# Patient Record
Sex: FEMALE | Race: WHITE | NOT HISPANIC OR LATINO | Employment: OTHER | URBAN - METROPOLITAN AREA
[De-identification: names, ages, dates, MRNs, and addresses within clinical notes are randomized per-mention and may not be internally consistent; named-entity substitution may affect disease eponyms.]

---

## 2017-02-06 DIAGNOSIS — I10 ESSENTIAL HYPERTENSION: Primary | ICD-10-CM

## 2017-02-06 RX ORDER — LISINOPRIL 5 MG/1
5 TABLET ORAL DAILY
Qty: 90 TABLET | Refills: 0 | Status: SHIPPED | OUTPATIENT
Start: 2017-02-06 | End: 2017-05-08

## 2017-02-06 NOTE — TELEPHONE ENCOUNTER
Medication is being filled for 1 time refill only due to:  Patient needs labs BMP. Future labs ordered Yes.

## 2017-02-07 ENCOUNTER — TELEPHONE (OUTPATIENT)
Dept: FAMILY MEDICINE | Facility: CLINIC | Age: 77
End: 2017-02-07

## 2017-02-07 ENCOUNTER — HOSPITAL ENCOUNTER (EMERGENCY)
Facility: CLINIC | Age: 77
Discharge: HOME | End: 2017-02-07
Attending: EMERGENCY MEDICINE | Admitting: EMERGENCY MEDICINE
Payer: MEDICARE

## 2017-02-07 ENCOUNTER — APPOINTMENT (OUTPATIENT)
Dept: ULTRASOUND IMAGING | Facility: CLINIC | Age: 77
End: 2017-02-07
Attending: EMERGENCY MEDICINE
Payer: MEDICARE

## 2017-02-07 ENCOUNTER — APPOINTMENT (OUTPATIENT)
Dept: GENERAL RADIOLOGY | Facility: CLINIC | Age: 77
End: 2017-02-07
Attending: EMERGENCY MEDICINE
Payer: MEDICARE

## 2017-02-07 VITALS
OXYGEN SATURATION: 100 % | RESPIRATION RATE: 14 BRPM | TEMPERATURE: 97.7 F | SYSTOLIC BLOOD PRESSURE: 142 MMHG | HEART RATE: 101 BPM | HEIGHT: 66 IN | WEIGHT: 175 LBS | DIASTOLIC BLOOD PRESSURE: 82 MMHG | BODY MASS INDEX: 28.12 KG/M2

## 2017-02-07 DIAGNOSIS — R60.0 LEG EDEMA, LEFT: ICD-10-CM

## 2017-02-07 PROCEDURE — 93971 EXTREMITY STUDY: CPT | Mod: LT

## 2017-02-07 PROCEDURE — 99284 EMERGENCY DEPT VISIT MOD MDM: CPT | Mod: 25 | Performed by: EMERGENCY MEDICINE

## 2017-02-07 PROCEDURE — 99284 EMERGENCY DEPT VISIT MOD MDM: CPT | Mod: Z6 | Performed by: EMERGENCY MEDICINE

## 2017-02-07 PROCEDURE — 73560 X-RAY EXAM OF KNEE 1 OR 2: CPT | Mod: LT

## 2017-02-07 ASSESSMENT — ENCOUNTER SYMPTOMS
VOMITING: 0
DIFFICULTY URINATING: 0
NAUSEA: 0
CHILLS: 0
COLOR CHANGE: 0
WEAKNESS: 0
SHORTNESS OF BREATH: 0
POLYDIPSIA: 0
NUMBNESS: 0
BRUISES/BLEEDS EASILY: 0
AGITATION: 0
NECK PAIN: 0
ADENOPATHY: 0
ABDOMINAL PAIN: 0
FEVER: 0
BACK PAIN: 0
LIGHT-HEADEDNESS: 0
NECK STIFFNESS: 0

## 2017-02-07 NOTE — ED AVS SNAPSHOT
[facility], [place], Emergency Department    [address] [address]    Phone:  [phone]                                       Shyann Samuel   MRN: [id number]    Department:  [facility], Emergency Department   Date of Visit:  2/7/2017           After Visit Summary Signature Page     I have received my discharge instructions, and my questions have been answered. I have discussed any challenges I see with this plan with the nurse or doctor.    ..........................................................................................................................................  Patient/Patient Representative Signature      ..........................................................................................................................................  Patient Representative Print Name and Relationship to Patient    ..................................................               ................................................  Date                                            Time    ..........................................................................................................................................  Reviewed by Signature/Title    ...................................................              ..............................................  Date                                                            Time

## 2017-02-07 NOTE — TELEPHONE ENCOUNTER
Discussed MyChart message from pt dated evening of 2/6 with Dr Starks - concern about swelling of pt's left calf, as well as joint swelling and pain of knee and ankle following a fall about 10 days ago. Per Dr Starks - pt needs to go to ER for possible blood clot - needs assessment of leg and joint swelling. Advised pt of this - she agrees with plan - she will go to [facility] ER this AM. She will call clinic prn .

## 2017-02-07 NOTE — ED AVS SNAPSHOT
[facility], Emergency Department    [address] [address]    Phone:  [phone]                                       Shyann Samuel   MRN: [id number]    Department:  [facility], Emergency Department   Date of Visit:  2/7/2017           Patient Information     Date Of Birth          1940        Your diagnoses for this visit were:     Leg edema, left        You were seen by Umair Stewart MD.        Discharge Instructions       Please make an appointment to follow up with Your Primary Care Provider in 7 days unless symptoms completely resolve. If your swelling worsens you should obtain a repeat ultrasound in one week since small blood clots in the veins of the calf cannot be seen adequately early in the process of clot formation.    Leg Swelling (1 Leg Only)  Swelling of the arms, feet, ankles, and legs is called edema. It is caused by extra fluid collecting in the tissues. Because of gravity, extra fluid in the body settles to the lowest part. That is why the legs and feet are most affected. You have swelling in only 1 leg.  Some of the causes for swelling in only one leg include:    Infection in the foot or leg    Long-term problem with a vein not working well (venous insufficiency)    Swollen, twisted vein in the leg (varicose veins)    Insect bite or sting on the foot or leg    Injury or recent surgery on the foot or leg    Blood clot in a deep vein of the leg (deep vein thrombosis or DVT)  Medical treatment will depend on what is causing your swelling.  Home care  Follow these guidelines when caring for yourself at home:    Don t wear tight clothing.    Keep your legs up while lying or sitting.    Take any medicines as directed.    If infection, injury, or recent surgery is the cause of your swelling, stay off your legs as much as possible until your symptoms get better.    If you have venous insufficiency or varicose veins, don t sit or  one place for long periods of time.  Take breaks and walk around every few hours. Talk with your healthcare provider about wearing support stockings to help lessen swelling during the day.  Follow-up care  Follow up with your health care provider as advised.  Call 911  Call 911 if any of these occur:    Shortness of breath or trouble breathing    Chest pain    Coughing up blood    Fainting or loss of consciousness   When to seek medical advice  Call your healthcare provider right away if any of these occur:    Increased pain, swelling, warmth, or redness of the leg, ankle, or foot    Fever of 100.4 F (38 C) or higher, or as directed by your healthcare provider    Weakness or dizziness    9626-7502 [employer]. [address] [place] [address], [place] [address]. All rights reserved. This information is not intended as a substitute for professional medical care. Always follow your healthcare professional's instructions.            24 Hour Appointment Hotline       To make an appointment at any [facility], call [phone] ([phone]). If you don't have a family doctor or clinic, we will help you find one. [facility] clinics are conveniently located to serve the needs of you and your family.             Review of your medicines      Our records show that you are taking the medicines listed below. If these are incorrect, please call your family doctor or clinic.        Dose / Directions Last dose taken    CALCIUM + D PO        Take  by mouth. 600mg/400 IU bid   Refills:  0        ciprofloxacin 500 MG tablet   Commonly known as:  CIPRO   Dose:  500 mg   Quantity:  10 tablet        Take 1 tablet (500 mg) by mouth 2 times daily   Refills:  1        conjugated estrogens cream   Commonly known as:  PREMARIN   Dose:  1 g   Quantity:  30 g        Place 1 g vaginally nightly as needed   Refills:  3        cyanocobalamin 1000 MCG tablet   Commonly known as:  vitamin  B-12   Dose:  1 tablet        Take 1 tablet by mouth three times a week.   Refills:   0        lisinopril 5 MG tablet   Commonly known as:  PRINIVIL/ZESTRIL   Dose:  5 mg   Quantity:  90 tablet        Take 1 tablet (5 mg) by mouth daily Needs labs for further refills   Refills:  0        NAPROXEN PO   Dose:  220 mg        Take 220 mg by mouth 2 times daily as needed for moderate pain   Refills:  0        OMEGA-3 FISH OIL PO        Take  by mouth.   Refills:  0        simvastatin 20 MG tablet   Commonly known as:  ZOCOR   Dose:  20 mg   Quantity:  90 tablet        Take 1 tablet (20 mg) by mouth At Bedtime   Refills:  3        * UNABLE TO FIND        MEDICATION NAME: Six-Flavor Tea pills, 8 pills (Chinese herbal medicain) Strengthen kidney merdian   Refills:  0        * UNABLE TO FIND        MEDICATION NAME: Barry Ash Raya pills (Chinese herbal medicine) for sleep   Refills:  0        * Notice:  This list has 2 medication(s) that are the same as other medications prescribed for you. Read the directions carefully, and ask your doctor or other care provider to review them with you.            Procedures and tests performed during your visit     US Lower Extremity Venous Duplex Left    XR Knee Left 1/2 Views      Orders Needing Specimen Collection     None      Pending Results     Date and Time Order Name Status Description    2/7/2017 1242 XR Knee Left 1/2 Views Preliminary             Pending Culture Results     No orders found from 2/6/2017 to 2/8/2017.            Thank you for choosing [facility]       Thank you for choosing [facility] for your care. Our goal is always to provide you with excellent care. Hearing back from our patients is one way we can continue to improve our services. Please take a few minutes to complete the written survey that you may receive in the mail after you visit with us. Thank you!        [employer] Information     [employer] gives you secure access to your electronic health record. If you see a primary care provider, you can also send messages to your care team and make  appointments. If you have questions, please call your primary care clinic.  If you do not have a primary care provider, please call [phone] and they will assist you.        Care EveryWhere ID     This is your Care EveryWhere ID. This could be used by other organizations to access your [facility] medical records  [id number]        After Visit Summary       This is your record. Keep this with you and show to your community pharmacist(s) and doctor(s) at your next visit.

## 2017-02-07 NOTE — ED PROVIDER NOTES
"    [facility] ([facility])  February 7, 2017  ED 11   History     Chief Complaint   Patient presents with     Leg Swelling     The history is provided by the patient and medical records.     Shyann Samuel is a 76 year old female with a medical history significant for hyperlipidemia, hypertension, osteoarthritis, presents to the emergency department for evaluation following a slip and fall on January 26. Patient reports that she slipped and fell getting out of her car and fell onto both knees. She reports that later in that evening she noted the left knee appeared swollen, but she did not think much of it as she considered the fall a \"gentle fall.\" She reports that she did not have any pain at that time. However, she reports that that a few days after the fall she developed left lower leg swelling. She reports that the swelling improves upon waking up at night and gradually worsens throughout the day. She states that she is able to move the knee without difficulty and denies any hip pain. She does note left anterior thigh soreness that she attributes to longstanding sciatica. She states that she is taking naproxen for her sciatica and has not had to take anything for her left knee  She denies fever. No reported history of DVT or PE. She is status post total right hip arthroplasty, but denies any previous interventions on either knee. She notes a visit with a Urologist in 2015 for frequent nocturnal urination in which she reports her symptoms were attributed to fluid retention in her lower extremities. She is not on diuretic medications.     I have reviewed the Medications, Allergies, Past Medical and Surgical History, and Social History in the Epic system.    PAST MEDICAL HISTORY:   Past Medical History   Diagnosis Date     Back pain      Hyperlipidemia      Anterior corneal dystrophy      Cataract      Esotropia      Hyperlipidemia      Hypertension      Insomnia      Osteoarthritis      " Posterior vitreous detachment      Urinary tract infection      PAST SURGICAL HISTORY:   Past Surgical History   Procedure Laterality Date     Eye surgery  after 2003,pt unsure     blepharoplasty     C total hip arthroplasty Right 10/2009     Replacement of right hip     Cataract iol, rt/lt Bilateral ~2005     Hc external levator resection Bilateral 12/2014     FAMILY HISTORY:   Family History   Problem Relation Age of Onset     CEREBROVASCULAR DISEASE Mother      Crohn Disease Mother      OSTEOPOROSIS Mother      CANCER Other      Father (kidney meningioma), Brother (neuroblastoma)     Glaucoma No family hx of      Macular Degeneration No family hx of      SOCIAL HISTORY:   Social History   Substance Use Topics     Smoking status: Never Smoker      Smokeless tobacco: Never Used     Alcohol Use: No     Discharge Medication List as of 2/7/2017  3:14 PM      CONTINUE these medications which have NOT CHANGED    Details   lisinopril (PRINIVIL/ZESTRIL) 5 MG tablet Take 1 tablet (5 mg) by mouth daily Needs labs for further refills, Disp-90 tablet, R-0, E-Prescribe      simvastatin (ZOCOR) 20 MG tablet Take 1 tablet (20 mg) by mouth At Bedtime, Disp-90 tablet, R-3, E-Prescribe      NAPROXEN PO Take 220 mg by mouth 2 times daily as needed for moderate pain, Historical      !! UNABLE TO FIND MEDICATION NAME: Six-Flavor Tea pills, 8 pills (Chinese herbal medicain) Strengthen kidney merdian, Historical      !! UNABLE TO FIND MEDICATION NAME: Barry Ash Raya pills (Chinese herbal medicine) for sleep, Historical      cyanocolbalamin (VITAMIN  B-12) 1000 MCG tablet Take 1 tablet by mouth three times a week., 1 tablet, Oral, THREE TIMES WEEKLY, Until Discontinued, Historical      Omega-3 Fatty Acids (OMEGA-3 FISH OIL PO) Take  by mouth., Oral, Until Discontinued, Historical      Calcium-Vitamin D (CALCIUM + D PO) Take  by mouth. 600mg/400 IU bid, Oral, Until Discontinued, Historical      ciprofloxacin (CIPRO) 500 MG tablet Take  "1 tablet (500 mg) by mouth 2 times daily, Disp-10 tablet, R-1, Local Print      conjugated estrogens (PREMARIN) vaginal cream Place 1 g vaginally nightly as needed, Disp-30 g, R-3, Local Print       !! - Potential duplicate medications found. Please discuss with provider.        Allergies   Allergen Reactions     Benzalkonium Chloride      Eye irritation     Nitrofurantoin Other (See Comments)     Causes fast heart rate.     Review of Systems   Constitutional: Negative for fever and chills.   HENT: Negative for congestion.    Eyes: Negative for visual disturbance.   Respiratory: Negative for shortness of breath.    Cardiovascular: Negative for chest pain.   Gastrointestinal: Negative for nausea, vomiting and abdominal pain.   Endocrine: Negative for polydipsia and polyuria.   Genitourinary: Negative for difficulty urinating.   Musculoskeletal: Negative for back pain, neck pain and neck stiffness.        Left leg swelling.   Skin: Negative for color change, pallor and rash.   Neurological: Negative for weakness, light-headedness and numbness.   Hematological: Negative for adenopathy. Does not bruise/bleed easily.   Psychiatric/Behavioral: Negative for behavioral problems and agitation.       Physical Exam   BP: 158/89 mmHg  Pulse: 86  Temp: 97.7  F (36.5  C)  Resp: 16  Height: 167.6 cm (5' 6\")  Weight: 79.379 kg (175 lb)  SpO2: 96 %  Physical Exam   Constitutional: She is oriented to person, place, and time. She appears well-developed and well-nourished. No distress.   HENT:   Head: Normocephalic and atraumatic.   Mouth/Throat: Oropharynx is clear and moist. No oropharyngeal exudate.   Eyes: Conjunctivae and EOM are normal. No scleral icterus.   Neck: Normal range of motion. Neck supple.   Cardiovascular: Normal rate, normal heart sounds and intact distal pulses.    Pulses:       Dorsalis pedis pulses are 2+ on the right side, and 2+ on the left side.        Posterior tibial pulses are 2+ on the right side, and 2+ on " the left side.   Pulmonary/Chest: Effort normal and breath sounds normal. No respiratory distress. She has no wheezes. She has no rales.   Abdominal: Soft. Bowel sounds are normal. There is no tenderness.   Musculoskeletal: Normal range of motion. She exhibits edema. She exhibits no tenderness.   Left leg minimally pitting edema. No tenderness to palpation of left leg. No skin color changes in left lower extremity. Compartments are soft in LLE. Full ROM, active and passive, in left hip, knee, and ankle withtout pain. No tenderness over hips.   Neurological: She is alert and oriented to person, place, and time. She has normal reflexes. She displays normal reflexes. No cranial nerve deficit. She exhibits normal muscle tone. Coordination normal.   Skin: Skin is warm. No rash noted. She is not diaphoretic. No erythema.   Psychiatric: She has a normal mood and affect. Her behavior is normal. Judgment and thought content normal.   Nursing note and vitals reviewed.      ED Course     Procedures       12:43 PM  The patient was seen and examined by Umair Stewart MD in Room 11.          Critical Care time:  none               Labs Ordered and Resulted from Time of ED Arrival Up to the Time of Departure from the ED - No data to display    Assessments & Plan (with Medical Decision Making)   76 year old woman presenting with left lower extremity swelling for several days. Differential diagnosis: DVT, dependent edema, lymphedema, unlikely knee fracture.    After thorough history and physical exam, the patient appears to be in no acute distress. She declined analgesics and stated that she had no pain. I will obtain a left lower extremity ultrasound and an x-ray of the left knee for further diagnostic evaluation. Patient agrees with the plan.     I reviewed the patient's x-ray of the knee and I read the Radiology report; there is no evidence of fracture or joint effusion. I also reviewed her left lower extremity ultrasound and I  read the Radiology report; there is no evidence of DVT. At this point I do not have clear etiology for patient s left leg edema and I recommend she follow up with a primary-care physician in 1 week for reassessment. She agrees with the plan. She also agrees to return if her symptoms worsen.     This part of the document was transcribed by Mango Lopez and Arely Moralez, Medical [employer].     I have reviewed the nursing notes.    I have reviewed the findings, diagnosis, plan and need for follow up with the patient.    Discharge Medication List as of 2/7/2017  3:14 PM          Final diagnoses:   Leg edema, left     I, Mango Lopez, am serving as a trained medical scribe to document services personally performed by Umair Stewart MD, based on the provider's statements to me.   I, Umair Stewart MD, was physically present and have reviewed and verified the accuracy of this note documented by Mango Lopez.  2/7/2017   [facility], [place], EMERGENCY DEPARTMENT      Umair Stewart MD  02/07/17 1911

## 2017-02-07 NOTE — ED NOTES
Pt is here with + 2 pitting edema in entire left leg which pt reports noticing last Friday. Pt fell on 1/26 landing on knees and believes this my be related. No pain currently. VSS

## 2017-02-07 NOTE — DISCHARGE INSTRUCTIONS
Please make an appointment to follow up with Your Primary Care Provider in 7 days unless symptoms completely resolve. If your swelling worsens you should obtain a repeat ultrasound in one week since small blood clots in the veins of the calf cannot be seen adequately early in the process of clot formation.    Leg Swelling (1 Leg Only)  Swelling of the arms, feet, ankles, and legs is called edema. It is caused by extra fluid collecting in the tissues. Because of gravity, extra fluid in the body settles to the lowest part. That is why the legs and feet are most affected. You have swelling in only 1 leg.  Some of the causes for swelling in only one leg include:    Infection in the foot or leg    Long-term problem with a vein not working well (venous insufficiency)    Swollen, twisted vein in the leg (varicose veins)    Insect bite or sting on the foot or leg    Injury or recent surgery on the foot or leg    Blood clot in a deep vein of the leg (deep vein thrombosis or DVT)  Medical treatment will depend on what is causing your swelling.  Home care  Follow these guidelines when caring for yourself at home:    Don t wear tight clothing.    Keep your legs up while lying or sitting.    Take any medicines as directed.    If infection, injury, or recent surgery is the cause of your swelling, stay off your legs as much as possible until your symptoms get better.    If you have venous insufficiency or varicose veins, don t sit or  one place for long periods of time. Take breaks and walk around every few hours. Talk with your healthcare provider about wearing support stockings to help lessen swelling during the day.  Follow-up care  Follow up with your health care provider as advised.  Call 911  Call 911 if any of these occur:    Shortness of breath or trouble breathing    Chest pain    Coughing up blood    Fainting or loss of consciousness   When to seek medical advice  Call your healthcare provider right away if any  of these occur:    Increased pain, swelling, warmth, or redness of the leg, ankle, or foot    Fever of 100.4 F (38 C) or higher, or as directed by your healthcare provider    Weakness or dizziness    [id number] [employer]. [address] [place] [address]. All rights reserved. This information is not intended as a substitute for professional medical care. Always follow your healthcare professional's instructions.

## 2017-02-13 DIAGNOSIS — R60.0 EDEMA OF LEFT LOWER EXTREMITY: Primary | ICD-10-CM

## 2017-02-14 ENCOUNTER — OFFICE VISIT (OUTPATIENT)
Dept: FAMILY MEDICINE | Facility: CLINIC | Age: 77
End: 2017-02-14

## 2017-02-14 VITALS
OXYGEN SATURATION: 98 % | TEMPERATURE: 97.3 F | WEIGHT: 183.5 LBS | HEART RATE: 88 BPM | SYSTOLIC BLOOD PRESSURE: 123 MMHG | BODY MASS INDEX: 29.62 KG/M2 | DIASTOLIC BLOOD PRESSURE: 82 MMHG

## 2017-02-14 DIAGNOSIS — R60.0 LEG EDEMA, LEFT: Primary | ICD-10-CM

## 2017-02-14 NOTE — PROGRESS NOTES
SUBJECTIVE:  Shyann Samuel is a 76 year old female who presents to clinic today for the following health issues:    Left Leg Swelling x 11 days  On 1/26 she slipped and fell on ice while turning to get into the front seat of her car. She landed on her left knee but not think it was a severe fall. On 2/3/17 She noticed that her left leg was swelling up. On 2/6/17 she was advised to go to the ER for evaulation. She states a knee Xray and leg Ultrasound were both negative. The ER physician said she should repeat the ultrasound if it didn't resolve in a week. Two days ago she noticed it was even more swollen when she went to bed and her knee was swollen as well as her thigh. She called to make an appointment for ultrasound with vascular clinic and was given March 17 appointment    She states the swelling was better in the morning when she first wakes up. This morning the calf swelling has gone down but her knee is swollen. She also notes that last night she had pain in her left ankle for the first time.    She has no personal or family history of blood clots. She has been ambulating since her fall. She has no fever, cough, shortness of breath, weakness, nausea. She denies locking, catching or giving way of her knee.      Patient Active Problem List   Diagnosis     Low back pain     Status post hip replacement     Hyperlipidemia     Anterior corneal dystrophy     Hypertension     Insomnia     Osteoarthritis     Cataract     Dermatitis medicamentosa     Esotropia     Macular puckering     Nontoxic multinodular goiter     Posterior vitreous detachment     Recurrent UTI     Other symptoms involving nervous and musculoskeletal systems(181.99)     Atrophic vaginitis     Overactive bladder     Health Care Home       Current Outpatient Prescriptions   Medication Sig Dispense Refill     lisinopril (PRINIVIL/ZESTRIL) 5 MG tablet Take 1 tablet (5 mg) by mouth daily Needs labs for further refills 90 tablet 0     conjugated  estrogens (PREMARIN) vaginal cream Place 1 g vaginally nightly as needed 30 g 3     simvastatin (ZOCOR) 20 MG tablet Take 1 tablet (20 mg) by mouth At Bedtime 90 tablet 3     NAPROXEN PO Take 220 mg by mouth 2 times daily as needed for moderate pain       UNABLE TO FIND MEDICATION NAME: Six-Flavor Tea pills, 8 pills (Chinese herbal medicain) Strengthen kidney merdian       UNABLE TO FIND MEDICATION NAME: Barry Ash Raya pills (Chinese herbal medicine) for sleep       cyanocolbalamin (VITAMIN  B-12) 1000 MCG tablet Take 1 tablet by mouth three times a week.       Omega-3 Fatty Acids (OMEGA-3 FISH OIL PO) Take  by mouth.       Calcium-Vitamin D (CALCIUM + D PO) Take  by mouth. 600mg/400 IU bid       Problem list, Medication list, Allergies, and Medical/Social/Surgical histories reviewed in EPIC and updated as appropriate.    ROS:  The pertinent ROS is as per HPI, otherwise completely unremarkable.    OBJECTIVE:  /82 (BP Location: Right arm, Patient Position: Chair, Cuff Size: Adult Large)  Pulse 88  Temp 97.3  F (36.3  C) (Axillary)  Wt 183 lb 8 oz (83.2 kg)  SpO2 98%  BMI 29.62 kg/m2  Body mass index is 29.62 kg/(m^2).  GENERAL: healthy, alert and no distress  MS: Left leg is obviously swollen. Calf diameter is 39 cm vs 33 cm in her right leg. Distal circulation and sensation are intact. No warmth or erythema. Marlys sign is negative. Left Knee ROM is wnl. No edema. Mild tenderness to palpation at inferomedial aspect of knee. No laxity with varus or valgus stress Anterior drawer is positive. Zulma is negative.      ASSESSMENT/PLAN:  1. Leg edema, left  Given the discrepancy in leg diameter and worsening edema a repeat ultrasound would be prudent. If she develops shortness of breath or chest pain in the interim she agrees to go to the ER.   - US Lower Extremity Venous Duplex Left; Future      Rosa Segundo, MS, PA-C  [facility]

## 2017-02-14 NOTE — NURSING NOTE
76 year old  Chief Complaint   Patient presents with     Swelling     in left leg        Blood pressure 123/82, pulse 88, temperature 97.3  F (36.3  C), temperature source Axillary, weight 183 lb 8 oz (83.2 kg), SpO2 98 %. Body mass index is 29.62 kg/(m^2).  Patient Active Problem List   Diagnosis     Low back pain     Status post hip replacement     Hyperlipidemia     Anterior corneal dystrophy     Hypertension     Insomnia     Osteoarthritis     Cataract     Dermatitis medicamentosa     Esotropia     Macular puckering     Nontoxic multinodular goiter     Posterior vitreous detachment     Recurrent UTI     Other symptoms involving nervous and musculoskeletal systems(781.99)     Atrophic vaginitis     Overactive bladder     Health Care Home       Wt Readings from Last 2 Encounters:   02/14/17 183 lb 8 oz (83.2 kg)   02/07/17 175 lb (79.4 kg)     BP Readings from Last 3 Encounters:   02/14/17 123/82   02/07/17 142/82   09/30/16 138/84         Current Outpatient Prescriptions   Medication     lisinopril (PRINIVIL/ZESTRIL) 5 MG tablet     conjugated estrogens (PREMARIN) vaginal cream     simvastatin (ZOCOR) 20 MG tablet     NAPROXEN PO     UNABLE TO FIND     UNABLE TO FIND     cyanocolbalamin (VITAMIN  B-12) 1000 MCG tablet     Omega-3 Fatty Acids (OMEGA-3 FISH OIL PO)     Calcium-Vitamin D (CALCIUM + D PO)     No current facility-administered medications for this visit.        Social History   Substance Use Topics     Smoking status: Never Smoker     Smokeless tobacco: Never Used     Alcohol use No       Health Maintenance Due   Topic Date Due     ADVANCE DIRECTIVE PLANNING Q5 YRS (NO INBASKET)  04/08/1959     COLON CANCER SCREEN (SYSTEM ASSIGNED)  04/08/1991     FALL RISK ASSESSMENT  04/08/2006     DEXA SCAN SCREENING (SYSTEM ASSIGNED)  04/08/2006     PNEUMOCOCCAL (1 of 2 - PCV13) 04/08/2006     TETANUS IMMUNIZATION (SYSTEM ASSIGNED)  06/24/2010     INFLUENZA VACCINE (SYSTEM ASSIGNED)  09/01/2016       No results  found for: PAP      February 14, 2017 11:09 AM

## 2017-02-14 NOTE — MR AVS SNAPSHOT
After Visit Summary   2/14/2017    Shyann Samuel    MRN: [id number]           Patient Information     Date Of Birth          1940        Visit Information        Provider Department      2/14/2017 11:00 AM Rosa Segundo PA-C [facility]        Today's Diagnoses     Leg edema, left    -  1       Follow-ups after your visit        Your next 10 appointments already scheduled     Feb 15, 2017 12:00 PM CST   US LOWER EXTREMITY VENOUS DUPLEX LEFT with UCUSV1   [facility]  ([facility])    [address] [address] [phone]           Please bring a list of your medicines (including vitamins, minerals and over-the-counter drugs). Also, tell your doctor about any allergies you may have. Wear comfortable clothes and leave your valuables at home.  You do not need to do anything special to prepare for your exam.  Please call the Imaging Department at your exam site with any questions.              Future tests that were ordered for you today     Open Future Orders        Priority Expected Expires Ordered    US Lower Extremity Venous Duplex Left Routine  2/14/2018 2/14/2017            Who to contact     Please call your clinic at [phone] to:    Ask questions about your health    Make or cancel appointments    Discuss your medicines    Learn about your test results    Speak to your doctor   If you have compliments or concerns about an experience at your clinic, or if you wish to file a complaint, please contact [facility] Patient Relations at [phone] or email us at [email or web address]         Additional Information About Your Visit        MyChart Information     [employer] gives you secure access to your electronic health record. If you see a primary care provider, you can also send messages to your care team and make appointments. If you have questions, please call your primary  [facility] clinic.  If you do not have a primary care provider, please call [phone] and they will assist you.      [employer] is an electronic gateway that provides easy, online access to your medical records. With [employer], you can request a clinic appointment, read your test results, renew a prescription or communicate with your care team.     To access your existing account, please contact your [facility] or call [phone] for assistance.        Care EveryWhere ID     This is your Care EveryWhere ID. This could be used by other organizations to access your [facility] medical records  [id number]        Your Vitals Were     Pulse Temperature Pulse Oximetry BMI (Body Mass Index)          88 97.3  F (36.3  C) (Axillary) 98% 29.62 kg/m2         Blood Pressure from Last 3 Encounters:   02/14/17 123/82   02/07/17 142/82   09/30/16 138/84    Weight from Last 3 Encounters:   02/14/17 183 lb 8 oz (83.2 kg)   02/07/17 175 lb (79.4 kg)   09/19/16 181 lb (82.1 kg)               Primary Care Provider Office Phone # Fax #    Aleksandra Starks [phone]       [address] [address]        Goals        General    Psychosocial (pt-stated)     Notes - Note edited  4/19/2016 11:10 AM by Zulma Kim BSW    As of today's date 4/19/2016 goal is met at 76 - 100%.   Goal Status:  Complete   Pt has resource ([employer]) that can evaluate pt's home.  Pt has decided to complete two other tasks (hiring yard work and cleaning out of her house assistance) prior to having [employer] come out.  I want a resource that can evaluate my home for DME/potential remodeling so I can age in place  As of today's date 3/17/2016 goal is met at 0 - 25%.   Goal Status:  Active    LUKE Coffey          Thank you!     Thank you for choosing [facility]  for your care. Our goal is always to provide you with excellent care. Hearing back from our patients  is one way we can continue to improve our services. Please take a few minutes to complete the written survey that you may receive in the mail after your visit with us. Thank you!             Your Updated Medication List - Protect others around you: Learn how to safely use, store and throw away your medicines at www.disposemymeds.org.          This list is accurate as of: 2/14/17 12:44 PM.  Always use your most recent med list.                   Brand Name Dispense Instructions for use    CALCIUM + D PO      Take  by mouth. 600mg/400 IU bid       conjugated estrogens cream    PREMARIN    30 g    Place 1 g vaginally nightly as needed       cyanocobalamin 1000 MCG tablet    vitamin  B-12     Take 1 tablet by mouth three times a week.       lisinopril 5 MG tablet    PRINIVIL/ZESTRIL    90 tablet    Take 1 tablet (5 mg) by mouth daily Needs labs for further refills       NAPROXEN PO      Take 220 mg by mouth 2 times daily as needed for moderate pain       OMEGA-3 FISH OIL PO      Take  by mouth.       simvastatin 20 MG tablet    ZOCOR    90 tablet    Take 1 tablet (20 mg) by mouth At Bedtime       * UNABLE TO FIND      MEDICATION NAME: Six-Flavor Tea pills, 8 pills (Chinese herbal medicain) Strengthen kidney merdian       * UNABLE TO FIND      MEDICATION NAME: Barry Bird Ten Raya pills (Chinese herbal medicine) for sleep       * Notice:  This list has 2 medication(s) that are the same as other medications prescribed for you. Read the directions carefully, and ask your doctor or other care provider to review them with you.

## 2017-02-23 ENCOUNTER — OFFICE VISIT (OUTPATIENT)
Dept: OTHER | Facility: CLINIC | Age: 77
End: 2017-02-23
Attending: INTERNAL MEDICINE
Payer: MEDICARE

## 2017-02-23 VITALS
DIASTOLIC BLOOD PRESSURE: 84 MMHG | OXYGEN SATURATION: 97 % | HEART RATE: 95 BPM | HEIGHT: 66 IN | SYSTOLIC BLOOD PRESSURE: 145 MMHG | WEIGHT: 184 LBS | BODY MASS INDEX: 29.57 KG/M2

## 2017-02-23 DIAGNOSIS — I87.2 VENOUS (PERIPHERAL) INSUFFICIENCY: Primary | ICD-10-CM

## 2017-02-23 PROCEDURE — 99211 OFF/OP EST MAY X REQ PHY/QHP: CPT

## 2017-02-23 PROCEDURE — 99214 OFFICE O/P EST MOD 30 MIN: CPT | Mod: ZP | Performed by: INTERNAL MEDICINE

## 2017-02-23 NOTE — NURSING NOTE
"Chief Complaint   Patient presents with     Consult     left leg swelling referred by Dr. Starks, had DVT U/S done       Initial /84 (BP Location: Left arm, Patient Position: Chair, Cuff Size: Adult Large)  Pulse 95  Ht 5' 6\" (1.676 m)  Wt 184 lb (83.5 kg)  SpO2 97%  BMI 29.7 kg/m2 Estimated body mass index is 29.7 kg/(m^2) as calculated from the following:    Height as of this encounter: 5' 6\" (1.676 m).    Weight as of this encounter: 184 lb (83.5 kg).  Medication Reconciliation: complete    Face to face time: 5 minutes    Alisha Vidales CMA    "

## 2017-02-23 NOTE — MR AVS SNAPSHOT
After Visit Summary   2/23/2017    Shyann Samuel    MRN: [id number]           Patient Information     Date Of Birth          1940        Visit Information        Provider Department      2/23/2017 2:30 PM Chao Valenzuela MD [facility]      Today's Diagnoses     Venous (peripheral) insufficiency    -  1       Follow-ups after your visit        Your next 10 appointments already scheduled     Apr 26, 2017  2:00 PM CDT   (Arrive by 1:45 PM)   New Patient Visit with Fredy Ambriz MD   [facility] ([facility])    [address] [place] [address]  3rd Floor  [facility] [address] [phone]              Who to contact     If you have questions or need follow up information about today's clinic visit or your schedule please contact [facility] directly at [phone].  Normal or non-critical lab and imaging results will be communicated to you by MyChart, letter or phone within 4 business days after the clinic has received the results. If you do not hear from us within 7 days, please contact the clinic through [employer] or phone. If you have a critical or abnormal lab result, we will notify you by phone as soon as possible.  Submit refill requests through [employer] or call your pharmacy and they will forward the refill request to us. Please allow 3 business days for your refill to be completed.          Additional Information About Your Visit        MyChart Information     [employer] gives you secure access to your electronic health record. If you see a primary care provider, you can also send messages to your care team and make appointments. If you have questions, please call your primary care clinic.  If you do not have a primary care provider, please call [phone] and they will assist you.        Care EveryWhere ID     This is your Care EveryWhere ID. This could be used by  "other organizations to access your [facility] medical records  [id number]        Your Vitals Were     Pulse Height Pulse Oximetry BMI (Body Mass Index)          95 5' 6\" (1.676 m) 97% 29.7 kg/m2         Blood Pressure from Last 3 Encounters:   02/23/17 145/84   02/14/17 123/82   02/07/17 142/82    Weight from Last 3 Encounters:   02/23/17 184 lb (83.5 kg)   02/14/17 183 lb 8 oz (83.2 kg)   02/07/17 175 lb (79.4 kg)              Today, you had the following     No orders found for display       Primary Care Provider Office Phone # Fax #    Aleksandra Starks [phone]       [address] [address]        Goals        General    Psychosocial (pt-stated)     Notes - Note edited  4/19/2016 11:10 AM by Zulma Kim BSW    As of today's date 4/19/2016 goal is met at 76 - 100%.   Goal Status:  Complete   Pt has resource ([employer]) that can evaluate pt's home.  Pt has decided to complete two other tasks (hiring yard work and cleaning out of her house assistance) prior to having [employer] come out.  I want a resource that can evaluate my home for DME/potential remodeling so I can age in place  As of today's date 3/17/2016 goal is met at 0 - 25%.   Goal Status:  Active    LUKE Coffey          Thank you!     Thank you for choosing [facility]  for your care. Our goal is always to provide you with excellent care. Hearing back from our patients is one way we can continue to improve our services. Please take a few minutes to complete the written survey that you may receive in the mail after your visit with us. Thank you!             Your Updated Medication List - Protect others around you: Learn how to safely use, store and throw away your medicines at www.disposemymeds.org.          This list is accurate as of: 2/23/17  2:48 PM.  Always use your most recent med list.                   Brand Name Dispense Instructions for use    " CALCIUM + D PO      Take  by mouth. 600mg/400 IU bid       conjugated estrogens cream    PREMARIN    30 g    Place 1 g vaginally nightly as needed       cyanocobalamin 1000 MCG tablet    vitamin  B-12     Take 1 tablet by mouth three times a week.       lisinopril 5 MG tablet    PRINIVIL/ZESTRIL    90 tablet    Take 1 tablet (5 mg) by mouth daily Needs labs for further refills       NAPROXEN PO      Take 220 mg by mouth 2 times daily as needed for moderate pain       OMEGA-3 FISH OIL PO      Take  by mouth.       simvastatin 20 MG tablet    ZOCOR    90 tablet    Take 1 tablet (20 mg) by mouth At Bedtime       * UNABLE TO FIND      MEDICATION NAME: Six-Flavor Tea pills, 8 pills (Chinese herbal medicain) Strengthen kidney merdian       * UNABLE TO FIND      MEDICATION NAME: Barry Ash Raya pills (Chinese herbal medicine) for sleep       * Notice:  This list has 2 medication(s) that are the same as other medications prescribed for you. Read the directions carefully, and ask your doctor or other care provider to review them with you.

## 2017-02-24 NOTE — PROGRESS NOTES
February 23, 2017            Aleksandra Starks MD   [facility]   [address], [address]   [place], [place]  [address]      Dear Aleksandra:      I saw your patient, Ms. Shyann Samuel in vascular medical assessment at [facility] on today's date to go over her recent complaints of unilateral left lower extremity edema.  Approximately 1 month ago, the patient fell and contacted her bilateral knees with the trauma being greater on the left knee.  She noted that the left leg would swell during the day subsequent to this.  She noted that the swelling would improve as the night went on.  On 02/07/2017, she was seen in the emergency room.  She was noted to have no evidence of fracture of the leg.  She was noted to have patellofemoral degenerative changes.  Lower extremity duplex obtained at that time revealed no evidence of DVT.  She continued to have symptoms of unilateral left lower extremity edema and for that reason contacted your office.  Roughly 1 week later, on 02/15/2017, a venous duplex of left lower extremity was repeated and thankfully noted the absence of left lower extremity DVT.  Nonetheless, the patient is having continuing ongoing symptoms of left lower extremity edema.  She notes again that this has become much more prominent since the time of her fall.  However, she does note that she has had swelling in her legs that has been of minimal concern for many years and she states that that swelling was similarly worse throughout the day and better in the morning when she would wake up after her legs had been elevated in bed.  She denies chest pain, shortness of breath, nausea, vomiting or diarrhea.  The remainder of her 14-point review of systems is entirely within normal limits.      MEDICATIONS:   1.  Lisinopril 5 mg daily.   2.  Premarin vaginal cream nightly p.r.n.   3.  Simvastatin 20 mg daily.   4.  Naprosyn 220 mg p.o. b.i.d. p.r.n.   5.  Vitamin B12 1 tab  p.o. 3 times weekly.   6.  Omega 3 fatty acids over-the-counter 1 gram daily.   7.  Calcium with vitamin D b.i.d.      ALLERGIES:  Macrodantin causes palpitations.      PAST MEDICAL HISTORY:    1.  Urinary tract infection.   2.  Posterior vitreous detachment.   3.  Osteoarthritis.   4.  Insomnia.   5.  Hypertension.   6.  Hyperlipidemia.   7.  Cataracts.   8.  Back pain.   9.  Corneal dystrophy.      PREVIOUS SURGICAL HISTORY:     1.  Bilateral external levator resections in  and .   2.  Cataract repair bilaterally .   3.  Right total hip arthroplasty 10/2009.      FAMILY HISTORY:  Notable for stroke and Crohn's disease as well as osteoporosis in her  mother.  Her father had a renal meningioma.  A brother had a neuroblastoma.      SOCIAL HISTORY:  Notable for the fact that she is retired.  She is a .  She is a lifelong nonsmoker.      PHYSICAL EXAMINATION:   VITAL SIGNS:  The patient has a blood pressure of 147/88 in her right arm and  145/84 in her left arm.  Heart rate is 93, respirations are 12, pulse ox 97% on room air.   HEENT:  Oropharynx within normal limits.   NECK:  Reveals no JVD, thyromegaly, lymphadenopathy.   LUNGS:  Clear to auscultation bilaterally without rales, wheezes or rhonchi.   HEART:  Regular rate and rhythm, normal S1, S2, no S3, S4, murmur, gallop or rub.   GASTROINTESTINAL:  Normoactive bowel sounds, soft, nondistended, nontender.   EXTREMITIES:  Without cyanosis, clubbing or edema on the right.  She has an enlarged left calf with a mild degree of calf tautness.  She states that this is not appreciably changed since the time of her 02/15/2017 venous duplex.   NEUROLOGIC:  Nonfocal.  She has CEAP C1 varicosities.      IMPRESSION:     1.  Venous insufficiency.     2.  Two serial negative venous duplex studies, left lower extremity.     3.  Hypertension.        DISCUSSION:      I explained to the patient with her symptoms not being appreciably different since the time  of her initial venous duplex that the likelihood of having developed a DVT since her second venous duplex is extremely low.  Rather, this appears clinically to be much more likely consistent with venous insufficiency.  To that end as she does not have venous ulceration or lipodermatosclerosis, I have not recommended that we proceed with a venous competency study in consideration of radiofrequency ablation of any incompetent lower extremity veins which may be encountered.  Rather, I have recommended that she utilize 20-30 mm compressive Sigvaris hosiery to the knee-high position.  I advised that she return to clinic in 6 months or sooner should she have problems with progressive swelling of the lower extremity or shortness of breath.  I also recommend that she return to see you for further evaluation of her hypertension.        Please do not hesitate to contact me if I may be of assistance regarding this or other matters moving forward in the future.      Sincerely,          SHANNAN WALSH MD             D: 2017 14:47   T: 2017 15:23   MT: [id number]      Name:     SAMIRA GREGG   MRN:      [id number]        Account:      [id number]   :      1940           Visit Date:   2017      Document: [id number]       cc: Aleksandra Starks MD

## 2017-03-29 DIAGNOSIS — I10 ESSENTIAL HYPERTENSION: Primary | ICD-10-CM

## 2017-04-25 DIAGNOSIS — E78.5 HYPERLIPIDEMIA LDL GOAL <130: Primary | ICD-10-CM

## 2017-04-26 ENCOUNTER — OFFICE VISIT (OUTPATIENT)
Dept: CARDIOLOGY | Facility: CLINIC | Age: 77
End: 2017-04-26
Attending: INTERNAL MEDICINE
Payer: MEDICARE

## 2017-04-26 VITALS
OXYGEN SATURATION: 98 % | HEIGHT: 66 IN | WEIGHT: 183.4 LBS | DIASTOLIC BLOOD PRESSURE: 84 MMHG | HEART RATE: 103 BPM | SYSTOLIC BLOOD PRESSURE: 134 MMHG | BODY MASS INDEX: 29.47 KG/M2

## 2017-04-26 DIAGNOSIS — Z13.220 SCREENING CHOLESTEROL LEVEL: ICD-10-CM

## 2017-04-26 DIAGNOSIS — R00.2 PALPITATIONS: ICD-10-CM

## 2017-04-26 DIAGNOSIS — R06.02 SOB (SHORTNESS OF BREATH): ICD-10-CM

## 2017-04-26 DIAGNOSIS — R01.1 HEART MURMUR: ICD-10-CM

## 2017-04-26 DIAGNOSIS — R60.0 BILATERAL LOWER EXTREMITY EDEMA: Primary | ICD-10-CM

## 2017-04-26 PROCEDURE — 93005 ELECTROCARDIOGRAM TRACING: CPT | Mod: ZF

## 2017-04-26 PROCEDURE — 99214 OFFICE O/P EST MOD 30 MIN: CPT | Mod: ZP | Performed by: INTERNAL MEDICINE

## 2017-04-26 PROCEDURE — 93010 ELECTROCARDIOGRAM REPORT: CPT | Mod: ZP | Performed by: INTERNAL MEDICINE

## 2017-04-26 ASSESSMENT — PAIN SCALES - GENERAL: PAINLEVEL: NO PAIN (0)

## 2017-04-26 NOTE — LETTER
4/26/2017      RE: Shyann Samuel  [address]  [facility] [address]       Dear Colleague,    Thank you for the opportunity to participate in the care of your patient, Shyann Samuel, at the [facility]. Please see a copy of my visit note below.    HPI:       I saw your patient, Ms. Shyann Samuel, who is a 77 yr female patient with a Hx her recent complaints of unilateral left lower extremity.Patient experienced nocturia.  Patient had a fall in January 2017 on the ice and then after the fall edema.    After the fall she was noted to have patellofemoral degenerative changes. Lower extremity duplex obtained at that time revealed no evidence of DVT. She continued to have symptoms of unilateral left lower extremity edema and for that reason contacted your office. Roughly 1 week later, on 02/15/2017, a venous duplex of left lower extremity was repeated and thankfully noted the absence of left lower extremity DVT.    She denies chest pain, shortness of breath, palpitations, nausea, vomiting or diarrhea. The remainder of her 14-point review of systems is entirely within normal limits.     Patient has had 3 falls not due to balance problems following the patient.-    PAST MEDICAL HISTORY:  Past Medical History:   Diagnosis Date     Anterior corneal dystrophy      Back pain      Cataract      Esotropia      Hyperlipidemia      Hyperlipidemia      Hypertension      Insomnia      Osteoarthritis      Posterior vitreous detachment      Urinary tract infection        CURRENT MEDICATIONS:  Current Outpatient Prescriptions   Medication Sig Dispense Refill     lisinopril (PRINIVIL/ZESTRIL) 5 MG tablet Take 1 tablet (5 mg) by mouth daily Needs labs for further refills 90 tablet 0     conjugated estrogens (PREMARIN) vaginal cream Place 1 g vaginally nightly as needed 30 g 3     simvastatin (ZOCOR) 20 MG tablet Take 1 tablet (20 mg) by mouth At Bedtime 90 tablet 3      NAPROXEN PO Take 220 mg by mouth 2 times daily as needed for moderate pain       UNABLE TO FIND MEDICATION NAME: Six-Flavor Tea pills, 8 pills (Chinese herbal medicain) Strengthen kidney merdian       UNABLE TO FIND MEDICATION NAME: Barry Ash Raya pills (Chinese herbal medicine) for sleep       cyanocolbalamin (VITAMIN  B-12) 1000 MCG tablet Take 1 tablet by mouth three times a week.       Omega-3 Fatty Acids (OMEGA-3 FISH OIL PO) Take  by mouth.       Calcium-Vitamin D (CALCIUM + D PO) Take  by mouth. 600mg/400 IU bid         PAST SURGICAL HISTORY:  Past Surgical History:   Procedure Laterality Date     C TOTAL HIP ARTHROPLASTY Right 10/2009    Replacement of right hip     CATARACT IOL, RT/LT Bilateral ~2005     EYE SURGERY  after 2003,pt unsure    blepharoplasty     HC EXTERNAL LEVATOR RESECTION Bilateral 12/2014       ALLERGIES     Allergies   Allergen Reactions     Benzalkonium Chloride      Eye irritation     Nitrofurantoin Other (See Comments)     Causes fast heart rate.       FAMILY HISTORY:  Family History   Problem Relation Age of Onset     CEREBROVASCULAR DISEASE Mother      Crohn Disease Mother      OSTEOPOROSIS Mother      CANCER Other      Father (kidney meningioma), Brother (neuroblastoma)     Glaucoma No family hx of      Macular Degeneration No family hx of    Coronary heart disease                         Father    SOCIAL HISTORY:  Social History     Social History     Marital status:      Spouse name: N/A     Number of children: N/A     Years of education: N/A     Social History Main Topics     Smoking status: Never Smoker     Smokeless tobacco: Never Used     Alcohol use No     Drug use: No     Sexual activity: No     Other Topics Concern     None     Social History Narrative       ROS:   Constitutional: No fever, chills, or sweats. No weight gain/loss   ENT: No visual disturbance, ear ache, epistaxis, sore throat  Allergies/Immunologic: Negative.   Respiratory: No cough,  "hemoptysia  Cardiovascular: As per HPI  GI: No nausea, vomiting, hematemesis, melena, or hematochezia  : No urinary frequency, dysuria, or hematuria  Integument: Negative  Psychiatric: Negative  Neuro: Negative  Endocrinology: Negative   Musculoskeletal: Negative    EXAM:  /84 (BP Location: Right arm, Patient Position: Chair, Cuff Size: Adult Large)  Pulse 103  Ht 1.676 m (5' 6\")  Wt 83.2 kg (183 lb 6.4 oz)  SpO2 98%  BMI 29.6 kg/m2  In general, the patient is a pleasant female in no apparent distress.    HEENT: NC/AT.  PERRLA.  EOMI.  Sclerae white, not injected.  Nares clear.  Pharynx without erythema or exudate.  Dentition intact.    Neck: No adenopathy.  No thyromegaly. Carotids +4/4 bilaterally without bruits.  No jugular venous distension.   Heart: RRR. Normal S1, S2 splits physiologically. No murmur, rub, click, or gallop. The PMI is in the 5th ICS in the midclavicular line. There is no heave.    Lungs: CTA.  No ronchi, wheezes, rales.  No dullness to percussion.   Abdomen: Soft, nontender, nondistended. No organomegaly.  No bruits.   Extremities: No clubbing, cyanosis, or edema.  The pulses are +4/4 at the radial, brachial, femoral, popliteal, DP, and PT sites bilaterally.  No bruits are noted.  Neurologic: Alert and oriented to person/place/time, normal speech, gait and affect  Skin: No petechiae, purpura or rash.    Labs:  LIPID RESULTS:  Lab Results   Component Value Date    CHOL 154 05/05/2017    HDL 62 05/05/2017    LDL 73 05/05/2017    TRIG 92 05/05/2017    CHOLHDLRATIO 2.9 02/23/2016    NHDL 92 05/05/2017       LIVER ENZYME RESULTS:  Lab Results   Component Value Date    AST 16 05/05/2017    ALT 29 05/05/2017       CBC RESULTS:  Lab Results   Component Value Date    WBC 8.5 04/21/2015    RBC 4.80 04/21/2015    HGB 14.1 04/21/2015    HCT 44.4 04/21/2015    MCV 93 04/21/2015    MCH 29.4 04/21/2015    MCHC 31.8 04/21/2015    RDW 13.9 04/21/2015     04/21/2015       BMP RESULTS:  Lab " Results   Component Value Date     05/11/2017    POTASSIUM 4.7 05/11/2017    CHLORIDE 103 05/11/2017    CO2 27 05/11/2017    ANIONGAP 9 05/11/2017    GLC 80 05/11/2017    BUN 16 05/11/2017    CR 0.70 05/11/2017    GFRESTIMATED 81 05/11/2017    GFRESTBLACK >90   GFR Calc   05/11/2017    ROBERT 9.5 05/11/2017        A1C RESULTS:  Lab Results   Component Value Date    A1C 5.4 02/11/2016       INR RESULTS:  Lab Results   Component Value Date    INR 2.05 (H) 10/24/2009    INR 1.49 (H) 10/23/2009       Procedures:    Echocardiogram 05-15-17  Global and regional left ventricular function is normal with an EF of 55-60%.  Global right ventricular function is normal. The right ventricle is normal  size.  Pulmonary artery systolic pressure cannot be assessed. The inferior vena cava  was normal in size with preserved respiratory variability. Estimated mean  right atrial pressure is <3 mmHg.     Previous study not available for comparison.  _____________________________________________________________________________  __        Left Ventricle  Left ventricular size is normal. Global and regional left ventricular function  is normal with an EF of 55-60%. Mild concentric wall thickening consistent  with left ventricular hypertrophy is present. Left ventricular diastolic  function is indeterminate.     Right Ventricle  Global right ventricular function is normal. The right ventricle is normal  size.     Atria  Both atria appear normal. The atrial septum is intact as assessed by color  Doppler .     Mitral Valve  The mitral valve is normal. Trace mitral insufficiency is present.        Aortic Valve  The aortic valve is tricuspid. Mild aortic valve sclerosis is present. Trace  aortic insufficiency is present.     Tricuspid Valve  The tricuspid valve is normal. Trace tricuspid insufficiency is present.  Pulmonary artery systolic pressure cannot be assessed.     Pulmonic Valve  The pulmonic valve is normal. Trace  pulmonic insufficiency is present.     Vessels  The aorta root is normal. The inferior vena cava was normal in size with  preserved respiratory variability. Estimated mean right atrial pressure is <3  mmHg.     Pericardium  No pericardial effusion is present.        Compared to Previous Study  Previous study not available for comparison.     Attestation  I have personally viewed the imaging and agree with the interpretation and  report as documented by the fellow, Dr. Simeon, and/or edited by me.  _____________________________________________________________________________  __     MMode/2D Measurements & Calculations  IVSd: 1.2 cm  LVIDd: 4.5 cm  LVIDs: 3.8 cm  LVPWd: 1.1 cm  FS: 16.4 %  EDV(Teich): 93.7 ml  ESV(Teich): 61.4 ml  LV mass(C)d: 190.8 grams  LV mass(C)dI: 99.1 grams/m2  Ao root diam: 3.1 cm  asc Aorta Diam: 3.3 cm  LVOT diam: 2.1 cm  LVOT area: 3.4 cm2  LA Volume (BP): 53.6 ml     LA Volume Index (BP): 27.8 ml/m2        Doppler Measurements & Calculations  MV E max jacobo: 69.2 cm/sec  MV A max jacobo: 93.2 cm/sec  MV E/A: 0.74  MV dec time: 0.23 sec  Lateral E/e': 11.4  Medial E/e': 16.1       Venous Ultrasonography Lower Extremities 05-15-17   Clinical History: LOWER EXTREMITY CLAUDICATION, Localized edema     Technique: B-mode (grayscale) and Duplex Doppler ultrasound of the  lower extremity veins (superficial and deep), including incompetency  reflux time and compression for thrombus. Additional Duplex doppler  assessment of the PTA and RAMONITA at the ankles. Superficial incompetency  exam performed upright, non-weight bearing with distal compression  using rapid inflation/deflation cuffs.     Ordering provider: DG PIERSON      Venous Competency Diagnostic Criteria Adopted 11/29/11.     Venous competency criteria defining abnormal reflux duration:  Femoral - popliteal reflux > 1.0 sec.  Deep femoral,deep calf veins, and superficial vein reflux > 0.5 sec.   vein reflux > 0.35  sec.     Supporting document: J Vasc Surg 2003; 38:793-8. Definition of reflux  in lower extremity veins.     Findings:      Right ankle:   Posterior Tibial artery waveform: tri/biphasic  Dorsalis Pedis artery waveform: tri/biphasic     Left ankle:  Posterior Tibial artery waveform: tri/biphasic  Dorsalis Pedis artery waveform: tri/biphasic     Right Lower Extremity:      Duplex Evaluation for Deep Vein Thrombus (which includes CFV, FV,  popliteal vein, and posterior tibial veins): Negative.     Common femoral vein: Competent     Deep femoral vein: Competent     Femoral vein:      proximal thigh: Competent      mid thigh: Competent      distal thigh: Competent     Popliteal vein: Competent     Posterior tibial veins at the ankle: Competent     Left lower extremity:     Duplex Evaluation for Deep Vein Thrombus (which includes CFV, FV,  popliteal vein, and posterior tibial veins): Negative.      Common femoral vein: Competent     Deep femoral vein: Competent     Femoral vein:      proximal thigh: Competent      mid thigh: Competent      distal thigh: Competent     Popliteal vein: Competent     Posterior tibial veins at the ankle: Competent     Superficial Venous Incompetency Study:        Right Lower Extremity:      Duplex Evaluation for Superficial Vein Thrombus (which includes GSV,  SSV, AASV, and PASV): Negative.      Anterior accessory saphenous (AASV): Not seen     Posterior accessory saphenous (PASV): Competent     Great saphenous vein (GSV)      sapheno-femoral junction: Competent      prox thigh: Competent      mid thigh: Competent      dist thigh: Competent       knee: Competent      prox calf: Competent      mid calf: Competent      ankle: Competent     Vein of Giacomini (V of G):      distal thigh: Competent      mid thigh: Not seen      prox thigh: Not seen     Small saphenous vein:      sapheno-popliteal junction: Not seen      prox calf: Competent      mid calf: Competent     Diameters of superficial  veins:     AASV: Not measured     PASV: Not measured     SFJ: Diameter* 5.6 mm  GSV prox thigh*: Diameter 5.5 mm      GSV knee*: Diameter 2.6 mm     V. of Giacomini (V of G) dist thigh: Not measured  V. of Giacomini (V of G) mid thigh: Not measured  V. of Giacomini (V of G) prox thigh: Not measured     SSV SPJ*: Not measured  SSV prox calf: Diameter 2.0 mm     No incompetent veins or varicosities.     Left lower extremity:     Duplex Evaluation for Superficial Vein Thrombus (which includes GSV,  SSV, AASV, and PASV): Negative.     Anterior accessory saphenous (AASV): Competent     Posterior accessory saphenous (PASV): Competent     Great saphenous vein (GSV)      sapheno-femoral junction: Competent      prox thigh: Competent      mid thigh: Competent      dist thigh: Competent       knee: Competent      prox calf: Competent      mid calf: Competent      ankle: Competent     Vein of Giacomini (V of G):      distal thigh: Competent      mid thigh: Not seen      prox thigh: Not seen     Small saphenous vein:      sapheno-popliteal junction: Competent      prox calf: Not seen      mid calf: Competent     Diameters of superficial veins:     AASV: Not measured     PASV: Not measured     SFJ: Diameter* 6.3 mm  GSV prox thigh*: Diameter 4.7 mm      GSV knee*: Diameter 4.6 mm     SSV SPJ*: Diameter 3.4 mm     No incompetent veins or varicosities.         Impression:  1. Right leg: No thrombosis or venous incompetence .     2. Left leg: No thrombosis or venous incompetence.       Assessment and Plan:     We discussed the results with patient:  We re-emphasized the importance of a heart healthy diet.  Echocardiogram shows LVH.  EKG: sin rhythm  Venous Ultrasound does not show DVT nor venous incompetency.  Patient complaints about swelling lower legs - she has no venous incompetence - nor DVT  Fluid retention can be caused by chronic NSAID intake (naroxen 220 mg bid).  Patient has LVH - BP s reasonable under control with  lisinopril.  Because of swelling lower extremities - I would add spironolactone 12.5 mg/d.      Fredy Ambriz MD, PhD  Professor of Medicine  Division of Cardiology      CC  Patient Care Team:  Marcus Starks MD as PCP - General (Internal Medicine)  Rosina Albert PA-C as Physician Assistant (Family Practice)  Arabella Og MD as MD (Family Practice)  Alistair Olmos MD as Referring Physician (Neurology)  Yumiko Figueroa, JAVON as Nurse Coordinator (Neurology)  Ivonne Hernández, PhD LP (Psychology)  Rosa Segundo PA-C as Physician Assistant (Physician Assistant)  Fredy Ambriz MD as MD (Cardiology)  Petra Prabhakar RN as Nurse Coordinator (Cardiology)  MARCUS STARKS

## 2017-04-26 NOTE — PATIENT INSTRUCTIONS
Schedule fasting labs, echocardiogram and lower extremity venous competence evaluation. You may call [phone] to schedule the testing.     Please do not hesitate to call if you have any cardiology related questions or concerns, or need to schedule an appointment, at [phone].         Cardiology Medication Refill Request Information:  * Please contact your pharmacy regarding ANY request for medication refills.  ** [facility] Prescription Fax = [phone]  * Please allow 3 business days for routine medication refills.    Cardiology Test Result notification information:  *You will be notified with in 7-10 days of your appointment day regarding the results of your test. If you are on MyChart you will be notified as soon as the provider has reviewed the results and signed off on them. Please call RN Care Coordinator with questions regarding results.

## 2017-04-26 NOTE — MR AVS SNAPSHOT
After Visit Summary   4/26/2017    Shyann Samuel    MRN: [id number]           Patient Information     Date Of Birth          1940        Visit Information        Provider Department      4/26/2017 2:00 PM Fredy Ambriz MD [facility]        Today's Diagnoses     Bilateral lower extremity edema    -  1    SOB (shortness of breath)        Palpitations        Heart murmur        Screening cholesterol level          Care Instructions    Schedule fasting labs, echocardiogram and lower extremity venous competence evaluation. You may call [phone] to schedule the testing.     Please do not hesitate to call if you have any cardiology related questions or concerns, or need to schedule an appointment, at [phone].         Cardiology Medication Refill Request Information:  * Please contact your pharmacy regarding ANY request for medication refills.  ** [facility] Prescription Fax = [phone]  * Please allow 3 business days for routine medication refills.    Cardiology Test Result notification information:  *You will be notified with in 7-10 days of your appointment day regarding the results of your test. If you are on MyChart you will be notified as soon as the provider has reviewed the results and signed off on them. Please call RN Care Coordinator with questions regarding results.            Follow-ups after your visit        Follow-up notes from your care team     Write patient with results Return if symptoms worsen or fail to improve, for Katina.      Your next 10 appointments already scheduled     May 05, 2017  8:30 AM CDT   LAB VISIT with [facility]   [facility] ([facility])    [address], [facility] [address]  [facility] [address]   [phone]           If you are coming in for fasting labs, you will need to fast for 10-12 hours prior to your appt. Fasting labs include lipids, cholesterol, glucose, complete metabolic panel, basic metabolic  panel, and triglycerides. Do not drink coffee or any other fluids. Water with medications are okay. Do not chew gum as well. If you have any further questions, please contact your health care team.                Future tests that were ordered for you today     Open Future Orders        Priority Expected Expires Ordered    EKG 12-lead, tracing only Routine  8/24/2017 4/26/2017    US Venous Competency Bilateral Routine  7/26/2018 4/26/2017    Echocardiogram Routine  7/26/2018 4/26/2017    N terminal pro BNP outpatient Routine  7/26/2018 4/26/2017    Lipid panel reflex to direct LDL Routine  7/26/2018 4/26/2017    Comprehensive metabolic panel Routine  7/26/2018 4/26/2017    Lipid Panel Plus ([place]) Routine  7/25/2017 4/25/2017            Who to contact     If you have questions or need follow up information about today's clinic visit or your schedule please contact [facility] directly at [phone].  Normal or non-critical lab and imaging results will be communicated to you by [employer], letter or phone within 4 business days after the clinic has received the results. If you do not hear from us within 7 days, please contact the clinic through [employer] or phone. If you have a critical or abnormal lab result, we will notify you by phone as soon as possible.  Submit refill requests through [employer] or call your pharmacy and they will forward the refill request to us. Please allow 3 business days for your refill to be completed.          Additional Information About Your Visit        [employer] Information     [employer] gives you secure access to your electronic health record. If you see a primary care provider, you can also send messages to your care team and make appointments. If you have questions, please call your primary care clinic.  If you do not have a primary care provider, please call [phone] and they will assist you.        Care EveryWhere ID     This is your Care EveryWhere ID. This could be used  "by other organizations to access your [facility] medical records  [id number]        Your Vitals Were     Pulse Height Pulse Oximetry BMI (Body Mass Index)          103 1.676 m (5' 6\") 98% 29.6 kg/m2         Blood Pressure from Last 3 Encounters:   04/26/17 134/84   02/23/17 145/84   02/14/17 123/82    Weight from Last 3 Encounters:   04/26/17 83.2 kg (183 lb 6.4 oz)   02/23/17 83.5 kg (184 lb)   02/14/17 83.2 kg (183 lb 8 oz)               Primary Care Provider Office Phone # Fax #    Aleksandra Isabelle Starks [phone]       [address] [address]        Goals        General    Psychosocial (pt-stated)     Notes - Note edited  4/19/2016 11:10 AM by Zulma Kim BSW    As of today's date 4/19/2016 goal is met at 76 - 100%.   Goal Status:  Complete   Pt has resource ([employer]) that can evaluate pt's home.  Pt has decided to complete two other tasks (hiring yard work and cleaning out of her house assistance) prior to having [employer] come out.  I want a resource that can evaluate my home for DME/potential remodeling so I can age in place  As of today's date 3/17/2016 goal is met at 0 - 25%.   Goal Status:  Active    LUKE Coffey          Thank you!     Thank you for choosing [facility]  for your care. Our goal is always to provide you with excellent care. Hearing back from our patients is one way we can continue to improve our services. Please take a few minutes to complete the written survey that you may receive in the mail after your visit with us. Thank you!             Your Updated Medication List - Protect others around you: Learn how to safely use, store and throw away your medicines at www.disposemymeds.org.          This list is accurate as of: 4/26/17  3:20 PM.  Always use your most recent med list.                   Brand Name Dispense Instructions for use    CALCIUM + D PO      Take  by mouth. 600mg/400 IU bid       conjugated " estrogens cream    PREMARIN    30 g    Place 1 g vaginally nightly as needed       cyanocobalamin 1000 MCG tablet    vitamin  B-12     Take 1 tablet by mouth three times a week.       lisinopril 5 MG tablet    PRINIVIL/ZESTRIL    90 tablet    Take 1 tablet (5 mg) by mouth daily Needs labs for further refills       NAPROXEN PO      Take 220 mg by mouth 2 times daily as needed for moderate pain       OMEGA-3 FISH OIL PO      Take  by mouth.       simvastatin 20 MG tablet    ZOCOR    90 tablet    Take 1 tablet (20 mg) by mouth At Bedtime       * UNABLE TO FIND      MEDICATION NAME: Six-Flavor Tea pills, 8 pills (Chinese herbal medicain) Strengthen kidney merdian       * UNABLE TO FIND      MEDICATION NAME: Barry Ash Raya pills (Chinese herbal medicine) for sleep       * Notice:  This list has 2 medication(s) that are the same as other medications prescribed for you. Read the directions carefully, and ask your doctor or other care provider to review them with you.

## 2017-04-26 NOTE — PROGRESS NOTES
HPI:       I saw your patient, Ms. Shyann Samuel, who is a 77 yr female patient with a Hx her recent complaints of unilateral left lower extremity.Patient experienced nocturia.  Patient had a fall in January 2017 on the ice and then after the fall edema.    After the fall she was noted to have patellofemoral degenerative changes. Lower extremity duplex obtained at that time revealed no evidence of DVT. She continued to have symptoms of unilateral left lower extremity edema and for that reason contacted your office. Roughly 1 week later, on 02/15/2017, a venous duplex of left lower extremity was repeated and thankfully noted the absence of left lower extremity DVT.    She denies chest pain, shortness of breath, palpitations, nausea, vomiting or diarrhea. The remainder of her 14-point review of systems is entirely within normal limits.     Patient has had 3 falls not due to balance problems following the patient.-    PAST MEDICAL HISTORY:  Past Medical History:   Diagnosis Date     Anterior corneal dystrophy      Back pain      Cataract      Esotropia      Hyperlipidemia      Hyperlipidemia      Hypertension      Insomnia      Osteoarthritis      Posterior vitreous detachment      Urinary tract infection        CURRENT MEDICATIONS:  Current Outpatient Prescriptions   Medication Sig Dispense Refill     lisinopril (PRINIVIL/ZESTRIL) 5 MG tablet Take 1 tablet (5 mg) by mouth daily Needs labs for further refills 90 tablet 0     conjugated estrogens (PREMARIN) vaginal cream Place 1 g vaginally nightly as needed 30 g 3     simvastatin (ZOCOR) 20 MG tablet Take 1 tablet (20 mg) by mouth At Bedtime 90 tablet 3     NAPROXEN PO Take 220 mg by mouth 2 times daily as needed for moderate pain       UNABLE TO FIND MEDICATION NAME: Six-Flavor Tea pills, 8 pills (Chinese herbal medicain) Strengthen kidney merdian       UNABLE TO FIND MEDICATION NAME: Barry Bird Ten Raya pills (Chinese herbal medicine) for sleep       cyanocolbalamin  (VITAMIN  B-12) 1000 MCG tablet Take 1 tablet by mouth three times a week.       Omega-3 Fatty Acids (OMEGA-3 FISH OIL PO) Take  by mouth.       Calcium-Vitamin D (CALCIUM + D PO) Take  by mouth. 600mg/400 IU bid         PAST SURGICAL HISTORY:  Past Surgical History:   Procedure Laterality Date     C TOTAL HIP ARTHROPLASTY Right 10/2009    Replacement of right hip     CATARACT IOL, RT/LT Bilateral ~2005     EYE SURGERY  after 2003,pt unsure    blepharoplasty     HC EXTERNAL LEVATOR RESECTION Bilateral 12/2014       ALLERGIES     Allergies   Allergen Reactions     Benzalkonium Chloride      Eye irritation     Nitrofurantoin Other (See Comments)     Causes fast heart rate.       FAMILY HISTORY:  Family History   Problem Relation Age of Onset     CEREBROVASCULAR DISEASE Mother      Crohn Disease Mother      OSTEOPOROSIS Mother      CANCER Other      Father (kidney meningioma), Brother (neuroblastoma)     Glaucoma No family hx of      Macular Degeneration No family hx of    Coronary heart disease                         Father    SOCIAL HISTORY:  Social History     Social History     Marital status:      Spouse name: N/A     Number of children: N/A     Years of education: N/A     Social History Main Topics     Smoking status: Never Smoker     Smokeless tobacco: Never Used     Alcohol use No     Drug use: No     Sexual activity: No     Other Topics Concern     None     Social History Narrative       ROS:   Constitutional: No fever, chills, or sweats. No weight gain/loss   ENT: No visual disturbance, ear ache, epistaxis, sore throat  Allergies/Immunologic: Negative.   Respiratory: No cough, hemoptysia  Cardiovascular: As per HPI  GI: No nausea, vomiting, hematemesis, melena, or hematochezia  : No urinary frequency, dysuria, or hematuria  Integument: Negative  Psychiatric: Negative  Neuro: Negative  Endocrinology: Negative   Musculoskeletal: Negative    EXAM:  /84 (BP Location: Right arm, Patient Position:  "Chair, Cuff Size: Adult Large)  Pulse 103  Ht 1.676 m (5' 6\")  Wt 83.2 kg (183 lb 6.4 oz)  SpO2 98%  BMI 29.6 kg/m2  In general, the patient is a pleasant female in no apparent distress.    HEENT: NC/AT.  PERRLA.  EOMI.  Sclerae white, not injected.  Nares clear.  Pharynx without erythema or exudate.  Dentition intact.    Neck: No adenopathy.  No thyromegaly. Carotids +4/4 bilaterally without bruits.  No jugular venous distension.   Heart: RRR. Normal S1, S2 splits physiologically. No murmur, rub, click, or gallop. The PMI is in the 5th ICS in the midclavicular line. There is no heave.    Lungs: CTA.  No ronchi, wheezes, rales.  No dullness to percussion.   Abdomen: Soft, nontender, nondistended. No organomegaly.  No bruits.   Extremities: No clubbing, cyanosis, or edema.  The pulses are +4/4 at the radial, brachial, femoral, popliteal, DP, and PT sites bilaterally.  No bruits are noted.  Neurologic: Alert and oriented to person/place/time, normal speech, gait and affect  Skin: No petechiae, purpura or rash.    Labs:  LIPID RESULTS:  Lab Results   Component Value Date    CHOL 154 05/05/2017    HDL 62 05/05/2017    LDL 73 05/05/2017    TRIG 92 05/05/2017    CHOLHDLRATIO 2.9 02/23/2016    NHDL 92 05/05/2017       LIVER ENZYME RESULTS:  Lab Results   Component Value Date    AST 16 05/05/2017    ALT 29 05/05/2017       CBC RESULTS:  Lab Results   Component Value Date    WBC 8.5 04/21/2015    RBC 4.80 04/21/2015    HGB 14.1 04/21/2015    HCT 44.4 04/21/2015    MCV 93 04/21/2015    MCH 29.4 04/21/2015    MCHC 31.8 04/21/2015    RDW 13.9 04/21/2015     04/21/2015       BMP RESULTS:  Lab Results   Component Value Date     05/11/2017    POTASSIUM 4.7 05/11/2017    CHLORIDE 103 05/11/2017    CO2 27 05/11/2017    ANIONGAP 9 05/11/2017    GLC 80 05/11/2017    BUN 16 05/11/2017    CR 0.70 05/11/2017    GFRESTIMATED 81 05/11/2017    GFRESTBLACK >90   GFR Calc   05/11/2017    ROBERT 9.5 05/11/2017    "     A1C RESULTS:  Lab Results   Component Value Date    A1C 5.4 02/11/2016       INR RESULTS:  Lab Results   Component Value Date    INR 2.05 (H) 10/24/2009    INR 1.49 (H) 10/23/2009       Procedures:    Echocardiogram 05-15-17  Global and regional left ventricular function is normal with an EF of 55-60%.  Global right ventricular function is normal. The right ventricle is normal  size.  Pulmonary artery systolic pressure cannot be assessed. The inferior vena cava  was normal in size with preserved respiratory variability. Estimated mean  right atrial pressure is <3 mmHg.     Previous study not available for comparison.  _____________________________________________________________________________  __        Left Ventricle  Left ventricular size is normal. Global and regional left ventricular function  is normal with an EF of 55-60%. Mild concentric wall thickening consistent  with left ventricular hypertrophy is present. Left ventricular diastolic  function is indeterminate.     Right Ventricle  Global right ventricular function is normal. The right ventricle is normal  size.     Atria  Both atria appear normal. The atrial septum is intact as assessed by color  Doppler .     Mitral Valve  The mitral valve is normal. Trace mitral insufficiency is present.        Aortic Valve  The aortic valve is tricuspid. Mild aortic valve sclerosis is present. Trace  aortic insufficiency is present.     Tricuspid Valve  The tricuspid valve is normal. Trace tricuspid insufficiency is present.  Pulmonary artery systolic pressure cannot be assessed.     Pulmonic Valve  The pulmonic valve is normal. Trace pulmonic insufficiency is present.     Vessels  The aorta root is normal. The inferior vena cava was normal in size with  preserved respiratory variability. Estimated mean right atrial pressure is <3  mmHg.     Pericardium  No pericardial effusion is present.        Compared to Previous Study  Previous study not available for  comparison.     Attestation  I have personally viewed the imaging and agree with the interpretation and  report as documented by the fellow, Dr. Simeon, and/or edited by me.  _____________________________________________________________________________  __     MMode/2D Measurements & Calculations  IVSd: 1.2 cm  LVIDd: 4.5 cm  LVIDs: 3.8 cm  LVPWd: 1.1 cm  FS: 16.4 %  EDV(Teich): 93.7 ml  ESV(Teich): 61.4 ml  LV mass(C)d: 190.8 grams  LV mass(C)dI: 99.1 grams/m2  Ao root diam: 3.1 cm  asc Aorta Diam: 3.3 cm  LVOT diam: 2.1 cm  LVOT area: 3.4 cm2  LA Volume (BP): 53.6 ml     LA Volume Index (BP): 27.8 ml/m2        Doppler Measurements & Calculations  MV E max jacobo: 69.2 cm/sec  MV A max jacobo: 93.2 cm/sec  MV E/A: 0.74  MV dec time: 0.23 sec  Lateral E/e': 11.4  Medial E/e': 16.1       Venous Ultrasonography Lower Extremities 05-15-17   Clinical History: LOWER EXTREMITY CLAUDICATION, Localized edema     Technique: B-mode (grayscale) and Duplex Doppler ultrasound of the  lower extremity veins (superficial and deep), including incompetency  reflux time and compression for thrombus. Additional Duplex doppler  assessment of the PTA and RAMONITA at the ankles. Superficial incompetency  exam performed upright, non-weight bearing with distal compression  using rapid inflation/deflation cuffs.     Ordering provider: DG PIERSON      Venous Competency Diagnostic Criteria Adopted 11/29/11.     Venous competency criteria defining abnormal reflux duration:  Femoral - popliteal reflux > 1.0 sec.  Deep femoral,deep calf veins, and superficial vein reflux > 0.5 sec.   vein reflux > 0.35 sec.     Supporting document: J Vasc Surg 2003; 38:793-8. Definition of reflux  in lower extremity veins.     Findings:      Right ankle:   Posterior Tibial artery waveform: tri/biphasic  Dorsalis Pedis artery waveform: tri/biphasic     Left ankle:  Posterior Tibial artery waveform: tri/biphasic  Dorsalis Pedis artery waveform:  tri/biphasic     Right Lower Extremity:      Duplex Evaluation for Deep Vein Thrombus (which includes CFV, FV,  popliteal vein, and posterior tibial veins): Negative.     Common femoral vein: Competent     Deep femoral vein: Competent     Femoral vein:      proximal thigh: Competent      mid thigh: Competent      distal thigh: Competent     Popliteal vein: Competent     Posterior tibial veins at the ankle: Competent     Left lower extremity:     Duplex Evaluation for Deep Vein Thrombus (which includes CFV, FV,  popliteal vein, and posterior tibial veins): Negative.      Common femoral vein: Competent     Deep femoral vein: Competent     Femoral vein:      proximal thigh: Competent      mid thigh: Competent      distal thigh: Competent     Popliteal vein: Competent     Posterior tibial veins at the ankle: Competent     Superficial Venous Incompetency Study:        Right Lower Extremity:      Duplex Evaluation for Superficial Vein Thrombus (which includes GSV,  SSV, AASV, and PASV): Negative.      Anterior accessory saphenous (AASV): Not seen     Posterior accessory saphenous (PASV): Competent     Great saphenous vein (GSV)      sapheno-femoral junction: Competent      prox thigh: Competent      mid thigh: Competent      dist thigh: Competent       knee: Competent      prox calf: Competent      mid calf: Competent      ankle: Competent     Vein of Giacomini (V of G):      distal thigh: Competent      mid thigh: Not seen      prox thigh: Not seen     Small saphenous vein:      sapheno-popliteal junction: Not seen      prox calf: Competent      mid calf: Competent     Diameters of superficial veins:     AASV: Not measured     PASV: Not measured     SFJ: Diameter* 5.6 mm  GSV prox thigh*: Diameter 5.5 mm      GSV knee*: Diameter 2.6 mm     V. of Giacomini (V of G) dist thigh: Not measured  V. of Giacomini (V of G) mid thigh: Not measured  V. of Giacomini (V of G) prox thigh: Not measured     SSV SPJ*: Not measured  SSV  prox calf: Diameter 2.0 mm     No incompetent veins or varicosities.     Left lower extremity:     Duplex Evaluation for Superficial Vein Thrombus (which includes GSV,  SSV, AASV, and PASV): Negative.     Anterior accessory saphenous (AASV): Competent     Posterior accessory saphenous (PASV): Competent     Great saphenous vein (GSV)      sapheno-femoral junction: Competent      prox thigh: Competent      mid thigh: Competent      dist thigh: Competent       knee: Competent      prox calf: Competent      mid calf: Competent      ankle: Competent     Vein of Giacomini (V of G):      distal thigh: Competent      mid thigh: Not seen      prox thigh: Not seen     Small saphenous vein:      sapheno-popliteal junction: Competent      prox calf: Not seen      mid calf: Competent     Diameters of superficial veins:     AASV: Not measured     PASV: Not measured     SFJ: Diameter* 6.3 mm  GSV prox thigh*: Diameter 4.7 mm      GSV knee*: Diameter 4.6 mm     SSV SPJ*: Diameter 3.4 mm     No incompetent veins or varicosities.         Impression:  1. Right leg: No thrombosis or venous incompetence .     2. Left leg: No thrombosis or venous incompetence.       Assessment and Plan:     We discussed the results with patient:  We re-emphasized the importance of a heart healthy diet.  Echocardiogram shows LVH.  EKG: sin rhythm  Venous Ultrasound does not show DVT nor venous incompetency.  Patient complaints about swelling lower legs - she has no venous incompetence - nor DVT  Fluid retention can be caused by chronic NSAID intake (naroxen 220 mg bid).  Patient has LVH - BP s reasonable under control with lisinopril.  Because of swelling lower extremities - I would add spironolactone 12.5 mg/d.      Fredy Ambriz MD, PhD  Professor of Medicine  Division of Cardiology      CC  Patient Care Team:  Aleksandra Starks MD as PCP - General (Internal Medicine)  Rosina Albert PA-C as Physician Assistant (Family  Practice)  Arabella Og MD as MD (Family Practice)  Alistair Olmos MD as Referring Physician (Neurology)  Yumiko Figueroa, RN as Nurse Coordinator (Neurology)  Ivonne Hernández, PhD LP (Psychology)  Rosa Segundo PA-C as Physician Assistant (Physician Assistant)  Fredy Ambriz MD as MD (Cardiology)  Petra Prabhakar, RN as Nurse Coordinator (Cardiology)  MARCUS LI

## 2017-05-05 ENCOUNTER — TELEPHONE (OUTPATIENT)
Dept: FAMILY MEDICINE | Facility: CLINIC | Age: 77
End: 2017-05-05

## 2017-05-05 DIAGNOSIS — R00.2 PALPITATIONS: ICD-10-CM

## 2017-05-05 DIAGNOSIS — M54.42 CHRONIC BILATERAL LOW BACK PAIN WITH LEFT-SIDED SCIATICA: Primary | ICD-10-CM

## 2017-05-05 DIAGNOSIS — R01.1 HEART MURMUR: ICD-10-CM

## 2017-05-05 DIAGNOSIS — R06.02 SOB (SHORTNESS OF BREATH): ICD-10-CM

## 2017-05-05 DIAGNOSIS — G89.29 CHRONIC BILATERAL LOW BACK PAIN WITH LEFT-SIDED SCIATICA: Primary | ICD-10-CM

## 2017-05-05 DIAGNOSIS — Z13.220 SCREENING CHOLESTEROL LEVEL: ICD-10-CM

## 2017-05-05 LAB
ALBUMIN SERPL-MCNC: 3.6 G/DL (ref 3.4–5)
ALP SERPL-CCNC: 54 U/L (ref 40–150)
ALT SERPL W P-5'-P-CCNC: 29 U/L (ref 0–50)
ANION GAP SERPL CALCULATED.3IONS-SCNC: 6 MMOL/L (ref 3–14)
AST SERPL W P-5'-P-CCNC: 16 U/L (ref 0–45)
BILIRUB SERPL-MCNC: 0.4 MG/DL (ref 0.2–1.3)
BUN SERPL-MCNC: 21 MG/DL (ref 7–30)
CALCIUM SERPL-MCNC: 8.9 MG/DL (ref 8.5–10.1)
CHLORIDE SERPL-SCNC: 107 MMOL/L (ref 94–109)
CHOLEST SERPL-MCNC: 154 MG/DL
CO2 SERPL-SCNC: 27 MMOL/L (ref 20–32)
CREAT SERPL-MCNC: 0.75 MG/DL (ref 0.52–1.04)
GFR SERPL CREATININE-BSD FRML MDRD: 75 ML/MIN/1.7M2
GLUCOSE SERPL-MCNC: 97 MG/DL (ref 70–99)
HDLC SERPL-MCNC: 62 MG/DL
LDLC SERPL CALC-MCNC: 73 MG/DL
NONHDLC SERPL-MCNC: 92 MG/DL
NT-PROBNP SERPL-MCNC: 19 PG/ML (ref 0–450)
POTASSIUM SERPL-SCNC: 5.4 MMOL/L (ref 3.4–5.3)
PROT SERPL-MCNC: 7.1 G/DL (ref 6.8–8.8)
SODIUM SERPL-SCNC: 140 MMOL/L (ref 133–144)
TRIGL SERPL-MCNC: 92 MG/DL

## 2017-05-05 NOTE — TELEPHONE ENCOUNTER
----- Message from Vianey Farr sent at 5/5/2017 10:55 AM CDT -----  Regarding: Pt calling with questions  Contact: [phone]  Pt calling in regards to echocardiogram and ultrasound.  Found out what the process would be like for these two appts; which meant that she would be laying down for a significant time.  Pt states she has sciatica and is afraid it will be hard for her to do.  She spoke with someone within the vascular department that recommend she get rx for a muscle relaxer.  Pt is not sure she should be doing that, requesting that Amara call back to speak with her in regards to whether she should be taking a muscle relaxer.  Please call the pt back with the number listed above.    Thank you,  Vianey FONSECA  Call Center    Spoke with pt.  Reviewed with PIngel   Pingel will order Flexeril 5 mg , to take one prior to procedure , may repeat if needed, 2 pills.  Spoke with pt about this option, plus also told her techs are very good and accommodating with issues such as this, as often people can't breath lying flat and they make adjustments, etc, so maybe would not even need.  But, again Pingel would prescribe if she wants.  Told her if uses med, can not drive self.  Pt is now thinking she is going to wait and talk to her acupuncturist about the dilemma and hold on the flexeril idea for now.      She will call back if decides to go the med route.     Suzie June,RN  May 5, 2017 2:55 PM

## 2017-05-08 ENCOUNTER — CARE COORDINATION (OUTPATIENT)
Dept: CARDIOLOGY | Facility: CLINIC | Age: 77
End: 2017-05-08

## 2017-05-08 DIAGNOSIS — I10 ESSENTIAL HYPERTENSION: ICD-10-CM

## 2017-05-08 DIAGNOSIS — E87.5 HYPERKALEMIA: Primary | ICD-10-CM

## 2017-05-08 DIAGNOSIS — E78.5 HYPERLIPIDEMIA, UNSPECIFIED HYPERLIPIDEMIA TYPE: Primary | ICD-10-CM

## 2017-05-08 RX ORDER — LISINOPRIL 5 MG/1
5 TABLET ORAL DAILY
Qty: 90 TABLET | Refills: 3 | Status: SHIPPED | OUTPATIENT
Start: 2017-05-08 | End: 2018-04-10

## 2017-05-08 RX ORDER — CYCLOBENZAPRINE HCL 5 MG
TABLET ORAL
Qty: 2 TABLET | Refills: 0 | Status: SHIPPED | OUTPATIENT
Start: 2017-05-08 | End: 2017-06-27

## 2017-05-08 RX ORDER — SIMVASTATIN 20 MG
20 TABLET ORAL AT BEDTIME
Qty: 90 TABLET | Refills: 3 | Status: SHIPPED | OUTPATIENT
Start: 2017-05-08 | End: 2018-04-10

## 2017-05-08 NOTE — TELEPHONE ENCOUNTER
PT called and states that she DOES want her Muscle Relaxant FLEXERIL RX to be filled and SENT to her. PT was wondering if you guys would be able to have the PHARMACY send her RX to her home.     PT would like a call back when RX is ready. She can be reached at:   [phone].       Confirmed [facility] with pt[facility].  I wrote on script to please mail to pt's home and she will also call them to confirm this. Script e-scribed today , 5/ 8/17      Suzie June,RN  May 8, 2017 10:01 AM

## 2017-05-08 NOTE — PROGRESS NOTES
Date: 5/8/2017    Time of Call: 2:59 PM     Diagnosis:  Hyperkalemia     [ TORB ] Ordering provider: Dr. Ambriz  Order: Obtain repeat BMP late this week.      Order received by: Petra Prabhakar RN, BSN     Follow-up/additional notes: Voice message left for patient.

## 2017-05-08 NOTE — TELEPHONE ENCOUNTER
See tel call of 5/5/17.  OK per Thu for 2 pills     Script sent in to correct pharmacy, with request to have them mail pills to pt.

## 2017-05-11 DIAGNOSIS — E87.5 HYPERKALEMIA: ICD-10-CM

## 2017-05-11 LAB
ANION GAP SERPL CALCULATED.3IONS-SCNC: 9 MMOL/L (ref 3–14)
BUN SERPL-MCNC: 16 MG/DL (ref 7–30)
CALCIUM SERPL-MCNC: 9.5 MG/DL (ref 8.5–10.1)
CHLORIDE SERPL-SCNC: 103 MMOL/L (ref 94–109)
CO2 SERPL-SCNC: 27 MMOL/L (ref 20–32)
CREAT SERPL-MCNC: 0.7 MG/DL (ref 0.52–1.04)
GFR SERPL CREATININE-BSD FRML MDRD: 81 ML/MIN/1.7M2
GLUCOSE SERPL-MCNC: 80 MG/DL (ref 70–99)
POTASSIUM SERPL-SCNC: 4.7 MMOL/L (ref 3.4–5.3)
SODIUM SERPL-SCNC: 138 MMOL/L (ref 133–144)

## 2017-05-15 ENCOUNTER — RADIANT APPOINTMENT (OUTPATIENT)
Dept: CARDIOLOGY | Facility: CLINIC | Age: 77
End: 2017-05-15
Attending: INTERNAL MEDICINE

## 2017-05-15 DIAGNOSIS — R00.2 PALPITATIONS: ICD-10-CM

## 2017-05-15 DIAGNOSIS — R01.1 HEART MURMUR: ICD-10-CM

## 2017-05-15 DIAGNOSIS — R06.02 SOB (SHORTNESS OF BREATH): ICD-10-CM

## 2017-06-27 ENCOUNTER — OFFICE VISIT (OUTPATIENT)
Dept: FAMILY MEDICINE | Facility: CLINIC | Age: 77
End: 2017-06-27

## 2017-06-27 VITALS
OXYGEN SATURATION: 99 % | HEART RATE: 72 BPM | SYSTOLIC BLOOD PRESSURE: 140 MMHG | DIASTOLIC BLOOD PRESSURE: 83 MMHG | BODY MASS INDEX: 29.05 KG/M2 | WEIGHT: 180 LBS | TEMPERATURE: 98.2 F

## 2017-06-27 DIAGNOSIS — R30.0 DYSURIA: ICD-10-CM

## 2017-06-27 DIAGNOSIS — N30.00 ACUTE CYSTITIS WITHOUT HEMATURIA: Primary | ICD-10-CM

## 2017-06-27 DIAGNOSIS — L98.9 SKIN LESION: ICD-10-CM

## 2017-06-27 LAB
BILIRUBIN UR: NEGATIVE
BLOOD UR: ABNORMAL
GLUCOSE URINE: NEGATIVE
KETONES UR QL: NEGATIVE
LEUKOCYTE ESTERASE UR: ABNORMAL
NITRITE UR QL STRIP: POSITIVE
PH UR STRIP: 6 [PH] (ref 5–7)
PROTEIN UR: NEGATIVE
SP GR UR STRIP: 1.01
UROBILINOGEN UR STRIP-ACNC: ABNORMAL

## 2017-06-27 RX ORDER — CIPROFLOXACIN 250 MG/1
250 TABLET, FILM COATED ORAL 2 TIMES DAILY
Qty: 14 TABLET | Refills: 0 | Status: SHIPPED | OUTPATIENT
Start: 2017-06-27 | End: 2017-07-04

## 2017-06-27 ASSESSMENT — PAIN SCALES - GENERAL: PAINLEVEL: NO PAIN (0)

## 2017-06-27 NOTE — MR AVS SNAPSHOT
After Visit Summary   6/27/2017    Shyann Samuel    MRN: [id number]           Patient Information     Date Of Birth          1940        Visit Information        Provider Department      6/27/2017 9:00 AM Rosina Albert PA-C [facility]        Today's Diagnoses     Acute cystitis without hematuria    -  1    Dysuria        Skin lesion          Care Instructions    I would like you to see a dermatologist for a more thorough evaluation of the new skin lesion on your neck.    Urine test is consistent with UTI.  Urine culture is pending.    You have been prescribed an antibiotic to treat a bacterial infection. Watch for signs of allergic reaction including swelling around the mouth or eyes and the development of a rash.  If you develop any of these symptoms, stop your medication, take a benadryl (25-50 mg) and give our office a call. Consider probiotic therapy to decrease the possibility of diarrhea associated with antibiotic use.    Follow up if you have any worsening or persistent problems or concerns.              Follow-ups after your visit        Additional Services     DERMATOLOGY REFERRAL       Your provider has referred you to: [facility]: Dermatology Clinic [facility] [phone]   [email or web address]  [facility]: [facility] Dermatology [facility] [phone]   [email or web address]  N: [facility] [phone]  [email or web address]  [facility]: [facility] [phone]   [email or web address]  N: [facility] [phone]   [email or web address]    Please be aware that coverage of these services is subject to the terms and limitations of your health insurance plan.  Call member services at your health plan with any benefit or coverage questions.      Please bring the following with you to your appointment:    (1) Any X-Rays, CTs or MRIs  which have been performed.  Contact the facility where they were done to arrange for  prior to your scheduled appointment.    (2) List of current medications  (3) This referral request   (4) Any documents/labs given to you for this referral                  Who to contact     Please call your clinic at [phone] to:    Ask questions about your health    Make or cancel appointments    Discuss your medicines    Learn about your test results    Speak to your doctor   If you have compliments or concerns about an experience at your clinic, or if you wish to file a complaint, please contact [facility] Patient Relations at [phone] or email us at [email or web address]         Additional Information About Your Visit        [employer] Information     [employer] gives you secure access to your electronic health record. If you see a primary care provider, you can also send messages to your care team and make appointments. If you have questions, please call your primary care clinic.  If you do not have a primary care provider, please call [phone] and they will assist you.      [employer] is an electronic gateway that provides easy, online access to your medical records. With [employer], you can request a clinic appointment, read your test results, renew a prescription or communicate with your care team.     To access your existing account, please contact your [facility] Clinic or call [phone] for assistance.        Care EveryWhere ID     This is your Care EveryWhere ID. This could be used by other organizations to access your [facility] medical records  [id number]        Your Vitals Were     Pulse Temperature Pulse Oximetry BMI (Body Mass Index)          72 98.2  F (36.8  C) (Oral) 99% 29.05 kg/m2         Blood Pressure from Last 3 Encounters:   06/27/17 140/83   04/26/17 134/84   02/23/17 145/84    Weight from Last 3 Encounters:   06/27/17 180 lb (81.6  kg)   04/26/17 183 lb 6.4 oz (83.2 kg)   02/23/17 184 lb (83.5 kg)              We Performed the Following     DERMATOLOGY REFERRAL     Urinalysis ([place])     Urine Culture Aerobic Bacterial          Today's Medication Changes          These changes are accurate as of: 6/27/17 10:38 AM.  If you have any questions, ask your nurse or doctor.               Start taking these medicines.        Dose/Directions    ciprofloxacin 250 MG tablet   Commonly known as:  CIPRO   Used for:  Acute cystitis without hematuria   Started by:  Rosina Albert PA-C        Dose:  250 mg   Take 1 tablet (250 mg) by mouth 2 times daily for 7 days   Quantity:  14 tablet   Refills:  0         These medicines have changed or have updated prescriptions.        Dose/Directions    UNABLE TO FIND   This may have changed:  Another medication with the same name was removed. Continue taking this medication, and follow the directions you see here.   Changed by:  Alistair Olmos MD        MEDICATION NAME: Six-Flavor Tea pills, 8 pills (Chinese herbal medicain) Strengthen kidney merdian   Refills:  0         Stop taking these medicines if you haven't already. Please contact your care team if you have questions.     cyclobenzaprine 5 MG tablet   Commonly known as:  FLEXERIL   Stopped by:  Rosina Albert PA-C                Where to get your medicines      These medications were sent to [facility] [address] [place] [address] AT [address] [address]     Phone:  [phone]     ciprofloxacin 250 MG tablet                Primary Care Provider Office Phone # Fax #    Aleksandra Starks [phone]       [address]  [facility] [address]        Goals        General    Psychosocial (pt-stated)     Notes - Note edited  4/19/2016 11:10 AM by Zulma Kim BSW    As of today's date 4/19/2016 goal is met at 76 - 100%.   Goal Status:   Complete   Pt has resource ([employer]) that can evaluate pt's home.  Pt has decided to complete two other tasks (hiring yard work and cleaning out of her house assistance) prior to having [employer] come out.  I want a resource that can evaluate my home for DME/potential remodeling so I can age in place  As of today's date 3/17/2016 goal is met at 0 - 25%.   Goal Status:  Active    Shawn Kim, LSW          Equal Access to Services     LEE WALSH AH: Hadii aad ku hadasho Soomaali, waaxda luqadaha, qaybta kaalmada adeegyada, waxsondra amayain remin staciereta turner lakeronraphael . So [facility] [phone].    ATENCIÓN: Si kulwinderla cassi, tiene a sanchez disposición servicios gratuitos de asistencia lingüística. Llame al [phone].    We comply with applicable federal civil rights laws and [place] laws. We do not discriminate on the basis of race, color, national origin, age, disability sex, sexual orientation or gender identity.            Thank you!     Thank you for choosing [facility]  for your care. Our goal is always to provide you with excellent care. Hearing back from our patients is one way we can continue to improve our services. Please take a few minutes to complete the written survey that you may receive in the mail after your visit with us. Thank you!             Your Updated Medication List - Protect others around you: Learn how to safely use, store and throw away your medicines at www.disposemymeds.org.          This list is accurate as of: 6/27/17 10:38 AM.  Always use your most recent med list.                   Brand Name Dispense Instructions for use Diagnosis    CALCIUM + D PO      Take  by mouth. 600mg/400 IU bid        ciprofloxacin 250 MG tablet    CIPRO    14 tablet    Take 1 tablet (250 mg) by mouth 2 times daily for 7 days    Acute cystitis without hematuria       conjugated estrogens cream    PREMARIN    30 g    Place 1 g vaginally nightly as needed    Postmenopausal atrophic vaginitis        cyanocobalamin 1000 MCG tablet    vitamin  B-12     Take 1 tablet by mouth three times a week.        lisinopril 5 MG tablet    PRINIVIL/ZESTRIL    90 tablet    Take 1 tablet (5 mg) by mouth daily    Essential hypertension       NAPROXEN PO      Take 220 mg by mouth 2 times daily as needed for moderate pain        OMEGA-3 FISH OIL PO      Take  by mouth.        simvastatin 20 MG tablet    ZOCOR    90 tablet    Take 1 tablet (20 mg) by mouth At Bedtime    Hyperlipidemia, unspecified hyperlipidemia type       UNABLE TO FIND      MEDICATION NAME: Six-Flavor Tea pills, 8 pills (Chinese herbal medicain) Strengthen kidney merdian

## 2017-06-27 NOTE — PATIENT INSTRUCTIONS
I would like you to see a dermatologist for a more thorough evaluation of the new skin lesion on your neck.    Urine test is consistent with UTI.  Urine culture is pending.    You have been prescribed an antibiotic to treat a bacterial infection. Watch for signs of allergic reaction including swelling around the mouth or eyes and the development of a rash.  If you develop any of these symptoms, stop your medication, take a benadryl (25-50 mg) and give our office a call. Consider probiotic therapy to decrease the possibility of diarrhea associated with antibiotic use.    Follow up if you have any worsening or persistent problems or concerns.

## 2017-06-27 NOTE — NURSING NOTE
Chief Complaint   Patient presents with     Dysuria     Mole     on the left side of her neck by the jawline she would like checked.     77 year old      Blood pressure 140/83, pulse 72, temperature 98.2  F (36.8  C), temperature source Oral, weight 180 lb (81.6 kg), SpO2 99 %. Body mass index is 29.05 kg/(m^2).    Wt Readings from Last 2 Encounters:   06/27/17 180 lb (81.6 kg)   04/26/17 183 lb 6.4 oz (83.2 kg)     BP Readings from Last 3 Encounters:   06/27/17 140/83   04/26/17 134/84   02/23/17 145/84       Patient Active Problem List   Diagnosis     Low back pain     Status post hip replacement     Hyperlipidemia     Anterior corneal dystrophy     Hypertension     Insomnia     Osteoarthritis     Cataract     Dermatitis medicamentosa     Esotropia     Macular puckering     Nontoxic multinodular goiter     Posterior vitreous detachment     Recurrent UTI     Nerv/musculskel sym NEC     Atrophic vaginitis     Overactive bladder     Health Care Home       Current Outpatient Prescriptions   Medication Sig Dispense Refill     lisinopril (PRINIVIL/ZESTRIL) 5 MG tablet Take 1 tablet (5 mg) by mouth daily 90 tablet 3     simvastatin (ZOCOR) 20 MG tablet Take 1 tablet (20 mg) by mouth At Bedtime 90 tablet 3     conjugated estrogens (PREMARIN) vaginal cream Place 1 g vaginally nightly as needed 30 g 3     NAPROXEN PO Take 220 mg by mouth 2 times daily as needed for moderate pain       UNABLE TO FIND MEDICATION NAME: Six-Flavor Tea pills, 8 pills (Chinese herbal medicain) Strengthen kidney merdian       cyanocolbalamin (VITAMIN  B-12) 1000 MCG tablet Take 1 tablet by mouth three times a week.       Omega-3 Fatty Acids (OMEGA-3 FISH OIL PO) Take  by mouth.       Calcium-Vitamin D (CALCIUM + D PO) Take  by mouth. 600mg/400 IU bid         Social History   Substance Use Topics     Smoking status: Never Smoker     Smokeless tobacco: Never Used     Alcohol use No         Health Maintenance Due   Topic Date Due     ADVANCE  DIRECTIVE PLANNING Q5 YRS  04/08/1958     FALL RISK ASSESSMENT  04/08/2006     DEXA SCAN SCREENING (SYSTEM ASSIGNED)  04/08/2006     PNEUMOCOCCAL (1 of 2 - PCV13) 04/08/2006     TETANUS IMMUNIZATION (SYSTEM ASSIGNED)  06/24/2010       MARILYN CRUZ CMA  June 27, 2017 9:17 AM

## 2017-06-27 NOTE — PROGRESS NOTES
SUBJECTIVE:                                                    Shyann Samuel is a 77 year old female who presents to clinic today for the following health issues:    URINARY TRACT SYMPTOMS  Onset: Frequency, urgency and vaginal irritation and mild itching. Patient history of recurrent UTI.  Symptoms started after she was treated with prophylactic antibiotic.        Description:   Painful urination (Dysuria): no   Blood in urine (Hematuria): no   Delay in urine (Hesitency): no     Intensity: mild    Progression of Symptoms:  Improving with cranberry and yogurt.  Then worsened again    Accompanying Signs & Symptoms:  Fever/chills: no   Flank pain no   Nausea and vomiting: no   Any vaginal symptoms: Patient is not sure  Abdominal/Pelvic Pain: no     History:   History of frequent UTI's: YES  History of kidney stones: no   Sexually Active: no   Possibility of pregnancy: No    Precipitating factors:   History of recurrent UTI with pelvic floor dysfunction    Therapies Tried and outcome:  Cranberry juice prn (contraindicated in Coumadin patients)    SKIN LESION: Noted raised skin lesion left side of neck.  She does not think it was there previously.  No associated redness or pain. It is slightly itchy and has become scaly.  Looking for recommendation sof care.  No personal or family history of skin cancer.      Problem list and histories reviewed & adjusted, as indicated.  Additional history: as documented    Patient Active Problem List   Diagnosis     Low back pain     Status post hip replacement     Hyperlipidemia     Anterior corneal dystrophy     Hypertension     Insomnia     Osteoarthritis     Cataract     Dermatitis medicamentosa     Esotropia     Macular puckering     Nontoxic multinodular goiter     Posterior vitreous detachment     Recurrent UTI     Nerv/musculskel sym NEC     Atrophic vaginitis     Overactive bladder     Health Care Home     Past Surgical History:   Procedure Laterality Date     C TOTAL HIP  ARTHROPLASTY Right 10/2009    Replacement of right hip     CATARACT IOL, RT/LT Bilateral ~2005     EYE SURGERY  after 2003,pt unsure    blepharoplasty     HC EXTERNAL LEVATOR RESECTION Bilateral 12/2014       Social History   Substance Use Topics     Smoking status: Never Smoker     Smokeless tobacco: Never Used     Alcohol use No     Family History   Problem Relation Age of Onset     CEREBROVASCULAR DISEASE Mother      Crohn Disease Mother      OSTEOPOROSIS Mother      CANCER Other      Father (kidney meningioma), Brother (neuroblastoma)     Glaucoma No family hx of      Macular Degeneration No family hx of            Reviewed and updated as needed this visit by clinical staff  Tobacco  Allergies  Meds       Reviewed and updated as needed this visit by Provider         ROS:  Constitutional, HEENT, cardiovascular, pulmonary, gi and gu systems are negative, except as otherwise noted.    OBJECTIVE:     /83  Pulse 72  Temp 98.2  F (36.8  C) (Oral)  Wt 180 lb (81.6 kg)  SpO2 99%  BMI 29.05 kg/m2  Body mass index is 29.05 kg/(m^2).  GENERAL: healthy, alert and no distress  RESP: lungs clear to auscultation - no rales, rhonchi or wheezes  CV: regular rate and rhythm, normal S1 S2, no S3 or S4, no murmur, click or rub, no peripheral edema and peripheral pulses strong  ABDOMEN: soft, nontender, no hepatosplenomegaly, no masses and bowel sounds normal  MS: no gross musculoskeletal defects noted, no edema  BACK: no CVA tenderness, no paralumbar tenderness  Skin:  Well circumscribed raised skin lesion 0.8mm well circumscribed border with different color throughout and scaling.  No tenderness, redness or inflammation.    Diagnostic Test Results:  Results for orders placed or performed in visit on 06/27/17 (from the past 24 hour(s))   Urinalysis ([place])   Result Value Ref Range    Specific Gravity Urine 1.010 1.005 - 1.030    pH Urine 6.0 4.5 - 8.0    Leukocyte Esterase UR 3+ (A) Negative    Nitrite Urine  Positive (A) Negative    Protein UR Negative Negative    Glucose Urine Negative Negative    Ketones Urine Negative Negative    Urobilinogen mg/dL 0.2 E.U./dL 0.2 E.U./dL    Bilirubin UR Negative Negative    Blood UR 1+ (A) Negative       ASSESSMENT/PLAN:           ICD-10-CM    1. Acute cystitis without hematuria N30.00 ciprofloxacin (CIPRO) 250 MG tablet   2. Dysuria R30.0 Urinalysis ([place])     Urine Culture Aerobic Bacterial   3. Skin lesion L98.9 DERMATOLOGY REFERRAL       Patient Instructions   I would like you to see a dermatologist for a more thorough evaluation of the new skin lesion on your neck.    Urine test is consistent with UTI.  Urine culture is pending.    You have been prescribed an antibiotic to treat a bacterial infection. Watch for signs of allergic reaction including swelling around the mouth or eyes and the development of a rash.  If you develop any of these symptoms, stop your medication, take a benadryl (25-50 mg) and give our office a call. Consider probiotic therapy to decrease the possibility of diarrhea associated with antibiotic use.    Follow up if you have any worsening or persistent problems or concerns.          Rosina Albert PA-C  [facility]

## 2017-06-28 LAB
BACTERIA SPEC CULT: ABNORMAL
Lab: ABNORMAL
MICRO REPORT STATUS: ABNORMAL
MICROORGANISM SPEC CULT: ABNORMAL
SPECIMEN SOURCE: ABNORMAL

## 2017-07-07 ENCOUNTER — OFFICE VISIT (OUTPATIENT)
Dept: DERMATOLOGY | Facility: CLINIC | Age: 77
End: 2017-07-07

## 2017-07-07 DIAGNOSIS — D18.01 CHERRY ANGIOMA: Primary | ICD-10-CM

## 2017-07-07 DIAGNOSIS — D48.5 NEOPLASM OF UNCERTAIN BEHAVIOR OF SKIN: ICD-10-CM

## 2017-07-07 DIAGNOSIS — L82.1 SK (SEBORRHEIC KERATOSIS): ICD-10-CM

## 2017-07-07 DIAGNOSIS — L81.4 LENTIGINES: ICD-10-CM

## 2017-07-07 DIAGNOSIS — R20.2 NOTALGIA PARESTHETICA: ICD-10-CM

## 2017-07-07 DIAGNOSIS — Z80.8 FAMILY HISTORY OF SKIN CANCER: ICD-10-CM

## 2017-07-07 PROCEDURE — 88305 TISSUE EXAM BY PATHOLOGIST: CPT | Performed by: DERMATOLOGY

## 2017-07-07 RX ORDER — LIDOCAINE HYDROCHLORIDE AND EPINEPHRINE 10; 10 MG/ML; UG/ML
3 INJECTION, SOLUTION INFILTRATION; PERINEURAL ONCE
Qty: 3 ML | Refills: 0 | OUTPATIENT
Start: 2017-07-07 | End: 2017-07-07

## 2017-07-07 ASSESSMENT — PAIN SCALES - GENERAL
PAINLEVEL: NO PAIN (0)
PAINLEVEL: NO PAIN (0)

## 2017-07-07 NOTE — NURSING NOTE
"Dermatology Rooming Note    Shyann Samuel's goals for this visit include:   Chief Complaint   Patient presents with     Derm Problem     Shyann is here today for concerns of an area on the left side of her jawline.  States she noticed it about \"2 months ago.\"             Patricia Lopez, MARIS      "

## 2017-07-07 NOTE — LETTER
"7/7/2017       RE: Shyann Samuel  [address] [facility] [place]  [place] [address]     Dear Colleague,    Thank you for referring your patient, Shyann Samuel, to the [facility] DERMATOLOGY at [facility]. Please see a copy of my visit note below.    [facility] Dermatology Note      Dermatology Problem List:  1. NUB,  left jawline cheek. Shave bx 7/7/2017 r/u ISK vs others.   2. Family hx of skin cancer  3. Benign skin findings: SK,CA, Lentigines.   4. Pruritus, back. Notalgia parasthetica >> vs. Xerosis vs. ICD. Gentle skin care/sarna lotion.     Encounter Date: Jul 7, 2017    CC:   Chief Complaint   Patient presents with     Derm Problem     Shyann is here today for concerns of an area on the left side of her jawline.  States she noticed it about \"2 months ago.\"               History of Present Illness:  Ms. Shyann Samuel is a 77 year old female who presents in consultation from her PCP Dr. Aleksandra Starks for evaluation of concerning lesion on the left jawline. This first appeared approximately 2 months ago. Popped up quickly, but has been stable and persistent since that time. Occasionally caught and irritated, but otherwise without any concerning symptoms. No personal hx of skin cancer. Good sun protection endorsed. Itching noted on the left mid back. Has not tried anything for this. No other concerns today.     Past Medical History:   Patient Active Problem List   Diagnosis     Low back pain     Status post hip replacement     Hyperlipidemia     Anterior corneal dystrophy     Hypertension     Insomnia     Osteoarthritis     Cataract     Dermatitis medicamentosa     Esotropia     Macular puckering     Nontoxic multinodular goiter     Posterior vitreous detachment     Recurrent UTI     Nerv/musculskel sym NEC     Atrophic vaginitis     Overactive bladder     Health Care Home     Past Medical History:   Diagnosis Date     Anterior corneal dystrophy      Back pain  "     Cataract      Esotropia      Hyperlipidemia      Hyperlipidemia      Hypertension      Insomnia      Osteoarthritis      Posterior vitreous detachment      Urinary tract infection      Past Surgical History:   Procedure Laterality Date     C TOTAL HIP ARTHROPLASTY Right 10/2009    Replacement of right hip     CATARACT IOL, RT/LT Bilateral ~2005     EYE SURGERY  after 2003,pt unsure    blepharoplasty     HC EXTERNAL LEVATOR RESECTION Bilateral 12/2014       Social History:  The patient is retired. 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     Family History:  There is a family history of non-melanoma skin cancer in the patient's father. Unclear what type.    Medications:  Current Outpatient Prescriptions   Medication Sig Dispense Refill     lisinopril (PRINIVIL/ZESTRIL) 5 MG tablet Take 1 tablet (5 mg) by mouth daily 90 tablet 3     simvastatin (ZOCOR) 20 MG tablet Take 1 tablet (20 mg) by mouth At Bedtime 90 tablet 3     conjugated estrogens (PREMARIN) vaginal cream Place 1 g vaginally nightly as needed 30 g 3     NAPROXEN PO Take 220 mg by mouth 2 times daily as needed for moderate pain       UNABLE TO FIND MEDICATION NAME: Six-Flavor Tea pills, 8 pills (Chinese herbal medicain) Strengthen kidney merdian       cyanocolbalamin (VITAMIN  B-12) 1000 MCG tablet Take 1 tablet by mouth three times a week.       Omega-3 Fatty Acids (OMEGA-3 FISH OIL PO) Take  by mouth.       Calcium-Vitamin D (CALCIUM + D PO) Take  by mouth. 600mg/400 IU bid          Allergies   Allergen Reactions     Benzalkonium Chloride      Eye irritation     Nitrofurantoin Other (See Comments)     Causes fast heart rate.         Review of Systems:  -As per HPI  -Constitutional: The patient denies fatigue, fevers, chills, unintended weight loss, and night sweats.  -HEENT: Patient denies nonhealing oral sores.  -Skin: As above in HPI. No additional skin concerns.    Physical exam:  Vitals: There were no  vitals taken for this visit.  GEN: This is a well developed, well-nourished female in no acute distress, in a pleasant mood.    SKIN: Waist-up skin, which includes the head/face, neck, both arms, chest, back, abdomen, digits and/or nails was examined.  -There are bright red dome shaped papules scattered on the areas examined today.   -Mid upper back with mild lichenification (L>R) overlying background of xerosis.  -Scattered brown macules on sun exposed areas.  -There are waxy stuck on tan to brown papules on the areas examined including a flat 5 mm macule on the right lower cheek and scattered on trunk and extremities. .  -6 mm dry, slightly irritated stuck on brown papule on the left jawline without clear dermoscopic features (horn cysts, comedonal openings, etc).    Impression/Plan:  1. NUB, left jawline cheek. 6 mm stuck on brown irritated papule. No distinct dermoscopic features. Shave removal attempted today. ISK vs. NMSC vs others.     Shave biopsy:  After discussion of benefits and risks including but not limited to bleeding/bruising, pain/swelling, infection, scar, incomplete removal, nerve damage/numbness, recurrence, and non-diagnostic biopsy, written consent, verbal consent and photographs were obtained. Time-out was performed. The area was cleaned with isopropyl alcohol.  was injected to obtain adequate anesthesia of the lesion on the left jawline cheek . 1.5 ml of 1.5% lidocaine with Epi 1:200,000 was injected to obtain adequate anesthesia. A  shave biopsy was performed. Hemostasis was achieved with aluminium chloride. Vaseline and a sterile dressing were applied. The patient tolerated the procedure and no complications were noted. The patient was provided with verbal and written post care instructions.      2. Pruritus, Left upper back. 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  Less likely xerotic dermatitis vs. ICD.    Start gentle skin care with frequent emollient use    PRN Sarna for pruritus.     3. Benign  skin findings: seborrheic keratoses, cherry angiomata, lentigines.     Benign, reassurance provided. Nothing to do at this point.     CC Dr. Aleksandra Starks on close of this encounter.  Follow-up prn for new or changing lesions or pending bx results above.     Adalid Oscar staffed the patient.    Staff Involved:  Resident(Roderick Shah)/Staff(as above)    Roderick Shah MD  PGY3 Dermatology  [phone]   I was present during the key portions of the history and physical exam and shave biopsy.  I agree with the treatment plan. JUSTO Leslie MD          Pictures were placed in Pt's chart today for future reference.

## 2017-07-07 NOTE — PROGRESS NOTES
"[facility] Dermatology Note      Dermatology Problem List:  1. NUB,  left jawline cheek. Shave bx 7/7/2017 r/u ISK vs others.   2. Family hx of skin cancer  3. Benign skin findings: SK,CA, Lentigines.   4. Pruritus, back. Notalgia parasthetica >> vs. Xerosis vs. ICD. Gentle skin care/sarna lotion.     Encounter Date: Jul 7, 2017    CC:   Chief Complaint   Patient presents with     Derm Problem     Syhann is here today for concerns of an area on the left side of her jawline.  States she noticed it about \"2 months ago.\"               History of Present Illness:  Ms. Shyann Samuel is a 77 year old female who presents in consultation from her PCP Dr. Aleksandra Starks for evaluation of concerning lesion on the left jawline. This first appeared approximately 2 months ago. Popped up quickly, but has been stable and persistent since that time. Occasionally caught and irritated, but otherwise without any concerning symptoms. No personal hx of skin cancer. Good sun protection endorsed. Itching noted on the left mid back. Has not tried anything for this. No other concerns today.     Past Medical History:   Patient Active Problem List   Diagnosis     Low back pain     Status post hip replacement     Hyperlipidemia     Anterior corneal dystrophy     Hypertension     Insomnia     Osteoarthritis     Cataract     Dermatitis medicamentosa     Esotropia     Macular puckering     Nontoxic multinodular goiter     Posterior vitreous detachment     Recurrent UTI     Nerv/musculskel sym NEC     Atrophic vaginitis     Overactive bladder     Health Care Home     Past Medical History:   Diagnosis Date     Anterior corneal dystrophy      Back pain      Cataract      Esotropia      Hyperlipidemia      Hyperlipidemia      Hypertension      Insomnia      Osteoarthritis      Posterior vitreous detachment      Urinary tract infection      Past Surgical History:   Procedure Laterality Date     C TOTAL HIP ARTHROPLASTY Right 10/2009 "    Replacement of right hip     CATARACT IOL, RT/LT Bilateral ~2005     EYE SURGERY  after 2003,pt unsure    blepharoplasty     HC EXTERNAL LEVATOR RESECTION Bilateral 12/2014       Social History:  The patient is retired. 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     Family History:  There is a family history of non-melanoma skin cancer in the patient's father. Unclear what type.    Medications:  Current Outpatient Prescriptions   Medication Sig Dispense Refill     lisinopril (PRINIVIL/ZESTRIL) 5 MG tablet Take 1 tablet (5 mg) by mouth daily 90 tablet 3     simvastatin (ZOCOR) 20 MG tablet Take 1 tablet (20 mg) by mouth At Bedtime 90 tablet 3     conjugated estrogens (PREMARIN) vaginal cream Place 1 g vaginally nightly as needed 30 g 3     NAPROXEN PO Take 220 mg by mouth 2 times daily as needed for moderate pain       UNABLE TO FIND MEDICATION NAME: Six-Flavor Tea pills, 8 pills (Chinese herbal medicain) Strengthen kidney merdian       cyanocolbalamin (VITAMIN  B-12) 1000 MCG tablet Take 1 tablet by mouth three times a week.       Omega-3 Fatty Acids (OMEGA-3 FISH OIL PO) Take  by mouth.       Calcium-Vitamin D (CALCIUM + D PO) Take  by mouth. 600mg/400 IU bid          Allergies   Allergen Reactions     Benzalkonium Chloride      Eye irritation     Nitrofurantoin Other (See Comments)     Causes fast heart rate.         Review of Systems:  -As per HPI  -Constitutional: The patient denies fatigue, fevers, chills, unintended weight loss, and night sweats.  -HEENT: Patient denies nonhealing oral sores.  -Skin: As above in HPI. No additional skin concerns.    Physical exam:  Vitals: There were no vitals taken for this visit.  GEN: This is a well developed, well-nourished female in no acute distress, in a pleasant mood.    SKIN: Waist-up skin, which includes the head/face, neck, both arms, chest, back, abdomen, digits and/or nails was examined.  -There are bright red dome shaped  papules scattered on the areas examined today.   -Mid upper back with mild lichenification (L>R) overlying background of xerosis.  -Scattered brown macules on sun exposed areas.  -There are waxy stuck on tan to brown papules on the areas examined including a flat 5 mm macule on the right lower cheek and scattered on trunk and extremities. .  -6 mm dry, slightly irritated stuck on brown papule on the left jawline without clear dermoscopic features (horn cysts, comedonal openings, etc).    Impression/Plan:  1. NUB, left jawline cheek. 6 mm stuck on brown irritated papule. No distinct dermoscopic features. Shave removal attempted today. ISK vs. NMSC vs others.     Shave biopsy:  After discussion of benefits and risks including but not limited to bleeding/bruising, pain/swelling, infection, scar, incomplete removal, nerve damage/numbness, recurrence, and non-diagnostic biopsy, written consent, verbal consent and photographs were obtained. Time-out was performed. The area was cleaned with isopropyl alcohol.  was injected to obtain adequate anesthesia of the lesion on the left jawline cheek . 1.5 ml of 1.5% lidocaine with Epi 1:200,000 was injected to obtain adequate anesthesia. A  shave biopsy was performed. Hemostasis was achieved with aluminium chloride. Vaseline and a sterile dressing were applied. The patient tolerated the procedure and no complications were noted. The patient was provided with verbal and written post care instructions.      2. Pruritus, Left upper back. 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  Less likely xerotic dermatitis vs. ICD.    Start gentle skin care with frequent emollient use    PRN Sarna for pruritus.     3. Benign skin findings: seborrheic keratoses, cherry angiomata, lentigines.     Benign, reassurance provided. Nothing to do at this point.     CC Dr. Aleksandra Starks on close of this encounter.  Follow-up prn for new or changing lesions or pending bx results above.     Adalid Oscar staffed  the patient.    Staff Involved:  Resident(Roderick Shah)/Staff(as above)    Roderick Shah MD  PGY3 Dermatology  [phone]   I was present during the key portions of the history and physical exam and shave biopsy.  I agree with the treatment plan. JUSTO Leslie MD

## 2017-07-07 NOTE — PATIENT INSTRUCTIONS
Wound Care After a Biopsy    What is a skin biopsy?  A skin biopsy allows the doctor to examine a very small piece of tissue under the microscope to determine the diagnosis and the best treatment for the skin condition. A local anesthetic (numbing medicine)  is injected with a very small needle into the skin area to be tested. A small piece of skin is taken from the area. Sometimes a suture (stitch) is used.     What are the risks of a skin biopsy?  I will experience scar, bleeding, swelling, pain, crusting and redness. I may experience incomplete removal or recurrence. Risks of this procedure are excessive bleeding, bruising, infection, nerve damage, numbness, thick (hypertrophic or keloidal) scar and non-diagnostic biopsy.    How should I care for my wound for the first 24 hours?    Keep the wound dry and covered for 24 hours    If it bleeds, hold direct pressure on the area for 15 minutes. If bleeding does not stop then go to the emergency room    Avoid strenuous exercise the first 1-2 days or as your doctor instructs you    How should I care for the wound after 24 hours?    After 24 hours, remove the bandage    You may bathe or shower as normal    If you had a scalp biopsy, you can shampoo as usual and can use shower water to clean the biopsy site daily    Clean the wound twice a day with gentle soap and water    Do not scrub, be gentle    Apply white petroleum/Vaseline after cleaning the wound with a cotton swab or a clean finger, and keep the site covered with a Bandaid /bandage. Bandages are not necessary with a scalp biopsy    If you are unable to cover the site with a Bandaid /bandage, re-apply ointment 2-3 times a day to keep the site moist. Moisture will help with healing    Avoid strenuous activity for first 1-2 days    Avoid lakes, rivers, pools, and oceans until the stitches are removed or the site is healed    How do I clean my wound?    Wash hands thoroughly with soap or use hand  before all  wound care    Clean the wound with gentle soap and water    Apply white petroleum/Vaseline  to wound after it is clean    Replace the Bandaid /bandage to keep the wound covered for the first few days or as instructed by your doctor    If you had a scalp biopsy, warm shower water to the area on a daily basis should suffice    What should I use to clean my wound?     Cotton-tipped applicators (Qtips )    White petroleum jelly (Vaseline ). Use a clean new container and use Q-tips to apply.    Bandaids   as needed    Gentle soap     How should I care for my wound long term?    Do not get your wound dirty    Keep up with wound care for one week or until the area is healed.    A small scab will form and fall off by itself when the area is completely healed. The area will be red and will become pink in color as it heals. Sun protection is very important for how your scar will turn out. Sunscreen with an SPF 30 or greater is recommended once the area is healed.    If you have stitches, stitches need to be removed in days. You may return to our clinic for this or you may have it done locally at your doctor s office.    You should have some soreness but it should be mild and slowly go away over several days. Talk to your doctor about using tylenol for pain,    When should I call my doctor?  If you have increased:     Pain or swelling    Pus or drainage (clear or slightly yellow drainage is ok)    Temperature over 100F    Spreading redness or warmth around wound    When will I hear about my results?  The biopsy results can take 2-3 weeks to come back. The clinic will call you with the results, send you a [employer] message, or have you schedule a follow-up clinic or phone time to discuss the results. Contact our clinics if you do not hear from us in 3 weeks.     Who should I call with questions?    [facility]: [phone]     [facility]: [phone]    For urgent  needs outside of business hours call the [facility] at [phone] and ask for the dermatology resident on call    The ABCDEs of Melanoma    Skin cancer can develop anywhere on the skin. Ask someone for help when checking your skin, especially in hard to see places. If you notice a mole different from others, or that changes, enlarges, itches, or bleeds (even if it is small), you should see a dermatologist.                    Dry Skin    What is dry skin?    Common skin problem    Can be worse during the winter     Affects all ages    Occurs in people with or without other skin problems    What does it look like?    Fine lines in the skin become more visible     Rough feeling skin     Flaky skin    Most common on the arms and legs    Skin can become cracked, especially on the hands and feet    What are some problems caused by dry skin?     Itching    Rubbing or scratching can cause thickened, rough skin patches    Cracks in skin can be painful    Red, itchy, scaly skin (called eczema) can occur    Yellow crusting or pus could be signs of an infection    What causes dry skin?    A lack of water in the top layer of the skin    Too much soapy water,  hot water, or harsh chemicals    Aging and sun damage    How do I treat dry skin?    Shower or bathe daily for under ten minutes with lukewarm water and mild soap.    Pat yourself dry with a towel gently and leave your skin slightly damp.    Use moisturizing cream or ointment right away.  Avoid lotions.    What kind of mild soap should I be using?    Camay , Dove , Tone , Neutrogena , Purpose , or Oil of Olay     A non-detergent cleanser, like Cetaphil , can be used.    What should I stay away from?    Scented soaps     Bath oils    What moisturizers should I be using?    Cetaphil Cream,CeraVe Cream, Vanicream, Aquaphilic, Eucerin, Aquaphor, or Vaseline     Always apply after showering or bathing.    Reapply throughout the day, if possible.    If dry skin affects  your hands, always reapply after handwashing.    What else should I know?    Using a humidifier during winter months may help.    If dry skin gets worse or if eczema develops, a steroid cream may be needed.

## 2017-07-07 NOTE — MR AVS SNAPSHOT
After Visit Summary   7/7/2017    Shyann Samuel    MRN: [id number]           Patient Information     Date Of Birth          1940        Visit Information        Provider Department      7/7/2017 10:45 AM Adalid Leslie MD [facility] Dermatology        Today's Diagnoses     Cherry angioma    -  1    Lentigines        SK (seborrheic keratosis)        Family history of skin cancer        Neoplasm of uncertain behavior of skin        Notalgia paresthetica          Care Instructions    Wound Care After a Biopsy    What is a skin biopsy?  A skin biopsy allows the doctor to examine a very small piece of tissue under the microscope to determine the diagnosis and the best treatment for the skin condition. A local anesthetic (numbing medicine)  is injected with a very small needle into the skin area to be tested. A small piece of skin is taken from the area. Sometimes a suture (stitch) is used.     What are the risks of a skin biopsy?  I will experience scar, bleeding, swelling, pain, crusting and redness. I may experience incomplete removal or recurrence. Risks of this procedure are excessive bleeding, bruising, infection, nerve damage, numbness, thick (hypertrophic or keloidal) scar and non-diagnostic biopsy.    How should I care for my wound for the first 24 hours?    Keep the wound dry and covered for 24 hours    If it bleeds, hold direct pressure on the area for 15 minutes. If bleeding does not stop then go to the emergency room    Avoid strenuous exercise the first 1-2 days or as your doctor instructs you    How should I care for the wound after 24 hours?    After 24 hours, remove the bandage    You may bathe or shower as normal    If you had a scalp biopsy, you can shampoo as usual and can use shower water to clean the biopsy site daily    Clean the wound twice a day with gentle soap and water    Do not scrub, be gentle    Apply white petroleum/Vaseline after cleaning the wound with a cotton swab or  a clean finger, and keep the site covered with a Bandaid /bandage. Bandages are not necessary with a scalp biopsy    If you are unable to cover the site with a Bandaid /bandage, re-apply ointment 2-3 times a day to keep the site moist. Moisture will help with healing    Avoid strenuous activity for first 1-2 days    Avoid lakes, rivers, pools, and oceans until the stitches are removed or the site is healed    How do I clean my wound?    Wash hands thoroughly with soap or use hand  before all wound care    Clean the wound with gentle soap and water    Apply white petroleum/Vaseline  to wound after it is clean    Replace the Bandaid /bandage to keep the wound covered for the first few days or as instructed by your doctor    If you had a scalp biopsy, warm shower water to the area on a daily basis should suffice    What should I use to clean my wound?     Cotton-tipped applicators (Qtips )    White petroleum jelly (Vaseline ). Use a clean new container and use Q-tips to apply.    Bandaids   as needed    Gentle soap     How should I care for my wound long term?    Do not get your wound dirty    Keep up with wound care for one week or until the area is healed.    A small scab will form and fall off by itself when the area is completely healed. The area will be red and will become pink in color as it heals. Sun protection is very important for how your scar will turn out. Sunscreen with an SPF 30 or greater is recommended once the area is healed.    If you have stitches, stitches need to be removed in days. You may return to our clinic for this or you may have it done locally at your doctor s office.    You should have some soreness but it should be mild and slowly go away over several days. Talk to your doctor about using tylenol for pain,    When should I call my doctor?  If you have increased:     Pain or swelling    Pus or drainage (clear or slightly yellow drainage is ok)    Temperature over  100F    Spreading redness or warmth around wound    When will I hear about my results?  The biopsy results can take 2-3 weeks to come back. The clinic will call you with the results, send you a mychart message, or have you schedule a follow-up clinic or phone time to discuss the results. Contact our clinics if you do not hear from us in 3 weeks.     Who should I call with questions?    [facility]: [phone]     [facility]: [phone]    For urgent needs outside of business hours call the [facility] at [phone] and ask for the dermatology resident on call    The ABCDEs of Melanoma    Skin cancer can develop anywhere on the skin. Ask someone for help when checking your skin, especially in hard to see places. If you notice a mole different from others, or that changes, enlarges, itches, or bleeds (even if it is small), you should see a dermatologist.                    Dry Skin    What is dry skin?    Common skin problem    Can be worse during the winter     Affects all ages    Occurs in people with or without other skin problems    What does it look like?    Fine lines in the skin become more visible     Rough feeling skin     Flaky skin    Most common on the arms and legs    Skin can become cracked, especially on the hands and feet    What are some problems caused by dry skin?     Itching    Rubbing or scratching can cause thickened, rough skin patches    Cracks in skin can be painful    Red, itchy, scaly skin (called eczema) can occur    Yellow crusting or pus could be signs of an infection    What causes dry skin?    A lack of water in the top layer of the skin    Too much soapy water,  hot water, or harsh chemicals    Aging and sun damage    How do I treat dry skin?    Shower or bathe daily for under ten minutes with lukewarm water and mild soap.    Pat yourself dry with a towel gently and leave your skin slightly damp.    Use  moisturizing cream or ointment right away.  Avoid lotions.    What kind of mild soap should I be using?    Camay , Dove , Tone , Neutrogena , Purpose , or Oil of Olay     A non-detergent cleanser, like Cetaphil , can be used.    What should I stay away from?    Scented soaps     Bath oils    What moisturizers should I be using?    Cetaphil Cream,CeraVe Cream, Vanicream, Aquaphilic, Eucerin, Aquaphor, or Vaseline     Always apply after showering or bathing.    Reapply throughout the day, if possible.    If dry skin affects your hands, always reapply after handwashing.    What else should I know?    Using a humidifier during winter months may help.    If dry skin gets worse or if eczema develops, a steroid cream may be needed.                  Follow-ups after your visit        Follow-up notes from your care team     Return With new concerns, or bx results as above.      Who to contact     Please call your clinic at [phone] to:    Ask questions about your health    Make or cancel appointments    Discuss your medicines    Learn about your test results    Speak to your doctor   If you have compliments or concerns about an experience at your clinic, or if you wish to file a complaint, please contact [facility] Patient Relations at [phone] or email us at [email or web address]         Additional Information About Your Visit        MyChart Information     SendUs gives you secure access to your electronic health record. If you see a primary care provider, you can also send messages to your care team and make appointments. If you have questions, please call your primary care clinic.  If you do not have a primary care provider, please call [phone] and they will assist you.      SendUs is an electronic gateway that provides easy, online access to your medical records. With SendUs, you can request a clinic appointment, read your test results, renew a prescription or  communicate with your care team.     To access your existing account, please contact your [facility] or call [phone] for assistance.        Care EveryWhere ID     This is your Care EveryWhere ID. This could be used by other organizations to access your [facility] medical records  [id number]         Blood Pressure from Last 3 Encounters:   06/27/17 140/83   04/26/17 134/84   02/23/17 145/84    Weight from Last 3 Encounters:   06/27/17 81.6 kg (180 lb)   04/26/17 83.2 kg (183 lb 6.4 oz)   02/23/17 83.5 kg (184 lb)              We Performed the Following     BIOPSY SKIN/SUBQ/MUC MEM, SINGLE LESION     Surgical pathology exam          Today's Medication Changes          These changes are accurate as of: 7/7/17  2:22 PM.  If you have any questions, ask your nurse or doctor.               Start taking these medicines.        Dose/Directions    camphor-menthol 0.5-0.5 % Lotn   Commonly known as:  DERMASARRA   Used for:  Notalgia paresthetica   Started by:  Adalid Leslie MD        Apply up to 4x daily as needed for itch on the back.   Quantity:  222 mL   Refills:  11       lidocaine 1% with EPINEPHrine 1:100,000 1 %-1:667741 injection   Used for:  Neoplasm of uncertain behavior of skin   Started by:  Adalid Leslie MD        Dose:  3 mL   Inject 3 mLs into the skin once for 1 dose   Quantity:  3 mL   Refills:  0            Where to get your medicines      These medications were sent to [facility] [address] [address]    Hours:  TRANSPLANT PHONE NUMBER [phone] Phone:  [phone]     camphor-menthol 0.5-0.5 % Lotn         Some of these will need a paper prescription and others can be bought over the counter.  Ask your nurse if you have questions.     You don't need a prescription for these medications     lidocaine 1% with EPINEPHrine 1:100,000 1 %-1:192539 injection                 Primary Care Provider Office Phone # Fax #    Aleksandra Starks [phone]       [facility] [address] [place] [address] A  [facility] [address]        Goals        General    Psychosocial (pt-stated)     Notes - Note edited  4/19/2016 11:10 AM by Zulma Kim BSW    As of today's date 4/19/2016 goal is met at 76 - 100%.   Goal Status:  Complete   Pt has resource ([employer]) that can evaluate pt's home.  Pt has decided to complete two other tasks (hiring yard work and cleaning out of her house assistance) prior to having [employer] come out.  I want a resource that can evaluate my home for DME/potential remodeling so I can age in place  As of today's date 3/17/2016 goal is met at 0 - 25%.   Goal Status:  Active    LUKE Coffey          Equal Access to Services     [facility] : Hadii aad ku hadasho Soomaali, waaxda luqadaha, qaybta kaalmada adeegyada, daija branham haymarguerite driscoll . So [facility] [phone].    ATENCIÓN: Si habla español, tiene a sanchez disposición servicios gratuitos de asistencia lingüística. Llame al [phone].    We comply with applicable federal civil rights laws and [place] laws. We do not discriminate on the basis of race, color, national origin, age, disability sex, sexual orientation or gender identity.            Thank you!     Thank you for choosing [facility] DERMATOLOGY  for your care. Our goal is always to provide you with excellent care. Hearing back from our patients is one way we can continue to improve our services. Please take a few minutes to complete the written survey that you may receive in the mail after your visit with us. Thank you!             Your Updated Medication List - Protect others around you: Learn how to safely use, store and throw away your medicines at www.disposemymeds.org.          This list is accurate as of: 7/7/17  2:22 PM.  Always use your most recent med list.                   Brand Name Dispense  Instructions for use Diagnosis    CALCIUM + D PO      Take  by mouth. 600mg/400 IU bid        camphor-menthol 0.5-0.5 % Lotn    DERMASARRA    222 mL    Apply up to 4x daily as needed for itch on the back.    Notalgia paresthetica       conjugated estrogens cream    PREMARIN    30 g    Place 1 g vaginally nightly as needed    Postmenopausal atrophic vaginitis       cyanocobalamin 1000 MCG tablet    vitamin  B-12     Take 1 tablet by mouth three times a week.        lidocaine 1% with EPINEPHrine 1:100,000 1 %-1:616129 injection     3 mL    Inject 3 mLs into the skin once for 1 dose    Neoplasm of uncertain behavior of skin       lisinopril 5 MG tablet    PRINIVIL/ZESTRIL    90 tablet    Take 1 tablet (5 mg) by mouth daily    Essential hypertension       NAPROXEN PO      Take 220 mg by mouth 2 times daily as needed for moderate pain        OMEGA-3 FISH OIL PO      Take  by mouth.        simvastatin 20 MG tablet    ZOCOR    90 tablet    Take 1 tablet (20 mg) by mouth At Bedtime    Hyperlipidemia, unspecified hyperlipidemia type       UNABLE TO FIND      MEDICATION NAME: Six-Flavor Tea pills, 8 pills (Chinese herbal medicain) Strengthen kidney merdian

## 2017-07-11 ENCOUNTER — TELEPHONE (OUTPATIENT)
Dept: DERMATOLOGY | Facility: CLINIC | Age: 77
End: 2017-07-11

## 2017-07-11 LAB — COPATH REPORT: NORMAL

## 2017-07-14 RX ORDER — LIDOCAINE HYDROCHLORIDE AND EPINEPHRINE 10; 10 MG/ML; UG/ML
3 INJECTION, SOLUTION INFILTRATION; PERINEURAL ONCE
Qty: 3 ML | Refills: 0 | OUTPATIENT
Start: 2017-07-14 | End: 2017-07-14

## 2017-09-13 ENCOUNTER — TELEPHONE (OUTPATIENT)
Dept: OTHER | Facility: CLINIC | Age: 77
End: 2017-09-13

## 2017-09-13 NOTE — TELEPHONE ENCOUNTER
Patient called and states that she does not need appointment with Dr. Valenzuela, she is being seen at the [facility] and will do follow up care there  Xochitl Grimes, RN, BSN

## 2017-10-02 ENCOUNTER — OFFICE VISIT (OUTPATIENT)
Dept: OPHTHALMOLOGY | Facility: CLINIC | Age: 77
End: 2017-10-02
Attending: OPHTHALMOLOGY
Payer: MEDICARE

## 2017-10-02 DIAGNOSIS — H51.8 DIVERGENCE INSUFFICIENCY: ICD-10-CM

## 2017-10-02 DIAGNOSIS — H52.203 MYOPIA WITH ASTIGMATISM AND PRESBYOPIA, BILATERAL: ICD-10-CM

## 2017-10-02 DIAGNOSIS — H52.13 MYOPIA WITH ASTIGMATISM AND PRESBYOPIA, BILATERAL: ICD-10-CM

## 2017-10-02 DIAGNOSIS — H52.4 MYOPIA WITH ASTIGMATISM AND PRESBYOPIA, BILATERAL: ICD-10-CM

## 2017-10-02 DIAGNOSIS — H26.493 BILATERAL POSTERIOR CAPSULAR OPACIFICATION: ICD-10-CM

## 2017-10-02 DIAGNOSIS — Z96.1 PSEUDOPHAKIA OF BOTH EYES: Primary | ICD-10-CM

## 2017-10-02 DIAGNOSIS — H02.889 MEIBOMIAN GLAND DYSFUNCTION (MGD): ICD-10-CM

## 2017-10-02 PROCEDURE — 99213 OFFICE O/P EST LOW 20 MIN: CPT | Mod: ZF

## 2017-10-02 PROCEDURE — 92015 DETERMINE REFRACTIVE STATE: CPT | Mod: GY,ZF

## 2017-10-02 ASSESSMENT — REFRACTION_WEARINGRX
OD_ADD: +2.25
OS_CYLINDER: +0.75
OS_AXIS: 91
OS_SPHERE: -2.75
OD_CYLINDER: +0.25
OD_SPHERE: -0.25
OS_ADD: +2.25
OS_HPRISM: 3.0
OD_HBASE: OUT
SPECS_TYPE: BIFOCAL
OD_AXIS: 035
OD_HPRISM: 3.0
OS_HBASE: OUT

## 2017-10-02 ASSESSMENT — CONF VISUAL FIELD
METHOD: COUNTING FINGERS
OS_NORMAL: 1
OD_NORMAL: 1

## 2017-10-02 ASSESSMENT — REFRACTION_MANIFEST
OD_AXIS: 020
OS_CYLINDER: +0.75
OS_SPHERE: -3.50
OS_AXIS: 090
OD_SPHERE: -1.00
OD_CYLINDER: +0.75
OS_ADD: +2.50
OD_ADD: +2.50

## 2017-10-02 ASSESSMENT — CUP TO DISC RATIO
OS_RATIO: 0.1
OD_RATIO: 0.1

## 2017-10-02 ASSESSMENT — REFRACTION_FINALRX
OS_HPRISM: 3.0
OS_HBASE: OUT
OD_HBASE: OUT
OD_HPRISM: 3.0

## 2017-10-02 ASSESSMENT — VISUAL ACUITY
CORRECTION_TYPE: GLASSES
OS_CC+: -1
OD_CC: 20/60
OD_PH_CC: 20/30
METHOD: SNELLEN - LINEAR
OS_CC: 20/20

## 2017-10-02 ASSESSMENT — SLIT LAMP EXAM - LIDS
COMMENTS: MEIBOMIAN GLAND DYSFUNCTION
COMMENTS: MEIBOMIAN GLAND DYSFUNCTION

## 2017-10-02 ASSESSMENT — TONOMETRY
OS_IOP_MMHG: 21
IOP_METHOD: TONOPEN
OD_IOP_MMHG: 19

## 2017-10-02 NOTE — PATIENT INSTRUCTIONS
You have a posterior capsular opacification (PCO) in your eyes  The treatment for this is a YAG laser to open the capsular bag  The procedure takes approximately ten minutes and can be done in clinic    Please feel free to

## 2017-10-02 NOTE — MR AVS SNAPSHOT
After Visit Summary   10/2/2017    Shyann Samuel    MRN: [id number]           Patient Information     Date Of Birth          1940        Visit Information        Provider Department      10/2/2017 1:45 PM Vin Del Angel MD [facility]        Care Instructions    You have a posterior capsular opacification (PCO) in your eyes  The treatment for this is a YAG laser to open the capsular bag  The procedure takes approximately ten minutes and can be done in clinic    Please feel free to           Follow-ups after your visit        Who to contact     Please call your clinic at [phone] to:    Ask questions about your health    Make or cancel appointments    Discuss your medicines    Learn about your test results    Speak to your doctor   If you have compliments or concerns about an experience at your clinic, or if you wish to file a complaint, please contact [facility] Patient Relations at [phone] or email us at [email or web address]         Additional Information About Your Visit        [place] Information     [employer] gives you secure access to your electronic health record. If you see a primary care provider, you can also send messages to your care team and make appointments. If you have questions, please call your primary care clinic.  If you do not have a primary care provider, please call [phone] and they will assist you.      Pull is an electronic gateway that provides easy, online access to your medical records. With Pull, you can request a clinic appointment, read your test results, renew a prescription or communicate with your care team.     To access your existing account, please contact your [facility] or call [phone] for assistance.        Care EveryWhere ID     This is your Care EveryWhere ID. This could be used by other organizations to access your [place] medical records  [id number]          Blood Pressure from Last 3 Encounters:   06/27/17 140/83   04/26/17 134/84   02/23/17 145/84    Weight from Last 3 Encounters:   06/27/17 81.6 kg (180 lb)   04/26/17 83.2 kg (183 lb 6.4 oz)   02/23/17 83.5 kg (184 lb)              Today, you had the following     No orders found for display       Primary Care Provider Office Phone # Fax #    Aleksandra Isabelle Starks [phone]       [address] [place] [address]        Goals        General    Psychosocial (pt-stated)     Notes - Note edited  4/19/2016 11:10 AM by Zulma Kim BSKENNY    As of today's date 4/19/2016 goal is met at 76 - 100%.   Goal Status:  Complete   Pt has resource ([employer]) that can evaluate pt's home.  Pt has decided to complete two other tasks (hiring yard work and cleaning out of her house assistance) prior to having [employer] come out.  I want a resource that can evaluate my home for DME/potential remodeling so I can age in place  As of today's date 3/17/2016 goal is met at 0 - 25%.   Goal Status:  Active    LUKE Coffey          Equal Access to Services     [facility]E [facility]ALSH AH: Hadii aad ku hadasho Soomaali, waaxda luqadaha, qaybta kaalmada adeegyada, waxay idiin haydasian daphne driscoll . So [facility] [phone].    ATENCIÓN: Si habla español, tiene a sanchez disposición servicios gratuitos de asistencia lingüística. Llame al [phone].    We comply with applicable federal civil rights laws and [place] laws. We do not discriminate on the basis of race, color, national origin, age, disability, sex, sexual orientation, or gender identity.            Thank you!     Thank you for choosing EYE CLINIC  for your care. Our goal is always to provide you with excellent care. Hearing back from our patients is one way we can continue to improve our services. Please take a few minutes to complete the written survey that you may receive in the mail after your visit with us. Thank you!             Your Updated  Medication List - Protect others around you: Learn how to safely use, store and throw away your medicines at www.disposemymeds.org.          This list is accurate as of: 10/2/17  3:03 PM.  Always use your most recent med list.                   Brand Name Dispense Instructions for use Diagnosis    CALCIUM + D PO      Take  by mouth. 600mg/400 IU bid        camphor-menthol 0.5-0.5 % Lotn    DERMASARRA    222 mL    Apply up to 4x daily as needed for itch on the back.    Notalgia paresthetica       conjugated estrogens cream    PREMARIN    30 g    Place 1 g vaginally nightly as needed    Postmenopausal atrophic vaginitis       cyanocobalamin 1000 MCG tablet    vitamin  B-12     Take 1 tablet by mouth three times a week.        lisinopril 5 MG tablet    PRINIVIL/ZESTRIL    90 tablet    Take 1 tablet (5 mg) by mouth daily    Essential hypertension       NAPROXEN PO      Take 220 mg by mouth 2 times daily as needed for moderate pain        OMEGA-3 FISH OIL PO      Take  by mouth.        simvastatin 20 MG tablet    ZOCOR    90 tablet    Take 1 tablet (20 mg) by mouth At Bedtime    Hyperlipidemia, unspecified hyperlipidemia type       UNABLE TO FIND      MEDICATION NAME: Six-Flavor Tea pills, 8 pills (Chinese herbal medicain) Strengthen kidney merdian

## 2017-10-02 NOTE — PROGRESS NOTES
HPI:  Shyann Samuel is a 77 year old female divergence insufficiency [facility] ptosis s/p blepharoplasty ([facility] 2014). S/p CATARACT EXTRACTION WITH INTRAOCULAR LENS IMPLANTATION both eyes(Patricia 2009) who presents with decreased vision at a distance x 6-12 months. Stable, not intermittent. Denies flashes or new floaters. Denies pain. 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  posterior capsular opacity (PCO) likely contributing to [facility]   Offered YAG Capsulotomy today  Patient elected to defer as BEST CORRECTED VISUAL ACUITY 20/40;20/20  New Rx dispensed  Patient may elect to call and schedule laser PRN      (H02.89) Meibomian gland dysfunction (MGD)  Start artificial tears four times daily   Start warm compresses for five minute twice daily   Start lid hygiene     (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      Patient discussed with MD Vin Heck MD  PGY3, Dept of Ophthalmology  Pager [phone]      Teaching statement:  Complete documentation of historical and exam elements from today's encounter can be found in the full encounter summary report (not reduplicated in this progress note). I personally obtained the chief complaint(s) and history of present illness.  I confirmed and edited as necessary the review of systems, past medical/surgical history, family history, social history, and examination findings as documented by others; and I examined  the patient myself. I personally reviewed the relevant tests, images, and reports as documented above.     I formulated and edited as necessary the assessment and plan and discussed the findings and management plan with the patient and family.    Daniella Soni MD  Comprehensive Ophthalmology & Ocular Pathology  Department of Ophthalmology and Visual Neurosciences  [email or web address]  Pager [phone]

## 2017-10-02 NOTE — NURSING NOTE
Chief Complaints and History of Present Illnesses   Patient presents with     Yearly Exam     Pseudophakia      HPI    Affected eye(s):  Both   Symptoms:        Frequency:  Constant       Do you have eye pain now?:  No      Comments:  Feels that the gls are not helping the va like they use to  No red watery   + dry  Claudine Goodson COT 2:08 PM October 2, 2017

## 2017-11-06 ENCOUNTER — OFFICE VISIT (OUTPATIENT)
Dept: FAMILY MEDICINE | Facility: CLINIC | Age: 77
End: 2017-11-06

## 2017-11-06 VITALS
HEART RATE: 86 BPM | TEMPERATURE: 97.4 F | DIASTOLIC BLOOD PRESSURE: 82 MMHG | WEIGHT: 180 LBS | BODY MASS INDEX: 29.05 KG/M2 | OXYGEN SATURATION: 98 % | SYSTOLIC BLOOD PRESSURE: 129 MMHG

## 2017-11-06 DIAGNOSIS — R32 URINARY INCONTINENCE, UNSPECIFIED TYPE: Primary | ICD-10-CM

## 2017-11-06 DIAGNOSIS — M15.8 OTHER OSTEOARTHRITIS INVOLVING MULTIPLE JOINTS: ICD-10-CM

## 2017-11-06 DIAGNOSIS — Z23 NEED FOR PROPHYLACTIC VACCINATION AND INOCULATION AGAINST INFLUENZA: ICD-10-CM

## 2017-11-06 ASSESSMENT — PAIN SCALES - GENERAL: PAINLEVEL: MODERATE PAIN (4)

## 2017-11-06 NOTE — MR AVS SNAPSHOT
After Visit Summary   11/6/2017    Shyann Samuel    MRN: [id number]           Patient Information     Date Of Birth          1940        Visit Information        Provider Department      11/6/2017 3:00 PM Aleksandra Starks MD [facility]        Today's Diagnoses     Urinary incontinence, unspecified type    -  1    Need for prophylactic vaccination and inoculation against influenza        Other osteoarthritis involving multiple joints           Follow-ups after your visit        Additional Services     [facility] PT, HAND, AND CHIROPRACTIC REFERRAL       **This order will print in the [facility] Scheduling Office**    Physical Therapy, Hand Therapy and Chiropractic Care are available through:    *[facility]  *[facility]  *[facility]    Call one number to schedule at any of the above locations: [phone].    Your provider has referred you to: Chiropractic at [facility] or [facility]    Indication/Reason for Referral: deconditioning, bilateral leg deconditioning  Onset of Illness: months/years  Therapy Orders: Evaluate and Treat  Special Programs: None  Special Request: None    Brandon Taylor      Additional Comments for the Therapist or Chiropractor:       Please be aware that coverage of these services is subject to the terms and limitations of your health insurance plan.  Call member services at your health plan with any benefit or coverage questions.      Please bring the following to your appointment:    *Your personal calendar for scheduling future appointments  *Comfortable clothing                  Who to contact     Please call your clinic at [phone] to:    Ask questions about your health    Make or cancel appointments    Discuss your medicines    Learn about your test results    Speak to your doctor   If you have compliments or concerns about an experience at your clinic, or if you wish to file a complaint, please contact [facility]  [place] [facility] Patient Relations at [phone] or email us at [email or web address]         Additional Information About Your Visit        [employer] Information     [employer] gives you secure access to your electronic health record. If you see a primary care provider, you can also send messages to your care team and make appointments. If you have questions, please call your primary care clinic.  If you do not have a primary care provider, please call [phone] and they will assist you.      testbirds is an electronic gateway that provides easy, online access to your medical records. With testbirds, you can request a clinic appointment, read your test results, renew a prescription or communicate with your care team.     To access your existing account, please contact your [facility] or call [phone] for assistance.        Care EveryWhere ID     This is your Care EveryWhere ID. This could be used by other organizations to access your [place] medical records  [id number]        Your Vitals Were     Pulse Temperature Pulse Oximetry BMI (Body Mass Index)          86 97.4  F (36.3  C) (Oral) 98% 29.05 kg/m2         Blood Pressure from Last 3 Encounters:   11/06/17 129/82   06/27/17 140/83   04/26/17 134/84    Weight from Last 3 Encounters:   11/06/17 180 lb (81.6 kg)   06/27/17 180 lb (81.6 kg)   04/26/17 183 lb 6.4 oz (83.2 kg)              We Performed the Following     ADMIN MEDICARE: Pneumococcal Vaccine ()     [facility] PT, HAND, AND CHIROPRACTIC REFERRAL     Pneumococcal vaccine 13 valent PCV13 IM (Prevnar) [01212]        Primary Care Provider Office Phone # Fax #    Aleksandra Starks [phone]       [address] [facility] [facility] [address]        Goals        General    Psychosocial (pt-stated)     Notes - Note edited  4/19/2016 11:10 AM by Zulma Kim BSW    As of today's date 4/19/2016 goal is met at 76 - 100%.   Goal Status:   Complete   Pt has resource ([employer]) that can evaluate pt's home.  Pt has decided to complete two other tasks (hiring yard work and cleaning out of her house assistance) prior to having [employer] come out.  I want a resource that can evaluate my home for DME/potential remodeling so I can age in place  As of today's date 3/17/2016 goal is met at 0 - 25%.   Goal Status:  Active    Shawn Kim, LSW          Equal Access to Services     LEE WALSH AH: Hadii aad ku hadasho Soomaali, waaxda luqadaha, qaybta kaalmada adeegyada, waxsondra idiin remin adereta turner lakeronraphael . So [facility] [phone].    ATENCIÓN: Si turner ye, tiene a sanchez disposición servicios gratuitos de asistencia lingüística. Llame al [phone].    We comply with applicable federal civil rights laws and [place] laws. We do not discriminate on the basis of race, color, national origin, age, disability, sex, sexual orientation, or gender identity.            Thank you!     Thank you for choosing [facility]  for your care. Our goal is always to provide you with excellent care. Hearing back from our patients is one way we can continue to improve our services. Please take a few minutes to complete the written survey that you may receive in the mail after your visit with us. Thank you!             Your Updated Medication List - Protect others around you: Learn how to safely use, store and throw away your medicines at www.disposemymeds.org.          This list is accurate as of: 11/6/17  3:32 PM.  Always use your most recent med list.                   Brand Name Dispense Instructions for use Diagnosis    CALCIUM + D PO      Take  by mouth. 600mg/400 IU bid        camphor-menthol 0.5-0.5 % Lotn    DERMASARRA    222 mL    Apply up to 4x daily as needed for itch on the back.    Notalgia paresthetica       conjugated estrogens cream    PREMARIN    30 g    Place 1 g vaginally nightly as needed    Postmenopausal atrophic vaginitis        cyanocobalamin 1000 MCG tablet    vitamin  B-12     Take 1 tablet by mouth three times a week.        lisinopril 5 MG tablet    PRINIVIL/ZESTRIL    90 tablet    Take 1 tablet (5 mg) by mouth daily    Essential hypertension       NAPROXEN PO      Take 220 mg by mouth 2 times daily as needed for moderate pain        OMEGA-3 FISH OIL PO      Take  by mouth.        simvastatin 20 MG tablet    ZOCOR    90 tablet    Take 1 tablet (20 mg) by mouth At Bedtime    Hyperlipidemia, unspecified hyperlipidemia type       UNABLE TO FIND      MEDICATION NAME: Six-Flavor Tea pills, 8 pills (Chinese herbal medicain) Strengthen kidney merdian

## 2017-11-06 NOTE — PROGRESS NOTES
Shyann Samuel is a 77 year old female here for the following issues:      Urinary problems  Shyann has a history of urinary frequency, nocturia, and recurrent urinary infections.   Her last infection was in June 2017. She had been followed by Dr. Silvestre in the past.  She did not tolerate anticholinergics because they cause too much lethargy. At one point, Dr. Silvestre  Discussed option of Botox or implanted nerve stimulator.  She did not want to pursue either of these.  At night, for the past 3-4 months, she has no sense of urgency but leaks urine if she gets up from bed. Happens 3x per week.. No dysuria, no hematuria. Does not happen during the day. No caffeine use. When she sleeps lightly she can get up and not leak urine. Only in deep sleep.   No stress incontinence.  High dose vitamin C , stopped and that helped fecal incontinence.   Wants to discuss today, and wanted to hear options but she is not sure she is ready to take medication and she declined referral to pelvic floor clinic or urology..    Osteoarthritis  Shyann is a  77-year-old woman with a long-standing history of osteoarthritis. She is status post right hip replacement. Fell on ice in Jan 2017, had swelling in her left calf that has remained. She is wearing a compression sock. It aggravatred her sciatica. She is fortunate to have an elevator in her house (15 steps). She has been using the elevator a lot and feels deconditioned, especially fine muscles. Pain radiates from her back to her right thigh, sometimes on the left. Exercise bike 17 min a day. Muscles feel tight on anterior thighs. [facility]. Pain can radiate to her shins, knees hurt too. She has left knee occasionally with pivoting. Does not wish to have TKA at this time. Does not feel off balance or weak. She would like referral to see a physical therapist.    Need for prophylactic vaccination and inoculation against influenza --she has had [facility] the [facility].   And pneumococcal vaccines-  Prevnar 13, would like to do this today.        Patient Active Problem List   Diagnosis     Low back pain     Status post hip replacement     Hyperlipidemia     Anterior corneal dystrophy     Hypertension     Insomnia     Osteoarthritis     Cataract     Dermatitis medicamentosa     Esotropia     Macular puckering     Nontoxic multinodular goiter     Posterior vitreous detachment     Recurrent UTI     Other symptoms involving nervous and musculoskeletal systems(781.99)     Atrophic vaginitis     Overactive bladder     Health Care Home       Current Outpatient Prescriptions   Medication Sig Dispense Refill     camphor-menthol (DERMASARRA) 0.5-0.5 % LOTN Apply up to 4x daily as needed for itch on the back. 222 mL 11     lisinopril (PRINIVIL/ZESTRIL) 5 MG tablet Take 1 tablet (5 mg) by mouth daily 90 tablet 3     simvastatin (ZOCOR) 20 MG tablet Take 1 tablet (20 mg) by mouth At Bedtime 90 tablet 3     conjugated estrogens (PREMARIN) vaginal cream Place 1 g vaginally nightly as needed 30 g 3     NAPROXEN PO Take 220 mg by mouth 2 times daily as needed for moderate pain       UNABLE TO FIND MEDICATION NAME: Six-Flavor Tea pills, 8 pills (Chinese herbal medicain) Strengthen kidney merdian       cyanocolbalamin (VITAMIN  B-12) 1000 MCG tablet Take 1 tablet by mouth three times a week.       Omega-3 Fatty Acids (OMEGA-3 FISH OIL PO) Take  by mouth.       Calcium-Vitamin D (CALCIUM + D PO) Take  by mouth. 600mg/400 IU bid         Allergies   Allergen Reactions     Benzalkonium Chloride      Eye irritation     Nitrofurantoin Other (See Comments)     Causes fast heart rate.        EXAM  /82 (BP Location: Left arm, Patient Position: Chair, Cuff Size: Adult Large)  Pulse 86  Temp 97.4  F (36.3  C) (Oral)  Wt 180 lb (81.6 kg)  SpO2 98%  BMI 29.05 kg/m2  Gen: Alert, pleasant, NAD  MS: No tenderness over the bony C, T, or LS spinous bodies  Point tenderness over the para Lumbar muscles,  bilaterally  MS:  no swelling,  redness, warmth of  either knee   no tenderness with palpation over the lateral hips (bursa or IT band)      Assessment:  (R32) Urinary incontinence, unspecified type  (primary encounter diagnosis)  Comment: worsening night time symptoms over the past several months,  No evidence of urinary tract  Infection. She does not want any intervention at this time  Plan: Urinalysis ([facility])         Recommend she wear a pad at that time. Offered short acting oxybutynin for nighttime use, she declines. Offered referral back to see the urologist, she declines at this time    (Z23) Need for prophylactic vaccination and inoculation against influenza  Comment:  She has never had pneumococcal vaccinations. She is up-to-date on her influenza vaccine  Plan: Pneumococcal vaccine 13 valent PCV13 IM         (Prevnar) [17245], ADMIN MEDICARE: Pneumococcal        Vaccine ()         Administered Prevnar 13 today, returning one year for pneumococcal 23 vaccine.    (M15.8) Other osteoarthritis involving multiple joints  Comment:  Bilateral thigh, knee and leg pain  Plan: [facility] PT, HAND, AND CHIROPRACTIC REFERRAL         Referred to physical therapy per patient request. If not improving, refer to orthopedic surgery.

## 2017-11-06 NOTE — NURSING NOTE
77 year old  Chief Complaint   Patient presents with     Referral     for Physical Therapy     Bladder Problems     reports unable to control of urination over the last 3-4 months.        Blood pressure 129/82, pulse 86, temperature 97.4  F (36.3  C), temperature source Oral, weight 180 lb (81.6 kg), SpO2 98 %. Body mass index is 29.05 kg/(m^2).  Patient Active Problem List   Diagnosis     Low back pain     Status post hip replacement     Hyperlipidemia     Anterior corneal dystrophy     Hypertension     Insomnia     Osteoarthritis     Cataract     Dermatitis medicamentosa     Esotropia     Macular puckering     Nontoxic multinodular goiter     Posterior vitreous detachment     Recurrent UTI     Other symptoms involving nervous and musculoskeletal systems(781.99)     Atrophic vaginitis     Overactive bladder     Health Care Home       Wt Readings from Last 2 Encounters:   11/06/17 180 lb (81.6 kg)   06/27/17 180 lb (81.6 kg)     BP Readings from Last 3 Encounters:   11/06/17 129/82   06/27/17 140/83   04/26/17 134/84         Current Outpatient Prescriptions   Medication     camphor-menthol (DERMASARRA) 0.5-0.5 % LOTN     lisinopril (PRINIVIL/ZESTRIL) 5 MG tablet     simvastatin (ZOCOR) 20 MG tablet     conjugated estrogens (PREMARIN) vaginal cream     NAPROXEN PO     UNABLE TO FIND     cyanocolbalamin (VITAMIN  B-12) 1000 MCG tablet     Omega-3 Fatty Acids (OMEGA-3 FISH OIL PO)     Calcium-Vitamin D (CALCIUM + D PO)     No current facility-administered medications for this visit.        Social History   Substance Use Topics     Smoking status: Never Smoker     Smokeless tobacco: Never Used     Alcohol use No       Health Maintenance Due   Topic Date Due     ADVANCE DIRECTIVE PLANNING Q5 YRS  04/08/1996     FALL RISK ASSESSMENT  04/08/2006     DEXA SCAN SCREENING (SYSTEM ASSIGNED)  04/08/2006     PNEUMOCOCCAL (1 of 2 - PCV13) 04/08/2006     TETANUS IMMUNIZATION (SYSTEM ASSIGNED)  06/24/2010     INFLUENZA VACCINE  (SYSTEM ASSIGNED)  09/01/2017       No results found for: MILEY Vazquez, [facility]  November 6, 2017 2:46 PM    Screening Questionnaire for Adult Immunization    Are you sick today?   No   Do you have allergies to medications, food, a vaccine component or latex?   No   Have you ever had a serious reaction after receiving a vaccination?   No   Do you have a long-term health problem with heart disease, lung disease, asthma, kidney disease, metabolic disease (e.g. diabetes), anemia, or other blood disorder?   No   Do you have cancer, leukemia, HIV/AIDS, or any other immune system problem?   No   In the past 3 months, have you taken medications that affect  your immune system, such as prednisone, other steroids, or anticancer drugs; drugs for the treatment of rheumatoid arthritis, Crohn s disease, or psoriasis; or have you had radiation treatments?   No   Have you had a seizure, or a brain or other nervous system problem?   No   During the past year, have you received a transfusion of blood or blood     products, or been given immune (gamma) globulin or antiviral drug?   No   For women: Are you pregnant or is there a chance you could become        pregnant during the next month?   No   Have you received any vaccinations in the past 4 weeks?   No     Immunization questionnaire answers were all negative.        Per orders of Dr. Starks, injection of PCV 13 given by Sofia Vazquez. Patient instructed to remain in clinic for 15 minutes afterwards, and to report any adverse reaction to me immediately.       Screening performed by Sofia Vazquez on 11/6/2017 at 3:37 PM.

## 2017-11-07 DIAGNOSIS — N95.2 POSTMENOPAUSAL ATROPHIC VAGINITIS: ICD-10-CM

## 2017-11-14 ENCOUNTER — MYC MEDICAL ADVICE (OUTPATIENT)
Dept: OPHTHALMOLOGY | Facility: CLINIC | Age: 77
End: 2017-11-14

## 2017-11-27 ENCOUNTER — THERAPY VISIT (OUTPATIENT)
Dept: PHYSICAL THERAPY | Facility: CLINIC | Age: 77
End: 2017-11-27
Payer: MEDICARE

## 2017-11-27 DIAGNOSIS — M25.562 LEFT KNEE PAIN: ICD-10-CM

## 2017-11-27 DIAGNOSIS — M25.561 RIGHT KNEE PAIN: Primary | ICD-10-CM

## 2017-11-27 PROCEDURE — G8978 MOBILITY CURRENT STATUS: HCPCS | Mod: GP | Performed by: PHYSICAL THERAPIST

## 2017-11-27 PROCEDURE — 97110 THERAPEUTIC EXERCISES: CPT | Mod: GP | Performed by: PHYSICAL THERAPIST

## 2017-11-27 PROCEDURE — 97112 NEUROMUSCULAR REEDUCATION: CPT | Mod: GP | Performed by: PHYSICAL THERAPIST

## 2017-11-27 PROCEDURE — G8978 MOBILITY CURRENT STATUS: HCPCS | Mod: GP

## 2017-11-27 PROCEDURE — G8979 MOBILITY GOAL STATUS: HCPCS | Mod: GP

## 2017-11-27 PROCEDURE — 97161 PT EVAL LOW COMPLEX 20 MIN: CPT | Mod: GP | Performed by: PHYSICAL THERAPIST

## 2017-11-27 PROCEDURE — G8979 MOBILITY GOAL STATUS: HCPCS | Mod: GP | Performed by: PHYSICAL THERAPIST

## 2017-11-27 ASSESSMENT — ACTIVITIES OF DAILY LIVING (ADL)
KNEE_ACTIVITY_OF_DAILY_LIVING_SUM: 47
SIT WITH YOUR KNEE BENT: ACTIVITY IS MINIMALLY DIFFICULT
HOW_WOULD_YOU_RATE_THE_CURRENT_FUNCTION_OF_YOUR_KNEE_DURING_YOUR_USUAL_DAILY_ACTIVITIES_ON_A_SCALE_FROM_0_TO_100_WITH_100_BEING_YOUR_LEVEL_OF_KNEE_FUNCTION_PRIOR_TO_YOUR_INJURY_AND_0_BEING_THE_INABILITY_TO_PERFORM_ANY_OF_YOUR_USUAL_DAILY_ACTIVITIES?: 90
KNEEL ON THE FRONT OF YOUR KNEE: I AM UNABLE TO DO THE ACTIVITY
STAND: ACTIVITY IS SOMEWHAT DIFFICULT
PAIN: THE SYMPTOM AFFECTS MY ACTIVITY MODERATELY
GIVING WAY, BUCKLING OR SHIFTING OF KNEE: I DO NOT HAVE THE SYMPTOM
STIFFNESS: I DO NOT HAVE THE SYMPTOM
LIMPING: I DO NOT HAVE THE SYMPTOM
KNEE_ACTIVITY_OF_DAILY_LIVING_SCORE: 67.14
AS_A_RESULT_OF_YOUR_KNEE_INJURY,_HOW_WOULD_YOU_RATE_YOUR_CURRENT_LEVEL_OF_DAILY_ACTIVITY?: ABNORMAL
RISE FROM A CHAIR: ACTIVITY IS MINIMALLY DIFFICULT
GO DOWN STAIRS: ACTIVITY IS SOMEWHAT DIFFICULT
GO UP STAIRS: ACTIVITY IS SOMEWHAT DIFFICULT
HOW_WOULD_YOU_RATE_THE_OVERALL_FUNCTION_OF_YOUR_KNEE_DURING_YOUR_USUAL_DAILY_ACTIVITIES?: NEARLY NORMAL
RAW_SCORE: 47
SWELLING: I DO NOT HAVE THE SYMPTOM
WEAKNESS: I DO NOT HAVE THE SYMPTOM
WALK: ACTIVITY IS SOMEWHAT DIFFICULT
SQUAT: I AM UNABLE TO DO THE ACTIVITY

## 2017-11-27 NOTE — PROGRESS NOTES
"Subjective:    Patient is a 77 year old female presenting with rehab right knee hpi. The history is provided by the patient. No  was used.   Shyann Samuel is a 77 year old female with a right knee and left knee condition.  Condition occurred with:  Insidious onset.  Condition occurred: for unknown reasons.  This is a new condition  Patient saw her provider on 11/6/2017, and a referral was placed that day. She stated her symptoms began 5-6 months ago. She also fell in January/February 2017, after which she was diagnosed with an unknown diagnosis where she has been instructed to wear compression socks to reverse the swelling. Is considering getting a TKA \"eventually\". Has been doing stepdowns and Herbert Chi.    Patient reports pain:  Anterior and medial.  Radiates to:  Hip, thigh, knee and lower leg.  Pain is described as aching and sharp and is constant and reported as 4/10.  Associated symptoms:  Loss of motion/stiffness and loss of strength. Pain is the same all the time.  Symptoms are exacerbated by ascending stairs, descending stairs, weight bearing, walking, bending/squatting, kneeling, transfers and standing and relieved by rest and NSAID's.  Since onset symptoms are unchanged.  Special tests:  X-ray.  Previous treatment includes physical therapy.  There was moderate improvement following previous treatment.  General health as reported by patient is good.  Pertinent medical history includes:  High blood pressure and overweight.  Medical allergies: no.  Other surgeries include:  Orthopedic surgery.  Current medications:  Anti-inflammatory and high blood pressure medication.  Current occupation is retired.    Primary job tasks include:  Prolonged sitting, prolonged standing, lifting, driving and repetitive tasks.    Barriers include:  None as reported by the patient.    Red flags:  None as reported by the patient.                        Objective:    Standing Alignment:    Cervical/Thoracic:  " Forward head, cervical lordosis increased and thoracic kyphosis increased  Shoulder/UE:  Rounded shoulders              Gait:    Assistive Devices:  Cane  Deviations:  Knee:  Knee flexion decr R, knee flexion decr L, knee extension decr L and knee extension decr RAnkle:  Push off decr L and push off decr R      Neurological: She is alert and oriented to person, place, and time.       Ankle/Foot Evaluation  ROM:  AROM is normal.                                                         Hip Evaluation  HIP AROM:  AROM:    Left Hip:     Normal    Right Hip:   Normal                  Hip PROM:  Hip PROM:  Left Hip:    Normal  Right Hip:  Normal                                     Knee Evaluation:  ROM:  AROM: normal  PROM: normal                    Edema:  Edema of the knee: swelling present throughout left knee.                General Evaluation:  AROM:  normal            PROM:  normal            Gross Strength:  Gross strength wnl general: decreased strength noted throughout body, especially legs (R<L)                        Edema:  Edema wnl general: swelling noted throughout left leg, particularly knee joint.    Integumentary/Inspection:  normal      Balance:  Balance wnl general: Impaired balance bilaterally (left better than right) in static and dynamic situations.            Posture:  Posture wnl general: forward head posture with rounded shoulders.          Gait:  Gait wnl general: decreased knee flexion/extension bilaterally, decreased push off, and decreased foot clearance.                                         ROS    Assessment/Plan:      Patient is a 77 year old female with both sides knee complaints.    Patient has the following significant findings with corresponding treatment plan.                Diagnosis 1:  Bilateral knee pain  Pain -  hot/cold therapy, electric stimulation, manual therapy, self management, education and home program  Decreased strength - therapeutic exercise, therapeutic activities  and home program  Impaired balance - neuro re-education, therapeutic activities and home program  Decreased proprioception - neuro re-education, therapeutic activities and home program  Impaired gait - gait training and home program  Impaired muscle performance - neuro re-education and home program  Decreased function - therapeutic activities and home program  Impaired posture - neuro re-education and home program    Therapy Evaluation Codes:   1) History comprised of:   Personal factors that impact the plan of care:      Past/current experiences, Time since onset of symptoms and Work status.    Comorbidity factors that impact the plan of care are:      High blood pressure and Overweight.     Medications impacting care: Anti-inflammatory and High blood pressure.  2) Examination of Body Systems comprised of:   Body structures and functions that impact the plan of care:      Hip and Knee.   Activity limitations that impact the plan of care are:      Bending, Driving, Lifting, Squatting/kneeling, Stairs, Standing and Walking.  3) Clinical presentation characteristics are:   Stable/Uncomplicated.  4) Decision-Making    Low complexity using standardized patient assessment instrument and/or measureable assessment of functional outcome.  Cumulative Therapy Evaluation is: Low complexity.    Previous and current functional limitations:  (See Goal Flow Sheet for this information)    Short term and Long term goals: (See Goal Flow Sheet for this information)     Communication ability:  Patient appears to be able to clearly communicate and understand verbal and written communication and follow directions correctly.  Treatment Explanation - The following has been discussed with the patient:   RX ordered/plan of care  Anticipated outcomes  Possible risks and side effects  This patient would benefit from PT intervention to resume normal activities.   Rehab potential is good.    Frequency:  1 X week, once daily  Duration:  for 6  weeks  Discharge Plan:  Achieve all LTG.  Independent in home treatment program.  Return to previous functional level by discharge.  Reach maximal therapeutic benefit.    Please refer to the daily flowsheet for treatment today, total treatment time and time spent performing 1:1 timed codes.

## 2017-11-27 NOTE — MR AVS SNAPSHOT
After Visit Summary   11/27/2017    Shyann Samuel    MRN: [id number]           Patient Information     Date Of Birth          1940        Visit Information        Provider Department      11/27/2017 3:20 PM Kat Wayne PT [facility]        Today's Diagnoses     Right knee pain    -  1    Left knee pain           Follow-ups after your visit        Your next 10 appointments already scheduled     Nov 28, 2017  8:00 AM CST   RETURN GENERAL with Vin Del Angel MD   Eye Clinic ([facility])    [address]  [address] [address]   [phone]            Dec 12, 2017  8:00 AM CST   RETURN GENERAL with Vin Del Angel MD   Eye Clinic ([facility])    [address]  [address] [address]   [phone]              Who to contact     If you have questions or need follow up information about today's clinic visit or your schedule please contact [facility] ATHLETIC [facility] PH[facility] directly at [phone].  Normal or non-critical lab and imaging results will be communicated to you by [employer], letter or phone within 4 business days after the clinic has received the results. If you do not hear from us within 7 days, please contact the clinic through [employer] or phone. If you have a critical or abnormal lab result, we will notify you by phone as soon as possible.  Submit refill requests through [employer] or call your pharmacy and they will forward the refill request to us. Please allow 3 business days for your refill to be completed.          Additional Information About Your Visit        MyChart Information     [employer] gives you secure access to your electronic health record. If you see a primary care provider, you can also send messages to your care team and make appointments. If you have questions, please call  your primary care clinic.  If you do not have a primary care provider, please call [phone] and they will assist you.        Care EveryWhere ID     This is your Care EveryWhere ID. This could be used by other organizations to access your [facility] medical records  [id number]         Blood Pressure from Last 3 Encounters:   11/06/17 129/82   06/27/17 140/83   04/26/17 134/84    Weight from Last 3 Encounters:   11/06/17 81.6 kg (180 lb)   06/27/17 81.6 kg (180 lb)   04/26/17 83.2 kg (183 lb 6.4 oz)              We Performed the Following     JERRY CERT REPORT     JERRY Inital Eval Report     Neuromuscular Re-Education     PT Eval, Low Complexity (80299)     Therapeutic Exercises        Primary Care Provider Office Phone # Fax #    Aleksandra Isabelle Starks [phone]       [address] [place] [address]        Goals        General    Psychosocial (pt-stated)     Notes - Note edited  4/19/2016 11:10 AM by Zulma Kim BSW    As of today's date 4/19/2016 goal is met at 76 - 100%.   Goal Status:  Complete   Pt has resource ([employer]) that can evaluate pt's home.  Pt has decided to complete two other tasks (hiring yard work and cleaning out of her house assistance) prior to having [employer] come out.  I want a resource that can evaluate my home for DME/potential remodeling so I can age in place  As of today's date 3/17/2016 goal is met at 0 - 25%.   Goal Status:  Active    LUKE Coffey          Equal Access to Services     [facility] AH: Hadii aad ku hadasho Soarronali, waaxda luqadaha, qaybta kaalmada adeegbrittney, waxsondra idiin hayaan adeeg kharash la'aan . So [facility] [phone].    ATENCIÓN: Si habla español, tiene a sanchez disposición servicios gratuitos de asistencia lingüística. Llame al [phone].    We comply with applicable federal civil rights laws and [place] laws. We do not discriminate on the basis of race, color, national origin, age, disability, sex, sexual  orientation, or gender identity.            Thank you!     Thank you for choosing [place] [facility]  for your care. Our goal is always to provide you with excellent care. Hearing back from our patients is one way we can continue to improve our services. Please take a few minutes to complete the written survey that you may receive in the mail after your visit with us. Thank you!             Your Updated Medication List - Protect others around you: Learn how to safely use, store and throw away your medicines at www.disposemymeds.org.          This list is accurate as of: 11/27/17  4:26 PM.  Always use your most recent med list.                   Brand Name Dispense Instructions for use Diagnosis    CALCIUM + D PO      Take  by mouth. 600mg/400 IU bid        camphor-menthol 0.5-0.5 % Lotn    DERMASARRA    222 mL    Apply up to 4x daily as needed for itch on the back.    Notalgia paresthetica       conjugated estrogens cream    PREMARIN    30 g    Place 1 g vaginally nightly as needed    Postmenopausal atrophic vaginitis       cyanocobalamin 1000 MCG tablet    vitamin  B-12     Take 1 tablet by mouth three times a week.        lisinopril 5 MG tablet    PRINIVIL/ZESTRIL    90 tablet    Take 1 tablet (5 mg) by mouth daily    Essential hypertension       NAPROXEN PO      Take 220 mg by mouth 2 times daily as needed for moderate pain        OMEGA-3 FISH OIL PO      Take  by mouth.        simvastatin 20 MG tablet    ZOCOR    90 tablet    Take 1 tablet (20 mg) by mouth At Bedtime    Hyperlipidemia, unspecified hyperlipidemia type       UNABLE TO FIND      MEDICATION NAME: Six-Flavor Tea pills, 8 pills (Chinese herbal medicain) Strengthen kidney merdian

## 2017-11-27 NOTE — LETTER
"DEPARTMENT OF HEALTH AND HUMAN SERVICES  CENTERS FOR MEDICARE & MEDICAID SERVICES    PLAN/UPDATED PLAN OF PROGRESS FOR OUTPATIENT REHABILITATION    PATIENTS NAME:  Shyann Samuel   : 1940  PROVIDER NUMBER:    [id number]  [id number]   PR[facility]  MEDICAL RECORD NUMBER: [id number]   START OF CARE DATE:  SOC Date: 17   TYPE:  PT  PRIMARY/TREATMENT DIAGNOSIS: (Pertinent Medical Diagnosis)  Right knee pain  Left knee pain  VISITS FROM START OF CARE:  Rxs Used: 1     Subjective:  Patient is a 77 year old female presenting with rehab right knee hpi. The history is provided by the patient. No  was used.   Shyann Samuel is a 77 year old female with a right knee and left knee condition.  Condition occurred with:  Insidious onset.  Condition occurred: for unknown reasons.  This is a new condition  Patient saw her provider on 2017, and a referral was placed that day. She stated her symptoms began 5-6 months ago. She also fell in 2017, after which she was diagnosed with an unknown diagnosis where she has been instructed to wear compression socks to reverse the swelling. Is considering getting a TKA \"eventually\". Has been doing stepdowns and Herbert Chi.    Patient reports pain:  Anterior and medial.  Radiates to:  Hip, thigh, knee and lower leg.  Pain is described as aching and sharp and is constant and reported as 4/10.  Associated symptoms:  Loss of motion/stiffness and loss of strength. Pain is the same all the time.  Symptoms are exacerbated by ascending stairs, descending stairs, weight bearing, walking, bending/squatting, kneeling, transfers and standing and relieved by rest and NSAID's.  Since onset symptoms are unchanged.  Special tests:  X-ray.  Previous treatment includes physical therapy.  There was moderate improvement following previous treatment.  General health as reported by patient is good.  " Pertinent medical history includes:  High blood pressure and overweight.  Medical allergies: no.  Other surgeries include:  Orthopedic surgery.  Current medications:  Anti-inflammatory and high blood pressure medication.  Current occupation is retired.    Primary job tasks include:  Prolonged sitting, prolonged standing, lifting, driving and repetitive tasks.  Barriers include:  None as reported by the patient.  Red flags:  None as reported by the patient.   Objective:  Standing Alignment:    Cervical/Thoracic:  Forward head, cervical lordosis increased and thoracic kyphosis increased  Shoulder/UE:  Rounded shoulders  Gait:    Assistive Devices:  Cane  Deviations:  Knee:  Knee flexion decr R, knee flexion decr L, knee extension decr L and knee extension decr RAnkle:  Push off decr L and push off decr R  Neurological: She is alert and oriented to person, place, and time.         Ankle/Foot Evaluation  ROM:  AROM is normal.  Hip Evaluation  HIP AROM:  AROM:    Left Hip:     Normal    Right Hip:   Normal   Hip PROM:  Hip PROM:  Left Hip:    Normal  Right Hip:  Normal  Knee Evaluation:  ROM:  AROM: normal  PROM: normal    Edema:  Edema of the knee: swelling present throughout left knee.  General Evaluation:  AROM:  normal  PROM:  normal  Gross Strength:  Gross strength wnl general: decreased strength noted throughout body, especially legs (R<L)  Edema:  Edema wnl general: swelling noted throughout left leg, particularly knee joint.  Integumentary/Inspection:  normal  Balance:  Balance wnl general: Impaired balance bilaterally (left better than right) in static and dynamic situations.  Posture:  Posture wnl general: forward head posture with rounded shoulders.  Gait:  Gait wnl general: decreased knee flexion/extension bilaterally, decreased push off, and decreased foot clearance.  Assessment/Plan:    Patient is a 77 year old female with both sides knee complaints.    Patient has the following significant findings with  corresponding treatment plan.                Diagnosis 1:  Bilateral knee pain  Pain -  hot/cold therapy, electric stimulation, manual therapy, self management, education and home program  Decreased strength - therapeutic exercise, therapeutic activities and home program  Impaired balance - neuro re-education, therapeutic activities and home program  Decreased proprioception - neuro re-education, therapeutic activities and home program  Impaired gait - gait training and home program  Impaired muscle performance - neuro re-education and home program  Decreased function - therapeutic activities and home program  Impaired posture - neuro re-education and home program  Therapy Evaluation Codes:   1) History comprised of:   Personal factors that impact the plan of care:      Past/current experiences, Time since onset of symptoms and Work status.    Comorbidity factors that impact the plan of care are:      High blood pressure and Overweight.     Medications impacting care: Anti-inflammatory and High blood pressure.  2) Examination of Body Systems comprised of:   Body structures and functions that impact the plan of care:      Hip and Knee.   Activity limitations that impact the plan of care are:      Bending, Driving, Lifting, Squatting/kneeling, Stairs, Standing and Walking.  3) Clinical presentation characteristics are:   Stable/Uncomplicated.  4) Decision-Making  Low complexity using standardized patient assessment instrument and/or measureable assessment of functional outcome.    Cumulative Therapy Evaluation is: Low complexity.  Previous and current functional limitations:  (See Goal Flow Sheet for this information)    Short term and Long term goals: (See Goal Flow Sheet for this information)     Communication ability:  Patient appears to be able to clearly communicate and understand verbal and written communication and follow directions correctly.  Treatment Explanation - The following has been discussed with the  "patient:   RX ordered/plan of care  Anticipated outcomes  Possible risks and side effects  This patient would benefit from PT intervention to resume normal activities.   Rehab potential is good.    Frequency:  1 X week, once daily  Duration:  for 6 weeks  Discharge Plan:  Achieve all LTG.  Independent in home treatment program.  Return to previous functional level by discharge.  Reach maximal therapeutic benefit.    Caregiver Signature/Credentials _____________________________ Date ________       Treating Provider: Kat Wayne, ATC, PT   I have reviewed and certified the need for these services and plan of treatment while under my care.        PHYSICIAN'S SIGNATURE:   _________________________________________  Date___________   Aleksandra Starks MD    Certification period:  Beginning of Cert date period: 11/27/17 to  End of Cert period date: 02/24/18     Functional Level Progress Report: Please see attached \"Goal Flow sheet for Functional level.\"    ____X____ Continue Services or       ________ DC Services                Service dates: From  SOC Date: 11/27/17 date to present                         "

## 2017-11-28 ENCOUNTER — OFFICE VISIT (OUTPATIENT)
Dept: OPHTHALMOLOGY | Facility: CLINIC | Age: 77
End: 2017-11-28
Attending: OPHTHALMOLOGY
Payer: MEDICARE

## 2017-11-28 DIAGNOSIS — H26.493 BILATERAL POSTERIOR CAPSULAR OPACIFICATION: Primary | ICD-10-CM

## 2017-11-28 PROCEDURE — 66821 AFTER CATARACT LASER SURGERY: CPT | Mod: RT,ZF | Performed by: OPHTHALMOLOGY

## 2017-11-28 PROCEDURE — 99212 OFFICE O/P EST SF 10 MIN: CPT | Mod: ZF

## 2017-11-28 ASSESSMENT — REFRACTION_WEARINGRX
SPECS_TYPE: BIFOCAL
OS_SPHERE: -2.75
OS_HPRISM: 3.0
OD_ADD: +2.25
OD_HBASE: OUT
OS_AXIS: 91
OS_HBASE: OUT
OD_SPHERE: -0.25
OD_CYLINDER: +0.25
OD_HPRISM: 3.0
OS_CYLINDER: +0.75
OD_AXIS: 035
OS_ADD: +2.25

## 2017-11-28 ASSESSMENT — CONF VISUAL FIELD
OS_NORMAL: 1
OD_NORMAL: 1

## 2017-11-28 ASSESSMENT — VISUAL ACUITY
METHOD: SNELLEN - LINEAR
OS_CC+: -1
OS_CC: 20/20
OD_PH_CC: 20/40
OD_CC: 20/60
CORRECTION_TYPE: GLASSES

## 2017-11-28 ASSESSMENT — SLIT LAMP EXAM - LIDS
COMMENTS: MEIBOMIAN GLAND DYSFUNCTION
COMMENTS: MEIBOMIAN GLAND DYSFUNCTION

## 2017-11-28 ASSESSMENT — TONOMETRY
OD_IOP_MMHG: 14
OS_IOP_MMHG: 15
IOP_METHOD: TONOPEN

## 2017-11-28 NOTE — NURSING NOTE
Chief Complaints and History of Present Illnesses   Patient presents with     Follow Up For     s/p Pseudophakia of both eyes (Primary Dx);      HPI    Affected eye(s):  Both   Symptoms:     No blurred vision   Decreased vision   No distorted vision   Floaters   No flashes      Duration:  2 months   Frequency:  Constant       Do you have eye pain now?:  No      Comments:  Pt. Stated decreased night vision over the last 2 months.  Renato Rush  8:15 AM November 28, 2017

## 2017-11-28 NOTE — PROGRESS NOTES
Subjective:    Patient is a 77 year old female presenting with rehab left ankle/foot hpi.                                    General health as reported by patient is good.  Pertinent medical history includes:  High blood pressure and overweight.    Other surgeries include:  Other (artificial right hip).  Current medications:  Anti-inflammatory and high blood pressure medication.  Current occupation is Retired  .        Barriers include:  None as reported by patient.    Red flags:  None as reported by patient.      Oswestry Score: 40 %    Knee Activity of Daily Living Score: 67.14            Objective:    System    Physical Exam    General     ROS    Assessment/Plan:

## 2017-11-28 NOTE — MR AVS SNAPSHOT
After Visit Summary   11/28/2017    Shyann Samuel    MRN: [id number]           Patient Information     Date Of Birth          1940        Visit Information        Provider Department      11/28/2017 8:00 AM Vin Del Angel MD [facility]        Today's Diagnoses     Bilateral posterior capsular opacification    -  1       Follow-ups after your visit        Follow-up notes from your care team     Return in about 2 weeks (around 12/12/2017) for as scheduled, IOP check, MRx s/p YAG Cap OD.      Your next 10 appointments already scheduled     Dec 12, 2017  8:00 AM CST   RETURN GENERAL with Vin Del Angel MD   Eye Clinic ([facility])    [address] [facility]  9th [address] Clin [address]  [facility] [id number] [phone]              Who to contact     Please call your clinic at [phone] to:    Ask questions about your health    Make or cancel appointments    Discuss your medicines    Learn about your test results    Speak to your doctor   If you have compliments or concerns about an experience at your clinic, or if you wish to file a complaint, please contact [facility] Patient Relations at [phone] or email us at [email or web address]         Additional Information About Your Visit        MyChart Information     [employer] gives you secure access to your electronic health record. If you see a primary care provider, you can also send messages to your care team and make appointments. If you have questions, please call your primary care clinic.  If you do not have a primary care provider, please call [phone] and they will assist you.      [employer] is an electronic gateway that provides easy, online access to your medical records. With [employer], you can request a clinic appointment, read your test results, renew a prescription or communicate with your care team.     To access your existing account, please contact your  [facility] or call [phone] for assistance.        Care EveryWhere ID     This is your Care EveryWhere ID. This could be used by other organizations to access your [place] medical records  [id number]         Blood Pressure from Last 3 Encounters:   11/06/17 129/82   06/27/17 140/83   04/26/17 134/84    Weight from Last 3 Encounters:   11/06/17 81.6 kg (180 lb)   06/27/17 81.6 kg (180 lb)   04/26/17 83.2 kg (183 lb 6.4 oz)              We Performed the Following     YAG Capsulotomy OD (right eye)        Primary Care Provider Office Phone # Fax #    Aleksandra Starks [phone]       [address] [place] [address]        Goals        General    Psychosocial (pt-stated)     Notes - Note edited  4/19/2016 11:10 AM by Zulma Kim BSW    As of today's date 4/19/2016 goal is met at 76 - 100%.   Goal Status:  Complete   Pt has resource ([employer]) that can evaluate pt's home.  Pt has decided to complete two other tasks (hiring yard work and cleaning out of her house assistance) prior to having [employer] come out.  I want a resource that can evaluate my home for DME/potential remodeling so I can age in place  As of today's date 3/17/2016 goal is met at 0 - 25%.   Goal Status:  Active    LUKE Coffey          Equal Access to Services     [facility] AH: Hadii aad ku hadasho Soomaali, waaxda luqadaha, qaybta kaalmada daphneyada, daija garza [facility] Centerreta driscoll . So [facility] [phone].    ATENCIÓN: Si habla español, tiene a sanchez disposición servicios gratuitos de asistencia lingüística. Llame al [phone].    We comply with applicable federal civil rights laws and [place] laws. We do not discriminate on the basis of race, color, national origin, age, disability, sex, sexual orientation, or gender identity.            Thank you!     Thank you for choosing EYE CLINIC  for your care. Our goal is always to provide you with excellent  care. Hearing back from our patients is one way we can continue to improve our services. Please take a few minutes to complete the written survey that you may receive in the mail after your visit with us. Thank you!             Your Updated Medication List - Protect others around you: Learn how to safely use, store and throw away your medicines at www.disposemymeds.org.          This list is accurate as of: 11/28/17  1:17 PM.  Always use your most recent med list.                   Brand Name Dispense Instructions for use Diagnosis    CALCIUM + D PO      Take  by mouth. 600mg/400 IU bid        camphor-menthol 0.5-0.5 % Lotn    DERMASARRA    222 mL    Apply up to 4x daily as needed for itch on the back.    Notalgia paresthetica       conjugated estrogens cream    PREMARIN    30 g    Place 1 g vaginally nightly as needed    Postmenopausal atrophic vaginitis       cyanocobalamin 1000 MCG tablet    vitamin  B-12     Take 1 tablet by mouth three times a week.        lisinopril 5 MG tablet    PRINIVIL/ZESTRIL    90 tablet    Take 1 tablet (5 mg) by mouth daily    Essential hypertension       NAPROXEN PO      Take 220 mg by mouth 2 times daily as needed for moderate pain        OMEGA-3 FISH OIL PO      Take  by mouth.        simvastatin 20 MG tablet    ZOCOR    90 tablet    Take 1 tablet (20 mg) by mouth At Bedtime    Hyperlipidemia, unspecified hyperlipidemia type       UNABLE TO FIND      MEDICATION NAME: Six-Flavor Tea pills, 8 pills (Chinese herbal medicain) Strengthen kidney merdian

## 2017-11-28 NOTE — PROGRESS NOTES
HPI:  Shyann Samuel is a 77 year old female divergence insufficiency [facility]/p [facility] ptosis s/p blepharoplasty ([facility] 2014). S/p CATARACT EXTRACTION WITH INTRAOCULAR LENS IMPLANTATION both eyes([facility] 2009) who presents with decreased vision at a distance x 6-12 months. She is here for YAG cap right eye    Stable, not intermittent. Denies flashes or new floaters. Denies pain. No current ocular medications    She continues to have intermittent FBS, relieved with AT.     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      Patient seen and discussed with MD Vin Dela Cruz MD  PGY3, Dept of Ophthalmology  Pager [phone]    Pre-operative diagnosis:  Posterior capsular opacification, right eye   Post-operative diagnosis:   same.  Procedure performed:   Yag laser   Anesthesia:     Topical proparacaine 0.5% drops  Complications:   None.    Description of the procedure:    Informed consent was obtained from the patient.  The patient was brought to the laser room, iopidine instilled into the operative eye where yag laser was performed. Parameters:  - Power:  1.0-1.2 mJ  -Total spots: 71    The patient tolerated the procedure well.  There  were no complications. Post YAG IOP 15  The patient  left the clinic in stable condition.      Plan:  Retinal detachment  Precautions were discussed with the patient and was asked to return if any of those occur            Attending Physician Attestation:  Complete documentation of historical and exam elements from today's encounter can be found in the full encounter summary report (not reduplicated in this progress note).  I personally obtained the chief complaint(s) and history of present illness.  I confirmed and edited as necessary the review of systems, past medical/surgical history, family history, social history, and examination findings as documented by others; and I examined the patient myself.  I personally reviewed the relevant tests, images, and reports as documented above.  I formulated and edited as necessary the assessment and plan and discussed the findings and management plan with the patient and family. Attending Physician Procedure Attestation: I was present for the entire procedure. - Yaya Dorado MD

## 2017-11-29 ENCOUNTER — MYC MEDICAL ADVICE (OUTPATIENT)
Dept: OPHTHALMOLOGY | Facility: CLINIC | Age: 77
End: 2017-11-29

## 2017-11-29 ENCOUNTER — TELEPHONE (OUTPATIENT)
Dept: OPHTHALMOLOGY | Facility: CLINIC | Age: 77
End: 2017-11-29

## 2017-11-29 NOTE — TELEPHONE ENCOUNTER
"Patient describes two \"Saran-wrap\" like floaters s/p YAG Cap. Denies flashes. Denies curtain. Advised to call if increased in floaters, flashing lights or curtain. Likely represents posterior capsular membrane settling in vitreous. All questions answered. Patient agrees with plan  "

## 2017-12-04 ENCOUNTER — OFFICE VISIT (OUTPATIENT)
Dept: FAMILY MEDICINE | Facility: CLINIC | Age: 77
End: 2017-12-04

## 2017-12-04 VITALS
SYSTOLIC BLOOD PRESSURE: 108 MMHG | TEMPERATURE: 97.5 F | DIASTOLIC BLOOD PRESSURE: 75 MMHG | HEART RATE: 100 BPM | OXYGEN SATURATION: 99 % | WEIGHT: 182.12 LBS | BODY MASS INDEX: 29.39 KG/M2

## 2017-12-04 DIAGNOSIS — N30.00 ACUTE CYSTITIS WITHOUT HEMATURIA: Primary | ICD-10-CM

## 2017-12-04 LAB
BILIRUBIN UR: NEGATIVE
BLOOD UR: ABNORMAL
GLUCOSE URINE: NEGATIVE
KETONES UR QL: NEGATIVE
LEUKOCYTE ESTERASE UR: ABNORMAL
NITRITE UR QL STRIP: POSITIVE
PH UR STRIP: 5.5 [PH] (ref 5–7)
PROTEIN UR: NEGATIVE
SP GR UR STRIP: >=1.03
UROBILINOGEN UR STRIP-ACNC: ABNORMAL

## 2017-12-04 RX ORDER — CIPROFLOXACIN 500 MG/1
500 TABLET, FILM COATED ORAL 2 TIMES DAILY
Qty: 6 TABLET | Refills: 0 | Status: SHIPPED | OUTPATIENT
Start: 2017-12-04 | End: 2017-12-07

## 2017-12-04 ASSESSMENT — PAIN SCALES - GENERAL: PAINLEVEL: NO PAIN (0)

## 2017-12-04 NOTE — NURSING NOTE
77 year old  Chief Complaint   Patient presents with     UTI     Vaginal itching at night, urgency, cramping as she finishes urination       Blood pressure 108/75, pulse 100, temperature 97.5  F (36.4  C), temperature source Oral, weight 182 lb 1.9 oz (82.6 kg), SpO2 99 %. Body mass index is 29.39 kg/(m^2).  Patient Active Problem List   Diagnosis     Low back pain     Status post hip replacement     Hyperlipidemia     Anterior corneal dystrophy     Hypertension     Insomnia     Osteoarthritis     Cataract     Dermatitis medicamentosa     Esotropia     Macular puckering     Nontoxic multinodular goiter     Posterior vitreous detachment     Recurrent UTI     Other symptoms involving nervous and musculoskeletal systems(781.99)     Atrophic vaginitis     Overactive bladder     Health Care Home     Left knee pain     Right knee pain       Wt Readings from Last 2 Encounters:   12/04/17 182 lb 1.9 oz (82.6 kg)   11/06/17 180 lb (81.6 kg)     BP Readings from Last 3 Encounters:   12/04/17 108/75   11/06/17 129/82   06/27/17 140/83         Current Outpatient Prescriptions   Medication     conjugated estrogens (PREMARIN) cream     camphor-menthol (DERMASARRA) 0.5-0.5 % LOTN     lisinopril (PRINIVIL/ZESTRIL) 5 MG tablet     simvastatin (ZOCOR) 20 MG tablet     NAPROXEN PO     UNABLE TO FIND     cyanocolbalamin (VITAMIN  B-12) 1000 MCG tablet     Omega-3 Fatty Acids (OMEGA-3 FISH OIL PO)     Calcium-Vitamin D (CALCIUM + D PO)     Current Facility-Administered Medications   Medication     apraclonidine (IOPIDINE) 1 % ophthalmic solution 1 drop       Social History   Substance Use Topics     Smoking status: Never Smoker     Smokeless tobacco: Never Used     Alcohol use No       Health Maintenance Due   Topic Date Due     ADVANCE DIRECTIVE PLANNING Q5 YRS  04/08/1996     DEXA SCAN SCREENING (SYSTEM ASSIGNED)  04/08/2006     TETANUS IMMUNIZATION (SYSTEM ASSIGNED)  06/24/2010       No results found for: MILEY Stone,  L.P.N.  December 4, 2017 2:02 PM

## 2017-12-04 NOTE — MR AVS SNAPSHOT
After Visit Summary   12/4/2017    Shyann Samuel    MRN: [id number]           Patient Information     Date Of Birth          1940        Visit Information        Provider Department      12/4/2017 2:00 PM Aleksandra Starks MD [facility]        Today's Diagnoses     Acute cystitis without hematuria    -  1       Follow-ups after your visit        Your next 10 appointments already scheduled     Dec 08, 2017  8:50 AM CST   JERRY Extremity with Kat Wayne PT   [facility] ([place]  )    [address]  [facility] [id number] [phone]            Dec 12, 2017  8:00 AM CST   RETURN GENERAL with Vin Del Angel MD   Eye Clinic ([facility])    [address]  [address]  [facility] [id number] [phone]              Who to contact     Please call your clinic at [phone] to:    Ask questions about your health    Make or cancel appointments    Discuss your medicines    Learn about your test results    Speak to your doctor   If you have compliments or concerns about an experience at your clinic, or if you wish to file a complaint, please contact [facility] Patient Relations at [phone] or email us at [email or web address]         Additional Information About Your Visit        MyChart Information     [employer] gives you secure access to your electronic health record. If you see a primary care provider, you can also send messages to your care team and make appointments. If you have questions, please call your primary care clinic.  If you do not have a primary care provider, please call [phone] and they will assist you.      [employer] is an electronic gateway that provides easy, online access to your medical records. With [employer], you can request a clinic appointment, read your test results, renew a prescription or communicate  with your care team.     To access your existing account, please contact your [facility] or call [phone] for assistance.        Care EveryWhere ID     This is your Care EveryWhere ID. This could be used by other organizations to access your [place] medical records  [id number]        Your Vitals Were     Pulse Temperature Pulse Oximetry BMI (Body Mass Index)          100 97.5  F (36.4  C) (Oral) 99% 29.39 kg/m2         Blood Pressure from Last 3 Encounters:   12/04/17 108/75   11/06/17 129/82   06/27/17 140/83    Weight from Last 3 Encounters:   12/04/17 182 lb 1.9 oz (82.6 kg)   11/06/17 180 lb (81.6 kg)   06/27/17 180 lb (81.6 kg)              We Performed the Following     Urinalysis ([place])     Urine Culture Aerobic Bacterial          Today's Medication Changes          These changes are accurate as of: 12/4/17  2:15 PM.  If you have any questions, ask your nurse or doctor.               Start taking these medicines.        Dose/Directions    ciprofloxacin 500 MG tablet   Commonly known as:  CIPRO   Used for:  Acute cystitis without hematuria        Dose:  500 mg   Take 1 tablet (500 mg) by mouth 2 times daily for 3 days   Quantity:  6 tablet   Refills:  0            Where to get your medicines      These medications were sent to [facility] [address] [place] [address] AT [address] [address]    Hours:  24-hours Phone:  [phone]     ciprofloxacin 500 MG tablet                Primary Care Provider Office Phone # Fax #    Aleksandra Starks [phone]       [address] [place] [address]        Goals        General    Psychosocial (pt-stated)     Notes - Note edited  4/19/2016 11:10 AM by Zulma Kim BSW    As of today's date 4/19/2016 goal is met at 76 - 100%.   Goal Status:  Complete   Pt has resource ([employer]) that can evaluate pt's home.  Pt has decided to complete  two other tasks (hiring yard work and cleaning out of her house assistance) prior to having [employer] come out.  I want a resource that can evaluate my home for DME/potential remodeling so I can age in place  As of today's date 3/17/2016 goal is met at 0 - 25%.   Goal Status:  Active    Shawn Kim, LSW          Equal Access to Services     LEE WALSH AH: Hadii aad ku hadasho Soomaali, waaxda luqadaha, qaybta kaalmada adeegyada, daija branham haydasian adereta narinderdenisse driscoll . So [facility] [phone].    ATENCIÓN: Si habla español, tiene a sanchez disposición servicios gratuitos de asistencia lingüística. Llame al [phone].    We comply with applicable federal civil rights laws and [place] laws. We do not discriminate on the basis of race, color, national origin, age, disability, sex, sexual orientation, or gender identity.            Thank you!     Thank you for choosing [facility]  for your care. Our goal is always to provide you with excellent care. Hearing back from our patients is one way we can continue to improve our services. Please take a few minutes to complete the written survey that you may receive in the mail after your visit with us. Thank you!             Your Updated Medication List - Protect others around you: Learn how to safely use, store and throw away your medicines at www.disposemymeds.org.          This list is accurate as of: 12/4/17  2:15 PM.  Always use your most recent med list.                   Brand Name Dispense Instructions for use Diagnosis    CALCIUM + D PO      Take  by mouth. 600mg/400 IU bid        camphor-menthol 0.5-0.5 % Lotn    DERMASARRA    222 mL    Apply up to 4x daily as needed for itch on the back.    Notalgia paresthetica       ciprofloxacin 500 MG tablet    CIPRO    6 tablet    Take 1 tablet (500 mg) by mouth 2 times daily for 3 days    Acute cystitis without hematuria       conjugated estrogens cream    PREMARIN    30 g    Place 1 g vaginally nightly as needed     Postmenopausal atrophic vaginitis       cyanocobalamin 1000 MCG tablet    vitamin  B-12     Take 1 tablet by mouth three times a week.        lisinopril 5 MG tablet    PRINIVIL/ZESTRIL    90 tablet    Take 1 tablet (5 mg) by mouth daily    Essential hypertension       NAPROXEN PO      Take 220 mg by mouth 2 times daily as needed for moderate pain        OMEGA-3 FISH OIL PO      Take  by mouth.        simvastatin 20 MG tablet    ZOCOR    90 tablet    Take 1 tablet (20 mg) by mouth At Bedtime    Hyperlipidemia, unspecified hyperlipidemia type       UNABLE TO FIND      MEDICATION NAME: Six-Flavor Tea pills, 8 pills (Chinese herbal medicain) Strengthen kidney merdian

## 2017-12-04 NOTE — PROGRESS NOTES
Shyann Samuel is a 77 year old female here for the following issues:    Lower urinary tract symptoms   I recently saw Shyann on 11/9/17 for the following  Urinary problems  Shyann has a history of urinary frequency, nocturia, and recurrent urinary infections.   Her last infection was in June 2017. She had been followed by Dr. Silvestre in the past.  She did not tolerate anticholinergics because they cause too much lethargy. At one point, Dr. Silvestre  Discussed option of Botox or implanted nerve stimulator.  She did not want to pursue either of these.  At night, for the past 3-4 months, she has no sense of urgency but leaks urine if she gets up from bed. Happens 3x per week.. No dysuria, no hematuria. Does not happen during the day. No caffeine use. When she sleeps lightly she can get up and not leak urine. Only in deep sleep.   No stress incontinence.  High dose vitamin C , stopped and that helped fecal incontinence.   Wants to discuss today, and wanted to hear options but she is not sure she is ready to take medication and she declined referral to pelvic floor clinic or urology. Urine analysis was not obtained. She declined trial of short acting oxybutynin at bedtime, declined referral to see Dr. Silvestre.    Today, Shyann believes she has a UTI. Ongoing symptoms for the past week, notes urgency and cramping as she finishes urination. One night had terrible vaginal itching but then wore underwear to bed and it resolved. Notes that when she is drinking less she tends to get an infection. No blood in urine, no fever. No flank pain.     Patient Active Problem List   Diagnosis     Low back pain     Status post hip replacement     Hyperlipidemia     Anterior corneal dystrophy     Hypertension     Insomnia     Osteoarthritis     Cataract     Dermatitis medicamentosa     Esotropia     Macular puckering     Nontoxic multinodular goiter     Posterior vitreous detachment     Recurrent UTI     Other symptoms involving nervous and  musculoskeletal systems(781.99)     Atrophic vaginitis     Overactive bladder     Health Care Home     Left knee pain     Right knee pain       Current Outpatient Prescriptions   Medication Sig Dispense Refill     conjugated estrogens (PREMARIN) cream Place 1 g vaginally nightly as needed 30 g 3     camphor-menthol (DERMASARRA) 0.5-0.5 % LOTN Apply up to 4x daily as needed for itch on the back. 222 mL 11     lisinopril (PRINIVIL/ZESTRIL) 5 MG tablet Take 1 tablet (5 mg) by mouth daily 90 tablet 3     simvastatin (ZOCOR) 20 MG tablet Take 1 tablet (20 mg) by mouth At Bedtime 90 tablet 3     NAPROXEN PO Take 220 mg by mouth 2 times daily as needed for moderate pain       UNABLE TO FIND MEDICATION NAME: Six-Flavor Tea pills, 8 pills (Chinese herbal medicain) Strengthen kidney merdian       cyanocolbalamin (VITAMIN  B-12) 1000 MCG tablet Take 1 tablet by mouth three times a week.       Omega-3 Fatty Acids (OMEGA-3 FISH OIL PO) Take  by mouth.       Calcium-Vitamin D (CALCIUM + D PO) Take  by mouth. 600mg/400 IU bid         Allergies   Allergen Reactions     Benzalkonium Chloride      Eye irritation     Nitrofurantoin Other (See Comments)     Causes fast heart rate.        EXAM  /75 (BP Location: Right arm, Patient Position: Sitting, Cuff Size: Adult Large)  Pulse 100  Temp 97.5  F (36.4  C) (Oral)  Wt 182 lb 1.9 oz (82.6 kg)  SpO2 99%  BMI 29.39 kg/m2  Gen: Alert, pleasant, NAD  COR: S1,S2, no murmur  Lungs: CTA bilaterally, no rhonchi, wheezes or rales  Abdomen: Soft, non tender, normal bowel sounds, no HSM or masses  Back: no flank pain    Results for orders placed or performed in visit on 12/04/17   Urinalysis ([place])   Result Value Ref Range    Specific Gravity Urine >=1.030 1.005 - 1.030    pH Urine 5.5 4.5 - 8.0    Leukocyte Esterase UR 2+ (A) Negative    Nitrite Urine Positive (A) Negative    Protein UR Negative Negative    Glucose Urine Negative Negative    Ketones Urine Negative Negative     Urobilinogen mg/dL 0.2 E.U./dL 0.2 E.U./dL    Bilirubin UR Negative Negative    Blood UR Trace-intact (A) Negative           Assessment:  (N30.00) Acute cystitis without hematuria  (primary encounter diagnosis)  Comment: one week hx of end stream dysuria.   Plan: Urinalysis ([place]), Urine Culture Aerobic         Bacterial, ciprofloxacin (CIPRO) 500 MG tablet        Get plenty of fluids, start Ciprofloxacin now, will await culture.     Aleksandra Starks MD  Internal Medicine/Pediatrics

## 2017-12-05 LAB
BACTERIA SPEC CULT: ABNORMAL
Lab: ABNORMAL
SPECIMEN SOURCE: ABNORMAL

## 2017-12-08 ENCOUNTER — THERAPY VISIT (OUTPATIENT)
Dept: PHYSICAL THERAPY | Facility: CLINIC | Age: 77
End: 2017-12-08
Payer: MEDICARE

## 2017-12-08 DIAGNOSIS — M25.561 RIGHT KNEE PAIN: Primary | ICD-10-CM

## 2017-12-08 DIAGNOSIS — M25.562 LEFT KNEE PAIN, UNSPECIFIED CHRONICITY: ICD-10-CM

## 2017-12-08 PROCEDURE — 97110 THERAPEUTIC EXERCISES: CPT | Mod: GP | Performed by: PHYSICAL THERAPIST

## 2017-12-08 PROCEDURE — 97112 NEUROMUSCULAR REEDUCATION: CPT | Mod: GP | Performed by: PHYSICAL THERAPIST

## 2017-12-12 ENCOUNTER — OFFICE VISIT (OUTPATIENT)
Dept: OPHTHALMOLOGY | Facility: CLINIC | Age: 77
End: 2017-12-12
Attending: OPHTHALMOLOGY
Payer: MEDICARE

## 2017-12-12 DIAGNOSIS — H26.493 BILATERAL POSTERIOR CAPSULAR OPACIFICATION: Primary | ICD-10-CM

## 2017-12-12 PROCEDURE — 92015 DETERMINE REFRACTIVE STATE: CPT | Mod: GY,ZF

## 2017-12-12 ASSESSMENT — REFRACTION_MANIFEST
OS_SPHERE: -3.50
OS_ADD: +2.50
OS_CYLINDER: +0.75
OD_ADD: +2.50
OS_AXIS: 090
OD_AXIS: 020
OD_SPHERE: -1.00
OD_CYLINDER: +0.75

## 2017-12-12 ASSESSMENT — TONOMETRY
OS_IOP_MMHG: 12
IOP_METHOD: TONOPEN
OD_IOP_MMHG: 12

## 2017-12-12 ASSESSMENT — REFRACTION_FINALRX
OS_HBASE: BASE OUT
OD_HBASE: BASE OUT

## 2017-12-12 ASSESSMENT — SLIT LAMP EXAM - LIDS
COMMENTS: MEIBOMIAN GLAND DYSFUNCTION
COMMENTS: MEIBOMIAN GLAND DYSFUNCTION

## 2017-12-12 ASSESSMENT — CONF VISUAL FIELD
OD_NORMAL: 1
OS_NORMAL: 1

## 2017-12-12 ASSESSMENT — VISUAL ACUITY
OS_CC+: -1
OD_CC+: -2
OD_CC: 20/60
OS_CC: 20/25
METHOD: SNELLEN - LINEAR
OD_PH_CC: 20/30-2
CORRECTION_TYPE: GLASSES

## 2017-12-12 NOTE — MR AVS SNAPSHOT
After Visit Summary   12/12/2017    Shyann Samuel    MRN: [id number]           Patient Information     Date Of Birth          1940        Visit Information        Provider Department      12/12/2017 8:00 AM Vin Del Angel MD [facility]        Today's Diagnoses     Bilateral posterior capsular opacification    -  1       Follow-ups after your visit        Follow-up notes from your care team     Return in about 1 year (around 12/12/2018), or if symptoms worsen or fail to improve, for Annual Visit.      Your next 10 appointments already scheduled     Dec 27, 2017  2:40 PM CST   JERRY Extremity with Kat Wayne PT   [facility] ([place]  )    [address]  [facility] [address] [phone]              Who to contact     Please call your clinic at [phone] to:    Ask questions about your health    Make or cancel appointments    Discuss your medicines    Learn about your test results    Speak to your doctor   If you have compliments or concerns about an experience at your clinic, or if you wish to file a complaint, please contact [facility] Patient Relations at [phone] or email us at [email or web address]         Additional Information About Your Visit        MyChart Information     [employer] gives you secure access to your electronic health record. If you see a primary care provider, you can also send messages to your care team and make appointments. If you have questions, please call your primary care clinic.  If you do not have a primary care provider, please call [phone] and they will assist you.      [employer] is an electronic gateway that provides easy, online access to your medical records. With [employer], you can request a clinic appointment, read your test results, renew a prescription or communicate with your care team.     To access your existing account, please  contact your [facility] or call [phone] for assistance.        Care EveryWhere ID     This is your Care EveryWhere ID. This could be used by other organizations to access your [facility] medical records  [id number]         Blood Pressure from Last 3 Encounters:   12/04/17 108/75   11/06/17 129/82   06/27/17 140/83    Weight from Last 3 Encounters:   12/04/17 82.6 kg (182 lb 1.9 oz)   11/06/17 81.6 kg (180 lb)   06/27/17 81.6 kg (180 lb)              Today, you had the following     No orders found for display       Primary Care Provider Office Phone # Fax #    Aleksandra Starks [phone]       [address] [place] [address]        Goals        General    Psychosocial (pt-stated)     Notes - Note edited  4/19/2016 11:10 AM by Zulma Kim BSKENNY    As of today's date 4/19/2016 goal is met at 76 - 100%.   Goal Status:  Complete   Pt has resource ([employer]) that can evaluate pt's home.  Pt has decided to complete two other tasks (hiring yard work and cleaning out of her house assistance) prior to having [employer] come out.  I want a resource that can evaluate my home for DME/potential remodeling so I can age in place  As of today's date 3/17/2016 goal is met at 0 - 25%.   Goal Status:  Active    LUKE Coffey          Equal Access to Services     [facility] AH: Hadii wes ku hadasho Soarronali, waaxda luqadaha, qaybta kaalmada daphneyamnajeet, daija garza gifted2youreta oquendo. So [facility] [phone].    ATENCIÓN: Si habla español, tiene a sanchez disposición servicios gratuitos de asistencia lingüística. Llame al [phone].    We comply with applicable federal civil rights laws and [place] laws. We do not discriminate on the basis of race, color, national origin, age, disability, sex, sexual orientation, or gender identity.            Thank you!     Thank you for choosing EYE CLINIC  for your care. Our goal is always to provide you with  excellent care. Hearing back from our patients is one way we can continue to improve our services. Please take a few minutes to complete the written survey that you may receive in the mail after your visit with us. Thank you!             Your Updated Medication List - Protect others around you: Learn how to safely use, store and throw away your medicines at www.disposemymeds.org.          This list is accurate as of: 12/12/17 11:59 PM.  Always use your most recent med list.                   Brand Name Dispense Instructions for use Diagnosis    CALCIUM + D PO      Take  by mouth. 600mg/400 IU bid        camphor-menthol 0.5-0.5 % Lotn    DERMASARRA    222 mL    Apply up to 4x daily as needed for itch on the back.    Notalgia paresthetica       conjugated estrogens cream    PREMARIN    30 g    Place 1 g vaginally nightly as needed    Postmenopausal atrophic vaginitis       cyanocobalamin 1000 MCG tablet    vitamin  B-12     Take 1 tablet by mouth three times a week.        lisinopril 5 MG tablet    PRINIVIL/ZESTRIL    90 tablet    Take 1 tablet (5 mg) by mouth daily    Essential hypertension       NAPROXEN PO      Take 220 mg by mouth 2 times daily as needed for moderate pain        OMEGA-3 FISH OIL PO      Take  by mouth.        simvastatin 20 MG tablet    ZOCOR    90 tablet    Take 1 tablet (20 mg) by mouth At Bedtime    Hyperlipidemia, unspecified hyperlipidemia type       UNABLE TO FIND      MEDICATION NAME: Six-Flavor Tea pills, 8 pills (Chinese herbal medicain) Strengthen kidney merdian

## 2017-12-12 NOTE — PROGRESS NOTES
CC: blurry vision    Interval History: Vision improved. No longer looking through film and having to clean glasses. Notes floater, improving.   HPI:  Shyann Samuel is a 77 year old female divergence insufficiency s/p strabismus surgery (Nirali 1990s), ptosis s/p blepharoplasty (Grant 2014). S/p CATARACT EXTRACTION WITH INTRAOCULAR LENS IMPLANTATION both eyes(Patricia 2009) who presents with decreased vision at a distance x 6-12 months. She is here for s/p YAG cap right eye    Stable, not intermittent. Denies flashes or new floaters. Denies pain. No current ocular medications    She continues to have intermittent FBS, relieved with AT.     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      Patient seen and discussed with Jaycob Dorado MD      Attending Physician Attestation:  Complete documentation of historical and exam elements from today's encounter can be found in the full encounter summary report (not reduplicated in this progress note).  I personally obtained the chief complaint(s) and history of present illness.  I confirmed and edited as necessary the review of systems, past medical/surgical history, family history, social history, and examination findings as documented by others; and I examined the patient myself.  I personally reviewed the relevant tests, images, and reports as documented above.  I  formulated and edited as necessary the assessment and plan and discussed the findings and management plan with the patient and family. . - Yaya Dorado MD

## 2017-12-27 ENCOUNTER — THERAPY VISIT (OUTPATIENT)
Dept: PHYSICAL THERAPY | Facility: CLINIC | Age: 77
End: 2017-12-27
Payer: MEDICARE

## 2017-12-27 DIAGNOSIS — G89.29 CHRONIC PAIN OF RIGHT KNEE: ICD-10-CM

## 2017-12-27 DIAGNOSIS — M25.561 CHRONIC PAIN OF RIGHT KNEE: ICD-10-CM

## 2017-12-27 DIAGNOSIS — M25.562 LEFT KNEE PAIN, UNSPECIFIED CHRONICITY: ICD-10-CM

## 2017-12-27 PROCEDURE — 97112 NEUROMUSCULAR REEDUCATION: CPT | Mod: GP | Performed by: PHYSICAL THERAPIST

## 2017-12-27 PROCEDURE — 97110 THERAPEUTIC EXERCISES: CPT | Mod: GP | Performed by: PHYSICAL THERAPIST

## 2018-01-16 DIAGNOSIS — N95.2 POSTMENOPAUSAL ATROPHIC VAGINITIS: Primary | ICD-10-CM

## 2018-02-01 ENCOUNTER — THERAPY VISIT (OUTPATIENT)
Dept: PHYSICAL THERAPY | Facility: CLINIC | Age: 78
End: 2018-02-01
Payer: MEDICARE

## 2018-02-01 DIAGNOSIS — M25.562 LEFT KNEE PAIN, UNSPECIFIED CHRONICITY: ICD-10-CM

## 2018-02-01 DIAGNOSIS — G89.29 CHRONIC PAIN OF RIGHT KNEE: ICD-10-CM

## 2018-02-01 DIAGNOSIS — M25.561 CHRONIC PAIN OF RIGHT KNEE: ICD-10-CM

## 2018-02-01 PROCEDURE — G8978 MOBILITY CURRENT STATUS: HCPCS | Mod: GP | Performed by: PHYSICAL THERAPIST

## 2018-02-01 PROCEDURE — 97110 THERAPEUTIC EXERCISES: CPT | Mod: GP | Performed by: PHYSICAL THERAPIST

## 2018-02-01 PROCEDURE — G8979 MOBILITY GOAL STATUS: HCPCS | Mod: GP | Performed by: PHYSICAL THERAPIST

## 2018-02-01 PROCEDURE — 97112 NEUROMUSCULAR REEDUCATION: CPT | Mod: GP | Performed by: PHYSICAL THERAPIST

## 2018-02-01 NOTE — PROGRESS NOTES
Subjective:  HPI                    Objective:  System    Physical Exam    General     ROS    Assessment/Plan:    PROGRESS  REPORT    Progress reporting period is from 11/27/2017 to 2/1/2017.       SUBJECTIVE  Subjective changes noted by patient:  .  Subjective: Trying to exercise everyday but this has helped sleeping.  Overall in the day having much less pain.  Working on the step up and step down for 16 times.  Has not tried going all the way up.  Finds that she has difficulty with putting right leg up.  Has not gotten to the pool at the [facility].  Does not want to go there in the winter.  Getting a cramp in the leg early in the morning.      Current pain level is  Current Pain level: 2/10.     Previous pain level was   Initial Pain level: 4/10.   Changes in function:  Yes (See Goal flowsheet attached for changes in current functional level)  Adverse reaction to treatment or activity: None    OBJECTIVE  Changes noted in objective findings:  Yes,   Objective: CAn do 15 sec of tandem stance with focusing on a spot but had to have a few trials of this.  Able to climb up 6 inch step but more difficulty with this on the right leg.  Decreased step length with left leg today but able to correct this and walking without pain.       ASSESSMENT/PLAN  Updated problem list and treatment plan: Diagnosis 1:  Bilateral leg pain  Pain -  manual therapy, self management, education and home program  Decreased ROM/flexibility - manual therapy, therapeutic exercise and home program  Decreased joint mobility - manual therapy, therapeutic exercise, therapeutic activity and home program  Decreased strength - therapeutic exercise, therapeutic activities and home program  Decreased proprioception - neuro re-education, therapeutic activities and home program  Decreased function - therapeutic activities and home program  STG/LTGs have been met or progress has been made towards goals:  Yes (See Goal flow sheet completed today.)  Assessment of  Progress: The patient's condition is improving.  The patient's condition has potential to improve.  Self Management Plans:  Patient has been instructed in a home treatment program.  I have re-evaluated this patient and find that the nature, scope, duration and intensity of the therapy is appropriate for the medical condition of the patient.  Shyann continues to require the following intervention to meet STG and LTG's:  PT    Recommendations:  This patient would benefit from continued therapy.     Frequency:  1 X a month, once daily  Duration:  for 2 months        Please refer to the daily flowsheet for treatment today, total treatment time and time spent performing 1:1 timed codes.

## 2018-02-01 NOTE — MR AVS SNAPSHOT
After Visit Summary   2/1/2018    Shyann Samuel    MRN: [id number]           Patient Information     Date Of Birth          1940        Visit Information        Provider Department      2/1/2018 11:20 AM Kat Wayne PT [facility] Physical Therapy        Today's Diagnoses     Left knee pain, unspecified chronicity        Chronic pain of right knee           Follow-ups after your visit        Who to contact     If you have questions or need follow up information about today's clinic visit or your schedule please contact [facility] PHYSICAL THERAPY directly at [phone].  Normal or non-critical lab and imaging results will be communicated to you by Cartela ABhart, letter or phone within 4 business days after the clinic has received the results. If you do not hear from us within 7 days, please contact the clinic through Cartela ABhart or phone. If you have a critical or abnormal lab result, we will notify you by phone as soon as possible.  Submit refill requests through [employer] or call your pharmacy and they will forward the refill request to us. Please allow 3 business days for your refill to be completed.          Additional Information About Your Visit        MyChart Information     [employer] gives you secure access to your electronic health record. If you see a primary care provider, you can also send messages to your care team and make appointments. If you have questions, please call your primary care clinic.  If you do not have a primary care provider, please call [phone] and they will assist you.        Care EveryWhere ID     This is your Care EveryWhere ID. This could be used by other organizations to access your [place] medical records  [id number]         Blood Pressure from Last 3 Encounters:   12/04/17 108/75   11/06/17 129/82   06/27/17 140/83    Weight from Last 3 Encounters:   12/04/17 82.6 kg (182 lb 1.9 oz)   11/06/17 81.6 kg  (180 lb)   06/27/17 81.6 kg (180 lb)              We Performed the Following     JERRY PROGRESS NOTES REPORT     NEUROMUSCULAR RE-EDUCATION     THERAPEUTIC EXERCISES        Primary Care Provider Office Phone # Fax #    Aleksandra Isabelle Starks [phone]       [address] [place] [address]        Goals        General    Psychosocial (pt-stated)     Notes - Note edited  4/19/2016 11:10 AM by Zulma Kim, BSW    As of today's date 4/19/2016 goal is met at 76 - 100%.   Goal Status:  Complete   Pt has resource ([employer]) that can evaluate pt's home.  Pt has decided to complete two other tasks (hiring yard work and cleaning out of her house assistance) prior to having [employer] come out.  I want a resource that can evaluate my home for DME/potential remodeling so I can age in place  As of today's date 3/17/2016 goal is met at 0 - 25%.   Goal Status:  Active    Shawn Kim LSW          Equal Access to Services     [facility]: Hadii aad ku hadasho Soomaali, waaxda luqadaha, qaybta kaalmada adeegyada, waxay idiin haydasian daphne driscoll . So [facility] [phone].    ATENCIÓN: Si habla español, tiene a sanchez disposición servicios gratuitos de asistencia lingüística. Llame al [phone].    We comply with applicable federal civil rights laws and [place] laws. We do not discriminate on the basis of race, color, national origin, age, disability, sex, sexual orientation, or gender identity.            Thank you!     Thank you for choosing [facility] PHYSICAL THERAPY  for your care. Our goal is always to provide you with excellent care. Hearing back from our patients is one way we can continue to improve our services. Please take a few minutes to complete the written survey that you may receive in the mail after your visit with us. Thank you!             Your Updated Medication List - Protect others around you: Learn how to safely use, store and throw  away your medicines at www.disposemymeds.org.          This list is accurate as of 2/1/18 12:06 PM.  Always use your most recent med list.                   Brand Name Dispense Instructions for use Diagnosis    CALCIUM + D PO      Take  by mouth. 600mg/400 IU bid        camphor-menthol 0.5-0.5 % Lotn    DERMASARRA    222 mL    Apply up to 4x daily as needed for itch on the back.    Notalgia paresthetica       cyanocobalamin 1000 MCG tablet    vitamin  B-12     Take 1 tablet by mouth three times a week.        ESTRIOL 0.5MG/GM CREAM     30 g    Insert 1 gram vaginally 2 times per week.    Postmenopausal atrophic vaginitis       lisinopril 5 MG tablet    PRINIVIL/ZESTRIL    90 tablet    Take 1 tablet (5 mg) by mouth daily    Essential hypertension       NAPROXEN PO      Take 220 mg by mouth 2 times daily as needed for moderate pain        OMEGA-3 FISH OIL PO      Take  by mouth.        simvastatin 20 MG tablet    ZOCOR    90 tablet    Take 1 tablet (20 mg) by mouth At Bedtime    Hyperlipidemia, unspecified hyperlipidemia type       UNABLE TO FIND      MEDICATION NAME: Six-Flavor Tea pills, 8 pills (Chinese herbal medicain) Strengthen kidney merdian

## 2018-03-05 ENCOUNTER — THERAPY VISIT (OUTPATIENT)
Dept: PHYSICAL THERAPY | Facility: CLINIC | Age: 78
End: 2018-03-05
Payer: MEDICARE

## 2018-03-05 DIAGNOSIS — G89.29 CHRONIC PAIN OF RIGHT KNEE: ICD-10-CM

## 2018-03-05 DIAGNOSIS — M25.562 LEFT KNEE PAIN, UNSPECIFIED CHRONICITY: ICD-10-CM

## 2018-03-05 DIAGNOSIS — M25.561 CHRONIC PAIN OF RIGHT KNEE: ICD-10-CM

## 2018-03-05 PROCEDURE — G8979 MOBILITY GOAL STATUS: HCPCS | Mod: GP | Performed by: PHYSICAL THERAPIST

## 2018-03-05 PROCEDURE — 97110 THERAPEUTIC EXERCISES: CPT | Mod: GP | Performed by: PHYSICAL THERAPIST

## 2018-03-05 PROCEDURE — G8978 MOBILITY CURRENT STATUS: HCPCS | Mod: GP | Performed by: PHYSICAL THERAPIST

## 2018-03-05 PROCEDURE — 97112 NEUROMUSCULAR REEDUCATION: CPT | Mod: GP | Performed by: PHYSICAL THERAPIST

## 2018-03-05 NOTE — MR AVS SNAPSHOT
After Visit Summary   3/5/2018    Shyann Samuel    MRN: [id number]           Patient Information     Date Of Birth          1940        Visit Information        Provider Department      3/5/2018 3:20 PM Kat Wayne, [facility] Physical Therapy        Today's Diagnoses     Left knee pain, unspecified chronicity        Chronic pain of right knee           Follow-ups after your visit        Who to contact     If you have questions or need follow up information about today's clinic visit or your schedule please contact [facility] PHYSICAL THERAPY directly at [phone].  Normal or non-critical lab and imaging results will be communicated to you by Stitcherhart, letter or phone within 4 business days after the clinic has received the results. If you do not hear from us within 7 days, please contact the clinic through Stitcher[employer] or phone. If you have a critical or abnormal lab result, we will notify you by phone as soon as possible.  Submit refill requests through [employer] or call your pharmacy and they will forward the refill request to us. Please allow 3 business days for your refill to be completed.          Additional Information About Your Visit        MyChart Information     [employer] gives you secure access to your electronic health record. If you see a primary care provider, you can also send messages to your care team and make appointments. If you have questions, please call your primary care clinic.  If you do not have a primary care provider, please call [phone] and they will assist you.        Care EveryWhere ID     This is your Care EveryWhere ID. This could be used by other organizations to access your [place] medical records  [id number]         Blood Pressure from Last 3 Encounters:   12/04/17 108/75   11/06/17 129/82   06/27/17 140/83    Weight from Last 3 Encounters:   12/04/17 82.6 kg (182 lb 1.9 oz)   11/06/17 81.6 kg  (180 lb)   06/27/17 81.6 kg (180 lb)              We Performed the Following     NEUROMUSCULAR RE-EDUCATION     THERAPEUTIC EXERCISES        Primary Care Provider Office Phone # Fax #    Aleksandra Starks [phone]       [address] [place] [address]        Goals        General    Psychosocial (pt-stated)     Notes - Note edited  4/19/2016 11:10 AM by Zulma Kim BSW    As of today's date 4/19/2016 goal is met at 76 - 100%.   Goal Status:  Complete   Pt has resource ([employer]) that can evaluate pt's home.  Pt has decided to complete two other tasks (hiring yard work and cleaning out of her house assistance) prior to having [employer] come out.  I want a resource that can evaluate my home for DME/potential remodeling so I can age in place  As of today's date 3/17/2016 goal is met at 0 - 25%.   Goal Status:  Active    LUKE Coffey          Equal Access to Services     [facility]: Hadii aad ku hadasho Soomaali, waaxda luqadaha, qaybta kaalmada adeegyada, waxay amayain kayla driscoll . So [facility] [phone].    ATENCIÓN: Si habla español, tiene a sanchez disposición servicios gratuitos de asistencia lingüística. Llame al [phone].    We comply with applicable federal civil rights laws and [place] laws. We do not discriminate on the basis of race, color, national origin, age, disability, sex, sexual orientation, or gender identity.            Thank you!     Thank you for choosing [facility] PHYSICAL THERAPY  for your care. Our goal is always to provide you with excellent care. Hearing back from our patients is one way we can continue to improve our services. Please take a few minutes to complete the written survey that you may receive in the mail after your visit with us. Thank you!             Your Updated Medication List - Protect others around you: Learn how to safely use, store and throw away your medicines at  www.disposemymeds.org.          This list is accurate as of 3/5/18  4:11 PM.  Always use your most recent med list.                   Brand Name Dispense Instructions for use Diagnosis    CALCIUM + D PO      Take  by mouth. 600mg/400 IU bid        camphor-menthol 0.5-0.5 % Lotn    DERMASARRA    222 mL    Apply up to 4x daily as needed for itch on the back.    Notalgia paresthetica       cyanocobalamin 1000 MCG tablet    vitamin  B-12     Take 1 tablet by mouth three times a week.        ESTRIOL 0.5MG/GM CREAM     30 g    Insert 1 gram vaginally 2 times per week.    Postmenopausal atrophic vaginitis       lisinopril 5 MG tablet    PRINIVIL/ZESTRIL    90 tablet    Take 1 tablet (5 mg) by mouth daily    Essential hypertension       NAPROXEN PO      Take 220 mg by mouth 2 times daily as needed for moderate pain        OMEGA-3 FISH OIL PO      Take  by mouth.        simvastatin 20 MG tablet    ZOCOR    90 tablet    Take 1 tablet (20 mg) by mouth At Bedtime    Hyperlipidemia, unspecified hyperlipidemia type       UNABLE TO FIND      MEDICATION NAME: Six-Flavor Tea pills, 8 pills (Chinese herbal medicain) Strengthen kidney merdian

## 2018-03-29 DIAGNOSIS — I10 ESSENTIAL HYPERTENSION: ICD-10-CM

## 2018-03-29 DIAGNOSIS — E78.5 HYPERLIPIDEMIA LDL GOAL <130: Primary | ICD-10-CM

## 2018-04-10 DIAGNOSIS — I10 ESSENTIAL HYPERTENSION: ICD-10-CM

## 2018-04-10 DIAGNOSIS — R73.09 ELEVATED GLUCOSE: Primary | ICD-10-CM

## 2018-04-10 DIAGNOSIS — E78.5 HYPERLIPIDEMIA, UNSPECIFIED HYPERLIPIDEMIA TYPE: ICD-10-CM

## 2018-04-10 LAB
ALT SERPL-CCNC: 20 U/L (ref 0–50)
ANION GAP SERPL CALCULATED.3IONS-SCNC: 6 MMOL/L (ref 3–14)
AST SERPL-CCNC: 23 U/L (ref 0–45)
BUN SERPL-MCNC: 17 MG/DL (ref 7–30)
CALCIUM SERPL-MCNC: 9.5 MG/DL (ref 8.5–10.1)
CHLORIDE SERPL-SCNC: 107 MMOL/L (ref 94–109)
CHOLEST SERPL-MCNC: 179 MG/DL (ref 0–200)
CHOLEST/HDLC SERPL: 3.2 {RATIO} (ref 0–5)
CO2 SERPL-SCNC: 27 MMOL/L (ref 20–32)
CREAT SERPL-MCNC: 0.69 MG/DL (ref 0.52–1.04)
FASTING SPECIMEN: YES
GFR SERPL CREATININE-BSD FRML MDRD: 83 ML/MIN/1.7M2
GLUCOSE SERPL-MCNC: 111 MG/DL (ref 60–109)
GLUCOSE SERPL-MCNC: 99 MG/DL (ref 70–99)
HBA1C MFR BLD: 5.8 % (ref 4.1–5.7)
HDLC SERPL-MCNC: 55 MG/DL
LDLC SERPL CALC-MCNC: 84 MG/DL (ref 0–129)
POTASSIUM SERPL-SCNC: 5.2 MMOL/L (ref 3.4–5.3)
SODIUM SERPL-SCNC: 140 MMOL/L (ref 133–144)
TRIGL SERPL-MCNC: 200 MG/DL (ref 0–150)
VLDL-CHOLESTEROL: 40 (ref 7–32)

## 2018-04-10 RX ORDER — LISINOPRIL 5 MG/1
5 TABLET ORAL DAILY
Qty: 90 TABLET | Refills: 3 | Status: SHIPPED | OUTPATIENT
Start: 2018-04-10 | End: 2019-07-03

## 2018-04-10 RX ORDER — SIMVASTATIN 20 MG
20 TABLET ORAL AT BEDTIME
Qty: 90 TABLET | Refills: 3 | Status: SHIPPED | OUTPATIENT
Start: 2018-04-10 | End: 2019-07-03

## 2018-05-15 DIAGNOSIS — H81.10 BPPV (BENIGN PAROXYSMAL POSITIONAL VERTIGO): Primary | ICD-10-CM

## 2018-05-17 ENCOUNTER — THERAPY VISIT (OUTPATIENT)
Dept: PHYSICAL THERAPY | Facility: CLINIC | Age: 78
End: 2018-05-17
Payer: MEDICARE

## 2018-05-17 DIAGNOSIS — G89.29 CHRONIC PAIN OF RIGHT KNEE: ICD-10-CM

## 2018-05-17 DIAGNOSIS — M25.561 CHRONIC PAIN OF RIGHT KNEE: ICD-10-CM

## 2018-05-17 DIAGNOSIS — M25.562 LEFT KNEE PAIN, UNSPECIFIED CHRONICITY: ICD-10-CM

## 2018-05-17 PROCEDURE — G8978 MOBILITY CURRENT STATUS: HCPCS | Mod: GP | Performed by: PHYSICAL THERAPIST

## 2018-05-17 PROCEDURE — G8979 MOBILITY GOAL STATUS: HCPCS | Mod: GP | Performed by: PHYSICAL THERAPIST

## 2018-05-17 PROCEDURE — 97112 NEUROMUSCULAR REEDUCATION: CPT | Mod: GP | Performed by: PHYSICAL THERAPIST

## 2018-05-17 PROCEDURE — 97110 THERAPEUTIC EXERCISES: CPT | Mod: GP | Performed by: PHYSICAL THERAPIST

## 2018-05-17 NOTE — MR AVS SNAPSHOT
After Visit Summary   5/17/2018    Shyann Samuel    MRN: [id number]           Patient Information     Date Of Birth          1940        Visit Information        Provider Department      5/17/2018 12:00 PM Kat Wayne, PT [facility] Physical Therapy        Today's Diagnoses     Left knee pain, unspecified chronicity        Chronic pain of right knee           Follow-ups after your visit        Who to contact     If you have questions or need follow up information about today's clinic visit or your schedule please contact [facility] PHYSICAL THERAPY directly at [phone].  Normal or non-critical lab and imaging results will be communicated to you by Accelerahart, letter or phone within 4 business days after the clinic has received the results. If you do not hear from us within 7 days, please contact the clinic through Acceler[employer]art or phone. If you have a critical or abnormal lab result, we will notify you by phone as soon as possible.  Submit refill requests through [employer] or call your pharmacy and they will forward the refill request to us. Please allow 3 business days for your refill to be completed.          Additional Information About Your Visit        MyChart Information     [employer] gives you secure access to your electronic health record. If you see a primary care provider, you can also send messages to your care team and make appointments. If you have questions, please call your primary care clinic.  If you do not have a primary care provider, please call [phone] and they will assist you.        Care EveryWhere ID     This is your Care EveryWhere ID. This could be used by other organizations to access your [place] medical records  [id number]         Blood Pressure from Last 3 Encounters:   12/04/17 108/75   11/06/17 129/82   06/27/17 140/83    Weight from Last 3 Encounters:   12/04/17 82.6 kg (182 lb 1.9 oz)   11/06/17 81.6 kg  (180 lb)   06/27/17 81.6 kg (180 lb)              We Performed the Following     JERRY PROGRESS NOTES REPORT        Primary Care Provider Office Phone # Fax #    Aleksandra Starks [phone]       [address] [place] [address]        Goals        General    Psychosocial (pt-stated)     Notes - Note edited  4/19/2016 11:10 AM by Zulma Kim BSW    As of today's date 4/19/2016 goal is met at 76 - 100%.   Goal Status:  Complete   Pt has resource ([employer]) that can evaluate pt's home.  Pt has decided to complete two other tasks (hiring yard work and cleaning out of her house assistance) prior to having [employer] come out.  I want a resource that can evaluate my home for DME/potential remodeling so I can age in place  As of today's date 3/17/2016 goal is met at 0 - 25%.   Goal Status:  Active    LUKE Coffey          Equal Access to Services     [facility]: Hadii aad ku hadasho Soomaali, waaxda luqadaha, qaybta kaalmada adeegyada, waxay idiin haydasian daphne driscoll . So [facility] [phone].    ATENCIÓN: Si habla español, tiene a sanchez disposición servicios gratuitos de asistencia lingüística. Llame al [phone].    We comply with applicable federal civil rights laws and [place] laws. We do not discriminate on the basis of race, color, national origin, age, disability, sex, sexual orientation, or gender identity.            Thank you!     Thank you for choosing [facility] PHYSICAL THERAPY  for your care. Our goal is always to provide you with excellent care. Hearing back from our patients is one way we can continue to improve our services. Please take a few minutes to complete the written survey that you may receive in the mail after your visit with us. Thank you!             Your Updated Medication List - Protect others around you: Learn how to safely use, store and throw away your medicines at www.disposemymeds.org.           This list is accurate as of 5/17/18  2:41 PM.  Always use your most recent med list.                   Brand Name Dispense Instructions for use Diagnosis    CALCIUM + D PO      Take  by mouth. 600mg/400 IU bid        camphor-menthol 0.5-0.5 % Lotn    DERMASARRA    222 mL    Apply up to 4x daily as needed for itch on the back.    Notalgia paresthetica       cyanocobalamin 1000 MCG tablet    vitamin  B-12     Take 1 tablet by mouth three times a week.        ESTRIOL 0.5MG/GM CREAM     30 g    Insert 1 gram vaginally 2 times per week.    Postmenopausal atrophic vaginitis       lisinopril 5 MG tablet    PRINIVIL/ZESTRIL    90 tablet    Take 1 tablet (5 mg) by mouth daily    Essential hypertension       NAPROXEN PO      Take 220 mg by mouth 2 times daily as needed for moderate pain        OMEGA-3 FISH OIL PO      Take  by mouth.        simvastatin 20 MG tablet    ZOCOR    90 tablet    Take 1 tablet (20 mg) by mouth At Bedtime    Hyperlipidemia, unspecified hyperlipidemia type       UNABLE TO FIND      MEDICATION NAME: Six-Flavor Tea pills, 8 pills (Chinese herbal medicain) Strengthen kidney merdian

## 2018-05-17 NOTE — PROGRESS NOTES
Subjective:  HPI                    Objective:  System    Physical Exam    General     ROS    Assessment/Plan:    PROGRESS  REPORT    Progress reporting period is from 2/1/2018 to 3/5/2018.       SUBJECTIVE  Subjective changes noted by patient:  .  Subjective: Has been getting more cramping in the right leg with getting out of bed.  Trying to do exercises and work on the stairs at home.  Has not been able to get to the [facility]    Current pain level is NA Current Pain level: 2/10.     Previous pain level was   Initial Pain level: 4/10.   Changes in function:  Yes (See Goal flowsheet attached for changes in current functional level)  Adverse reaction to treatment or activity: None    OBJECTIVE  Changes noted in objective findings:  Yes,   Objective: Right quad weaker than the left.  Modify how she is pushing off and encourage her to try more step ups with right leg for strengthening     ASSESSMENT/PLAN  Updated problem list and treatment plan: Diagnosis 1:  Bilateral leg pain  Pain -  manual therapy, self management, education and home program  Decreased ROM/flexibility - manual therapy, therapeutic exercise and home program  Decreased strength - therapeutic exercise, therapeutic activities and home program  Impaired muscle performance - neuro re-education and home program  Decreased function - therapeutic activities and home program  STG/LTGs have been met or progress has been made towards goals:  Yes (See Goal flow sheet completed today.)  Assessment of Progress: The patient's condition has potential to improve.  The patient has had set backs in their progress.  Self Management Plans:  Patient has been instructed in a home treatment program.  I have re-evaluated this patient and find that the nature, scope, duration and intensity of the therapy is appropriate for the medical condition of the patient.  Shyann continues to require the following intervention to meet STG and LTG's:  PT    Recommendations:  This patient  would benefit from continued therapy.     Frequency:  1 X a month, once daily  Duration:  for 2 months        Please refer to the daily flowsheet for treatment today, total treatment time and time spent performing 1:1 timed codes.

## 2018-05-17 NOTE — PROGRESS NOTES
Subjective:  HPI                    Objective:  System    Physical Exam    General     ROS    Assessment/Plan:    PROGRESS  REPORT    Progress reporting period is from 3/5/2018 to 5/17/2018.       SUBJECTIVE  Subjective changes noted by patient:  .  Subjective: Getting stronger.  Can carry two grocery bags up stairs now.  However she feels her stamina is not improving.  Has not been to the [facility].  Has had to cancel appointments due to dizziness.  Is going to make an appointment in rehab for this.  Got a new pair of stockings to help with swelling for her legs.  Has not been using the stationary bike at home as in the past due to the dizziness.     Current pain level is  Current Pain level: 1/10.     Previous pain level was   Initial Pain level: 4/10.   Changes in function:  Yes (See Goal flowsheet attached for changes in current functional level)  Adverse reaction to treatment or activity: None    OBJECTIVE  Changes noted in objective findings:  Yes,   Objective: Difficulty with single leg toe raise on the right.  Difficulty with keeping her knee in extension with this.  STands with right knee flexed.  Right pelvis higher. Able to perform tandem stance for 30 sec with each leg with eyes open without losing her balance.  Able to climb up 6 inch step without knee pain but stuggles with pushing right knee into extension when leading with this leg.  Reviewed form for other exercises using hand and ankle weights for strengthening.  Discussed getting back to the [facility] to swim.       ASSESSMENT/PLAN  Updated problem list and treatment plan: Diagnosis 1:  Bilateral leg weakness  Pain -  manual therapy, self management, directional preference exercise and home program  Decreased ROM/flexibility - manual therapy, therapeutic exercise and home program  Decreased strength - therapeutic exercise, therapeutic activities and home program  Impaired balance - neuro re-education, therapeutic activities and home program  Impaired gait -  gait training and home program  Decreased function - therapeutic activities and home program  STG/LTGs have been met or progress has been made towards goals:  Yes (See Goal flow sheet completed today.)  Assessment of Progress: The patient's condition is improving.  The patient's condition has potential to improve.  Self Management Plans:  Patient has been instructed in a home treatment program.  I have re-evaluated this patient and find that the nature, scope, duration and intensity of the therapy is appropriate for the medical condition of the patient.  Shyann continues to require the following intervention to meet STG and LTG's:  PT    Recommendations:  This patient would benefit from continued therapy.     Frequency:  1 X a month, once daily  Duration:  for 2 months        Please refer to the daily flowsheet for treatment today, total treatment time and time spent performing 1:1 timed codes.

## 2018-05-17 NOTE — LETTER
DEPARTMENT OF HEALTH AND HUMAN SERVICES  CENTERS FOR MEDICARE & MEDICAID SERVICES    PLAN/UPDATED PLAN OF PROGRESS FOR OUTPATIENT REHABILITATION    PATIENTS NAME:  Shyann Samuel   : 1940  PROVID[facility] NUMBER:    [id number]  [id number]  PROVIDER NAME: [facility]YSICAL THERAPY  MEDICAL RECORD NUMBER: [id number]   START OF CARE DATE:  SOC Date: 17   TYPE:  PT    PRIMARY/TREATMENT DIAGNOSIS: (Pertinent Medical Diagnosis)     Left knee pain, unspecified chronicity  Chronic pain of right knee    VISITS FROM START OF CARE:  Rxs Used: 6     Assessment/Plan:    PROGRESS  REPORT    Progress reporting period is from 3/5/2018 to 2018.       SUBJECTIVE  Subjective changes noted by patient:  .  Subjective: Getting stronger.  Can carry two grocery bags up stairs now.  However she feels her stamina is not improving.  Has not been to the [facility].  Has had to cancel appointments due to dizziness.  Is going to make an appointment in rehab for this.  Got a new pair of stockings to help with swelling for her legs.  Has not been using the stationary bike at home as in the past due to the dizziness.     Current pain level is  Current Pain level: 1/10.     Previous pain level was   Initial Pain level: 4/10.   Changes in function:  Yes (See Goal flowsheet attached for changes in current functional level)  Adverse reaction to treatment or activity: None    OBJECTIVE  Changes noted in objective findings:  Yes,   Objective: Difficulty with single leg toe raise on the right.  Difficulty with keeping her knee in extension with this.  STands with right knee flexed.  Right pelvis higher. Able to perform tandem stance for 30 sec with each leg with eyes open without losing her balance.  Able to climb up 6 inch step without knee pain but stuggles with pushing right knee into extension when leading with this leg.  Reviewed form for other exercises using hand and ankle weights for strengthening.   "Discussed getting back to the [facility] to swim.       ASSESSMENT/PLAN  Updated problem list and treatment plan: Diagnosis 1:  Bilateral leg weakness  Pain -  manual therapy, self management, directional preference exercise and home program  Decreased ROM/flexibility - manual therapy, therapeutic exercise and home program  Decreased strength - therapeutic exercise, therapeutic activities and home program  Impaired balance - neuro re-education, therapeutic activities and home program  Impaired gait - gait training and home program  Decreased function - therapeutic activities and home program  STG/LTGs have been met or progress has been made towards goals:  Yes (See Goal flow sheet completed today.)  Assessment of Progress: The patient's condition is improving.  The patient's condition has potential to improve.  Self Management Plans:  Patient has been instructed in a home treatment program.  I have re-evaluated this patient and find that the nature, scope, duration and intensity of the therapy is appropriate for the medical condition of the patient.  Shyann continues to require the following intervention to meet STG and LTG's:  PT    Recommendations:  This patient would benefit from continued therapy.     Frequency:  1 X a month, once daily  Duration:  for 2 months            Caregiver Signature/Credentials _____________________________ Date ________       Treating Provider:   Kat Wayne, ATC, PT   I have reviewed and certified the need for these services and plan of treatment while under my care.        PHYSICIAN'S SIGNATURE:   _________________________________________  Date___________   Aleksandra Starks MD    Certification period:  Beginning of Cert date period: 02/25/18 to  End of Cert period date: 05/25/18     Functional Level Progress Report: Please see attached \"Goal Flow sheet for Functional level.\"    ____X____ Continue Services or       ________ DC Services                Service dates: From  SOC Date: " 11/27/17 date to present

## 2018-06-07 ENCOUNTER — NURSE TRIAGE (OUTPATIENT)
Dept: NURSING | Facility: CLINIC | Age: 78
End: 2018-06-07

## 2018-06-07 ENCOUNTER — RADIANT APPOINTMENT (OUTPATIENT)
Dept: GENERAL RADIOLOGY | Facility: CLINIC | Age: 78
End: 2018-06-07
Attending: FAMILY MEDICINE
Payer: MEDICARE

## 2018-06-07 ENCOUNTER — OFFICE VISIT (OUTPATIENT)
Dept: ORTHOPEDICS | Facility: CLINIC | Age: 78
End: 2018-06-07
Payer: MEDICARE

## 2018-06-07 VITALS
HEIGHT: 66 IN | SYSTOLIC BLOOD PRESSURE: 146 MMHG | HEART RATE: 66 BPM | WEIGHT: 175 LBS | DIASTOLIC BLOOD PRESSURE: 84 MMHG | BODY MASS INDEX: 28.12 KG/M2

## 2018-06-07 DIAGNOSIS — M25.551 RIGHT HIP PAIN: ICD-10-CM

## 2018-06-07 DIAGNOSIS — M25.551 RIGHT HIP PAIN: Primary | ICD-10-CM

## 2018-06-07 RX ORDER — HYDROCODONE BITARTRATE AND ACETAMINOPHEN 5; 325 MG/1; MG/1
1 TABLET ORAL EVERY 6 HOURS PRN
Qty: 10 TABLET | Refills: 0 | Status: SHIPPED | OUTPATIENT
Start: 2018-06-07 | End: 2018-07-01

## 2018-06-07 RX ORDER — TIZANIDINE 2 MG/1
2 TABLET ORAL 3 TIMES DAILY PRN
Qty: 30 TABLET | Refills: 0 | Status: SHIPPED | OUTPATIENT
Start: 2018-06-07 | End: 2018-06-24

## 2018-06-07 RX ORDER — METHYLPREDNISOLONE 4 MG
TABLET, DOSE PACK ORAL
Qty: 21 TABLET | Refills: 0 | Status: SHIPPED | OUTPATIENT
Start: 2018-06-07 | End: 2018-10-18

## 2018-06-07 NOTE — PROGRESS NOTES
"[facility] Sports and Orthopedic Walk-in Clinic Note      Patient is a 78 year old female who presents to the office today for: Right hip pain.  2 days ago began having a sharp pain in her posterior right hip while standing.  Now has severe pain with standing and walking.  Has prior history of CONSTANCE in 2009 and has had no problems since that time.  Pain radiates from her low back posterior hip area around the lateral thigh.  Denies groin pain.  Better with rest.  No tingling or numbness in the lower extremities.    Past Medical History, Current Medications, and Allergies are reviewed in the electronic medical record as appropriate.     ROS: Pertinent items are noted in HPI.  Constitutional: negative for fevers, chills and malaise  Cardiovascular: negative for dyspnea, fatigue, lower extremity edema  Integument/breast: negative for rash, skin lesion(s) and skin color change  Neurological: negative for paresthesia and weakness      EXAM:/84 (BP Location: Right arm, Patient Position: Chair, Cuff Size: Adult Regular)  Pulse 66  Ht 5' 6\" (1.676 m)  Wt 175 lb (79.4 kg)  BMI 28.25 kg/m2    General: alert, pleasant, no distress. sitting comfortably in chair  Back/Spine: no tenderness to palpation of spinous processes, or paraspinous musculature of lumbar spine.  Pain in the right lower lumbar area with walking.  Pain with forward flexion and side bending to the right.  Straight leg raise negative bilaterally.  No pain in back with SHAYAN testing bilaterally  Neuro: SILT on bilateral lower extremities, can stand on toes and heel walk without difficulty, patellar and achilles reflexes 2+ and symmetric, babinski downgoing bilaterally   Right hip: Full PROM without pain.  No TTP over the anterior hip.  Mild TTP over the greater trochanter.  Hip flexion against resistance 4+/5 with pain.    Imaging:   X-rays of the lumbar spine and right hip were performed and reviewed independently demonstrating multilevel degenerative " change most significant at L3 through L5.  In the right hip there is no sign of acute injury such as periprosthetic fracture.  Per radiology possibly some slight decreased L2 vertebral body height as well as superior eccentric wear of the right total hip arthroplasty      Assessment: Patient is a 78 year old female with Low back pain with some radicular sounding symptoms.    Recommendations:   reviewed imaging and assessment with the patient in detail.  Recommended course of oral steroid.  Discussed risks and side effects.  Given tizanidine to use for muscle spasm.  Small prescription for  Norco given for severe pain only.  Also given home exercises and instruction to follow-up with physical therapy.        BP: In significant discomfort during initial measurement. , improved on repeat    Serge Watson MD

## 2018-06-07 NOTE — PROGRESS NOTES
SPORTS & ORTHOPEDIC WALK-IN VISIT 6/7/2018    Primary Care Physician: Dr. 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 She states she had a right hip replacement done by Christopher Snell in 2009 and has not had pain since. She states she doesn't recall a reason for the pain to start now. She has had a history of a few falls since her surgery, but none have ever caused her problems, with the last being a few months ago.    Reason for visit:     What part of your body is injured / painful?  right hip    What caused the injury /pain? No inciting event     How long ago did your injury occur or pain begin? several days ago    What are your most bothersome symptoms? Pain    How would you characterize your symptom?  sharp    What makes your symptoms better? Rest, sitting    What makes your symptoms worse? Standing, Walking and Movement    Have you been previously seen for this problem? Yes, history of right hip replacement in 2009 at [facility] by Dr. nSell.    Medical History:    Any recent changes to your medical history? No    Any new medication prescribed since last visit? No    Have you had surgery on this body part before? Yes Date: 2009, Surgical Procedure: right hip arthroscopy, Surgeon: Christopher Snell    Social History:    Occupation: retired    Exercise: None    Review of Systems:    Do you have fever, chills, weight loss?

## 2018-06-07 NOTE — TELEPHONE ENCOUNTER
"Shyann calling back to speak with triage nurse, did get message from clinic RN.  sat in a chair for a meeting and starting  night started having back pain.  By Tuesday night, pain started in right hip (artificial joint).  Taking  Hydrocodone sparingly, but no improvement. Rubbing brings very brief relief, and ice did also very briefly help last night.  No pain at rest or while sitting, pain is present while standing and \"moving around\".  Triaged to be seen within 24 hours, did reiterate clinic RN's recommendation to go the [facility] sports/ ortho walk in clinic, which Shyann will do.    Reason for Disposition    [1] SEVERE pain (e.g., excruciating, unable to do any normal activities) AND [2] not improved after 2 hours of pain medicine    Additional Information    Negative: Looks like a broken bone or dislocated joint (e.g., crooked or deformed)    Negative: Sounds like a life-threatening emergency to the triager    Negative: Followed a hip injury    Negative: Leg pain is main symptom    Negative: Back pain radiating (shooting) into hip    Negative: [1] SEVERE pain (e.g., excruciating, unable to do any normal activities) AND [2] fever    Negative: Can't stand (bear weight) or walk    Negative: [1] Red area or streak AND [2] fever    Negative: Patient sounds very sick or weak to the triager    Protocols used: HIP PAIN-ADULT-AH      "

## 2018-06-07 NOTE — MR AVS SNAPSHOT
After Visit Summary   6/7/2018    Shyann Samuel    MRN: [id number]           Patient Information     Date Of Birth          1940        Visit Information        Provider Department      6/7/2018 12:20 PM Serge Watson MD [facility] Sports and Orthopaedic Walk In Clinic        Today's Diagnoses     Right hip pain    -  1      Care Instructions    Take medrol dosepack in the morning with food as directed  Do not take ibuprofen, naproxen while taking steroid  Take tizanidine up to three times daily for spasm  Take hydrocodone/acetaminophen for severe pain   Perform home exercises as tolerated  Follow up with physical therapy  Follow up in clinic if worsening symptoms such as numbness, weakness, or if there is no improvement after 6 weeks.             Follow-ups after your visit        Additional Services     [facility], [facility] REFERRAL       Physical Therapy Referral                  Who to contact     Please call your clinic at [phone] to:    Ask questions about your health    Make or cancel appointments    Discuss your medicines    Learn about your test results    Speak to your doctor            Additional Information About Your Visit        [employer] Information     [employer] gives you secure access to your electronic health record. If you see a primary care provider, you can also send messages to your care team and make appointments. If you have questions, please call your primary care clinic.  If you do not have a primary care provider, please call [phone] and they will assist you.      [employer] is an electronic gateway that provides easy, online access to your medical records. With [employer], you can request a clinic appointment, read your test results, renew a prescription or communicate with your care team.     To access your existing account, please contact your [facility] or call [phone] for assistance.        Care EveryWhere ID      "This is your Care EveryWhere ID. This could be used by other organizations to access your [facility] medical records  [id number]        Your Vitals Were     Pulse Height BMI (Body Mass Index)             66 1.676 m (5' 6\") 28.25 kg/m2          Blood Pressure from Last 3 Encounters:   06/07/18 146/84   12/04/17 108/75   11/06/17 129/82    Weight from Last 3 Encounters:   06/07/18 79.4 kg (175 lb)   12/04/17 82.6 kg (182 lb 1.9 oz)   11/06/17 81.6 kg (180 lb)              We Performed the Following     [facility] HAND, AND CH[facility]RACTIC REFERRAL          Today's Medication Changes          These changes are accurate as of 6/7/18  2:02 PM.  If you have any questions, ask your nurse or doctor.               Start taking these medicines.        Dose/Directions    HYDROcodone-acetaminophen 5-325 MG per tablet   Commonly known as:  NORCO   Used for:  Right hip pain   Started by:  Serge Watson MD        Dose:  1 tablet   Take 1 tablet by mouth every 6 hours as needed for severe pain   Quantity:  10 tablet   Refills:  0       methylPREDNISolone 4 MG tablet   Commonly known as:  MEDROL DOSEPAK   Used for:  Right hip pain   Started by:  Serge Watson MD        follow package directions   Quantity:  21 tablet   Refills:  0       tiZANidine 2 MG tablet   Commonly known as:  ZANAFLEX   Used for:  Right hip pain   Started by:  Serge Watson MD        Dose:  2 mg   Take 1 tablet (2 mg) by mouth 3 times daily as needed for muscle spasms   Quantity:  30 tablet   Refills:  0            Where to get your medicines      These medications were sent to [facility] [place], [place] - [address] [facility] [address] [place] [address] [facility], [facility] [address]    Hours:  TRANSPLANT PHONE NUMBER [phone] Phone:  [phone]     methylPREDNISolone 4 MG tablet    tiZANidine 2 MG tablet         Some of these will need a paper prescription and others can be bought over the counter.  Ask " your nurse if you have questions.     Bring a paper prescription for each of these medications     HYDROcodone-acetaminophen 5-325 MG per tablet               Information about OPIOIDS     PRESCRIPTION OPIOIDS: WHAT YOU NEED TO KNOW   You have a prescription for an opioid (narcotic) pain medicine. Opioids can cause addiction. If you have a history of chemical dependency of any type, you are at a higher risk of becoming addicted to opioids. Only take this medicine after all other options have been tried. Take it for as short a time and as few doses as possible.     Do not:    Drive. If you drive while taking these medicines, you could be arrested for driving under the influence (DUI).    Operate heavy machinery    Do any other dangerous activities while taking these medicines.     Drink any alcohol while taking these medicines.      Take with any other medicines that contain acetaminophen. Read all labels carefully. Look for the word  acetaminophen  or  Tylenol.  Ask your pharmacist if you have questions or are unsure.    Store your pills in a secure place, locked if possible. We will not replace any lost or stolen medicine. If you don t finish your medicine, please throw away (dispose) as directed by your pharmacist. The [place] Pollution Control Agency has more information about safe disposal: https://www.pca.[facility].[place].us/living-green/managing-unwanted-medications    All opioids tend to cause constipation. Drink plenty of water and eat foods that have a lot of fiber, such as fruits, vegetables, prune juice, apple juice and high-fiber cereal. Take a laxative (Miralax, milk of magnesia, Colace, Senna) if you don t move your bowels at least every other day.          Primary Care Provider Office Phone # Fax #    Aleksandra Starks [phone] [address] [place] [address]        Goals        General    Psychosocial (pt-stated)     Notes - Note edited  4/19/2016 11:10 AM by Judy  Zulma TROTTER, BSW    As of today's date 4/19/2016 goal is met at 76 - 100%.   Goal Status:  Complete   Pt has resource ([employer]) that can evaluate pt's home.  Pt has decided to complete two other tasks (hiring yard work and cleaning out of her house assistance) prior to having [employer] come out.  I want a resource that can evaluate my home for DME/potential remodeling so I can age in place  As of today's date 3/17/2016 goal is met at 0 - 25%.   Goal Status:  Active    Shawn Kim, LSW          Equal Access to Services     [facility]: Hadii aad ku hadasho Soomaali, waaxda luqadaha, qaybta kaalmada adeegyada, waxay idiin hayaan adereta driscoll . So [facility] [place] [phone].    ATENCIÓN: Si habla español, tiene a sanchez disposición servicios gratuitos de asistencia lingüística. Llame al [phone].    We comply with applicable federal civil rights laws and [place] laws. We do not discriminate on the basis of race, color, national origin, age, disability, sex, sexual orientation, or gender identity.            Thank you!     Thank you for choosing [facility] SPORTS AND ORTHOPAEDIC WALK IN CLINIC  for your care. Our goal is always to provide you with excellent care. Hearing back from our patients is one way we can continue to improve our services. Please take a few minutes to complete the written survey that you may receive in the mail after your visit with us. Thank you!             Your Updated Medication List - Protect others around you: Learn how to safely use, store and throw away your medicines at www.disposemymeds.org.          This list is accurate as of 6/7/18  2:02 PM.  Always use your most recent med list.                   Brand Name Dispense Instructions for use Diagnosis    CALCIUM + D PO      Take  by mouth. 600mg/400 IU bid        camphor-menthol 0.5-0.5 % Lotn    DERMASARRA    222 mL    Apply up to 4x daily as needed for itch on the back.    Notalgia paresthetica       cyanocobalamin 1000 MCG  tablet    vitamin  B-12     Take 1 tablet by mouth three times a week.        ESTRIOL 0.5MG/GM CREAM     30 g    Insert 1 gram vaginally 2 times per week.    Postmenopausal atrophic vaginitis       HYDROcodone-acetaminophen 5-325 MG per tablet    NORCO    10 tablet    Take 1 tablet by mouth every 6 hours as needed for severe pain    Right hip pain       lisinopril 5 MG tablet    PRINIVIL/ZESTRIL    90 tablet    Take 1 tablet (5 mg) by mouth daily    Essential hypertension       methylPREDNISolone 4 MG tablet    MEDROL DOSEPAK    21 tablet    follow package directions    Right hip pain       NAPROXEN PO      Take 220 mg by mouth 2 times daily as needed for moderate pain        OMEGA-3 FISH OIL PO      Take  by mouth.        simvastatin 20 MG tablet    ZOCOR    90 tablet    Take 1 tablet (20 mg) by mouth At Bedtime    Hyperlipidemia, unspecified hyperlipidemia type       tiZANidine 2 MG tablet    ZANAFLEX    30 tablet    Take 1 tablet (2 mg) by mouth 3 times daily as needed for muscle spasms    Right hip pain       UNABLE TO FIND      MEDICATION NAME: Six-Flavor Tea pills, 8 pills (Chinese herbal medicain) Strengthen kidney merdian

## 2018-06-07 NOTE — PATIENT INSTRUCTIONS
Take medrol dosepack in the morning with food as directed  Do not take ibuprofen, naproxen while taking steroid  Take tizanidine up to three times daily for spasm  Take hydrocodone/acetaminophen for severe pain   Perform home exercises as tolerated  Follow up with physical therapy  Follow up in clinic if worsening symptoms such as numbness, weakness, or if there is no improvement after 6 weeks.

## 2018-06-09 ENCOUNTER — TELEPHONE (OUTPATIENT)
Dept: ORTHOPEDICS | Facility: CLINIC | Age: 78
End: 2018-06-09

## 2018-06-09 NOTE — TELEPHONE ENCOUNTER
Shyann called and left a  regarding her pain in her quadriceps muscle.  She notes that she has had bouts of random spasms in that muscle in the past which subside within 20 minutes or so. The night after leaving our clinic, however, she had a spasm that has not relented. She was called back and advised to continue with her prescribed medication (including a muscle relaxant) and to come into the [facility] on Monday to see Dr. Watson again. If her spasm gets too intense or she feels the need to seek care sooner, she was offered the [facility] from 7-7 today and tomorrow or the ED after hours. She verbalized understanding and will plan on seeing Dr. Watson on Monday.  Joanna Alarcon ATC

## 2018-06-10 ENCOUNTER — TELEPHONE (OUTPATIENT)
Dept: ORTHOPEDICS | Facility: CLINIC | Age: 78
End: 2018-06-10

## 2018-06-10 DIAGNOSIS — M54.41 ACUTE RIGHT-SIDED LOW BACK PAIN WITH RIGHT-SIDED SCIATICA: Primary | ICD-10-CM

## 2018-06-10 NOTE — TELEPHONE ENCOUNTER
A VM was left for Shyann after discussing her new symptoms with Dr. Watson. Due to the new quadriceps pain, believed to be originating in the lumbar spine, a MRI order was placed. Patient was told that she may go through with scheduling a MRI before seeing Dr. Watson. She could also come in tomorrow and see Dr. Watson first to go over plan in more detail. Our direct walk in line was left for her to call back and relay which plan she would like to move forward with.  Joanna Alarcon ATC

## 2018-06-13 ENCOUNTER — HOSPITAL ENCOUNTER (OUTPATIENT)
Dept: MRI IMAGING | Facility: CLINIC | Age: 78
Discharge: HOME | End: 2018-06-13
Attending: FAMILY MEDICINE | Admitting: FAMILY MEDICINE
Payer: MEDICARE

## 2018-06-13 DIAGNOSIS — M54.41 ACUTE RIGHT-SIDED LOW BACK PAIN WITH RIGHT-SIDED SCIATICA: ICD-10-CM

## 2018-06-13 PROCEDURE — 72148 MRI LUMBAR SPINE W/O DYE: CPT

## 2018-06-16 ENCOUNTER — OFFICE VISIT (OUTPATIENT)
Dept: ORTHOPEDICS | Facility: CLINIC | Age: 78
End: 2018-06-16
Payer: MEDICARE

## 2018-06-16 VITALS
SYSTOLIC BLOOD PRESSURE: 129 MMHG | DIASTOLIC BLOOD PRESSURE: 85 MMHG | BODY MASS INDEX: 28.12 KG/M2 | HEART RATE: 92 BPM | WEIGHT: 175 LBS | HEIGHT: 66 IN

## 2018-06-16 DIAGNOSIS — M79.604 RIGHT LEG PAIN: Primary | ICD-10-CM

## 2018-06-16 NOTE — PROGRESS NOTES
SPORTS & ORTHOPEDIC WALK-IN FOLLOW-UP VISIT 6/16/2018    Interval History:     Follow up reason: L- spine MRI results    Date of injury: approx 2 weeks    Date last seen: 6/7/18    Following Therapeutic Plan: Yes     Pain: Improving    Function: Improving- walking without cane this morning    Interval History: Acupuncture tx on Thursday     Medical History:    Any recent changes to your medical history? No    Any new medication prescribed since last visit? No

## 2018-06-16 NOTE — MR AVS SNAPSHOT
"              After Visit Summary   6/16/2018    Shyann Samuel    MRN: [id number]           Patient Information     Date Of Birth          1940        Visit Information        Provider Department      6/16/2018 10:10 AM Serge Watson MD [facility]        Today's Diagnoses     Right leg pain    -  1       Follow-ups after your visit        Who to contact     Please call your clinic at [phone] to:    Ask questions about your health    Make or cancel appointments    Discuss your medicines    Learn about your test results    Speak to your doctor            Additional Information About Your Visit        MyChart Information     [employer] gives you secure access to your electronic health record. If you see a primary care provider, you can also send messages to your care team and make appointments. If you have questions, please call your primary care clinic.  If you do not have a primary care provider, please call [phone] and they will assist you.      [employer] is an electronic gateway that provides easy, online access to your medical records. With [employer], you can request a clinic appointment, read your test results, renew a prescription or communicate with your care team.     To access your existing account, please contact your [facility] or call [phone] for assistance.        Care EveryWhere ID     This is your Care EveryWhere ID. This could be used by other organizations to access your [facility] medical records  [id number]        Your Vitals Were     Pulse Height BMI (Body Mass Index)             92 5' 6\" (1.676 m) 28.25 kg/m2          Blood Pressure from Last 3 Encounters:   06/16/18 129/85   06/07/18 146/84   12/04/17 108/75    Weight from Last 3 Encounters:   06/16/18 175 lb (79.4 kg)   06/07/18 175 lb (79.4 kg)   12/04/17 182 lb 1.9 oz (82.6 kg)              Today, you had the following     No orders found for display    "    Primary Care Provider Office Phone # Fax #    Aleksandra Starks [phone]       [address] [place] [id number]        Goals        General    Psychosocial (pt-stated)     Notes - Note edited  4/19/2016 11:10 AM by Zulma Kim, BSW    As of today's date 4/19/2016 goal is met at 76 - 100%.   Goal Status:  Complete   Pt has resource ([employer]) that can evaluate pt's home.  Pt has decided to complete two other tasks (hiring yard work and cleaning out of her house assistance) prior to having [employer] come out.  I want a resource that can evaluate my home for DME/potential remodeling so I can age in place  As of today's date 3/17/2016 goal is met at 0 - 25%.   Goal Status:  Active    Shawn Kim LSW          Equal Access to Services     [facility]: Hadii aad ku hadasho Soomaali, waaxda luqadaha, qaybta kaalmada adeegyada, daija branham haymarguerite driscoll . So [facility] [phone].    ATENCIÓN: Si habla español, tiene a sanchez disposición servicios gratuitos de asistencia lingüística. Llame al [phone].    We comply with applicable federal civil rights laws and [place] laws. We do not discriminate on the basis of race, color, national origin, age, disability, sex, sexual orientation, or gender identity.            Thank you!     Thank you for choosing [facility]  for your care. Our goal is always to provide you with excellent care. Hearing back from our patients is one way we can continue to improve our services. Please take a few minutes to complete the written survey that you may receive in the mail after your visit with us. Thank you!             Your Updated Medication List - Protect others around you: Learn how to safely use, store and throw away your medicines at www.disposemymeds.org.          This list is accurate as of 6/16/18  2:15 PM.  Always use your most recent med list.                   Brand Name Dispense  Instructions for use Diagnosis    CALCIUM + D PO      Take  by mouth. 600mg/400 IU bid        camphor-menthol 0.5-0.5 % Lotn    DERMASARRA    222 mL    Apply up to 4x daily as needed for itch on the back.    Notalgia paresthetica       cyanocobalamin 1000 MCG tablet    vitamin  B-12     Take 1 tablet by mouth three times a week.        ESTRIOL 0.5MG/GM CREAM     30 g    Insert 1 gram vaginally 2 times per week.    Postmenopausal atrophic vaginitis       HYDROcodone-acetaminophen 5-325 MG per tablet    NORCO    10 tablet    Take 1 tablet by mouth every 6 hours as needed for severe pain    Right hip pain       lisinopril 5 MG tablet    PRINIVIL/ZESTRIL    90 tablet    Take 1 tablet (5 mg) by mouth daily    Essential hypertension       methylPREDNISolone 4 MG tablet    MEDROL DOSEPAK    21 tablet    follow package directions    Right hip pain       NAPROXEN PO      Take 220 mg by mouth 2 times daily as needed for moderate pain        OMEGA-3 FISH OIL PO      Take  by mouth.        simvastatin 20 MG tablet    ZOCOR    90 tablet    Take 1 tablet (20 mg) by mouth At Bedtime    Hyperlipidemia, unspecified hyperlipidemia type       tiZANidine 2 MG tablet    ZANAFLEX    30 tablet    Take 1 tablet (2 mg) by mouth 3 times daily as needed for muscle spasms    Right hip pain       UNABLE TO FIND      MEDICATION NAME: Six-Flavor Tea pills, 8 pills (Chinese herbal medicain) Strengthen kidney merdian

## 2018-06-16 NOTE — PROGRESS NOTES
"[facility] Note      Patient is a 78 year old female who presents to the office today for follow up of: Low back and right thigh pain.  Had some relief of back and hip pain after starting Medrol Dosepak but continued to have anterior thigh spasm which was significantly painful.  This was only slightly improved with tizanidine.  Underwent MRI with concern for lumbar etiology.  Here today to discuss results.  However subsequent MRI she underwent acupuncture which has resulted in near resolution of spasm in her right leg.  Having minimal to no back pain at the current time it some aching pain occasionally but overall dramatically improved from earlier this week.  No new symptoms.    Further history reveals that she has been having some ongoing pain in the right quadriceps as well as weakness and instability.  This was thought to be due to deconditioning and she has been working with physical therapy.  Nonetheless the pain recently has been dramatically worse until the last 2 days.    ROS: Pertinent items are noted in HPI.  Integument/breast: negative for rash, skin lesion(s) and skin color change  Neurological: negative for paresthesia       EXAM:/85  Pulse 92  Ht 5' 6\" (1.676 m)  Wt 175 lb (79.4 kg)  BMI 28.25 kg/m2    General alert, pleasant, no distress    Imaging: MRI of the lumbar spine performed 6/13/2018 and report per radiology:  Impression:   1. Progression of multilevel lumbar spondylosis since 7/5/2011.  2. Increased advanced spinal canal stenosis L2-L4, moderate to  advanced spinal canal stenosis at L1-2 and moderate spinal canal  stenosis at L4-5.   3. Increased mild to moderate neural foraminal stenosis on the left at  L4-5 and bilaterally at L1-2.  4. Chronic superior endplate compression fractures L2-L5, new since  7/5/2011.    Assessment: Patient is a 78 year old female with right low back, hip, thigh pain now nearly resolved, but concern remains for lumbar " etiology.    Recommendations:   Reviewed imaging and assessment with the patient in detail.  Patient would like to continue physical therapy and acupuncture which is very reasonable.  If she has a recurrence in pain or continues to have difficulty with weakness in the right lower extremity plan to follow-up with our spine providers.  Patient is in agreement and understanding of this plan.    Serge Watson MD      Total visit time today with Shyann Samuel was 10 minutes, with greater than 50% spent in counseling and/or coordination of care regarding MRI results, right leg pain.

## 2018-06-24 DIAGNOSIS — M25.551 RIGHT HIP PAIN: ICD-10-CM

## 2018-06-24 RX ORDER — TIZANIDINE 2 MG/1
2 TABLET ORAL 3 TIMES DAILY PRN
Qty: 30 TABLET | Refills: 0 | Status: SHIPPED | OUTPATIENT
Start: 2018-06-24 | End: 2018-07-18

## 2018-06-24 NOTE — TELEPHONE ENCOUNTER
Pt had called and left a voicemail stating she wanted the prescription sent to the [facility] on [address]. I called her back and left a voicemail to let her know the prescription was sent.

## 2018-06-24 NOTE — TELEPHONE ENCOUNTER
Pt was called because we were unable to e-prescribe Tizanidine to the outpatient pharmacy. She wanted to ask around to other pharmacies before deciding on a new one.

## 2018-06-24 NOTE — TELEPHONE ENCOUNTER
Pt called requesting a refill for Tizanidine that Dr. Watson prescribed on 6/7. She states she is improving slowly but still has pain every few days. She believes the Tizanidine helps but she only has four left. Dr. Watson agreed to refill her prescription. She requested it be sent to the outpatient pharmacy. She had no further questions.

## 2018-06-29 ENCOUNTER — TELEPHONE (OUTPATIENT)
Dept: ORTHOPEDICS | Facility: CLINIC | Age: 78
End: 2018-06-29

## 2018-06-29 NOTE — TELEPHONE ENCOUNTER
Pt was called in regards to the messages below. She states that she has not had an injection yet. I informed her we can either try the injection first but she would still need to come see Dr. Watson again, or we could refer her to Dr. Acevedo who would most likely order the injection before resorting to surgery anyway. She stated that she has been trying acupuncture and the first treatment seem to have helped. She would like to continue trying the acupuncture and will call back to schedule an appointment if she decides that the acupuncture isn't helping. She had no further questions.         ----- Message from Serge Watson MD sent at 6/29/2018  9:38 AM CDT -----  Regarding: RE: surgeon  If she wants to try an injection, I would want to talk to her to see which location would be best to do the injection then I can order it. If she prefers to see Curtis first, that is ok too.     -serge       ----- Message -----     From: Amairani Salmeron ATC     Sent: 6/29/2018   9:21 AM       To: Serge Watson MD  Subject: RE: surgeon                                      If she says she wants to try an injection, would you want to see her again before ordering it? Would you want to see her after? Or should I just give her Dr. Acevedo's name?       ----- Message -----     From: Serge Watson MD     Sent: 6/29/2018   9:14 AM       To: Amairani Salmeron ATC  Subject: RE: surgeon                                      Antonino Bales,     I don't remember mentioning anyone either. It may have been Basim. It would be appropriate for her to see Dr. Acevedo however. Has she tried an injection? I don't see that she has. That might be something worth trying before going to a surgeon and probably something Curtis would recommend before doing surgery.     -serge     ----- Message -----     From: Amairani Salmeron ATC     Sent: 6/28/2018   6:01 PM       To: Serge Watson MD  Subject: surgeon                                           HiShyann called bakari and said that you recommended a specific surgeon for her back pain. I looked in both of your notes and didn't see anything about anyone specific but she insists that you gave her a specific name. She's hoping to get a call back about who it was. She understands it might be a few days until we get back to her.

## 2018-07-01 ENCOUNTER — OFFICE VISIT (OUTPATIENT)
Dept: ORTHOPEDICS | Facility: CLINIC | Age: 78
End: 2018-07-01
Payer: MEDICARE

## 2018-07-01 VITALS
DIASTOLIC BLOOD PRESSURE: 84 MMHG | HEIGHT: 66 IN | WEIGHT: 175 LBS | SYSTOLIC BLOOD PRESSURE: 134 MMHG | BODY MASS INDEX: 28.12 KG/M2

## 2018-07-01 DIAGNOSIS — M25.551 RIGHT HIP PAIN: ICD-10-CM

## 2018-07-01 DIAGNOSIS — M51.16 INTERVERTEBRAL DISC DISORDER WITH RADICULOPATHY OF LUMBAR REGION: Primary | ICD-10-CM

## 2018-07-01 DIAGNOSIS — M79.604 RIGHT LEG PAIN: ICD-10-CM

## 2018-07-01 RX ORDER — HYDROCODONE BITARTRATE AND ACETAMINOPHEN 5; 325 MG/1; MG/1
1 TABLET ORAL EVERY 6 HOURS PRN
Qty: 15 TABLET | Refills: 0 | Status: SHIPPED | OUTPATIENT
Start: 2018-07-01 | End: 2018-10-18

## 2018-07-01 RX ORDER — GABAPENTIN 100 MG/1
100 CAPSULE ORAL AT BEDTIME
Qty: 90 CAPSULE | Refills: 0 | Status: SHIPPED | OUTPATIENT
Start: 2018-07-01 | End: 2018-08-01

## 2018-07-01 NOTE — PROGRESS NOTES
SPORTS & ORTHOPEDIC WALK-IN FOLLOW-UP VISIT 7/1/2018    Interval History:     Follow up reason: low back pain    Date of injury: About a month     Date last seen: 6/16/18    Following Therapeutic Plan: Yes     Pain: Unchanged - maybe a little worse over the past couple days     Function: Unchanged    Interval History:  Low back and right thigh pain has gotten a little worse over the past couple days. She notes that she has intense pain once her hydrocodone wears off. She is interested in pursuing an injection for her low back.     Medical History:    Any recent changes to your medical history? No    Any new medication prescribed since last visit? No    Review of Systems:    Do you have fever, chills, weight loss? No    Do you have any vision problems? No    Do you have any chest pain or edema? No    Do you have any shortness of breath or wheezing?  No    Do you have stomach problems? No    Do you have any numbness or focal weakness? No    Do you have diabetes? No    Do you have problems with bleeding or clotting? No    Do you have an rashes or other skin lesions? No

## 2018-07-01 NOTE — PROGRESS NOTES
"[facility] Note      Patient is a 78 year old female who presents to the office today for follow up of: Right leg pain.  Back pain seems to be slightly better however anterior right thigh pain is the same or worsening.  Is able to get some relief with hydrocodone.  Tizanidine may be helping him intermittently.  Localizes pain to anterior thigh to knee.  No radiation past the knee.  Worse when first standing or getting out of bed.  Improves with rest.      ROS: Pertinent items are noted in HPI.  Integument/breast: negative for rash, skin lesion(s) and skin color change  Neurological: negative for paresthesia       EXAM:/84  Ht 5' 6\" (1.676 m)  Wt 175 lb (79.4 kg)  BMI 28.25 kg/m2    General: Alert, pleasant, no distress  Back/spine: No TTP of spinous processes or paraspinous musculature.  No pain with back extension but reports pain in right thigh with forward flexion.  Slump test negative.  No tenderness to palpation of the quadriceps musculature.  Strength in flexion and extension of the knee is symmetric.  SI LT throughout lower extremities.    Imaging: MRI lumbar spine dated 6/13/18 and report per radiology:  Impression:   1. Progression of multilevel lumbar spondylosis since 7/5/2011.  2. Increased advanced spinal canal stenosis L2-L4, moderate to  advanced spinal canal stenosis at L1-2 and moderate spinal canal  stenosis at L4-5.   3. Increased mild to moderate neural foraminal stenosis on the left at  L4-5 and bilaterally at L1-2.  4. Chronic superior endplate compression fractures L2-L5, new since  7/5/2011.    Assessment: Patient is a 78 year old female with multilevel degenerative spine disease and right anterior leg pain.  Suspect radicular, possibly L3 or L4 dermatomes involved.    Recommendations:   Discussed difficulty in localizing symptoms to the lumbar spine and individuals were there is multilevel spinal disease.  Discussed options for treatment which " could include epidural steroid injection and/or referral to spine surgery clinic.  Patient would like to pursue steroid injections.  Injections at L4 and L3 on the right were ordered.  She will follow-up roughly 2 weeks after the injections in the spine clinic.  Also discussed pain management.  Pain is been poorly controlled except with hydrocodone.  Discussed that this is not a long-term medication for pain and discussed alternate medications that may be helpful including gabapentin.  After discussion we will initiate gabapentin nightly.  Try to minimize hydrocodone use.    Serge Watson MD

## 2018-07-01 NOTE — PATIENT INSTRUCTIONS
Taking gabapentin 1 tablet at bedtime for 3 days.  If tolerating well, increase to 2 tablets at bedtime for 3 days.  If still no effect, okay to increase to a maximum 3 tablets at bedtime.  Try to reserve hydrocodone use for severe pain only.  Follow-up in spine clinic at least 2 weeks after steroid injection.

## 2018-07-01 NOTE — MR AVS SNAPSHOT
After Visit Summary   7/1/2018    Shyann Samuel    MRN: [id number]           Patient Information     Date Of Birth          1940        Visit Information        Provider Department      7/1/2018 10:10 AM Serge Watson MD [facility]        Today's Diagnoses     Intervertebral disc disorder with radiculopathy of lumbar region     -  1    Right leg pain        Right hip pain          Care Instructions    Taking gabapentin 1 tablet at bedtime for 3 days.  If tolerating well, increase to 2 tablets at bedtime for 3 days.  If still no effect, okay to increase to a maximum 3 tablets at bedtime.  Try to reserve hydrocodone use for severe pain only.  Follow-up in spine clinic at least 2 weeks after steroid injection.          Follow-ups after your visit        Future tests that were ordered for you today     Open Future Orders        Priority Expected Expires Ordered    XR Lumbar Transforaminal Inj Single Routine 7/1/2018 7/1/2019 7/1/2018    XR Lumbar Transforaminal Additional Routine 7/1/2018 7/1/2019 7/1/2018            Who to contact     Please call your clinic at [phone] to:    Ask questions about your health    Make or cancel appointments    Discuss your medicines    Learn about your test results    Speak to your doctor            Additional Information About Your Visit        [employer] Information     [email or web address] gives you secure access to your electronic health record. If you see a primary care provider, you can also send messages to your care team and make appointments. If you have questions, please call your primary care clinic.  If you do not have a primary care provider, please call [phone] and they will assist you.      [email or web address] is an electronic gateway that provides easy, online access to your medical records. With [email or web address], you can request a clinic appointment, read your test results, renew a prescription or communicate with your care team.    "  To access your existing account, please contact your [facility] or call [phone] for assistance.        Care EveryWhere ID     This is your Care EveryWhere ID. This could be used by other organizations to access your [place] medical records  [id number]        Your Vitals Were     Height BMI (Body Mass Index)                5' 6\" (1.676 m) 28.25 kg/m2           Blood Pressure from Last 3 Encounters:   07/01/18 134/84   06/16/18 129/85   06/07/18 146/84    Weight from Last 3 Encounters:   07/01/18 175 lb (79.4 kg)   06/16/18 175 lb (79.4 kg)   06/07/18 175 lb (79.4 kg)                 Today's Medication Changes          These changes are accurate as of 7/1/18 11:06 AM.  If you have any questions, ask your nurse or doctor.               Start taking these medicines.        Dose/Directions    gabapentin 100 MG capsule   Commonly known as:  NEURONTIN   Used for:  Intervertebral disc disorder with radiculopathy of lumbar region   Started by:  Serge Watson MD        Dose:  100 mg   Take 1 capsule (100 mg) by mouth At Bedtime   Quantity:  90 capsule   Refills:  0            Where to get your medicines      Some of these will need a paper prescription and others can be bought over the counter.  Ask your nurse if you have questions.     Bring a paper prescription for each of these medications     gabapentin 100 MG capsule    HYDROcodone-acetaminophen 5-325 MG per tablet               Information about OPIOIDS     PRESCRIPTION OPIOIDS: WHAT YOU NEED TO KNOW   We gave you an opioid (narcotic) pain medicine. It is important to manage your pain, but opioids are not always the best choice. You should first try all the other options your care team gave you. Take this medicine for as short a time (and as few doses) as possible.     These medicines have risks:    DO NOT drive when on new or higher doses of pain medicine. These medicines can affect your alertness and reaction times, " and you could be arrested for driving under the influence (DUI). If you need to use opioids long-term, talk to your care team about driving.    DO NOT operate heave machinery    DO NOT do any other dangerous activities while taking these medicines.     DO NOT drink any alcohol while taking these medicines.      If the opioid prescribed includes acetaminophen, DO NOT take with any other medicines that contain acetaminophen. Read all labels carefully. Look for the word  acetaminophen  or  Tylenol.  Ask your pharmacist if you have questions or are unsure.    You can get addicted to pain medicines, especially if you have a history of addiction (chemical, alcohol or substance dependence). Talk to your care team about ways to reduce this risk.    Store your pills in a secure place, locked if possible. We will not replace any lost or stolen medicine. If you don t finish your medicine, please throw away (dispose) as directed by your pharmacist. The [place] Pollution Control Agency has more information about safe disposal: https://www.pca.[facility].[place].us/living-green/managing-unwanted-medications.     All opioids tend to cause constipation. Drink plenty of water and eat foods that have a lot of fiber, such as fruits, vegetables, prune juice, apple juice and high-fiber cereal. Take a laxative (Miralax, milk of magnesia, Colace, Senna) if you don t move your bowels at least every other day.          Primary Care Provider Office Phone # Fax #    Aleksandra Starks [phone]       [address] [place] [id number]        Goals        General    Psychosocial (pt-stated)     Notes - Note edited  4/19/2016 11:10 AM by Zulma Kim BSW    As of today's date 4/19/2016 goal is met at 76 - 100%.   Goal Status:  Complete   Pt has resource ([employer]) that can evaluate pt's home.  Pt has decided to complete two other tasks (hiring yard work and cleaning out of her house assistance) prior to having  [employer] come out.  I want a resource that can evaluate my home for DME/potential remodeling so I can age in place  As of today's date 3/17/2016 goal is met at 0 - 25%.   Goal Status:  Active    VANDANA CoffeyW          Equal Access to Services     LEE WALSH AH: Hadii aad ku hadabdelrahmano Soomaali, waaxda luqadaha, qaybta kaalmada adeegyada, daija amayain hayaan staciereta turner yamila . So [facility] [phone].    ATENCIÓN: Si habla español, tiene a sanchez disposición servicios gratuitos de asistencia lingüística. Llame al [phone].    We comply with applicable federal civil rights laws and [place] laws. We do not discriminate on the basis of race, color, national origin, age, disability, sex, sexual orientation, or gender identity.            Thank you!     Thank you for choosing [facility]  for your care. Our goal is always to provide you with excellent care. Hearing back from our patients is one way we can continue to improve our services. Please take a few minutes to complete the written survey that you may receive in the mail after your visit with us. Thank you!             Your Updated Medication List - Protect others around you: Learn how to safely use, store and throw away your medicines at www.disposemymeds.org.          This list is accurate as of 7/1/18 11:06 AM.  Always use your most recent med list.                   Brand Name Dispense Instructions for use Diagnosis    CALCIUM + D PO      Take  by mouth. 600mg/400 IU bid        camphor-menthol 0.5-0.5 % Lotn    DERMASARRA    222 mL    Apply up to 4x daily as needed for itch on the back.    Notalgia paresthetica       cyanocobalamin 1000 MCG tablet    vitamin  B-12     Take 1 tablet by mouth three times a week.        ESTRIOL 0.5MG/GM CREAM     30 g    Insert 1 gram vaginally 2 times per week.    Postmenopausal atrophic vaginitis       gabapentin 100 MG capsule    NEURONTIN    90 capsule    Take 1 capsule (100 mg) by  mouth At Bedtime    Intervertebral disc disorder with radiculopathy of lumbar region       HYDROcodone-acetaminophen 5-325 MG per tablet    NORCO    15 tablet    Take 1 tablet by mouth every 6 hours as needed for severe pain    Intervertebral disc disorder with radiculopathy of lumbar region       lisinopril 5 MG tablet    PRINIVIL/ZESTRIL    90 tablet    Take 1 tablet (5 mg) by mouth daily    Essential hypertension       methylPREDNISolone 4 MG tablet    MEDROL DOSEPAK    21 tablet    follow package directions    Right hip pain       NAPROXEN PO      Take 220 mg by mouth 2 times daily as needed for moderate pain        OMEGA-3 FISH OIL PO      Take  by mouth.        simvastatin 20 MG tablet    ZOCOR    90 tablet    Take 1 tablet (20 mg) by mouth At Bedtime    Hyperlipidemia, unspecified hyperlipidemia type       tiZANidine 2 MG tablet    ZANAFLEX    30 tablet    Take 1 tablet (2 mg) by mouth 3 times daily as needed for muscle spasms    Right hip pain       UNABLE TO FIND      MEDICATION NAME: Six-Flavor Tea pills, 8 pills (Chinese herbal medicain) Strengthen kidney merdian

## 2018-07-03 ENCOUNTER — TELEPHONE (OUTPATIENT)
Dept: GENERAL RADIOLOGY | Facility: CLINIC | Age: 78
End: 2018-07-03

## 2018-07-06 ENCOUNTER — RADIANT APPOINTMENT (OUTPATIENT)
Dept: GENERAL RADIOLOGY | Facility: CLINIC | Age: 78
End: 2018-07-06
Attending: FAMILY MEDICINE
Payer: MEDICARE

## 2018-07-06 VITALS
SYSTOLIC BLOOD PRESSURE: 135 MMHG | RESPIRATION RATE: 18 BRPM | HEART RATE: 80 BPM | DIASTOLIC BLOOD PRESSURE: 81 MMHG | OXYGEN SATURATION: 97 %

## 2018-07-06 DIAGNOSIS — M79.604 RIGHT LEG PAIN: ICD-10-CM

## 2018-07-06 DIAGNOSIS — M51.16 INTERVERTEBRAL DISC DISORDER WITH RADICULOPATHY OF LUMBAR REGION: ICD-10-CM

## 2018-07-06 RX ORDER — BUPIVACAINE HYDROCHLORIDE 5 MG/ML
10 INJECTION, SOLUTION PERINEURAL ONCE
Status: COMPLETED | OUTPATIENT
Start: 2018-07-06 | End: 2018-07-06

## 2018-07-06 RX ORDER — DEXAMETHASONE SODIUM PHOSPHATE 10 MG/ML
10 INJECTION, SOLUTION INTRAMUSCULAR; INTRAVENOUS ONCE
Status: COMPLETED | OUTPATIENT
Start: 2018-07-06 | End: 2018-07-06

## 2018-07-06 RX ORDER — LIDOCAINE HYDROCHLORIDE 10 MG/ML
30 INJECTION, SOLUTION EPIDURAL; INFILTRATION; INTRACAUDAL; PERINEURAL ONCE
Status: COMPLETED | OUTPATIENT
Start: 2018-07-06 | End: 2018-07-06

## 2018-07-06 RX ORDER — IOPAMIDOL 408 MG/ML
20 INJECTION, SOLUTION INTRATHECAL ONCE
Status: COMPLETED | OUTPATIENT
Start: 2018-07-06 | End: 2018-07-06

## 2018-07-06 RX ORDER — LIDOCAINE HYDROCHLORIDE 10 MG/ML
5 INJECTION, SOLUTION EPIDURAL; INFILTRATION; INTRACAUDAL; PERINEURAL ONCE
Status: DISCONTINUED | OUTPATIENT
Start: 2018-07-06 | End: 2018-07-06 | Stop reason: CLARIF

## 2018-07-06 RX ADMIN — DEXAMETHASONE SODIUM PHOSPHATE 10 MG: 10 INJECTION, SOLUTION INTRAMUSCULAR; INTRAVENOUS at 12:10

## 2018-07-06 RX ADMIN — IOPAMIDOL 4 ML: 408 INJECTION, SOLUTION INTRATHECAL at 12:10

## 2018-07-06 RX ADMIN — BUPIVACAINE HYDROCHLORIDE 20 MG: 5 INJECTION, SOLUTION PERINEURAL at 12:10

## 2018-07-06 RX ADMIN — LIDOCAINE HYDROCHLORIDE 10 ML: 10 INJECTION, SOLUTION EPIDURAL; INFILTRATION; INTRACAUDAL; PERINEURAL at 12:10

## 2018-07-06 NOTE — MR AVS SNAPSHOT
MRN:[id number]                      After Visit Summary   7/6/2018    Shyann Samuel    MRN: [id number]           Visit Information        Provider Department      7/6/2018 11:30 AM  NEURO RAD;  IMAGING NURSE; XR3 [facility]           Review of your medicines          These changes are accurate as of 7/6/18 11:30 AM.  If you have any questions, ask your nurse or doctor.               CONTINUE these medicines which have NOT CHANGED        Dose / Directions    CALCIUM + D PO        Take  by mouth. 600mg/400 IU bid   Refills:  0       camphor-menthol 0.5-0.5 % Lotn   Commonly known as:  DERMASARRA   Used for:  Notalgia paresthetica        Apply up to 4x daily as needed for itch on the back.   Quantity:  222 mL   Refills:  11       cyanocobalamin 1000 MCG tablet   Commonly known as:  vitamin  B-12        Dose:  1 tablet   Take 1 tablet by mouth three times a week.   Refills:  0       ESTRIOL 0.5MG/GM CREAM   Used for:  Postmenopausal atrophic vaginitis        Insert 1 gram vaginally 2 times per week.   Quantity:  30 g   Refills:  3       gabapentin 100 MG capsule   Commonly known as:  NEURONTIN   Used for:  Intervertebral disc disorder with radiculopathy of lumbar region        Dose:  100 mg   Take 1 capsule (100 mg) by mouth At Bedtime   Quantity:  90 capsule   Refills:  0       HYDROcodone-acetaminophen 5-325 MG per tablet   Commonly known as:  NORCO   Used for:  Intervertebral disc disorder with radiculopathy of lumbar region        Dose:  1 tablet   Take 1 tablet by mouth every 6 hours as needed for severe pain   Quantity:  15 tablet   Refills:  0       lisinopril 5 MG tablet   Commonly known as:  PRINIVIL/ZESTRIL   Used for:  Essential hypertension        Dose:  5 mg   Take 1 tablet (5 mg) by mouth daily   Quantity:  90 tablet   Refills:  3       methylPREDNISolone 4 MG tablet   Commonly known as:  MEDROL DOSEPAK   Used for:  Right hip pain        follow package directions    Quantity:  21 tablet   Refills:  0       NAPROXEN PO        Dose:  220 mg   Take 220 mg by mouth 2 times daily as needed for moderate pain   Refills:  0       OMEGA-3 FISH OIL PO        Take  by mouth.   Refills:  0       simvastatin 20 MG tablet   Commonly known as:  ZOCOR   Used for:  Hyperlipidemia, unspecified hyperlipidemia type        Dose:  20 mg   Take 1 tablet (20 mg) by mouth At Bedtime   Quantity:  90 tablet   Refills:  3       tiZANidine 2 MG tablet   Commonly known as:  ZANAFLEX   Used for:  Right hip pain        Dose:  2 mg   Take 1 tablet (2 mg) by mouth 3 times daily as needed for muscle spasms   Quantity:  30 tablet   Refills:  0       UNABLE TO FIND        MEDICATION NAME: Six-Flavor Tea pills, 8 pills (Chinese herbal medicain) Strengthen kidney merdian   Refills:  0                Protect others around you: Learn how to safely use, store and throw away your medicines at www.disposemymeds.org.         Follow-ups after your visit        Your next 10 appointments already scheduled     Jul 06, 2018 11:45 AM CDT   XR LUMBAR TRANSFORAMINAL ADDITIONAL with SOUMYA3, MELONY IMAGING NURSE,  NEURO [facility] ([facility])    [address] [address] [phone]           For nerve root injection, please send or bring copies of any MRIs or other scans you have had.  Bring a list of your current medicines to your exam. (Include vitamins, minerals and over-the-counter medicines.) Leave your valuables at home.  Plan to have someone drive you home afterward.  Stop taking the following medicines (but talk to your doctor first):   If you take blood thinners, you may need to stop taking them a few days before treatment. Talk to your doctor before stopping these medicines.Stop taking Coumadin (warfarin) 3 days before treatment. Restart the day after treatment.   If you take Plavix, Ticlid, Pletal or Persantine, please ask your doctor  if you should stop these medicines. You may need extra tests on the morning of your scan.   If you take Xarelto (Rivaroxaban), you may need to stop taking it 24 hours before treatment. Talk to your doctor before stopping this medicine. Restart the day after treatment.  You may take your other medicines as normal.  Stop all food and drink (including water) 3 hours before your test or treatment.  Please tell the doctor:   If you are allergic to X-ray dye (contrast fluid).   If you may be pregnant.  Injections take about 30 to 45 minutes. Most people spend up to 2 hours in the clinic or hospital.  Please call the Imaging Department at your exam site with any questions.               Care Instructions        Further instructions from your care team       Discharge Instructions for Epidural Steroid Injection/Nerve Block    Care of injection site:    If you have new numbness down your leg after the injection, this may last up to 4-6 hours, but should go away. You may need help with walking until your leg feels normal.    Over the next 24 to 48 hours, pain at the injection site may increase before it gets better.    For the next 48 hours, use ice packs for 15 minutes,3-4 times a day for pain relief.    If you have a bandage, you may remove it the next morning     Do not submerge injection site in water for 24 hours, (no baths. pools, hot tubs). Showers are OK.            Activity:    You should relax today and then you may return to your regular activity tomorrow.    You may eat a normal diet.    Medicines:    Restart all your medicines tomorrow at your regular dose, including Coumadin (Warfarin).    If you are restarting Coumadin, talk to your doctor about having your INR checked.    If you received steroids: The steroid should help reduce swelling and pain. This may take from 3-14 days. Pain from the shot will be mild and go away in 2-3 days.     Common side effects may include:    Insomnia    Heartburn    Flushed  face    Water retention    Increased appetite    Increased blood sugar      If you have diabetes, watch your blood sugar closely, If needed, call your doctor to help control your blood sugar.    Some patients will get lasting relief from a single injection. Others may require additional injections to get results.     Call your doctor or go to the Emergency Room if you have new severe pain or fever.     Additional Information About Your Visit        [employer] Information     [employer] gives you secure access to your electronic health record. If you see a primary care provider, you can also send messages to your care team and make appointments. If you have questions, please call your primary care clinic.  If you do not have a primary care provider, please call [phone] and they will assist you.      [employer] is an electronic gateway that provides easy, online access to your medical records. With [employer], you can request a clinic appointment, read your test results, renew a prescription or communicate with your care team.     To access your existing account, please contact your [facility] or call [phone] for assistance.        Care EveryWhere ID     This is your Care EveryWhere ID. This could be used by other organizations to access your [facility] medical records  [id number]        Your Vitals Were     Blood Pressure Pulse Respirations Pulse Oximetry          135/81 (BP Location: Right arm) 80 18 97%         Primary Care Provider Office Phone # Fax #    Aleksandra Isabelle Starks [phone]      Equal Access to Services     [facility] Hospital[facility] : Hadii wes ferrell hadasho Sotacos, waaxda luqadaha, qaybta kaalmada leonie, daija oquendo. So [facility] [phone].    ATENCIÓN: Si habla español, tiene a sanchez disposición servicios gratuitos de asistencia lingüística. Ralph al [phone].    We comply with applicable federal civil rights laws and [place]  laws. We do not discriminate on the basis of race, color, national origin, age, disability, sex, sexual orientation, or gender identity.            Thank you!     Thank you for choosing [facility] for your care. Our goal is always to provide you with excellent care. Hearing back from our patients is one way we can continue to improve our services. Please take a few minutes to complete the written survey that you may receive in the mail after you visit with us. Thank you!             Medication List: This is a list of all your medications and when to take them. Check marks below indicate your daily home schedule. Keep this list as a reference.          This list is accurate as of 7/6/18 11:30 AM.  Always use your most recent med list.               Medications           Morning Afternoon Evening Bedtime As Needed    CALCIUM + D PO   Take  by mouth. 600mg/400 IU bid                                camphor-menthol 0.5-0.5 % Lotn   Commonly known as:  DERMASARRA   Apply up to 4x daily as needed for itch on the back.                                cyanocobalamin 1000 MCG tablet   Commonly known as:  vitamin  B-12   Take 1 tablet by mouth three times a week.                                ESTRIOL 0.5MG/GM CREAM   Insert 1 gram vaginally 2 times per week.                                gabapentin 100 MG capsule   Commonly known as:  NEURONTIN   Take 1 capsule (100 mg) by mouth At Bedtime                                HYDROcodone-acetaminophen 5-325 MG per tablet   Commonly known as:  NORCO   Take 1 tablet by mouth every 6 hours as needed for severe pain                                lisinopril 5 MG tablet   Commonly known as:  PRINIVIL/ZESTRIL   Take 1 tablet (5 mg) by mouth daily                                methylPREDNISolone 4 MG tablet   Commonly known as:  MEDROL DOSEPAK   follow package directions                                NAPROXEN PO   Take 220 mg by mouth 2 times daily as needed for moderate pain                                 OMEGA-3 FISH OIL PO   Take  by mouth.                                simvastatin 20 MG tablet   Commonly known as:  ZOCOR   Take 1 tablet (20 mg) by mouth At Bedtime                                tiZANidine 2 MG tablet   Commonly known as:  ZANAFLEX   Take 1 tablet (2 mg) by mouth 3 times daily as needed for muscle spasms                                UNABLE TO FIND   MEDICATION NAME: Six-Flavor Tea pills, 8 pills (Chinese herbal medicain) Strengthen kidney merdian

## 2018-07-10 ASSESSMENT — ENCOUNTER SYMPTOMS
ARTHRALGIAS: 0
MYALGIAS: 0
MUSCLE CRAMPS: 1
JOINT SWELLING: 0
MUSCLE WEAKNESS: 1
STIFFNESS: 0
BACK PAIN: 0
NECK PAIN: 0

## 2018-07-10 NOTE — TELEPHONE ENCOUNTER
FUTURE VISIT INFORMATION      FUTURE VISIT INFORMATION:    Date: 7/20/18    Time: 0800    Location: ORTHO  REFERRAL INFORMATION:    Referring provider:  DR AGUILAR    Referring providers clinic:  WALK IN    Reason for visit/diagnosis  BACK PAIN      RECORDS STATUS      RECORDS IN CHART

## 2018-07-12 ENCOUNTER — MYC MEDICAL ADVICE (OUTPATIENT)
Dept: ORTHOPEDICS | Facility: CLINIC | Age: 78
End: 2018-07-12

## 2018-07-12 DIAGNOSIS — M25.551 RIGHT HIP PAIN: ICD-10-CM

## 2018-07-17 ENCOUNTER — TELEPHONE (OUTPATIENT)
Dept: ORTHOPEDICS | Facility: CLINIC | Age: 78
End: 2018-07-17

## 2018-07-17 NOTE — TELEPHONE ENCOUNTER
Left voicemail for patient to reschedule her appointment in Sports Med, currently with Dr. Bonilla on 7/19/18 to discuss leg length discrepancy. Dr. Bonilla does not treat this but could see any other non-operative physician in Sports Medicine. Callback number was left to reschedule.

## 2018-07-17 NOTE — TELEPHONE ENCOUNTER
FUTURE VISIT INFORMATION      FUTURE VISIT INFORMATION:    Date: 7/19/18    Time:     Location: [facility]  REFERRAL INFORMATION:    Referring provider:  self     Referring providers clinic:      Reason for visit/diagnosis  Leg length descrepenacy     RECORDS REQUESTED FROM:       Clinic name Comments Records Status Imaging Status     internal internal                                   RECORDS STATUS

## 2018-07-17 NOTE — TELEPHONE ENCOUNTER
Pt called and stated that she thinks the cause of her pain may be her leg length discrepancy. She states that she contacted her PT who had made notes of the length discrepancy, but PT does not believe that it is severe enough to require a shoe lift to correct. Pt would like to see someone about getting a measurement and recommendations for management. She was scheduled with Dr. Bonilal in Sports Medicine on Thursday at 7:30. She had no further questions.

## 2018-07-18 RX ORDER — TIZANIDINE 2 MG/1
2 TABLET ORAL 3 TIMES DAILY PRN
Qty: 30 TABLET | Refills: 0 | Status: SHIPPED | OUTPATIENT
Start: 2018-07-18 | End: 2018-11-03

## 2018-07-18 ASSESSMENT — ENCOUNTER SYMPTOMS
STIFFNESS: 0
BACK PAIN: 1
MUSCLE WEAKNESS: 1
JOINT SWELLING: 0
ARTHRALGIAS: 1
MYALGIAS: 1
MUSCLE CRAMPS: 1
NECK PAIN: 0

## 2018-07-19 ENCOUNTER — RADIANT APPOINTMENT (OUTPATIENT)
Dept: GENERAL RADIOLOGY | Facility: CLINIC | Age: 78
End: 2018-07-19
Attending: NURSE PRACTITIONER
Payer: MEDICARE

## 2018-07-19 ENCOUNTER — PRE VISIT (OUTPATIENT)
Dept: ORTHOPEDICS | Facility: CLINIC | Age: 78
End: 2018-07-19

## 2018-07-19 ENCOUNTER — OFFICE VISIT (OUTPATIENT)
Dept: ORTHOPEDICS | Facility: CLINIC | Age: 78
End: 2018-07-19
Payer: MEDICARE

## 2018-07-19 VITALS — HEIGHT: 65 IN | WEIGHT: 177.5 LBS | BODY MASS INDEX: 29.57 KG/M2

## 2018-07-19 DIAGNOSIS — M21.70 LEG LENGTH DIFFERENCE, ACQUIRED: Primary | ICD-10-CM

## 2018-07-19 DIAGNOSIS — M95.5 PELVIC OBLIQUITY: ICD-10-CM

## 2018-07-19 DIAGNOSIS — M47.26 OSTEOARTHRITIS OF SPINE WITH RADICULOPATHY, LUMBAR REGION: ICD-10-CM

## 2018-07-19 DIAGNOSIS — M21.70 LEG LENGTH DIFFERENCE, ACQUIRED: ICD-10-CM

## 2018-07-19 NOTE — PROGRESS NOTES
"Chief complaint: \"right hip higher than left\"    HPI:  Shyann is seen as a new patient with concerns \"of her legs being unequal\". She had a R CONSTANCE done by Dr. Snell in 2009 with good relief of her hip pain but \"has been troubled with walking since the operation\". She reports 2 or 3 specific episodes of \"bad leg pain\" and was treated with DHARMESH with \"near complete pain relief to her legs\". She uses a cane for walking \"because her legs feel weak and doesn't trust them\". She reports a fall on the ice in 2017 where \"everything has been sore and having a hard time recovering\". Denies fevers or chills. She rates her hip pain \"0\" on 0-10 scale. Denies much back pain \"except for an ache\". Reports bilateral leg pain \"7\" on 0-10 scale with walking. Her pain lessens if she uses a shopping cart or sits down. Denies changes in bowel or bladder. No pain with coughing or sneezing.    PMH: Reviewed in chart today 7/19/2018 and updated by myself.    ROS: Reviewed on tablet today 7/19/2018 with all systems negative except for those listed below.    Meds/All: Reviewed and updated by myself today 7/19/2018    PE:  Pleasant and cooperative female alert and oriented x3. She is 5'5\" 177 pounds with a BMI of 29. She arises from the chair using the sides for support. Walks with a fluent gait pattern with her right pelvis riding higher than her left. She has no pain with right hip internal and external rotation. Hip flexion normal. Markedly positive SLR R >L. Sensation noted to be diminished in dermatomes to bilateral lower extremity. Reflexes to patella delayed but symmetrical. Negative babinski. No calf pain to bilateral lower extremity.Bilateral lower extremity exam demonstrates quad strength 3-/5, DF 3-/5, inversion 3-/5. Pulses diminished but symmetrical. Tender to palpate PSIS R greater than L with tenderness to paraspinals. Skin exam to low back has no rashes, bruises, or lesions. Upon exam from posterior, a significant lumbar curve is " noted from right to left that is rigid.    A full length standing xray demonstrates near equal leg lengths but a significant lumbar curve right to left in nature. R total hip in good position. No acute fractures or obvious lucency noted.    Dx:  1. 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 R CONSTANCE  3. Bilateral leg radiculitis    Plan:  1. I explained that her pelvic obliquity is not directly related to her total hip and that her legs are essentially the same length as well. Full length xray was reviewed with her in detail.   2. Suggested formal PT for possible gentle traction and exercises for strengthening. She could try placing an over the counter heel lift in her left shoe in with the hopes this could make her pelvis feel more balanced.  3. Suggested a visit with her family doc for continued management of her severe end stage lumbar DDD for continued DHARMESH treatments or oral medication for better pain management.  4. Follow up with surgery prn.    Total time spent was 30 minutes with greater than 50% spent in face to face consultation and collaboration of care.  Answers for HPI/ROS submitted by the patient on 7/18/2018   General Symptoms: No  Skin Symptoms: No  HENT Symptoms: No  EYE SYMPTOMS: No  HEART SYMPTOMS: No  LUNG SYMPTOMS: No  INTESTINAL SYMPTOMS: No  URINARY SYMPTOMS: No  GYNECOLOGIC SYMPTOMS: No  BREAST SYMPTOMS: No  SKELETAL SYMPTOMS: Yes  BLOOD SYMPTOMS: No  NERVOUS SYSTEM SYMPTOMS: No  MENTAL HEALTH SYMPTOMS: No  Back pain: Yes  Muscle aches: Yes  Neck pain: No  Swollen joints: No  Joint pain: Yes  Bone pain: No  Muscle cramps: Yes  Muscle weakness: Yes  Joint stiffness: No  Bone fracture: No

## 2018-07-19 NOTE — NURSING NOTE
"Chief Complaint   Patient presents with     Consult     Pt. states that she is here today for a Slight Leg Length Descrepency. Pt. states that she had a Right Hip Replacement in October 2009, Done by Dr. Christopher Snell. After she states that her PT mentioned the Length differnce on 2 different occasions.        78 year old  1940    Ht 1.651 m (5' 5\")  Wt 80.5 kg (177 lb 8 oz)  BMI 29.54 kg/m2      Date/Surgery/Surgeon/Hospital:  1. 10/2009, Right CONSTANCE, Dr. Christopher Snell, [facility]           Pain Assessment  Patient Currently in Pain: Yes (Pt. states that she has weakness and Pain. )  0-10 Pain Scale: 2 (At Worst (Right Thigh): 8/10)  Primary Pain Location: Leg  Pain Orientation: Right  Other Pain Locations:  (Right Thigh)  Alleviating Factors:  (Insole in Left Shoe: she states that she didn't really notice a difference. )          [address]    Allergies   Allergen Reactions     Benzalkonium Chloride      Eye irritation     Nitrofurantoin Other (See Comments)     Causes hast heart rate.  Causes fast heart rate.     Current Outpatient Prescriptions   Medication     Calcium-Vitamin D (CALCIUM + D PO)     camphor-menthol (DERMASARRA) 0.5-0.5 % LOTN     cyanocolbalamin (VITAMIN  B-12) 1000 MCG tablet     ESTRIOL 0.5MG/GM CREAM     gabapentin (NEURONTIN) 100 MG capsule     HYDROcodone-acetaminophen (NORCO) 5-325 MG per tablet     lisinopril (PRINIVIL/ZESTRIL) 5 MG tablet     methylPREDNISolone (MEDROL DOSEPAK) 4 MG tablet     NAPROXEN PO     Omega-3 Fatty Acids (OMEGA-3 FISH OIL PO)     simvastatin (ZOCOR) 20 MG tablet     tiZANidine (ZANAFLEX) 2 MG tablet     UNABLE TO FIND     Current Facility-Administered Medications   Medication     apraclonidine (IOPIDINE) 1 % ophthalmic solution 1 drop         Questionnaires:      KOOS Knee Survey Assessment    Knee Outcome Survey ADL Scale (BRIAN Baron; FLOYD Gonzáles; Stacy, RS; Christiano, FH; JOHANA Wilson; 1998) " 11/27/2017   Pain (ADLS1) 2   Stiffness (ADLS2) 5   Swelling (ADLS3) 5   Giving Way, Buckling or Shifting of Knee (ADLS4) 5   Weakness (ADLS5) 5   Limping (ADLS6) 5   Walk? (ADLS7) 3   Go up stairs? (ADLS8) 3   Go down stairs? (ADLS9) 3   Stand? (ADLS10) 3   Kneel on the front of your knee? (ADLS11) 0   Squat? (ADLS12) 0   Sit with your knee bent? (ADLS13) 4   How would you rate the current function of your knee during your usual daily activities on a scale from 0 to 100 with 100 being your level of knee function prior to your injury and 0 being the inability to perform any of your usual daily activities? 90   How would you rate the overall function of your knee during your usual daily activities?  (please check the one response that best describes you) 3   As a result of your knee injury, how would you rate your current level of daily activity? (please check the one response that best describes you) 2   Sum 47   Count 14   Raw Score 47   Knee Activity of Daily Living Score 67.14            Promis 10 Assessment    PROMIS 10 7/10/2018   In general, would you say your health is: Very good   In general, would you say your quality of life is: Very good   In general, how would you rate your physical health? Good   In general, how would you rate your mental health, including your mood and your ability to think? Excellent   In general, how would you rate your satisfaction with your social activities and relationships? Excellent   In general, please rate how well you carry out your usual social activities and roles Fair   To what extent are you able to carry out your everyday physical activities such as walking, climbing stairs, carrying groceries, or moving a chair? Moderately   How often have you been bothered by emotional problems such as feeling anxious, depressed or irritable? Never   How would you rate your fatigue on average? Mild   How would you rate your pain on average?   0 = No Pain  to  10 = Worst Imaginable  Pain 4   In general, would you say your health is: 4   In general, would you say your quality of life is: 4   In general, how would you rate your physical health? 3   In general, how would you rate your mental health, including your mood and your ability to think? 5   In general, how would you rate your satisfaction with your social activities and relationships? 5   In general, please rate how well you carry out your usual social activities and roles. (This includes activities at home, at work and in your community, and responsibilities as a parent, child, spouse, employee, friend, etc.) 2   To what extent are you able to carry out your everyday physical activities such as walking, climbing stairs, carrying groceries, or moving a chair? 3   In the past 7 days, how often have you been bothered by emotional problems such as feeling anxious, depressed, or irritable? 1   In the past 7 days, how would you rate your fatigue on average? 2   In the past 7 days, how would you rate your pain on average, where 0 means no pain, and 10 means worst imaginable pain? 4   Global Mental Health Score 19   Global Physical Health Score 13   PROMIS TOTAL - SUBSCORES 32   Some recent data might be hidden

## 2018-07-19 NOTE — MR AVS SNAPSHOT
After Visit Summary   7/19/2018    Shyann Samuel    MRN: [id number]           Patient Information     Date Of Birth          1940        Visit Information        Provider Department      7/19/2018 8:30 AM Karey Haas APRN [facility]        Today's Diagnoses     Leg length difference, acquired    -  1    Pelvic obliquity        Osteoarthritis of spine with radiculopathy, lumbar region           Follow-ups after your visit        Additional Services     PHYSICAL THERAPY REFERRAL (External-Prints)       Physical Therapy Referral                  Your next 10 appointments already scheduled     Jul 20, 2018  8:00 AM CDT   (Arrive by 7:30 AM)   NEW SPINE with Chao Acevedo MD   [facility] ([facility])    [address]  [address] [facility] [address] [phone]              Who to contact     Please call your clinic at [phone] to:    Ask questions about your health    Make or cancel appointments    Discuss your medicines    Learn about your test results    Speak to your doctor            Additional Information About Your Visit        [employer] Information     [employer] gives you secure access to your electronic health record. If you see a primary care provider, you can also send messages to your care team and make appointments. If you have questions, please call your primary care clinic.  If you do not have a primary care provider, please call [phone] and they will assist you.      [employer] is an electronic gateway that provides easy, online access to your medical records. With [employer], you can request a clinic appointment, read your test results, renew a prescription or communicate with your care team.     To access your existing account, please contact your [facility] or call [phone] for assistance.        Care EveryWhere ID     This is your Care EveryWhere ID. This  "could be used by other organizations to access your [facility] medical records  [id number]        Your Vitals Were     Height BMI (Body Mass Index)                1.651 m (5' 5\") 29.54 kg/m2           Blood Pressure from Last 3 Encounters:   07/06/18 135/81   07/01/18 134/84   06/16/18 129/85    Weight from Last 3 Encounters:   07/19/18 80.5 kg (177 lb 8 oz)   07/01/18 79.4 kg (175 lb)   06/16/18 79.4 kg (175 lb)              We Performed the Following     PHYSICAL THERAPY REFERRAL (External-Prints)          Today's Medication Changes          These changes are accurate as of 7/19/18  9:40 AM.  If you have any questions, ask your nurse or doctor.               These medicines have changed or have updated prescriptions.        Dose/Directions    gabapentin 100 MG capsule   Commonly known as:  NEURONTIN   This may have changed:  how much to take   Used for:  Intervertebral disc disorder with radiculopathy of lumbar region        Dose:  100 mg   Take 1 capsule (100 mg) by mouth At Bedtime   Quantity:  90 capsule   Refills:  0                Primary Care Provider Office Phone # Fax #    Aleksandra Starks [phone]       [address] [place] [id number]        Goals        General    Psychosocial (pt-stated)     Notes - Note edited  4/19/2016 11:10 AM by Zulma Kim BSW    As of today's date 4/19/2016 goal is met at 76 - 100%.   Goal Status:  Complete   Pt has resource ([employer]) that can evaluate pt's home.  Pt has decided to complete two other tasks (hiring yard work and cleaning out of her house assistance) prior to having [employer] come out.  I want a resource that can evaluate my home for DME/potential remodeling so I can age in place  As of today's date 3/17/2016 goal is met at 0 - 25%.   Goal Status:  Active    LUKE Coffey          Equal Access to Services     LEE WALSH AH: Ayesha fostero Soomaali, waaxda luqadaha, qaybta kaalmada adeegyada, daija condein " kayla staciereta ricardolucero oquendo. So [facility] [phone].    ATENCIÓN: Si kulwinderla cassi, tiene a sanchez disposición servicios gratuitos de asistencia lingüística. Ralph al [phone].    We comply with applicable federal civil rights laws and [place] laws. We do not discriminate on the basis of race, color, national origin, age, disability, sex, sexual orientation, or gender identity.            Thank you!     Thank you for choosing [facility]  for your care. Our goal is always to provide you with excellent care. Hearing back from our patients is one way we can continue to improve our services. Please take a few minutes to complete the written survey that you may receive in the mail after your visit with us. Thank you!             Your Updated Medication List - Protect others around you: Learn how to safely use, store and throw away your medicines at www.disposemymeds.org.          This list is accurate as of 7/19/18  9:40 AM.  Always use your most recent med list.                   Brand Name Dispense Instructions for use Diagnosis    CALCIUM + D PO      Take  by mouth. 600mg/400 IU bid        camphor-menthol 0.5-0.5 % Lotn    DERMASARRA    222 mL    Apply up to 4x daily as needed for itch on the back.    Notalgia paresthetica       cyanocobalamin 1000 MCG tablet    vitamin  B-12     Take 1 tablet by mouth three times a week.        ESTRIOL 0.5MG/GM CREAM     30 g    Insert 1 gram vaginally 2 times per week.    Postmenopausal atrophic vaginitis       gabapentin 100 MG capsule    NEURONTIN    90 capsule    Take 1 capsule (100 mg) by mouth At Bedtime    Intervertebral disc disorder with radiculopathy of lumbar region       HYDROcodone-acetaminophen 5-325 MG per tablet    NORCO    15 tablet    Take 1 tablet by mouth every 6 hours as needed for severe pain    Intervertebral disc disorder with radiculopathy of lumbar region       lisinopril 5 MG tablet    PRINIVIL/ZESTRIL    90 tablet    Take 1 tablet (5 mg)  by mouth daily    Essential hypertension       methylPREDNISolone 4 MG tablet    MEDROL DOSEPAK    21 tablet    follow package directions    Right hip pain       NAPROXEN PO      Take 220 mg by mouth 2 times daily as needed for moderate pain        OMEGA-3 FISH OIL PO      Take  by mouth.        simvastatin 20 MG tablet    ZOCOR    90 tablet    Take 1 tablet (20 mg) by mouth At Bedtime    Hyperlipidemia, unspecified hyperlipidemia type       tiZANidine 2 MG tablet    ZANAFLEX    30 tablet    Take 1 tablet (2 mg) by mouth 3 times daily as needed for muscle spasms    Right hip pain       UNABLE TO FIND      MEDICATION NAME: Six-Flavor Tea pills, 8 pills (Chinese herbal medicain) Strengthen kidney merdian

## 2018-07-19 NOTE — LETTER
"7/19/2018       RE: Shyann Samuel  [address]  [place] [address]     Dear Colleague,    Thank you for referring your patient, Shyann Samuel, [facility]. Please see a copy of my visit note below.    Chief complaint: \"right hip higher than left\"    HPI:  Shyann is seen as a new patient with concerns \"of her legs being unequal\". She had a R CONSTANCE done by Dr. Snell in 2009 with good relief of her hip pain but \"has been troubled with walking since the operation\". She reports 2 or 3 specific episodes of \"bad leg pain\" and was treated with DHARMESH with \"near complete pain relief to her legs\". She uses a cane for walking \"because her legs feel weak and doesn't trust them\". She reports a fall on the ice in 2017 where \"everything has been sore and having a hard time recovering\". Denies fevers or chills. She rates her hip pain \"0\" on 0-10 scale. Denies much back pain \"except for an ache\". Reports bilateral leg pain \"7\" on 0-10 scale with walking. Her pain lessens if she uses a shopping cart or sits down. Denies changes in bowel or bladder. No pain with coughing or sneezing.    PMH: Reviewed in chart today 7/19/2018 and updated by myself.    ROS: Reviewed on tablet today 7/19/2018 with all systems negative except for those listed below.    Meds/All: Reviewed and updated by myself today 7/19/2018    PE:  Pleasant and cooperative female alert and oriented x3. She is 5'5\" 177 pounds with a BMI of 29. She arises from the chair using the sides for support. Walks with a fluent gait pattern with her right pelvis riding higher than her left. She has no pain with right hip internal and external rotation. Hip flexion normal. Markedly positive SLR R >L. Sensation noted to be diminished in dermatomes to bilateral lower extremity. Reflexes to patella delayed but symmetrical. Negative babinski. No calf pain to bilateral lower extremity.Bilateral lower " extremity exam demonstrates quad strength 3-/5, DF 3-/5, inversion 3-/5. Pulses diminished but symmetrical. Tender to palpate PSIS R greater than L with tenderness to paraspinals. Skin exam to low back has no rashes, bruises, or lesions. Upon exam from posterior, a significant lumbar curve is noted from right to left that is rigid.    A full length standing xray demonstrates near equal leg lengths but a significant lumbar curve right to left in nature. R total hip in good position. No acute fractures or obvious lucency noted.    Dx:  1. 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 R CONSTANCE  3. Bilateral leg radiculitis    Plan:  1. I explained that her pelvic obliquity is not directly related to her total hip and that her legs are essentially the same length as well. Full length xray was reviewed with her in detail.   2. Suggested formal PT for possible gentle traction and exercises for strengthening. She could try placing an over the counter heel lift in her left shoe in with the hopes this could make her pelvis feel more balanced.  3. Suggested a visit with her family doc for continued management of her severe end stage lumbar DDD for continued DHARMESH treatments or oral medication for better pain management.  4. Follow up with surgery prn.    Total time spent was 30 minutes with greater than 50% spent in face to face consultation and collaboration of care.    Again, thank you for allowing me to participate in the care of your patient.      Sincerely,    Karey Haas, KELLY CNP

## 2018-07-20 ENCOUNTER — OFFICE VISIT (OUTPATIENT)
Dept: ORTHOPEDICS | Facility: CLINIC | Age: 78
End: 2018-07-20
Payer: MEDICARE

## 2018-07-20 ENCOUNTER — PRE VISIT (OUTPATIENT)
Dept: ORTHOPEDICS | Facility: CLINIC | Age: 78
End: 2018-07-20

## 2018-07-20 ENCOUNTER — RADIANT APPOINTMENT (OUTPATIENT)
Dept: GENERAL RADIOLOGY | Facility: CLINIC | Age: 78
End: 2018-07-20
Attending: ORTHOPAEDIC SURGERY
Payer: MEDICARE

## 2018-07-20 VITALS — BODY MASS INDEX: 29.49 KG/M2 | HEIGHT: 65 IN | WEIGHT: 177 LBS

## 2018-07-20 DIAGNOSIS — M54.16 LUMBAR RADICULOPATHY: Primary | ICD-10-CM

## 2018-07-20 DIAGNOSIS — M54.16 LUMBAR RADICULOPATHY: ICD-10-CM

## 2018-07-20 NOTE — LETTER
7/20/2018      RE: Shyann Samuel  [address]  [facility] [address]       Spine Surgery Consultation    REFERRING PHYSICIAN: Serge Watson   PRIMARY CARE PHYSICIAN: Aleksandra Starks           Chief Complaint:   Pain of the Lower Back and Consult (The patient is here today with pain that radiates into her left thigh and knee. She was recommended to follow up after a epidural steroid inejction that was done 2 weeks ago. She reports haivng some pain relief after the injection, but she also just started gabepentin.  She feeels her pain may be due to her uneven hips. )      History of Present Illness:  Symptom Profile Including: location of symptoms, onset, severity, exacerbating/alleviating factors, previous treatments:        Shyann Samuel is a 78 year old female who presents for evaluation of claudicatory right thigh and buttock pain.  She denies any left leg symptoms.  The symptoms have been present for many years but have been getting worse over the last couple of months.  She had a previous right hip replacement and has had that investigated and is not felt to be the cause of her symptoms.  She has been following with a physical therapist which noticed some obliquity in the pelvis and told her she needed a shoe lift.  More recently she saw my partner Dr. perdomo who noted that she had a degenerative lumbar scoliosis and severe multilevel stenosis.  She was then sent for right-sided L3-4 and L4-5 transforaminal epidural steroid injections and this took away the pain for several hours but then the pain came back.  She does note that she had absolutely no pain in the thighs while the numbing agent was in place.  She is able to walk so well after these injections that she would gave up her walker.  After the pain came back she then had a fall so I do think this emphasizes, that the injections were helpful for her.  When you ask her about the injections she states that they did not provide any  lasting relief but again there was clear benefit in the early going.    She has also recently started gabapentin and this seems to help somewhat with the symptoms.  They are much worse when she is up and walking.  She feels like she cannot stand upright and has to bend over a walker to walk any distance.  She is very limited in her walking distance because of the thigh symptoms.         Past Medical History:     Past Medical History:   Diagnosis Date     Anterior corneal dystrophy      Back pain      Cataract      Esotropia      Hyperlipidemia      Hyperlipidemia      Hypertension      Insomnia      Osteoarthritis      Posterior vitreous detachment      Urinary tract infection             Past Surgical History:     Past Surgical History:   Procedure Laterality Date     C TOTAL HIP ARTHROPLASTY Right 10/2009    Replacement of right hip     CATARACT IOL, RT/LT Bilateral ~2005     EYE SURGERY  after 2003,pt unsure    blepharoplasty     HC EXTERNAL LEVATOR RESECTION Bilateral 12/2014            Social History:     Social History   Substance Use Topics     Smoking status: Never Smoker     Smokeless tobacco: Never Used     Alcohol use No            Family History:     Family History   Problem Relation Age of Onset     Cerebrovascular Disease Mother      probably consequent to Crohn's disease     Crohn Disease Mother      Osteoperosis Mother      probably consequent to Crohn's disease     Other Cancer Father      kidney cancer, benign brain tumor     Cancer Other      Father (kidney meningioma), Brother (neuroblastoma)     Other Cancer Brother      adult neuroblastoma (dispute about precise diagnos     Glaucoma No family hx of      Macular Degeneration No family hx of      Melanoma No family hx of      Skin Cancer No family hx of             Allergies:     Allergies   Allergen Reactions     Benzalkonium Chloride      Eye irritation     Nitrofurantoin Other (See Comments)     Causes hast heart rate.  Causes fast heart rate.  "           Medications:     Current Outpatient Prescriptions   Medication     Calcium-Vitamin D (CALCIUM + D PO)     camphor-menthol (DERMASARRA) 0.5-0.5 % LOTN     cyanocolbalamin (VITAMIN  B-12) 1000 MCG tablet     ESTRIOL 0.5MG/GM CREAM     gabapentin (NEURONTIN) 100 MG capsule     HYDROcodone-acetaminophen (NORCO) 5-325 MG per tablet     lisinopril (PRINIVIL/ZESTRIL) 5 MG tablet     methylPREDNISolone (MEDROL DOSEPAK) 4 MG tablet     NAPROXEN PO     Omega-3 Fatty Acids (OMEGA-3 FISH OIL PO)     simvastatin (ZOCOR) 20 MG tablet     tiZANidine (ZANAFLEX) 2 MG tablet     UNABLE TO FIND     Current Facility-Administered Medications   Medication     apraclonidine (IOPIDINE) 1 % ophthalmic solution 1 drop             Review of Systems:     A 10 point ROS was performed and reviewed. Specific responses to these questions are noted at the end of the document.         Physical Exam:   Vitals: Ht 1.651 m (5' 5\")  Wt 80.3 kg (177 lb)  BMI 29.45 kg/m2  Constitutional: awake, alert, cooperative, no apparent distress, appears stated age.    Eyes: The sclera are white.  Ears, Nose, Throat: The trachea is midline.  Psychiatric: The patient has a normal affect.  Respiratory: breathing non-labored  Cardiovascular: The extremities are warm and perfused.  Skin: no obvious rashes or lesions.  Musculoskeletal, Neurologic, and Spine:          Lumbar Spine:    Appearance - No gross stepoffs or deformities    Motor -     L2-3: Hip flexion 4/5 L and 5/5 R strength          L3/4:  Knee extension L 4/5 and R 5/5 strength         L4/5:  Foot dorsiflexion R 5/5 L 5/5 and       EHL dorsiflexion R 4/5 L 3/5 strength         S1:  Plantarflexion/Peroneal Muscles  L 5/5 and R 5/5 strength    Sensation: intact to light touch L3-S1 distribution BLE      Neurologic:      REFLEXES Left Right                  Patella 1+ 1+   Ankle jerk 1+ 1+   Babinski No upgoing great toe No upgoing great toe   Clonus 0 beats 0 beats     Hip Exam:  No pain with hip " log roll and no tenderness over the greater trochanters.    Alignment:  Patient stands with a positive standing sagittal balance.           Imaging:   We ordered and independently reviewed new radiographs at this clinic visit. The results were discussed with the patient.  Findings include:    June 13, 2018 lumbar MRI: Severe multilevel spondylosis with severe central and lateral recess stenosis L4-5, L3-4, L2-3, L1-2.  Evidence of prior L2 compression deformity, chronic in nature.    June 7, 2018 AP and lateral lumbar radiographs: No obvious instability.  Prior L2 compression fracture is again noted.    July 20, 2018 flexion-extension lateral lumbar radiographs: Loss of the normal lumbar lordosis but no obvious dynamic instability.             Assessment and Plan:   Assessment:  78 year old female with multilevel lumbar stenosis from L1-L5 with claudicatory right thigh and buttock pain.  I do not think her hip replacement is the cause of the symptoms.     Plan:  1. We talked about options moving forward.  One option would be to continue with conservative care.  So far she has been happy with the results of gabapentin.  2. Alternatively if she wished to consider surgery I recommend a decompression.  I think for levels is a little bit too much to do in an open fashion so potentially we would consider a minimally invasive laminectomy.  I gave her information about the procedure and she is going to read about it on spine [employer].  3. Risks of this surgery include risk of infection, risk of dural tear resulting in CSF leak which might result in headaches, or possible need for lumbar drain, or possible revision surgery in the setting of a persistent leak. Risk of seroma or hematoma requiring revision surgery. Possible nerve root injury resulting in numbness weakness or paralysis into the leg. Possible radiculitis which could result in similar symptoms or could result in significant neurogenic type pain into the leg.  Risk of incomplete decompression which might require revision surgery in the future.  Risk of pars fracture or postoperative instability requiring conversion to a fusion in the future. Risk of adjacent segment problems requiring surgery in the future. Risk of incomplete relief of symptoms possibly requiring revision surgery in the future. There is a risk of blood clots in the legs or the lungs.  Furthermore, although rare, there are risks of major vessel or major organ injury from the surgery, and risks of the anesthetic including stroke heart attack and death.  4. We talked about the postoperative recovery.  And expected hospital stay.  5. She is going to think about it.  Right now she is happy with the gabapentin results.  She would like to see how things go over the next month.  I think this is reasonable.  She will let me know if there is any worsening and if so I can see her back any time.  Otherwise follow-up in 1 month to assess her progress.      Respectfully,  Chao Acevedo MD  Spine Surgery  [facility]

## 2018-07-20 NOTE — NURSING NOTE
"Reason For Visit:   Chief Complaint   Patient presents with     Lower Back - Pain     Consult     The patient is here today with pain that radiates into her left thigh and knee. She was recommended to follow up after a epidural steroid inejction that was done 2 weeks ago. She reports haivng some pain relief after the injection, but she also just started gabepentin.  She feeels her pain may be due to her uneven hips.        Primary MD: Aleksandra Starks  Ref. MD: Serge Watson    ?  No  Occupation retired.  Currently working? No.  Work status?  Retired.  Type of surgery: TKA right  Smoker: No  Request smoking cessation information: No    Ht 1.651 m (5' 5\")  Wt 80.3 kg (177 lb)  BMI 29.45 kg/m2    Pain Assessment  Patient Currently in Pain: Yes  0-10 Pain Scale: 2  Primary Pain Location: Leg  Pain Orientation: Left  Pain Descriptors: Cramping, Sharp  Alleviating Factors: Rest  Aggravating Factors: Movement    Oswestry (VICENTE) Questionnaire    OSWESTRY DISABILITY INDEX 7/10/2018   Count 8   Sum 17   Oswestry Score (%) 42.5   Some recent data might be hidden            Neck Disability Index (NDI) Questionnaire    No flowsheet data found.           Visual Analog Pain Scale  Back Pain Scale 0-10: 0  Right leg pain: 2  Left leg pain: 0    Promis 10 Assessment    PROMIS 10 7/10/2018   In general, would you say your health is: Very good   In general, would you say your quality of life is: Very good   In general, how would you rate your physical health? Good   In general, how would you rate your mental health, including your mood and your ability to think? Excellent   In general, how would you rate your satisfaction with your social activities and relationships? Excellent   In general, please rate how well you carry out your usual social activities and roles Fair   To what extent are you able to carry out your everyday physical activities such as walking, climbing stairs, carrying groceries, or moving a chair? " Moderately   How often have you been bothered by emotional problems such as feeling anxious, depressed or irritable? Never   How would you rate your fatigue on average? Mild   How would you rate your pain on average?   0 = No Pain  to  10 = Worst Imaginable Pain 4   In general, would you say your health is: 4   In general, would you say your quality of life is: 4   In general, how would you rate your physical health? 3   In general, how would you rate your mental health, including your mood and your ability to think? 5   In general, how would you rate your satisfaction with your social activities and relationships? 5   In general, please rate how well you carry out your usual social activities and roles. (This includes activities at home, at work and in your community, and responsibilities as a parent, child, spouse, employee, friend, etc.) 2   To what extent are you able to carry out your everyday physical activities such as walking, climbing stairs, carrying groceries, or moving a chair? 3   In the past 7 days, how often have you been bothered by emotional problems such as feeling anxious, depressed, or irritable? 1   In the past 7 days, how would you rate your fatigue on average? 2   In the past 7 days, how would you rate your pain on average, where 0 means no pain, and 10 means worst imaginable pain? 4   Global Mental Health Score 19   Global Physical Health Score 13   PROMIS TOTAL - SUBSCORES 32   Some recent data might be hidden                Ana Morrow ATC

## 2018-07-20 NOTE — LETTER
7/20/2018       RE: Shyann Samuel  [address]  [place] [address]     Dear Colleague,    Thank you for referring your patient, Shyann Samuel, to the [facility]. Please see a copy of my visit note below.    Spine Surgery Consultation    REFERRING PHYSICIAN: Serge Watson   PRIMARY CARE PHYSICIAN: Aleksandra Starks           Chief Complaint:   Pain of the Lower Back and Consult (The patient is here today with pain that radiates into her left thigh and knee. She was recommended to follow up after a epidural steroid inejction that was done 2 weeks ago. She reports haivng some pain relief after the injection, but she also just started gabepentin.  She feeels her pain may be due to her uneven hips. )      History of Present Illness:  Symptom Profile Including: location of symptoms, onset, severity, exacerbating/alleviating factors, previous treatments:        Shyann Samuel is a 78 year old female who presents for evaluation of claudicatory right thigh and buttock pain.  She denies any left leg symptoms.  The symptoms have been present for many years but have been getting worse over the last couple of months.  She had a previous right hip replacement and has had that investigated and is not felt to be the cause of her symptoms.  She has been following with a physical therapist which noticed some obliquity in the pelvis and told her she needed a shoe lift.  More recently she saw my partner Dr. eprdomo who noted that she had a degenerative lumbar scoliosis and severe multilevel stenosis.  She was then sent for right-sided L3-4 and L4-5 transforaminal epidural steroid injections and this took away the pain for several hours but then the pain came back.  She does note that she had absolutely no pain in the thighs while the numbing agent was in place.  She is able to walk so well after these injections that she would gave up her  walker.  After the pain came back she then had a fall so I do think this emphasizes, that the injections were helpful for her.  When you ask her about the injections she states that they did not provide any lasting relief but again there was clear benefit in the early going.    She has also recently started gabapentin and this seems to help somewhat with the symptoms.  They are much worse when she is up and walking.  She feels like she cannot stand upright and has to bend over a walker to walk any distance.  She is very limited in her walking distance because of the thigh symptoms.         Past Medical History:     Past Medical History:   Diagnosis Date     Anterior corneal dystrophy      Back pain      Cataract      Esotropia      Hyperlipidemia      Hyperlipidemia      Hypertension      Insomnia      Osteoarthritis      Posterior vitreous detachment      Urinary tract infection             Past Surgical History:     Past Surgical History:   Procedure Laterality Date     C TOTAL HIP ARTHROPLASTY Right 10/2009    Replacement of right hip     CATARACT IOL, RT/LT Bilateral ~2005     EYE SURGERY  after 2003,pt unsure    blepharoplasty     HC EXTERNAL LEVATOR RESECTION Bilateral 12/2014            Social History:     Social History   Substance Use Topics     Smoking status: Never Smoker     Smokeless tobacco: Never Used     Alcohol use No            Family History:     Family History   Problem Relation Age of Onset     Cerebrovascular Disease Mother      probably consequent to Crohn's disease     Crohn Disease Mother      Osteoperosis Mother      probably consequent to Crohn's disease     Other Cancer Father      kidney cancer, benign brain tumor     Cancer Other      Father (kidney meningioma), Brother (neuroblastoma)     Other Cancer Brother      adult neuroblastoma (dispute about precise diagnos     Glaucoma No family hx of      Macular Degeneration No family hx of      Melanoma No family hx of      Skin Cancer No  "family hx of             Allergies:     Allergies   Allergen Reactions     Benzalkonium Chloride      Eye irritation     Nitrofurantoin Other (See Comments)     Causes hast heart rate.  Causes fast heart rate.            Medications:     Current Outpatient Prescriptions   Medication     Calcium-Vitamin D (CALCIUM + D PO)     camphor-menthol (DERMASARRA) 0.5-0.5 % LOTN     cyanocolbalamin (VITAMIN  B-12) 1000 MCG tablet     ESTRIOL 0.5MG/GM CREAM     gabapentin (NEURONTIN) 100 MG capsule     HYDROcodone-acetaminophen (NORCO) 5-325 MG per tablet     lisinopril (PRINIVIL/ZESTRIL) 5 MG tablet     methylPREDNISolone (MEDROL DOSEPAK) 4 MG tablet     NAPROXEN PO     Omega-3 Fatty Acids (OMEGA-3 FISH OIL PO)     simvastatin (ZOCOR) 20 MG tablet     tiZANidine (ZANAFLEX) 2 MG tablet     UNABLE TO FIND     Current Facility-Administered Medications   Medication     apraclonidine (IOPIDINE) 1 % ophthalmic solution 1 drop             Review of Systems:     A 10 point ROS was performed and reviewed. Specific responses to these questions are noted at the end of the document.         Physical Exam:   Vitals: Ht 1.651 m (5' 5\")  Wt 80.3 kg (177 lb)  BMI 29.45 kg/m2  Constitutional: awake, alert, cooperative, no apparent distress, appears stated age.    Eyes: The sclera are white.  Ears, Nose, Throat: The trachea is midline.  Psychiatric: The patient has a normal affect.  Respiratory: breathing non-labored  Cardiovascular: The extremities are warm and perfused.  Skin: no obvious rashes or lesions.  Musculoskeletal, Neurologic, and Spine:          Lumbar Spine:    Appearance - No gross stepoffs or deformities    Motor -     L2-3: Hip flexion 4/5 L and 5/5 R strength          L3/4:  Knee extension L 4/5 and R 5/5 strength         L4/5:  Foot dorsiflexion R 5/5 L 5/5 and       EHL dorsiflexion R 4/5 L 3/5 strength         S1:  Plantarflexion/Peroneal Muscles  L 5/5 and R 5/5 strength    Sensation: intact to light touch L3-S1 " distribution BLE      Neurologic:      REFLEXES Left Right                  Patella 1+ 1+   Ankle jerk 1+ 1+   Babinski No upgoing great toe No upgoing great toe   Clonus 0 beats 0 beats     Hip Exam:  No pain with hip log roll and no tenderness over the greater trochanters.    Alignment:  Patient stands with a positive standing sagittal balance.           Imaging:   We ordered and independently reviewed new radiographs at this clinic visit. The results were discussed with the patient.  Findings include:    June 13, 2018 lumbar MRI: Severe multilevel spondylosis with severe central and lateral recess stenosis L4-5, L3-4, L2-3, L1-2.  Evidence of prior L2 compression deformity, chronic in nature.    June 7, 2018 AP and lateral lumbar radiographs: No obvious instability.  Prior L2 compression fracture is again noted.    July 20, 2018 flexion-extension lateral lumbar radiographs: Loss of the normal lumbar lordosis but no obvious dynamic instability.             Assessment and Plan:   Assessment:  78 year old female with multilevel lumbar stenosis from L1-L5 with claudicatory right thigh and buttock pain.  I do not think her hip replacement is the cause of the symptoms.     Plan:  1. We talked about options moving forward.  One option would be to continue with conservative care.  So far she has been happy with the results of gabapentin.  2. Alternatively if she wished to consider surgery I recommend a decompression.  I think for levels is a little bit too much to do in an open fashion so potentially we would consider a minimally invasive laminectomy.  I gave her information about the procedure and she is going to read about it on spine [employer].  3. Risks of this surgery include risk of infection, risk of dural tear resulting in CSF leak which might result in headaches, or possible need for lumbar drain, or possible revision surgery in the setting of a persistent leak. Risk of seroma or hematoma requiring revision  surgery. Possible nerve root injury resulting in numbness weakness or paralysis into the leg. Possible radiculitis which could result in similar symptoms or could result in significant neurogenic type pain into the leg. Risk of incomplete decompression which might require revision surgery in the future.  Risk of pars fracture or postoperative instability requiring conversion to a fusion in the future. Risk of adjacent segment problems requiring surgery in the future. Risk of incomplete relief of symptoms possibly requiring revision surgery in the future. There is a risk of blood clots in the legs or the lungs.  Furthermore, although rare, there are risks of major vessel or major organ injury from the surgery, and risks of the anesthetic including stroke heart attack and death.  4. We talked about the postoperative recovery.  And expected hospital stay.  5. She is going to think about it.  Right now she is happy with the gabapentin results.  She would like to see how things go over the next month.  I think this is reasonable.  She will let me know if there is any worsening and if so I can see her back any time.  Otherwise follow-up in 1 month to assess her progress.      Respectfully,  Chao Acevedo MD  Spine Surgery  [facility]

## 2018-07-20 NOTE — MR AVS SNAPSHOT
"              After Visit Summary   7/20/2018    Shyann Samuel    MRN: [id number]           Patient Information     Date Of Birth          1940        Visit Information        Provider Department      7/20/2018 8:00 AM Chao Acevedo MD [facility]        Today's Diagnoses     Lumbar radiculopathy    -  1       Follow-ups after your visit        Who to contact     Please call your clinic at [phone] to:    Ask questions about your health    Make or cancel appointments    Discuss your medicines    Learn about your test results    Speak to your doctor            Additional Information About Your Visit        MyChart Information     [email or web address] gives you secure access to your electronic health record. If you see a primary care provider, you can also send messages to your care team and make appointments. If you have questions, please call your primary care clinic.  If you do not have a primary care provider, please call [phone] and they will assist you.      [email or web address] is an electronic gateway that provides easy, online access to your medical records. With [email or web address], you can request a clinic appointment, read your test results, renew a prescription or communicate with your care team.     To access your existing account, please contact your [facility] or call [phone] for assistance.        Care EveryWhere ID     This is your Care EveryWhere ID. This could be used by other organizations to access your [facility] medical records  [id number]        Your Vitals Were     Height BMI (Body Mass Index)                1.651 m (5' 5\") 29.45 kg/m2           Blood Pressure from Last 3 Encounters:   07/06/18 135/81   07/01/18 134/84   06/16/18 129/85    Weight from Last 3 Encounters:   07/20/18 80.3 kg (177 lb)   07/19/18 80.5 kg (177 lb 8 oz)   07/01/18 79.4 kg (175 lb)                 Today's Medication Changes          These changes are accurate as of 7/20/18  8:32 " AM.  If you have any questions, ask your nurse or doctor.               These medicines have changed or have updated prescriptions.        Dose/Directions    gabapentin 100 MG capsule   Commonly known as:  NEURONTIN   This may have changed:  how much to take   Used for:  Intervertebral disc disorder with radiculopathy of lumbar region        Dose:  100 mg   Take 1 capsule (100 mg) by mouth At Bedtime   Quantity:  90 capsule   Refills:  0                Primary Care Provider Office Phone # Fax #    Aleksandra Isabelle Starks [phone]       [address] [place] [id number]        Goals        General    Psychosocial (pt-stated)     Notes - Note edited  4/19/2016 11:10 AM by Zulma Kim BSW    As of today's date 4/19/2016 goal is met at 76 - 100%.   Goal Status:  Complete   Pt has resource ([employer]) that can evaluate pt's home.  Pt has decided to complete two other tasks (hiring yard work and cleaning out of her house assistance) prior to having [employer] come out.  I want a resource that can evaluate my home for DME/potential remodeling so I can age in place  As of today's date 3/17/2016 goal is met at 0 - 25%.   Goal Status:  Active    LUKE Coffey          Equal Access to Services     LEE WALSH AH: Hadii wes ferrell hadasho Soarronali, waaxda luqadaha, qaybta kaalmada adeegyada, daija oquendo. So [facility] [phone].    ATENCIÓN: Si habla español, tiene a sanchez disposición servicios gratuitos de asistencia lingüística. Llame al [phone].    We comply with applicable federal civil rights laws and [place] laws. We do not discriminate on the basis of race, color, national origin, age, disability, sex, sexual orientation, or gender identity.            Thank you!     Thank you for choosing [facility]  for your care. Our goal is always to provide you with excellent care. Hearing back from our patients is one way we can continue to improve  our services. Please take a few minutes to complete the written survey that you may receive in the mail after your visit with us. Thank you!             Your Updated Medication List - Protect others around you: Learn how to safely use, store and throw away your medicines at www.disposemymeds.org.          This list is accurate as of 7/20/18  8:32 AM.  Always use your most recent med list.                   Brand Name Dispense Instructions for use Diagnosis    CALCIUM + D PO      Take  by mouth. 600mg/400 IU bid        camphor-menthol 0.5-0.5 % Lotn    DERMASARRA    222 mL    Apply up to 4x daily as needed for itch on the back.    Notalgia paresthetica       cyanocobalamin 1000 MCG tablet    vitamin  B-12     Take 1 tablet by mouth three times a week.        ESTRIOL 0.5MG/GM CREAM     30 g    Insert 1 gram vaginally 2 times per week.    Postmenopausal atrophic vaginitis       gabapentin 100 MG capsule    NEURONTIN    90 capsule    Take 1 capsule (100 mg) by mouth At Bedtime    Intervertebral disc disorder with radiculopathy of lumbar region       HYDROcodone-acetaminophen 5-325 MG per tablet    NORCO    15 tablet    Take 1 tablet by mouth every 6 hours as needed for severe pain    Intervertebral disc disorder with radiculopathy of lumbar region       lisinopril 5 MG tablet    PRINIVIL/ZESTRIL    90 tablet    Take 1 tablet (5 mg) by mouth daily    Essential hypertension       methylPREDNISolone 4 MG tablet    MEDROL DOSEPAK    21 tablet    follow package directions    Right hip pain       NAPROXEN PO      Take 220 mg by mouth 2 times daily as needed for moderate pain        OMEGA-3 FISH OIL PO      Take  by mouth.        simvastatin 20 MG tablet    ZOCOR    90 tablet    Take 1 tablet (20 mg) by mouth At Bedtime    Hyperlipidemia, unspecified hyperlipidemia type       tiZANidine 2 MG tablet    ZANAFLEX    30 tablet    Take 1 tablet (2 mg) by mouth 3 times daily as needed for muscle spasms    Right hip pain       UNABLE  TO FIND      MEDICATION NAME: Six-Flavor Tea pills, 8 pills (Chinese herbal medicain) Strengthen kidney merdian

## 2018-07-20 NOTE — PROGRESS NOTES
Spine Surgery Consultation    REFERRING PHYSICIAN: Serge Watson   PRIMARY CARE PHYSICIAN: Aleksandra Starks           Chief Complaint:   Pain of the Lower Back and Consult (The patient is here today with pain that radiates into her left thigh and knee. She was recommended to follow up after a epidural steroid inejction that was done 2 weeks ago. She reports haivng some pain relief after the injection, but she also just started gabepentin.  She feeels her pain may be due to her uneven hips. )      History of Present Illness:  Symptom Profile Including: location of symptoms, onset, severity, exacerbating/alleviating factors, previous treatments:        Shyann Samuel is a 78 year old female who presents for evaluation of claudicatory right thigh and buttock pain.  She denies any left leg symptoms.  The symptoms have been present for many years but have been getting worse over the last couple of months.  She had a previous right hip replacement and has had that investigated and is not felt to be the cause of her symptoms.  She has been following with a physical therapist which noticed some obliquity in the pelvis and told her she needed a shoe lift.  More recently she saw my partner Dr. perdomo who noted that she had a degenerative lumbar scoliosis and severe multilevel stenosis.  She was then sent for right-sided L3-4 and L4-5 transforaminal epidural steroid injections and this took away the pain for several hours but then the pain came back.  She does note that she had absolutely no pain in the thighs while the numbing agent was in place.  She is able to walk so well after these injections that she would gave up her walker.  After the pain came back she then had a fall so I do think this emphasizes, that the injections were helpful for her.  When you ask her about the injections she states that they did not provide any lasting relief but again there was clear benefit in the early going.    She has also  recently started gabapentin and this seems to help somewhat with the symptoms.  They are much worse when she is up and walking.  She feels like she cannot stand upright and has to bend over a walker to walk any distance.  She is very limited in her walking distance because of the thigh symptoms.         Past Medical History:     Past Medical History:   Diagnosis Date     Anterior corneal dystrophy      Back pain      Cataract      Esotropia      Hyperlipidemia      Hyperlipidemia      Hypertension      Insomnia      Osteoarthritis      Posterior vitreous detachment      Urinary tract infection             Past Surgical History:     Past Surgical History:   Procedure Laterality Date     C TOTAL HIP ARTHROPLASTY Right 10/2009    Replacement of right hip     CATARACT IOL, RT/LT Bilateral ~2005     EYE SURGERY  after 2003,pt unsure    blepharoplasty     HC EXTERNAL LEVATOR RESECTION Bilateral 12/2014            Social History:     Social History   Substance Use Topics     Smoking status: Never Smoker     Smokeless tobacco: Never Used     Alcohol use No            Family History:     Family History   Problem Relation Age of Onset     Cerebrovascular Disease Mother      probably consequent to Crohn's disease     Crohn Disease Mother      Osteoperosis Mother      probably consequent to Crohn's disease     Other Cancer Father      kidney cancer, benign brain tumor     Cancer Other      Father (kidney meningioma), Brother (neuroblastoma)     Other Cancer Brother      adult neuroblastoma (dispute about precise diagnos     Glaucoma No family hx of      Macular Degeneration No family hx of      Melanoma No family hx of      Skin Cancer No family hx of             Allergies:     Allergies   Allergen Reactions     Benzalkonium Chloride      Eye irritation     Nitrofurantoin Other (See Comments)     Causes hast heart rate.  Causes fast heart rate.            Medications:     Current Outpatient Prescriptions   Medication      "Calcium-Vitamin D (CALCIUM + D PO)     camphor-menthol (DERMASARRA) 0.5-0.5 % LOTN     cyanocolbalamin (VITAMIN  B-12) 1000 MCG tablet     ESTRIOL 0.5MG/GM CREAM     gabapentin (NEURONTIN) 100 MG capsule     HYDROcodone-acetaminophen (NORCO) 5-325 MG per tablet     lisinopril (PRINIVIL/ZESTRIL) 5 MG tablet     methylPREDNISolone (MEDROL DOSEPAK) 4 MG tablet     NAPROXEN PO     Omega-3 Fatty Acids (OMEGA-3 FISH OIL PO)     simvastatin (ZOCOR) 20 MG tablet     tiZANidine (ZANAFLEX) 2 MG tablet     UNABLE TO FIND     Current Facility-Administered Medications   Medication     apraclonidine (IOPIDINE) 1 % ophthalmic solution 1 drop             Review of Systems:     A 10 point ROS was performed and reviewed. Specific responses to these questions are noted at the end of the document.         Physical Exam:   Vitals: Ht 1.651 m (5' 5\")  Wt 80.3 kg (177 lb)  BMI 29.45 kg/m2  Constitutional: awake, alert, cooperative, no apparent distress, appears stated age.    Eyes: The sclera are white.  Ears, Nose, Throat: The trachea is midline.  Psychiatric: The patient has a normal affect.  Respiratory: breathing non-labored  Cardiovascular: The extremities are warm and perfused.  Skin: no obvious rashes or lesions.  Musculoskeletal, Neurologic, and Spine:          Lumbar Spine:    Appearance - No gross stepoffs or deformities    Motor -     L2-3: Hip flexion 4/5 L and 5/5 R strength          L3/4:  Knee extension L 4/5 and R 5/5 strength         L4/5:  Foot dorsiflexion R 5/5 L 5/5 and       EHL dorsiflexion R 4/5 L 3/5 strength         S1:  Plantarflexion/Peroneal Muscles  L 5/5 and R 5/5 strength    Sensation: intact to light touch L3-S1 distribution BLE      Neurologic:      REFLEXES Left Right                  Patella 1+ 1+   Ankle jerk 1+ 1+   Babinski No upgoing great toe No upgoing great toe   Clonus 0 beats 0 beats     Hip Exam:  No pain with hip log roll and no tenderness over the greater " trochanters.    Alignment:  Patient stands with a positive standing sagittal balance.           Imaging:   We ordered and independently reviewed new radiographs at this clinic visit. The results were discussed with the patient.  Findings include:    June 13, 2018 lumbar MRI: Severe multilevel spondylosis with severe central and lateral recess stenosis L4-5, L3-4, L2-3, L1-2.  Evidence of prior L2 compression deformity, chronic in nature.    June 7, 2018 AP and lateral lumbar radiographs: No obvious instability.  Prior L2 compression fracture is again noted.    July 20, 2018 flexion-extension lateral lumbar radiographs: Loss of the normal lumbar lordosis but no obvious dynamic instability.             Assessment and Plan:   Assessment:  78 year old female with multilevel lumbar stenosis from L1-L5 with claudicatory right thigh and buttock pain.  I do not think her hip replacement is the cause of the symptoms.     Plan:  1. We talked about options moving forward.  One option would be to continue with conservative care.  So far she has been happy with the results of gabapentin.  2. Alternatively if she wished to consider surgery I recommend a decompression.  I think for levels is a little bit too much to do in an open fashion so potentially we would consider a minimally invasive laminectomy.  I gave her information about the procedure and she is going to read about it on spine L[facility].  3. Risks of this surgery include risk of infection, risk of dural tear resulting in CSF leak which might result in headaches, or possible need for lumbar drain, or possible revision surgery in the setting of a persistent leak. Risk of seroma or hematoma requiring revision surgery. Possible nerve root injury resulting in numbness weakness or paralysis into the leg. Possible radiculitis which could result in similar symptoms or could result in significant neurogenic type pain into the leg. Risk of incomplete decompression which might  require revision surgery in the future.  Risk of pars fracture or postoperative instability requiring conversion to a fusion in the future. Risk of adjacent segment problems requiring surgery in the future. Risk of incomplete relief of symptoms possibly requiring revision surgery in the future. There is a risk of blood clots in the legs or the lungs.  Furthermore, although rare, there are risks of major vessel or major organ injury from the surgery, and risks of the anesthetic including stroke heart attack and death.  4. We talked about the postoperative recovery.  And expected hospital stay.  5. She is going to think about it.  Right now she is happy with the gabapentin results.  She would like to see how things go over the next month.  I think this is reasonable.  She will let me know if there is any worsening and if so I can see her back any time.  Otherwise follow-up in 1 month to assess her progress.      Respectfully,  Chao Acevedo MD  Spine Surgery  [facility]      Answers for HPI/ROS submitted by the patient on 7/10/2018   General Symptoms: No  Skin Symptoms: No  HENT Symptoms: No  EYE SYMPTOMS: No  HEART SYMPTOMS: No  LUNG SYMPTOMS: No  INTESTINAL SYMPTOMS: No  URINARY SYMPTOMS: No  GYNECOLOGIC SYMPTOMS: No  BREAST SYMPTOMS: No  SKELETAL SYMPTOMS: Yes  BLOOD SYMPTOMS: No  NERVOUS SYSTEM SYMPTOMS: No  MENTAL HEALTH SYMPTOMS: No  Back pain: No  Muscle aches: No  Neck pain: No  Swollen joints: No  Joint pain: No  Muscle cramps: Yes  Muscle weakness: Yes  Joint stiffness: No  Bone fracture: No

## 2018-07-23 ENCOUNTER — THERAPY VISIT (OUTPATIENT)
Dept: PHYSICAL THERAPY | Facility: CLINIC | Age: 78
End: 2018-07-23
Attending: NURSE PRACTITIONER
Payer: MEDICARE

## 2018-07-23 DIAGNOSIS — M54.50 LOW BACK PAIN: Primary | ICD-10-CM

## 2018-07-23 PROBLEM — M25.562 LEFT KNEE PAIN: Status: RESOLVED | Noted: 2017-11-27 | Resolved: 2018-07-23

## 2018-07-23 PROBLEM — M25.561 RIGHT KNEE PAIN: Status: RESOLVED | Noted: 2017-11-27 | Resolved: 2018-07-23

## 2018-07-23 PROCEDURE — 97161 PT EVAL LOW COMPLEX 20 MIN: CPT | Mod: GP | Performed by: PHYSICAL THERAPIST

## 2018-07-23 PROCEDURE — 97110 THERAPEUTIC EXERCISES: CPT | Mod: GP | Performed by: PHYSICAL THERAPIST

## 2018-07-23 PROCEDURE — G8979 MOBILITY GOAL STATUS: HCPCS | Mod: GP | Performed by: PHYSICAL THERAPIST

## 2018-07-23 PROCEDURE — G8978 MOBILITY CURRENT STATUS: HCPCS | Mod: GP | Performed by: PHYSICAL THERAPIST

## 2018-07-23 NOTE — LETTER
DEPARTMENT OF HEALTH AND HUMAN SERVICES  CENTERS FOR MEDICARE & MEDICAID SERVICES    PLAN/UPDATED PLAN OF PROGRESS FOR OUTPATIENT REHABILITATION    PATIENTS NAME:  Shyann Samuel   : 1940  PR[facility] NUMBER:    [id number]  [id number]     PROVIDER NAME: [place] [facility] PHYSICAL THERAP[facility]  MEDICAL RECORD NUMBER: [id number]   START OF CARE DATE:  SOC Date: 18   TYPE:  PT    PRIMARY/TREATMENT DIAGNOSIS: (Pertinent Medical Diagnosis)  Low back pain    VISITS FROM START OF CARE:  Rxs Used: 1     [place] [facility] Initial Evaluation    Shyann Samuel is a 78 year old female with a lumbar condition.  Condition occurred with:  Insidious onset.  Condition occurred: at home.  This is a recurrent condition  Saw MD for back and leg pain on 2018 and has new orders for physical therapy  Had x-rays, standing and was diagnosed with pelvic obliquity.  Has a curvature in the lumbar region and severe DJD.  Has had pain in the right low back/hip region.  Was given a steroid and this helped. Also had a steroid injection and was given gabapentin.   But then had pain in the right anterior thigh.  Has tried an insole on the left shoe and this helped the right leg pain.  Can walk less than 100 yards with her walking stick..    Patient reports pain:  Lumbar spine right.  Radiates to:  Knee right.  Pain is described as aching and is intermittent and reported as 6/10.  Associated symptoms:  Loss of motion/stiffness and loss of strength. Pain is worse during the night and worse during the day.  Symptoms are exacerbated by standing Relieved by: left sidelying.  Since onset symptoms are unchanged.  Special tests:  X-ray.  Previous treatment includes physical therapy.  There was mild improvement following previous treatment.  General health as reported by patient is good.                Red flags:  None as reported by the patient.        Objective:  Standing Alignment:    Pelvic:  Iliac  crest high R  Gait:    Gait Type:  Antalgic   Assistive Devices:  Cane  Deviations:  Lumbar:  Trunk lean L  Lumbar/SI Evaluation  ROM:    AROM Lumbar:   Flexion:        Hands to knees with some pain in the right low back  Ext:                    Difficult to stand straight   Side Bend:        Left:     Right:   Rotation:           Left:     Right:   Side Glide:        Left:     Right:           Lumbar Myotomes:    L2-4 (Quads):  Right:  4  L4 (Ankle DF):  Right:  4  Lumbar DTR's:  not assessed  Cord Signs:  not assessed  Lumbar Dermtomes:  normal  Neural Tension/Mobility:    Right side:   Femoral Nerve positive.  Lumbar Palpation:    Tenderness present at Right: Quadratus Lumborum and Erector Spinae  Functional Tests:  not assessed    Assessment/Plan:    Patient is a 78 year old female with lumbar complaints.    Patient has the following significant findings with corresponding treatment plan.                Diagnosis 1:  Back pain  Pain -  manual therapy, self management, education and home program  Decreased ROM/flexibility - manual therapy, therapeutic exercise and home program  Decreased strength - therapeutic exercise, therapeutic activities and home program  Impaired gait - gait training, assistive devices and home program  Decreased function - therapeutic activities and home program    Therapy Evaluation Codes:   1) History comprised of:   Personal factors that impact the plan of care:      Time since onset of symptoms.    Comorbidity factors that impact the plan of care are:      None.     Medications impacting care: None.  2) Examination of Body Systems comprised of:   Body structures and functions that impact the plan of care:      Lumbar spine and Pelvis.   Activity limitations that impact the plan of care are:      Bending, Cooking, Stairs, Standing and Walking.  3) Clinical presentation characteristics are:   Stable/Uncomplicated.  4) Decision-Making    Low complexity using standardized patient  "assessment instrument and/or measureable assessment of functional outcome.  Cumulative Therapy Evaluation is: Low complexity.  Previous and current functional limitations:  (See Goal Flow Sheet for this information)    Short term and Long term goals: (See Goal Flow Sheet for this information)   Communication ability:  Patient appears to be able to clearly communicate and understand verbal and written communication and follow directions correctly.  Treatment Explanation - The following has been discussed with the patient:   RX ordered/plan of care  Anticipated outcomes  Possible risks and side effects  This patient would benefit from PT intervention to resume normal activities.   Rehab potential is good.      Frequency:  2 X a month, once daily  Duration:  for 3 months  Discharge Plan:  Achieve all LTG.  Independent in home treatment program.  Reach maximal therapeutic benefit.    Caregiver Signature/Credentials _____________________________ Date ________       Treating Provider: Kat RamsayATC   I have reviewed and certified the need for these services and plan of treatment while under my care.        PHYSICIAN'S SIGNATURE:   _________________________________________  Date___________   Karey MENDOZA    Certification period:  Beginning of Cert date period: 07/23/18 to  End of Cert period date: 10/20/18     Functional Level Progress Report: Please see attached \"Goal Flow sheet for Functional level.\"    ____X____ Continue Services or       ________ DC Services                Service dates: From  SOC Date: 07/23/18 date to present                         "

## 2018-07-23 NOTE — PROGRESS NOTES
[facility] Initial Evaluation  Subjective:  Patient is a 78 year old female presenting with rehab back hpi. The history is provided by the patient. No  was used.   Shyann Samuel is a 78 year old female with a lumbar condition.  Condition occurred with:  Insidious onset.  Condition occurred: at home.  This is a recurrent condition  Saw MD for back and leg pain on 7/20/2018 and has new orders for physical therapy  Had x-rays, standing and was diagnosed with pelvic obliquity.  Has a curvature in the lumbar region and severe DJD.  Has had pain in the right low back/hip region.  Was given a steroid and this helped. Also had a steroid injection and was given gabapentin.   But then had pain in the right anterior thigh.  Has tried an insole on the left shoe and this helped the right leg pain.  Can walk less than 100 yards with her walking stick..    Patient reports pain:  Lumbar spine right.  Radiates to:  Knee right.  Pain is described as aching and is intermittent and reported as 6/10.  Associated symptoms:  Loss of motion/stiffness and loss of strength. Pain is worse during the night and worse during the day.  Symptoms are exacerbated by standing Relieved by: left sidelying.  Since onset symptoms are unchanged.  Special tests:  X-ray.  Previous treatment includes physical therapy.  There was mild improvement following previous treatment.  General health as reported by patient is good.                      Red flags:  None as reported by the patient.                        Objective:  Standing Alignment:          Pelvic:  Iliac crest high R          Gait:    Gait Type:  Antalgic   Assistive Devices:  Cane  Deviations:  Lumbar:  Trunk lean L               Lumbar/SI Evaluation  ROM:    AROM Lumbar:   Flexion:        Hands to knees with some pain in the right low back  Ext:                    Difficult to stand straight   Side Bend:        Left:     Right:   Rotation:           Left:      Right:   Side Glide:        Left:     Right:           Lumbar Myotomes:      L2-4 (Quads):  Right:  4  L4 (Ankle DF):  Right:  4      Lumbar DTR's:  not assessed      Cord Signs:  not assessed    Lumbar Dermtomes:  normal                Neural Tension/Mobility:      Right side:   Femoral Nerve positive.  Lumbar Palpation:      Tenderness present at Right: Quadratus Lumborum and Erector Spinae  Functional Tests:  not assessed                                                         General     ROS    Assessment/Plan:    Patient is a 78 year old female with lumbar complaints.    Patient has the following significant findings with corresponding treatment plan.                Diagnosis 1:  Back pain  Pain -  manual therapy, self management, education and home program  Decreased ROM/flexibility - manual therapy, therapeutic exercise and home program  Decreased strength - therapeutic exercise, therapeutic activities and home program  Impaired gait - gait training, assistive devices and home program  Decreased function - therapeutic activities and home program    Therapy Evaluation Codes:   1) History comprised of:   Personal factors that impact the plan of care:      Time since onset of symptoms.    Comorbidity factors that impact the plan of care are:      None.     Medications impacting care: None.  2) Examination of Body Systems comprised of:   Body structures and functions that impact the plan of care:      Lumbar spine and Pelvis.   Activity limitations that impact the plan of care are:      Bending, Cooking, Stairs, Standing and Walking.  3) Clinical presentation characteristics are:   Stable/Uncomplicated.  4) Decision-Making    Low complexity using standardized patient assessment instrument and/or measureable assessment of functional outcome.  Cumulative Therapy Evaluation is: Low complexity.    Previous and current functional limitations:  (See Goal Flow Sheet for this information)    Short term and Long term  goals: (See Goal Flow Sheet for this information)     Communication ability:  Patient appears to be able to clearly communicate and understand verbal and written communication and follow directions correctly.  Treatment Explanation - The following has been discussed with the patient:   RX ordered/plan of care  Anticipated outcomes  Possible risks and side effects  This patient would benefit from PT intervention to resume normal activities.   Rehab potential is good.    Frequency:  2 X a month, once daily  Duration:  for 3 months  Discharge Plan:  Achieve all LTG.  Independent in home treatment program.  Reach maximal therapeutic benefit.    Please refer to the daily flowsheet for treatment today, total treatment time and time spent performing 1:1 timed codes.

## 2018-07-23 NOTE — MR AVS SNAPSHOT
After Visit Summary   7/23/2018    Shyann Samuel    MRN: [id number]           Patient Information     Date Of Birth          1940        Visit Information        Provider Department      7/23/2018 7:30 AM Kat Wayne PT [facility]        Today's Diagnoses     Low back pain    -  1       Follow-ups after your visit        Who to contact     If you have questions or need follow up information about today's clinic visit or your schedule please contact [facility] directly at [phone].  Normal or non-critical lab and imaging results will be communicated to you by [employer], letter or phone within 4 business days after the clinic has received the results. If you do not hear from us within 7 days, please contact the clinic through TVtript or phone. If you have a critical or abnormal lab result, we will notify you by phone as soon as possible.  Submit refill requests through Gigamon or call your pharmacy and they will forward the refill request to us. Please allow 3 business days for your refill to be completed.          Additional Information About Your Visit        MyChart Information     Gigamon gives you secure access to your electronic health record. If you see a primary care provider, you can also send messages to your care team and make appointments. If you have questions, please call your primary care clinic.  If you do not have a primary care provider, please call [phone] and they will assist you.        Care EveryWhere ID     This is your Care EveryWhere ID. This could be used by other organizations to access your [facility] medical records  [id number]         Blood Pressure from Last 3 Encounters:   07/06/18 135/81   07/01/18 134/84   06/16/18 129/85    Weight from Last 3 Encounters:   07/20/18 80.3 kg (177 lb)   07/19/18 80.5 kg (177 lb 8 oz)   07/01/18 79.4 kg (175 lb)              We  Performed the Following     HC PT EVAL, LOW COMPLEXITY     JERRY CERT REPORT     JERRY INITIAL EVAL REPORT     THERAPEUTIC EXERCISES          Today's Medication Changes          These changes are accurate as of 7/23/18 12:22 PM.  If you have any questions, ask your nurse or doctor.               These medicines have changed or have updated prescriptions.        Dose/Directions    gabapentin 100 MG capsule   Commonly known as:  NEURONTIN   This may have changed:  how much to take   Used for:  Intervertebral disc disorder with radiculopathy of lumbar region        Dose:  100 mg   Take 1 capsule (100 mg) by mouth At Bedtime   Quantity:  90 capsule   Refills:  0                Primary Care Provider Office Phone # Fax #    Aleksandra Isabelle Starks [phone]       [address] [place] [id number]        Goals        General    Psychosocial (pt-stated)     Notes - Note edited  4/19/2016 11:10 AM by Zulma Kim BSW    As of today's date 4/19/2016 goal is met at 76 - 100%.   Goal Status:  Complete   Pt has resource ([employer]) that can evaluate pt's home.  Pt has decided to complete two other tasks (hiring yard work and cleaning out of her house assistance) prior to having [employer] come out.  I want a resource that can evaluate my home for DME/potential remodeling so I can age in place  As of today's date 3/17/2016 goal is met at 0 - 25%.   Goal Status:  Active    LUKE Coffey          Equal Access to Services     LEE WALSH AH: Hadii aad ku hadasho Soarronali, waaxda luqadaha, qaybta kaalmada leonie, daija driscoll . So [facility] [phone].    ATENCIÓN: Si habla español, tiene a sanchez disposición servicios gratuitos de asistencia lingüística. Llame al [phone].    We comply with applicable federal civil rights laws and [place] laws. We do not discriminate on the basis of race, color, national origin, age, disability, sex, sexual orientation, or gender  identity.            Thank you!     Thank you for choosing [facility]  for your care. Our goal is always to provide you with excellent care. Hearing back from our patients is one way we can continue to improve our services. Please take a few minutes to complete the written survey that you may receive in the mail after your visit with us. Thank you!             Your Updated Medication List - Protect others around you: Learn how to safely use, store and throw away your medicines at www.disposemymeds.org.          This list is accurate as of 7/23/18 12:22 PM.  Always use your most recent med list.                   Brand Name Dispense Instructions for use Diagnosis    CALCIUM + D PO      Take  by mouth. 600mg/400 IU bid        camphor-menthol 0.5-0.5 % Lotn    DERMASARRA    222 mL    Apply up to 4x daily as needed for itch on the back.    Notalgia paresthetica       cyanocobalamin 1000 MCG tablet    vitamin  B-12     Take 1 tablet by mouth three times a week.        ESTRIOL 0.5MG/GM CREAM     30 g    Insert 1 gram vaginally 2 times per week.    Postmenopausal atrophic vaginitis       gabapentin 100 MG capsule    NEURONTIN    90 capsule    Take 1 capsule (100 mg) by mouth At Bedtime    Intervertebral disc disorder with radiculopathy of lumbar region       HYDROcodone-acetaminophen 5-325 MG per tablet    NORCO    15 tablet    Take 1 tablet by mouth every 6 hours as needed for severe pain    Intervertebral disc disorder with radiculopathy of lumbar region       lisinopril 5 MG tablet    PRINIVIL/ZESTRIL    90 tablet    Take 1 tablet (5 mg) by mouth daily    Essential hypertension       methylPREDNISolone 4 MG tablet    MEDROL DOSEPAK    21 tablet    follow package directions    Right hip pain       NAPROXEN PO      Take 220 mg by mouth 2 times daily as needed for moderate pain        OMEGA-3 FISH OIL PO      Take  by mouth.        simvastatin 20 MG tablet    ZOCOR    90  tablet    Take 1 tablet (20 mg) by mouth At Bedtime    Hyperlipidemia, unspecified hyperlipidemia type       tiZANidine 2 MG tablet    ZANAFLEX    30 tablet    Take 1 tablet (2 mg) by mouth 3 times daily as needed for muscle spasms    Right hip pain       UNABLE TO FIND      MEDICATION NAME: Six-Flavor Tea pills, 8 pills (Chinese herbal medicain) Strengthen kidney merdian

## 2018-07-29 ENCOUNTER — MYC MEDICAL ADVICE (OUTPATIENT)
Dept: ORTHOPEDICS | Facility: CLINIC | Age: 78
End: 2018-07-29

## 2018-07-29 DIAGNOSIS — M51.16 INTERVERTEBRAL DISC DISORDER WITH RADICULOPATHY OF LUMBAR REGION: ICD-10-CM

## 2018-08-01 DIAGNOSIS — M51.16 INTERVERTEBRAL DISC DISORDER WITH RADICULOPATHY OF LUMBAR REGION: ICD-10-CM

## 2018-08-01 RX ORDER — GABAPENTIN 300 MG/1
300 CAPSULE ORAL AT BEDTIME
Qty: 90 CAPSULE | Refills: 1 | Status: SHIPPED | OUTPATIENT
Start: 2018-08-01 | End: 2018-12-28 | Stop reason: DRUGHIGH

## 2018-08-01 RX ORDER — GABAPENTIN 100 MG/1
100 CAPSULE ORAL AT BEDTIME
Qty: 90 CAPSULE | Refills: 0 | Status: CANCELLED | OUTPATIENT
Start: 2018-08-01

## 2018-08-01 NOTE — TELEPHONE ENCOUNTER
Per Dr. Watson, patient was called to let her know Dr. Watson will refill Gabapentin one more time, and subsequent prescriptions should be go through her primary care provider. VM was left asking her to call or MyChart how much she's taking each night and which pharmacy she would like the prescription sent to.

## 2018-08-01 NOTE — TELEPHONE ENCOUNTER
Patient called back to let us know her primary care physician is willing to take over prescribing Gabapentin. She had no other questions or concerns at this time.

## 2018-08-01 NOTE — TELEPHONE ENCOUNTER
See mychart message of today,  Pingel to refill and changing to one 300 mg capsule at bedtime.  Told pt changing pill strength, so just to take one now at bedtime    Pt agrees to plan    Suzie June RN  August 1, 2018 2:08 PM

## 2018-08-15 ENCOUNTER — THERAPY VISIT (OUTPATIENT)
Dept: PHYSICAL THERAPY | Facility: CLINIC | Age: 78
End: 2018-08-15
Payer: MEDICARE

## 2018-08-15 DIAGNOSIS — M54.41 RIGHT-SIDED LOW BACK PAIN WITH RIGHT-SIDED SCIATICA: Primary | ICD-10-CM

## 2018-08-15 PROCEDURE — 97110 THERAPEUTIC EXERCISES: CPT | Mod: GP | Performed by: PHYSICAL THERAPIST

## 2018-09-04 DIAGNOSIS — M51.16 INTERVERTEBRAL DISC DISORDERS WITH RADICULOPATHY, LUMBAR REGION: Primary | ICD-10-CM

## 2018-09-04 RX ORDER — GABAPENTIN 100 MG/1
CAPSULE ORAL
Qty: 30 CAPSULE | Refills: 0 | Status: SHIPPED | OUTPATIENT
Start: 2018-09-04 | End: 2018-10-01

## 2018-09-04 NOTE — TELEPHONE ENCOUNTER
Script for gabapentin taper to pharmacy per Mychart message.  Amara Perez RN  Care Coordinator  [facility]

## 2018-09-07 ENCOUNTER — THERAPY VISIT (OUTPATIENT)
Dept: PHYSICAL THERAPY | Facility: CLINIC | Age: 78
End: 2018-09-07
Payer: MEDICARE

## 2018-09-07 DIAGNOSIS — M54.41 ACUTE RIGHT-SIDED LOW BACK PAIN WITH RIGHT-SIDED SCIATICA: ICD-10-CM

## 2018-09-07 PROCEDURE — 97112 NEUROMUSCULAR REEDUCATION: CPT | Mod: GP | Performed by: PHYSICAL THERAPIST

## 2018-09-07 PROCEDURE — G8978 MOBILITY CURRENT STATUS: HCPCS | Mod: GP | Performed by: PHYSICAL THERAPIST

## 2018-09-07 PROCEDURE — 97110 THERAPEUTIC EXERCISES: CPT | Mod: GP | Performed by: PHYSICAL THERAPIST

## 2018-09-07 PROCEDURE — G8979 MOBILITY GOAL STATUS: HCPCS | Mod: GP | Performed by: PHYSICAL THERAPIST

## 2018-09-07 NOTE — MR AVS SNAPSHOT
After Visit Summary   9/7/2018    Shyann Samuel    MRN: [id number]           Patient Information     Date Of Birth          1940        Visit Information        Provider Department      9/7/2018 9:30 AM Kat Wayne PT [facility]        Today's Diagnoses     Acute right-sided low back pain with right-sided sciatica           Follow-ups after your visit        Who to contact     If you have questions or need follow up information about today's clinic visit or your schedule please contact [facility]ERAP[facility] directly at [phone].  Normal or non-critical lab and imaging results will be communicated to you by [employer]art, letter or phone within 4 business days after the clinic has received the results. If you do not hear from us within 7 days, please contact the clinic through [employer] or phone. If you have a critical or abnormal lab result, we will notify you by phone as soon as possible.  Submit refill requests through Let or call your pharmacy and they will forward the refill request to us. Please allow 3 business days for your refill to be completed.          Additional Information About Your Visit        MyChart Information     Let gives you secure access to your electronic health record. If you see a primary care provider, you can also send messages to your care team and make appointments. If you have questions, please call your primary care clinic.  If you do not have a primary care provider, please call [phone] and they will assist you.        Care EveryWhere ID     This is your Care EveryWhere ID. This could be used by other organizations to access your [facility] medical records  [id number]         Blood Pressure from Last 3 Encounters:   07/06/18 135/81   07/01/18 134/84   06/16/18 129/85    Weight from Last 3 Encounters:   07/20/18 80.3 kg (177 lb)   07/19/18 80.5 kg (177 lb 8 oz)   07/01/18  79.4 kg (175 lb)              We Performed the Following     JERRY PROGRESS NOTES REPORT     NEUROMUSCULAR RE-EDUCATION     THERAPEUTIC EXERCISES        Primary Care Provider Office Phone # Fax #    Aleksandra Starks [phone]       [address] [place] [id number]        Goals        General    Psychosocial (pt-stated)     Notes - Note edited  4/19/2016 11:10 AM by Zulma Kim, BSW    As of today's date 4/19/2016 goal is met at 76 - 100%.   Goal Status:  Complete   Pt has resource ([employer]) that can evaluate pt's home.  Pt has decided to complete two other tasks (hiring yard work and cleaning out of her house assistance) prior to having [employer] come out.  I want a resource that can evaluate my home for DME/potential remodeling so I can age in place  As of today's date 3/17/2016 goal is met at 0 - 25%.   Goal Status:  Active    VANDANA CoffeyW          Equal Access to Services     LEE WALSH AH: Hadii aad ku hadasho Soomaali, waaxda luqadaha, qaybta kaalmada adeegyada, waxay idiin haydasian daphne driscoll . So [facility] [phone].    ATENCIÓN: Si habla español, tiene a sanchez disposición servicios gratuitos de asistencia lingüística. Llame al [phone].    We comply with applicable federal civil rights laws and [place] laws. We do not discriminate on the basis of race, color, national origin, age, disability, sex, sexual orientation, or gender identity.            Thank you!     Thank you for choosing [facility]  for your care. Our goal is always to provide you with excellent care. Hearing back from our patients is one way we can continue to improve our services. Please take a few minutes to complete the written survey that you may receive in the mail after your visit with us. Thank you!             Your Updated Medication List - Protect others around you: Learn how to safely use, store and throw away your medicines  at www.disposemymeds.org.          This list is accurate as of 9/7/18 10:19 AM.  Always use your most recent med list.                   Brand Name Dispense Instructions for use Diagnosis    CALCIUM + D PO      Take  by mouth. 600mg/400 IU bid        camphor-menthol 0.5-0.5 % Lotn    DERMASARRA    222 mL    Apply up to 4x daily as needed for itch on the back.    Notalgia paresthetica       cyanocobalamin 1000 MCG tablet    vitamin  B-12     Take 1 tablet by mouth three times a week.        ESTRIOL 0.5MG/GM CREAM     30 g    Insert 1 gram vaginally 2 times per week.    Postmenopausal atrophic vaginitis       * gabapentin 300 MG capsule    NEURONTIN    90 capsule    Take 1 capsule (300 mg) by mouth At Bedtime    Intervertebral disc disorder with radiculopathy of lumbar region       * gabapentin 100 MG capsule    NEURONTIN    30 capsule    Take 2 capsules at hs x 5 days, then 1 capsule at hs x 4 days.    Intervertebral disc disorders with radiculopathy, lumbar region       HYDROcodone-acetaminophen 5-325 MG per tablet    NORCO    15 tablet    Take 1 tablet by mouth every 6 hours as needed for severe pain    Intervertebral disc disorder with radiculopathy of lumbar region       lisinopril 5 MG tablet    PRINIVIL/ZESTRIL    90 tablet    Take 1 tablet (5 mg) by mouth daily    Essential hypertension       methylPREDNISolone 4 MG tablet    MEDROL DOSEPAK    21 tablet    follow package directions    Right hip pain       NAPROXEN PO      Take 220 mg by mouth 2 times daily as needed for moderate pain        OMEGA-3 FISH OIL PO      Take  by mouth.        simvastatin 20 MG tablet    ZOCOR    90 tablet    Take 1 tablet (20 mg) by mouth At Bedtime    Hyperlipidemia, unspecified hyperlipidemia type       tiZANidine 2 MG tablet    ZANAFLEX    30 tablet    Take 1 tablet (2 mg) by mouth 3 times daily as needed for muscle spasms    Right hip pain       UNABLE TO FIND      MEDICATION NAME: Six-Flavor Tea pills, 8 pills (Chinese herbal  eliud) Strengthen kidney merdian        * Notice:  This list has 2 medication(s) that are the same as other medications prescribed for you. Read the directions carefully, and ask your doctor or other care provider to review them with you.

## 2018-09-07 NOTE — PROGRESS NOTES
Subjective:  HPI                    Objective:  System    Physical Exam    General     ROS    Assessment/Plan:    PROGRESS  REPORT    Progress reporting period is from 7/23/2018 to 9/7/2018.       SUBJECTIVE  Subjective changes noted by patient:  .  Subjective: Has been 1/8th inch heel lift.  Switched to 1/4 inch wheich has helped.  Has some thigh pain on the right at the end of the day.  Up to 8 minutes a day on the exercise bike.  Had noticed more swelling in the left leg.  Had been taking gabapetin and has had some side effects.  Is in the process of tapering off.  Feels better now.  Has not done the stairs.  Hopes that going off medication may help with getting her energy back.      Current pain level is  Current Pain level: 1/10.     Previous pain level was   Initial Pain level: 7/10.   Changes in function:  Yes (See Goal flowsheet attached for changes in current functional level)  Adverse reaction to treatment or activity: None    OBJECTIVE  Changes noted in objective findings:  Yes,   Objective: Weakness noted in the right hamstring.  Difficulty with tandem stance.  Can hold for 5-10 sec.  Can do sit to stand without her hands for 3 reps.   [facility] membership is on hold.     ASSESSMENT/PLAN  Updated problem list and treatment plan: Diagnosis 1:  Right leg weakness  Pain -  self management, education and home program  Decreased ROM/flexibility - manual therapy, therapeutic exercise and home program  Decreased strength - therapeutic exercise, therapeutic activities and home program  Impaired muscle performance - neuro re-education and home program  Decreased function - therapeutic activities and home program  STG/LTGs have been met or progress has been made towards goals:  Yes (See Goal flow sheet completed today.)  Assessment of Progress: The patient's condition is improving.  The patient's condition has potential to improve.  Self Management Plans:  Patient has been instructed in a home treatment program.  I have  re-evaluated this patient and find that the nature, scope, duration and intensity of the therapy is appropriate for the medical condition of the patient.  Shyann continues to require the following intervention to meet STG and LTG's:  PT    Recommendations:  This patient would benefit from continued therapy.     Frequency:  1 X a month, once daily  Duration:  for 2 months        Please refer to the daily flowsheet for treatment today, total treatment time and time spent performing 1:1 timed codes.

## 2018-09-10 ENCOUNTER — MYC MEDICAL ADVICE (OUTPATIENT)
Dept: PHYSICAL THERAPY | Facility: CLINIC | Age: 78
End: 2018-09-10

## 2018-09-30 ENCOUNTER — MYC MEDICAL ADVICE (OUTPATIENT)
Dept: FAMILY MEDICINE | Facility: CLINIC | Age: 78
End: 2018-09-30

## 2018-09-30 DIAGNOSIS — M51.16 INTERVERTEBRAL DISC DISORDERS WITH RADICULOPATHY, LUMBAR REGION: ICD-10-CM

## 2018-10-01 RX ORDER — GABAPENTIN 100 MG/1
CAPSULE ORAL
Qty: 30 CAPSULE | Refills: 1 | Status: SHIPPED | OUTPATIENT
Start: 2018-10-01 | End: 2019-01-04

## 2018-10-01 NOTE — TELEPHONE ENCOUNTER
Yes, please give Gabapentin 100mg dose 30 pills with one refill   Aleksandra Starks MD   Internal Medicine/Pediatrics

## 2018-10-07 ENCOUNTER — MYC MEDICAL ADVICE (OUTPATIENT)
Dept: FAMILY MEDICINE | Facility: CLINIC | Age: 78
End: 2018-10-07

## 2018-10-07 DIAGNOSIS — G89.29 CHRONIC RIGHT-SIDED LOW BACK PAIN WITH RIGHT-SIDED SCIATICA: Primary | ICD-10-CM

## 2018-10-07 DIAGNOSIS — M54.41 CHRONIC RIGHT-SIDED LOW BACK PAIN WITH RIGHT-SIDED SCIATICA: Primary | ICD-10-CM

## 2018-10-11 PROBLEM — M54.41 RIGHT-SIDED LOW BACK PAIN WITH RIGHT-SIDED SCIATICA: Status: RESOLVED | Noted: 2018-08-15 | Resolved: 2018-10-11

## 2018-10-18 ENCOUNTER — OFFICE VISIT (OUTPATIENT)
Dept: ORTHOPEDICS | Facility: CLINIC | Age: 78
End: 2018-10-18
Payer: MEDICARE

## 2018-10-18 ENCOUNTER — TELEPHONE (OUTPATIENT)
Dept: ORTHOPEDICS | Facility: CLINIC | Age: 78
End: 2018-10-18

## 2018-10-18 VITALS — HEIGHT: 65 IN | WEIGHT: 174 LBS | BODY MASS INDEX: 28.99 KG/M2 | RESPIRATION RATE: 16 BRPM

## 2018-10-18 DIAGNOSIS — M51.16 INTERVERTEBRAL DISC DISORDER WITH RADICULOPATHY OF LUMBAR REGION: ICD-10-CM

## 2018-10-18 DIAGNOSIS — M25.551 RIGHT HIP PAIN: ICD-10-CM

## 2018-10-18 RX ORDER — METHYLPREDNISOLONE 4 MG
TABLET, DOSE PACK ORAL
Qty: 21 TABLET | Refills: 0 | Status: SHIPPED | OUTPATIENT
Start: 2018-10-18 | End: 2018-11-03

## 2018-10-18 RX ORDER — HYDROCODONE BITARTRATE AND ACETAMINOPHEN 5; 325 MG/1; MG/1
1 TABLET ORAL EVERY 6 HOURS PRN
Qty: 15 TABLET | Refills: 0 | Status: SHIPPED | OUTPATIENT
Start: 2018-10-18 | End: 2018-12-04

## 2018-10-18 NOTE — TELEPHONE ENCOUNTER
Patient called in with an increase in ridicular pain and was calling in to see a provider/ get medication for the increase in pain. I spoke with her and instructed her that  is in clinic today and we could see her. She was aware of this.

## 2018-10-18 NOTE — MR AVS SNAPSHOT
"              After Visit Summary   10/18/2018    Shyann Samuel    MRN: [id number]           Patient Information     Date Of Birth          1940        Visit Information        Provider Department      10/18/2018 10:30 AM Serge Watson MD [facility]        Today's Diagnoses     Right hip pain        Intervertebral disc disorder with radiculopathy of lumbar region            Follow-ups after your visit        Who to contact     Please call your clinic at [phone] to:    Ask questions about your health    Make or cancel appointments    Discuss your medicines    Learn about your test results    Speak to your doctor            Additional Information About Your Visit        MyChart Information     [employer] gives you secure access to your electronic health record. If you see a primary care provider, you can also send messages to your care team and make appointments. If you have questions, please call your primary care clinic.  If you do not have a primary care provider, please call [phone] and they will assist you.      [employer] is an electronic gateway that provides easy, online access to your medical records. With [employer], you can request a clinic appointment, read your test results, renew a prescription or communicate with your care team.     To access your existing account, please contact your [facility] or call [phone] for assistance.        Care EveryWhere ID     This is your Care EveryWhere ID. This could be used by other organizations to access your [facility] medical records  [id number]        Your Vitals Were     Respirations Height BMI (Body Mass Index)             16 1.651 m (5' 5\") 28.96 kg/m2          Blood Pressure from Last 3 Encounters:   07/06/18 135/81   07/01/18 134/84   06/16/18 129/85    Weight from Last 3 Encounters:   10/18/18 78.9 kg (174 lb)   07/20/18 80.3 kg (177 lb)   07/19/18 80.5 kg (177 lb 8 oz)    "           Today, you had the following     No orders found for display         Where to get your medicines      These medications were sent to [place], [place] - [address]  [address], [facility] [address]    Hours:  TRANSPLANT PHONE NUMBER [phone] Phone:  [phone]     methylPREDNISolone 4 MG tablet         Some of these will need a paper prescription and others can be bought over the counter.  Ask your nurse if you have questions.     Bring a paper prescription for each of these medications     HYDROcodone-acetaminophen 5-325 MG per tablet         Information about OPIOIDS     PRESCRIPTION OPIOIDS: WHAT YOU NEED TO KNOW   We gave you an opioid (narcotic) pain medicine. It is important to manage your pain, but opioids are not always the best choice. You should first try all the other options your care team gave you. Take this medicine for as short a time (and as few doses) as possible.    Some activities can increase your pain, such as bandage changes or therapy sessions. It may help to take your pain medicine 30 to 60 minutes before these activities. Reduce your stress by getting enough sleep, working on hobbies you enjoy and practicing relaxation or meditation. Talk to your care team about ways to manage your pain beyond prescription opioids.    These medicines have risks:    DO NOT drive when on new or higher doses of pain medicine. These medicines can affect your alertness and reaction times, and you could be arrested for driving under the influence (DUI). If you need to use opioids long-term, talk to your care team about driving.    DO NOT operate heavy machinery    DO NOT do any other dangerous activities while taking these medicines.    DO NOT drink any alcohol while taking these medicines.     If the opioid prescribed includes acetaminophen, DO NOT take with any other medicines that contain acetaminophen. Read all labels  carefully. Look for the word  acetaminophen  or  Tylenol.  Ask your pharmacist if you have questions or are unsure.    You can get addicted to pain medicines, especially if you have a history of addiction (chemical, alcohol or substance dependence). Talk to your care team about ways to reduce this risk.    All opioids tend to cause constipation. Drink plenty of water and eat foods that have a lot of fiber, such as fruits, vegetables, prune juice, apple juice and high-fiber cereal. Take a laxative (Miralax, milk of magnesia, Colace, Senna) if you don t move your bowels at least every other day. Other side effects include upset stomach, sleepiness, dizziness, throwing up, tolerance (needing more of the medicine to have the same effect), physical dependence and slowed breathing.    Store your pills in a secure place, locked if possible. We will not replace any lost or stolen medicine. If you don t finish your medicine, please throw away (dispose) as directed by your pharmacist. The [place] [employer] has more information about safe disposal: https://www.pca.[facility].[place]/living-green/managing-unwanted-medications         Primary Care Provider Office Phone # Fax #    Aleksandra Starks [phone]       [address] [place] [address]        Goals        General    Psychosocial (pt-stated)     Notes - Note edited  4/19/2016 11:10 AM by Zulma Kim BSW    As of today's date 4/19/2016 goal is met at 76 - 100%.   Goal Status:  Complete   Pt has resource ([facility]) that can evaluate pt's home.  Pt has decided to complete two other tasks (hiring yard work and cleaning out of her house assistance) prior to having [employer] come out.  I want a resource that can evaluate my home for DME/potential remodeling so I can age in place  As of today's date 3/17/2016 goal is met at 0 - 25%.   Goal Status:  Active    LUKE Coffey          Equal Access to Services     LEE  GAAR : Hadii aad ku jaimie Hoang, waaxda luqadaha, qaybta kasuzanneda leonie, daija amayain hayaaraphael serna yue yamila . So [facility] [phone].    ATENCIÓN: Si habla cassi, tiene a sanchez disposición servicios gratuitos de asistencia lingüística. Llame al [phone].    We comply with applicable federal civil rights laws and [place] laws. We do not discriminate on the basis of race, color, national origin, age, disability, sex, sexual orientation, or gender identity.            Thank you!     Thank you for choosing [facility]  for your care. Our goal is always to provide you with excellent care. Hearing back from our patients is one way we can continue to improve our services. Please take a few minutes to complete the written survey that you may receive in the mail after your visit with us. Thank you!             Your Updated Medication List - Protect others around you: Learn how to safely use, store and throw away your medicines at www.disposemymeds.org.          This list is accurate as of 10/18/18 11:59 PM.  Always use your most recent med list.                   Brand Name Dispense Instructions for use Diagnosis    CALCIUM + D PO      Take  by mouth. 600mg/400 IU bid        camphor-menthol 0.5-0.5 % Lotn    DERMASARRA    222 mL    Apply up to 4x daily as needed for itch on the back.    Notalgia paresthetica       cyanocobalamin 1000 MCG tablet    vitamin  B-12     Take 1 tablet by mouth three times a week.        ESTRIOL 0.5MG/GM CREAM     30 g    Insert 1 gram vaginally 2 times per week.    Postmenopausal atrophic vaginitis       * gabapentin 300 MG capsule    NEURONTIN    90 capsule    Take 1 capsule (300 mg) by mouth At Bedtime    Intervertebral disc disorder with radiculopathy of lumbar region       * gabapentin 100 MG capsule    NEURONTIN    30 capsule    Take 2 capsules at hs x 5 days, then 1 capsule at hs as directed    Intervertebral disc disorders with  radiculopathy, lumbar region       HYDROcodone-acetaminophen 5-325 MG per tablet    NORCO    15 tablet    Take 1 tablet by mouth every 6 hours as needed for severe pain    Intervertebral disc disorder with radiculopathy of lumbar region       lisinopril 5 MG tablet    PRINIVIL/ZESTRIL    90 tablet    Take 1 tablet (5 mg) by mouth daily    Essential hypertension       methylPREDNISolone 4 MG tablet    MEDROL DOSEPAK    21 tablet    follow package directions    Right hip pain       NAPROXEN PO      Take 220 mg by mouth 2 times daily as needed for moderate pain        OMEGA-3 FISH OIL PO      Take  by mouth.        simvastatin 20 MG tablet    ZOCOR    90 tablet    Take 1 tablet (20 mg) by mouth At Bedtime    Hyperlipidemia, unspecified hyperlipidemia type       tiZANidine 2 MG tablet    ZANAFLEX    30 tablet    Take 1 tablet (2 mg) by mouth 3 times daily as needed for muscle spasms    Right hip pain       UNABLE TO FIND      MEDICATION NAME: Six-Flavor Tea pills, 8 pills (Chinese herbal medicain) Strengthen kidney merdian        * Notice:  This list has 2 medication(s) that are the same as other medications prescribed for you. Read the directions carefully, and ask your doctor or other care provider to review them with you.

## 2018-10-18 NOTE — LETTER
10/18/2018       RE: Shyann Samuel  [address]  [place] [address]     Dear Colleague,    Thank you for referring your patient, Shyann Samuel, to the [facility] at [facility]. Please see a copy of my visit note below.    [facility]  Orthopedics  Serge Watson MD  10/18/2018     Name: Shyann Samuel  MRN: [id number]  Age: 78 year old  : 1940  Referring provider: Referred Self     Chief Complaint: Bilateral shin pain     Date of Injury: Approximately one week ago    History of Present Illness:   Shyann Samuel is a 78 year old female who presents today for follow-up regarding bilateral shin pain that began last week when she picked up a box and experienced immediate bilateral shin pain secondary to a stabbing pain in her back and the lateral aspect of her thighs. She had a previous right hip replacement and has had that investigated and is not felt to be the cause of her symptoms.  She has been following with a physical therapist which noticed some obliquity in the pelvis and told her she needed a shoe lift. Thigh pain was alleviated with the heel lift, but did however cause minor knee pain. She also had ongoing pain in the right quadriceps as well as weakness and instability.     Today, she notes that pain is alleviated with Gabapentin, but she had to taper off the medication because of the side effects of swelling in her foot and sleepiness. She denies any numbness or weakness. She was wondering if there were other options in place of Gabapentin that she could take for her bilateral shin pain. She was also wondering if she could receive another prescription of Hydrocodone, as it has helped with the pain. Has only used 15 tablets since her last fill in July.     Review of Systems:   A 10-point review of systems was obtained and is negative except for as noted in the HPI.     Physical Examination:  Resp. rate 16, height 1.651  "m (5' 5\"), weight 78.9 kg (174 lb).    General: alert, pleasant, no distress. sitting comfortably in chair  Back/Spine: no tenderness to palpation of spinous processes, or paraspinous musculature of lumbar spine. Pain in lower legs with flexion, twisting at the waist. No pain with extension and side bending. Straight leg raise negative bilaterally. Full range of motion.   Neuro: SILT on bilateral lower extremities, can stand on toes and heel walk without difficulty, patellar and achilles reflexes 2+ and symmetric.     Assessment:   78 year old female, with a history of lumbar spine disease including spinal stenosis, presenting with recurring bilateral leg pain concerning with radicular symptoms.     Plan:   I advised her to take a Medrol dose pack and refilled norco for severe pain. Had discussion regarding long term management as repeated steroid courses and norco are not ideal. She has seen Dr. Acevedo who has recommended surgery, but she isn't interested in surgery at the current time. There are other medications for there pain besides gabapentin, if she is uncomfortable with the side effects. However, recommended that she hold on tapering her gabapentin any further at the current time given her pain flare.  She will follow up with Dr. Chaney, an outside neurologist, for ongoing treatment of her back pain/radicular symptoms.      Scribe Disclosure:   I, Steve Dowell, am serving as a scribe to document services personally performed by Serge Watson MD at this visit, based upon the provider's statements to me. All documentation has been reviewed by the aforementioned provider prior to being entered into the official medical record.       Again, thank you for allowing me to participate in the care of your patient.      Sincerely,    Serge Watson MD      "

## 2018-10-18 NOTE — PROGRESS NOTES
"[facility]  Orthopedics  Serge Watson MD  10/18/2018     Name: Shyann Samuel  MRN: [id number]  Age: 78 year old  : 1940  Referring provider: Referred Self     Chief Complaint: Bilateral shin pain     Date of Injury: Approximately one week ago    History of Present Illness:   Shyann Samuel is a 78 year old female who presents today for follow-up regarding bilateral shin pain that began last week when she picked up a box and experienced immediate bilateral shin pain secondary to a stabbing pain in her back and the lateral aspect of her thighs. She had a previous right hip replacement and has had that investigated and is not felt to be the cause of her symptoms.  She has been following with a physical therapist which noticed some obliquity in the pelvis and told her she needed a shoe lift. Thigh pain was alleviated with the heel lift, but did however cause minor knee pain. She also had ongoing pain in the right quadriceps as well as weakness and instability.     Today, she notes that pain is alleviated with Gabapentin, but she had to taper off the medication because of the side effects of swelling in her foot and sleepiness. She denies any numbness or weakness. She was wondering if there were other options in place of Gabapentin that she could take for her bilateral shin pain. She was also wondering if she could receive another prescription of Hydrocodone, as it has helped with the pain. Has only used 15 tablets since her last fill in July.     Review of Systems:   A 10-point review of systems was obtained and is negative except for as noted in the HPI.     Physical Examination:  Resp. rate 16, height 1.651 m (5' 5\"), weight 78.9 kg (174 lb).    General: alert, pleasant, no distress. sitting comfortably in chair  Back/Spine: no tenderness to palpation of spinous processes, or paraspinous musculature of lumbar spine. Pain in lower legs with flexion, twisting at the waist. No pain with extension and side bending. " Straight leg raise negative bilaterally. Full range of motion.   Neuro: SILT on bilateral lower extremities, can stand on toes and heel walk without difficulty, patellar and achilles reflexes 2+ and symmetric.     Assessment:   78 year old female, with a history of lumbar spine disease including spinal stenosis, presenting with recurring bilateral leg pain concerning with radicular symptoms.     Plan:   I advised her to take a Medrol dose pack and refilled norco for severe pain. Had discussion regarding long term management as repeated steroid courses and norco are not ideal. She has seen Dr. Acevedo who has recommended surgery, but she isn't interested in surgery at the current time. There are other medications for there pain besides gabapentin, if she is uncomfortable with the side effects. However, recommended that she hold on tapering her gabapentin any further at the current time given her pain flare.  She will follow up with Dr. Chaney, an outside neurologist, for ongoing treatment of her back pain/radicular symptoms.      Serge Watson MD    Scribe Disclosure:   I, Steve Dowell, am serving as a scribe to document services personally performed by Serge Watson MD at this visit, based upon the provider's statements to me. All documentation has been reviewed by the aforementioned provider prior to being entered into the official medical record.

## 2018-10-26 ENCOUNTER — MYC MEDICAL ADVICE (OUTPATIENT)
Dept: FAMILY MEDICINE | Facility: CLINIC | Age: 78
End: 2018-10-26

## 2018-10-30 ENCOUNTER — MYC MEDICAL ADVICE (OUTPATIENT)
Dept: OTHER | Age: 78
End: 2018-10-30

## 2018-10-30 DIAGNOSIS — G89.29 CHRONIC BILATERAL LOW BACK PAIN WITH SCIATICA, SCIATICA LATERALITY UNSPECIFIED: Primary | ICD-10-CM

## 2018-10-30 DIAGNOSIS — M54.40 CHRONIC BILATERAL LOW BACK PAIN WITH SCIATICA, SCIATICA LATERALITY UNSPECIFIED: Primary | ICD-10-CM

## 2018-10-30 NOTE — TELEPHONE ENCOUNTER
----- Message from Amara Perez RN sent at 10/29/2018  1:35 PM CDT -----  Regarding: FW: Pt calling regarding referral  Contact: [phone]  Please let pt know we are faxing the order to Dr Chaney's office this afternoon. - thanks  ----- Message -----     From: Forrest Hanley     Sent: 10/29/2018   1:21 PM       To: [facility] Nurse Pool  Subject: Pt calling regarding referral                    Pt called back regarding referral to [facility] for Dr. Chaney. Needs to be faxed to [phone]. Would like this done ASAP.    Thank You    [facility]  Call Center    Please DO NOT send this message and/or reply back to sender. Call Center Representatives DO NOT respond to messages

## 2018-11-03 DIAGNOSIS — M51.16 INTERVERTEBRAL DISC DISORDER WITH RADICULOPATHY OF LUMBAR REGION: Primary | ICD-10-CM

## 2018-11-03 RX ORDER — METHOCARBAMOL 750 MG/1
750 TABLET, FILM COATED ORAL 4 TIMES DAILY PRN
Qty: 60 TABLET | Refills: 0 | Status: SHIPPED | OUTPATIENT
Start: 2018-11-03 | End: 2018-12-04

## 2018-11-07 ENCOUNTER — TELEPHONE (OUTPATIENT)
Dept: PHYSICAL MEDICINE AND REHAB | Facility: CLINIC | Age: 78
End: 2018-11-07

## 2018-11-07 NOTE — TELEPHONE ENCOUNTER
[facility] Call Center    Phone Message    May a detailed message be left on voicemail: yes    Reason for Call: Other: New Pt to PMR - needs Appt scheduled - Please return Pt call to schedule - Referral in system for Chronic bilateral low back pain with sciatica, sciatica laterality unspecified     Action Taken: Message routed to:  [facility]): Clinic Coordinator PMR

## 2018-11-08 NOTE — TELEPHONE ENCOUNTER
[facility] Call Center    Phone Message    May a detailed message be left on voicemail: yes    Reason for Call: Other: Pt requesting call back please to schedule Appt off of her Referral Order to [facility] for Sciatica in her legs she said - waiting for call back - Thanks     Action Taken: Message routed to:  [facility]): PM&R

## 2018-11-21 ENCOUNTER — OFFICE VISIT (OUTPATIENT)
Dept: PHYSICAL MEDICINE AND REHAB | Facility: CLINIC | Age: 78
End: 2018-11-21
Payer: MEDICARE

## 2018-11-21 VITALS
TEMPERATURE: 97.5 F | HEART RATE: 71 BPM | DIASTOLIC BLOOD PRESSURE: 83 MMHG | BODY MASS INDEX: 30.1 KG/M2 | WEIGHT: 176.3 LBS | SYSTOLIC BLOOD PRESSURE: 141 MMHG | OXYGEN SATURATION: 98 % | RESPIRATION RATE: 16 BRPM | HEIGHT: 64 IN

## 2018-11-21 DIAGNOSIS — M48.062 SPINAL STENOSIS OF LUMBAR REGION WITH NEUROGENIC CLAUDICATION: Primary | ICD-10-CM

## 2018-11-21 DIAGNOSIS — M47.27 LUMBOSACRAL RADICULOPATHY DUE TO DEGENERATIVE JOINT DISEASE OF SPINE: ICD-10-CM

## 2018-11-21 DIAGNOSIS — M62.81 GENERALIZED MUSCLE WEAKNESS: ICD-10-CM

## 2018-11-21 ASSESSMENT — PAIN SCALES - GENERAL: PAINLEVEL: MODERATE PAIN (4)

## 2018-11-21 NOTE — MR AVS SNAPSHOT
After Visit Summary   11/21/2018    Shyann Samuel    MRN: [id number]           Patient Information     Date Of Birth          1940        Visit Information        Provider Department      11/21/2018 7:00 AM Chivo Zamora MD [facility] Physical Medicine and Rehabilitation        Today's Diagnoses     Spinal stenosis of lumbar region with neurogenic claudication    -  1    Lumbosacral radiculopathy due to degenerative joint disease of spine        Generalized muscle weakness           Follow-ups after your visit        Additional Services     PHYSICAL THERAPY REFERRAL       If you have not heard from the scheduling office within 2 business days, please call [phone] for all locations, with the exception of [place], please call [phone] and [place], please call [phone].    Patient has significant DJD of lumbar spine at multi levels. Has multi level bilateral foraminal stenosis. Has right > left leg weakness, diffusely. Has very weak abdominal muscles. Patient needs exercise program to reduce the spinal stenosis and facet-related pain. Ultimately develop HEP for above. Also needs instruction in posture/mechanics.    Please be aware that coverage of these services is subject to the terms and limitations of your health insurance plan.  Call member services at your health plan with any benefit or coverage questions.                  Follow-up notes from your care team     Return in about 3 months (around 2/21/2019).      Your next 10 appointments already scheduled     Feb 13, 2019  8:00 AM CST   (Arrive by 7:45 AM)   Return Visit with Chivo Zamora MD   [facility] Physical Medicine and Rehabilitation ([facility])    [address] [place] [id number] [phone]              Future tests that were ordered for you today     Open Future Orders        Priority Expected Expires Ordered    PHYSICAL THERAPY REFERRAL Routine  11/21/2019 11/21/2018  "           Who to contact     Please call your clinic at [phone] to:    Ask questions about your health    Make or cancel appointments    Discuss your medicines    Learn about your test results    Speak to your doctor            Additional Information About Your Visit        Search to [employer] Information     [employer] gives you secure access to your electronic health record. If you see a primary care provider, you can also send messages to your care team and make appointments. If you have questions, please call your primary care clinic.  If you do not have a primary care provider, please call [phone] and they will assist you.      [employer] is an electronic gateway that provides easy, online access to your medical records. With [employer], you can request a clinic appointment, read your test results, renew a prescription or communicate with your care team.     To access your existing account, please contact your [facility] or call [phone] for assistance.        Care EveryWhere ID     This is your Care EveryWhere ID. This could be used by other organizations to access your [facility] medical records  [id number]        Your Vitals Were     Pulse Temperature Respirations Height Pulse Oximetry Breastfeeding?    71 97.5  F (36.4  C) (Oral) 16 1.626 m (5' 4\") 98% No    BMI (Body Mass Index)                   30.26 kg/m2            Blood Pressure from Last 3 Encounters:   11/21/18 141/83   07/06/18 135/81   07/01/18 134/84    Weight from Last 3 Encounters:   11/21/18 80 kg (176 lb 4.8 oz)   10/18/18 78.9 kg (174 lb)   07/20/18 80.3 kg (177 lb)               Primary Care Provider Office Phone # Fax #    Aleksandra Starks [phone]       [address] [facility] [address]        Goals        General    Psychosocial (pt-stated)     Notes - Note edited  4/19/2016 11:10 AM by Zulma Kim BSW    As of today's date 4/19/2016 goal is met at 76 - 100%.   Goal Status:  " Complete   Pt has resource ([employer]) that can evaluate pt's home.  Pt has decided to complete two other tasks (hiring yard work and cleaning out of her house assistance) prior to having [employer] come out.  I want a resource that can evaluate my home for DME/potential remodeling so I can age in place  As of today's date 3/17/2016 goal is met at 0 - 25%.   Goal Status:  Active    Shawn Kim, LSW          Equal Access to Services     LEE WALSH AH: Hadii aad ku hadasho Soomaali, waaxda luqadaha, qaybta kaalmada adeegyada, waxsondra idiin haydasian adereta turner lakeronraphael . So [facility] [phone].    ATENCIÓN: Si turner ye, tiene a sanchez disposición servicios gratuitos de asistencia lingüística. Llame al [phone].    We comply with applicable federal civil rights laws and [place] laws. We do not discriminate on the basis of race, color, national origin, age, disability, sex, sexual orientation, or gender identity.            Thank you!     Thank you for choosing [facility] PHYSICAL MEDICINE AND REHABILITATION  for your care. Our goal is always to provide you with excellent care. Hearing back from our patients is one way we can continue to improve our services. Please take a few minutes to complete the written survey that you may receive in the mail after your visit with us. Thank you!             Your Updated Medication List - Protect others around you: Learn how to safely use, store and throw away your medicines at www.disposemymeds.org.          This list is accurate as of 11/21/18  8:30 AM.  Always use your most recent med list.                   Brand Name Dispense Instructions for use Diagnosis    CALCIUM + D PO      Take  by mouth. 600mg/400 IU bid        camphor-menthol 0.5-0.5 % Lotn    DERMASARRA    222 mL    Apply up to 4x daily as needed for itch on the back.    Notalgia paresthetica       cyanocobalamin 1000 MCG tablet    vitamin  B-12     Take 1 tablet by mouth three times a week.        ESTRIOL  0.5MG/GM CREAM     30 g    Insert 1 gram vaginally 2 times per week.    Postmenopausal atrophic vaginitis       * gabapentin 300 MG capsule    NEURONTIN    90 capsule    Take 1 capsule (300 mg) by mouth At Bedtime    Intervertebral disc disorder with radiculopathy of lumbar region       * gabapentin 100 MG capsule    NEURONTIN    30 capsule    Take 2 capsules at hs x 5 days, then 1 capsule at hs as directed    Intervertebral disc disorders with radiculopathy, lumbar region       HYDROcodone-acetaminophen 5-325 MG per tablet    NORCO    15 tablet    Take 1 tablet by mouth every 6 hours as needed for severe pain    Intervertebral disc disorder with radiculopathy of lumbar region       lisinopril 5 MG tablet    PRINIVIL/ZESTRIL    90 tablet    Take 1 tablet (5 mg) by mouth daily    Essential hypertension       methocarbamol 750 MG tablet    ROBAXIN    60 tablet    Take 1 tablet (750 mg) by mouth 4 times daily as needed for muscle spasms    Intervertebral disc disorder with radiculopathy of lumbar region       NAPROXEN PO      Take 220 mg by mouth 2 times daily as needed for moderate pain        OMEGA-3 FISH OIL PO      Take  by mouth.        simvastatin 20 MG tablet    ZOCOR    90 tablet    Take 1 tablet (20 mg) by mouth At Bedtime    Hyperlipidemia, unspecified hyperlipidemia type       UNABLE TO FIND      MEDICATION NAME: Six-Flavor Tea pills, 8 pills (Chinese herbal medicain) Strengthen kidney merdian        * Notice:  This list has 2 medication(s) that are the same as other medications prescribed for you. Read the directions carefully, and ask your doctor or other care provider to review them with you.

## 2018-11-21 NOTE — NURSING NOTE
Chief Complaint   Patient presents with     Consult     [facility] NEW EVALUATION FOR BILATERAL LEG PAIN/SCIATICA     Tremayne Lora, EMT

## 2018-11-21 NOTE — LETTER
11/21/2018       RE: Shyann Samuel  [address]  [facility] [place] [address]     Dear Colleague,    Thank you for referring your patient, Shyann Samuel, to the [facility] PHYSICAL MEDICINE AND REHABILITATION at [facility]. Please see a copy of my visit note below.    Service Date: 11/21/2018      Shyann Samuel is a delightful 78-year-old woman from [place].  She has been referred to the Rehabilitation Clinic by Dr. Serge Watson.      The patient has a long history of arthritis diffusely.  She does have rather significant arthritis in her lumbar spine.  She did have right total hip arthroplasty performed in 2009.      She notes a few years after the arthroplasty, she noticed further issues with back problems.  In 01/2017, she slipped on the ice and fell.  She had rather significant discomfort at that time, but that gradually resolved.      The patient did develop acute low back pain in early October of this year.  When she bent over to  a box, she felt immediate pain and stabbing pain in the back as well as pain referring into her lateral thighs and bilateral shins.      She did have a Medrol Dosepak and she reports that that helped with the back pain.  However, she continues to get the referral of pain into the thighs and the legs.  She cannot think of anything per se that makes the pain better.  However, she does note when she is sitting in a good chair that referral of pain is almost gone or absent.  When she stands and moves, however, or lies in bed, she has increasing pain.  On a 0-10 scale with her activities, the least pain is rated at a 3 in the thighs and the legs, worse to 7.  Now while sitting in a chair comfortably talking with me, she reports zero pain in the legs.      The patient is currently on gabapentin.  That does seem to help, although she has some side effects from the gabapentin.      CURRENT MEDICATIONS:  As listed on medical record.       PHYSICAL EXAMINATION:  Ms. Samuel is a delightful 78-year-old woman, alert, oriented, and cooperative, no acute distress.  Blood pressure 141/83, pulse 71, respirations 16, oxygen saturation 98% on room air, height 1 meter 63 cm and weight 80 kilograms.  Temperature 36.4 degrees Celsius.      The patient sits comfortably in a chair.  She does have a cane that she uses in her left hand.  She states she uses it in her left hand because her right leg is weak.  She is able to independently arise from a seated to standing position.  She is able to both ambulate with and without the cane in the left hand.  When she has a cane in the left hand, it helps give her a bit more stability than without it.  She does tend to list to the left as she stands and as she ambulates.  Range of motion of lumbar spine is reduced in all directions.  Deep tendon reflexes hypoactive, but symmetrical at knees and ankles.  Sensory exam intact to light touch in both lower limbs.  Manual muscle testing reveals diffuse weakness of the right lower limb of the hip, knee, and ankle musculature with strength graded in the low-grade IV range.  Strength of the left lower limb is a 4+ 5- throughout.  Straight leg raising negative for nerve root stretch sign.      The patient has mild tightness of the hip flexors.  The patient has severe weakness of the trunk musculature.  Abdominal musculature is so weak that when lying in the supine position with hips and knees flexed, she is unable to even elevate her scapulae off the exam table.      I did review with the patient very carefully her MRI and x-rays.  She does have rather significant degenerative disk and joint disease of lumbar spine with multiple levels of foraminal stenosis and facet arthropathy.      Her symptomatology does appear to be very consistent with a lumbar spinal stenosis.  She also has a mild scoliosis.  I had a model to demonstrate to the patient what we will attempt to do in therapy.   In particular, we will try to decompress the multi-levels of foraminal stenosis by reducing some of the lumbar lordosis.  This will also reduce some of the stress and pressure on the multiple facet joints that are arthritic.  I believe that that should help with her pain relief.  She also needs strengthening of her lower limbs and core musculature as well as instruction in proper posture and mechanics for protection.      The patient has had physical therapy and she has had it with Kat Wayne, a physical therapist at [facility] near [facility].      I have given the patient a prescription for physical therapy.  She will schedule that with Kat Wayne to work on all of these above issues.      The patient also had questions about aquatic exercise.  I thought that that would be helpful, but I was doubtful that she could have land-based physical therapy at one facility and aquatic exercise at another to be covered by her health insurance.  However, she will be checking on aquatic therapy as well.  She would like to have her physical therapy with Kat as she has had therapy with her in the past, and the therapist does know her well.      Total time spent with the patient was well over 60 minutes, the vast majority of that time was discussing these above issues.  I will see the patient in followup in 3 months' time.         D: 2018   T: 2018   MT: KK      Name:     SAMIRA GREGG   MRN:      [id number]        Account:      [id number]   :      1940           Service Date: 2018      Document: [id number]        Again, thank you for allowing me to participate in the care of your patient.      Sincerely,    Chivo Zamora MD

## 2018-11-21 NOTE — PROGRESS NOTES
Service Date: 11/21/2018      Shyann Smauel is a delightful 78-year-old woman from [place].  She has been referred to the Rehabilitation Clinic by Dr. Serge Watson.      The patient has a long history of arthritis diffusely.  She does have rather significant arthritis in her lumbar spine.  She did have right total hip arthroplasty performed in 2009.      She notes a few years after the arthroplasty, she noticed further issues with back problems.  In 01/2017, she slipped on the ice and fell.  She had rather significant discomfort at that time, but that gradually resolved.      The patient did develop acute low back pain in early October of this year.  When she bent over to  a box, she felt immediate pain and stabbing pain in the back as well as pain referring into her lateral thighs and bilateral shins.      She did have a Medrol Dosepak and she reports that that helped with the back pain.  However, she continues to get the referral of pain into the thighs and the legs.  She cannot think of anything per se that makes the pain better.  However, she does note when she is sitting in a good chair that referral of pain is almost gone or absent.  When she stands and moves, however, or lies in bed, she has increasing pain.  On a 0-10 scale with her activities, the least pain is rated at a 3 in the thighs and the legs, worse to 7.  Now while sitting in a chair comfortably talking with me, she reports zero pain in the legs.      The patient is currently on gabapentin.  That does seem to help, although she has some side effects from the gabapentin.      CURRENT MEDICATIONS:  As listed on medical record.      PHYSICAL EXAMINATION:  Ms. Samuel is a delightful 78-year-old woman, alert, oriented, and cooperative, no acute distress.  Blood pressure 141/83, pulse 71, respirations 16, oxygen saturation 98% on room air, height 1 meter 63 cm and weight 80 kilograms.  Temperature 36.4 degrees Celsius.      The patient sits  comfortably in a chair.  She does have a cane that she uses in her left hand.  She states she uses it in her left hand because her right leg is weak.  She is able to independently arise from a seated to standing position.  She is able to both ambulate with and without the cane in the left hand.  When she has a cane in the left hand, it helps give her a bit more stability than without it.  She does tend to list to the left as she stands and as she ambulates.  Range of motion of lumbar spine is reduced in all directions.  Deep tendon reflexes hypoactive, but symmetrical at knees and ankles.  Sensory exam intact to light touch in both lower limbs.  Manual muscle testing reveals diffuse weakness of the right lower limb of the hip, knee, and ankle musculature with strength graded in the low-grade IV range.  Strength of the left lower limb is a 4+ 5- throughout.  Straight leg raising negative for nerve root stretch sign.      The patient has mild tightness of the hip flexors.  The patient has severe weakness of the trunk musculature.  Abdominal musculature is so weak that when lying in the supine position with hips and knees flexed, she is unable to even elevate her scapulae off the exam table.      I did review with the patient very carefully her MRI and x-rays.  She does have rather significant degenerative disk and joint disease of lumbar spine with multiple levels of foraminal stenosis and facet arthropathy.      Her symptomatology does appear to be very consistent with a lumbar spinal stenosis.  She also has a mild scoliosis.  I had a model to demonstrate to the patient what we will attempt to do in therapy.  In particular, we will try to decompress the multi-levels of foraminal stenosis by reducing some of the lumbar lordosis.  This will also reduce some of the stress and pressure on the multiple facet joints that are arthritic.  I believe that that should help with her pain relief.  She also needs strengthening of  her lower limbs and core musculature as well as instruction in proper posture and mechanics for protection.      The patient has had physical therapy and she has had it with Kat Wayne, a physical therapist at [facility] near [facility].      I have given the patient a prescription for physical therapy.  She will schedule that with Kat Wayne to work on all of these above issues.      The patient also had questions about aquatic exercise.  I thought that that would be helpful, but I was doubtful that she could have land-based physical therapy at one facility and aquatic exercise at another to be covered by her health insurance.  However, she will be checking on aquatic therapy as well.  She would like to have her physical therapy with Kat as she has had therapy with her in the past, and the therapist does know her well.      Total time spent with the patient was well over 60 minutes, the vast majority of that time was discussing these above issues.  I will see the patient in followup in 3 months' time.      MD LISSETTE Meier MD             D: 2018   T: 2018   MT: KK      Name:     SAMIRA GREGG   MRN:      [id number]        Account:      [id number]   :      1940           Service Date: 2018      Document: [id number]

## 2018-11-30 ENCOUNTER — MYC MEDICAL ADVICE (OUTPATIENT)
Dept: ORTHOPEDICS | Facility: CLINIC | Age: 78
End: 2018-11-30

## 2018-11-30 ENCOUNTER — TELEPHONE (OUTPATIENT)
Dept: PHYSICAL MEDICINE AND REHAB | Facility: CLINIC | Age: 78
End: 2018-11-30

## 2018-11-30 DIAGNOSIS — M48.07 SPINAL STENOSIS OF LUMBOSACRAL REGION: Primary | ICD-10-CM

## 2018-11-30 DIAGNOSIS — M48.062 SPINAL STENOSIS OF LUMBAR REGION WITH NEUROGENIC CLAUDICATION: Primary | ICD-10-CM

## 2018-11-30 DIAGNOSIS — M62.81 GENERALIZED MUSCLE WEAKNESS: ICD-10-CM

## 2018-11-30 DIAGNOSIS — M47.27 LUMBOSACRAL RADICULOPATHY DUE TO DEGENERATIVE JOINT DISEASE OF SPINE: ICD-10-CM

## 2018-11-30 NOTE — TELEPHONE ENCOUNTER
referral has been faxed to keysha Espinoza at [phone]   PT has been notified.  Carey SANFORD

## 2018-12-03 ASSESSMENT — ENCOUNTER SYMPTOMS
NECK PAIN: 0
STIFFNESS: 0
MUSCLE CRAMPS: 0
MYALGIAS: 1
JOINT SWELLING: 0
ARTHRALGIAS: 0
MUSCLE WEAKNESS: 0
BACK PAIN: 0

## 2018-12-03 ASSESSMENT — ANXIETY QUESTIONNAIRES
GAD7 TOTAL SCORE: 0
5. BEING SO RESTLESS THAT IT IS HARD TO SIT STILL: NOT AT ALL
3. WORRYING TOO MUCH ABOUT DIFFERENT THINGS: NOT AT ALL
4. TROUBLE RELAXING: NOT AT ALL
7. FEELING AFRAID AS IF SOMETHING AWFUL MIGHT HAPPEN: NOT AT ALL
GAD7 TOTAL SCORE: 0
2. NOT BEING ABLE TO STOP OR CONTROL WORRYING: NOT AT ALL
1. FEELING NERVOUS, ANXIOUS, OR ON EDGE: NOT AT ALL
7. FEELING AFRAID AS IF SOMETHING AWFUL MIGHT HAPPEN: NOT AT ALL
6. BECOMING EASILY ANNOYED OR IRRITABLE: NOT AT ALL
GAD7 TOTAL SCORE: 0

## 2018-12-04 ENCOUNTER — OFFICE VISIT (OUTPATIENT)
Dept: ANESTHESIOLOGY | Facility: CLINIC | Age: 78
End: 2018-12-04
Payer: MEDICARE

## 2018-12-04 ENCOUNTER — APPOINTMENT (OUTPATIENT)
Dept: LAB | Facility: CLINIC | Age: 78
End: 2018-12-04
Payer: MEDICARE

## 2018-12-04 VITALS
DIASTOLIC BLOOD PRESSURE: 85 MMHG | HEART RATE: 77 BPM | SYSTOLIC BLOOD PRESSURE: 133 MMHG | BODY MASS INDEX: 30.05 KG/M2 | RESPIRATION RATE: 16 BRPM | WEIGHT: 176 LBS | HEIGHT: 64 IN

## 2018-12-04 DIAGNOSIS — M48.062 SPINAL STENOSIS OF LUMBAR REGION WITH NEUROGENIC CLAUDICATION: Primary | ICD-10-CM

## 2018-12-04 DIAGNOSIS — M51.16 INTERVERTEBRAL DISC DISORDER WITH RADICULOPATHY OF LUMBAR REGION: ICD-10-CM

## 2018-12-04 DIAGNOSIS — F11.20 CONTINUOUS OPIOID DEPENDENCE (H): ICD-10-CM

## 2018-12-04 RX ORDER — HYDROCODONE BITARTRATE AND ACETAMINOPHEN 5; 325 MG/1; MG/1
1 TABLET ORAL EVERY 6 HOURS PRN
Qty: 15 TABLET | Refills: 0 | Status: SHIPPED | OUTPATIENT
Start: 2018-12-04 | End: 2019-01-04

## 2018-12-04 ASSESSMENT — PAIN SCALES - GENERAL: PAINLEVEL: MILD PAIN (2)

## 2018-12-04 ASSESSMENT — ANXIETY QUESTIONNAIRES: GAD7 TOTAL SCORE: 0

## 2018-12-04 NOTE — MR AVS SNAPSHOT
After Visit Summary   12/4/2018    Shyann Samuel    MRN: [id number]           Patient Information     Date Of Birth          1940        Visit Information        Provider Department      12/4/2018 8:20 AM Jessica Lora APRN [facility]        Today's Diagnoses     Spinal stenosis of lumbar region with neurogenic claudication    -  1    Intervertebral disc disorder with radiculopathy of lumbar region         Continuous opioid dependence (H)          Care Instructions    1. Sign a controlled Substance Agreement.     2. Blood draw, performed today- Drug screen.     3. Hydrocodone- Acetaminophen (Norco) 5-325 mg tablets- Take 1 tablet by mouth every 6 hours as needed for severe pain.   15 tablets dispensed. Must last 30 days.     Follow up: 30 days with Jessica Lora.       To speak with a nurse, schedule/reschedule/cancel a clinic appointment, or request a medication refill call: [phone]     You can also reach us by [employer]: [email or web address]    For refills, please call on Monday, 1 week before your medication runs out. The doctors are not always in clinic, so this gives us time to get your prescriptions ready.  Please let us know the name of the medication you are requesting a refill of.                                     Follow-ups after your visit        Your next 10 appointments already scheduled     Feb 13, 2019  8:00 AM CST   (Arrive by 7:45 AM)   Return Visit with Chivo Zamora MD   [facility] ([facility])    [address] [place] [id number] [phone]              Who to contact     Please call your clinic at [phone] to:    Ask questions about your health    Make or cancel appointments    Discuss your medicines    Learn about your test results    Speak to your doctor            Additional Information About Your Visit       "  MyChart Information     [employer] gives you secure access to your electronic health record. If you see a primary care provider, you can also send messages to your care team and make appointments. If you have questions, please call your primary care clinic.  If you do not have a primary care provider, please call [phone] and they will assist you.      [employer] is an electronic gateway that provides easy, online access to your medical records. With [employer], you can request a clinic appointment, read your test results, renew a prescription or communicate with your care team.     To access your existing account, please contact your [facility] or call [phone] for assistance.        Care EveryWhere ID     This is your Care EveryWhere ID. This could be used by other organizations to access your [place] medical records  [id number]        Your Vitals Were     Pulse Respirations Height BMI (Body Mass Index)          77 16 1.626 m (5' 4\") 30.21 kg/m2         Blood Pressure from Last 3 Encounters:   12/04/18 133/85   11/21/18 141/83   07/06/18 135/81    Weight from Last 3 Encounters:   12/04/18 79.8 kg (176 lb)   11/21/18 80 kg (176 lb 4.8 oz)   10/18/18 78.9 kg (174 lb)              We Performed the Following     Drug Screen Comprehensive , Urine with Reported Meds (Pain Care Package)          Today's Medication Changes          These changes are accurate as of 12/4/18  9:25 AM.  If you have any questions, ask your nurse or doctor.               Stop taking these medicines if you haven't already. Please contact your care team if you have questions.     methocarbamol 750 MG tablet   Commonly known as:  ROBAXIN   Stopped by:  Jessica Lora APRN CNP                Where to get your medicines      Some of these will need a paper prescription and others can be bought over the counter.  Ask your nurse if you have questions.     Bring a paper prescription for each of these medications "     HYDROcodone-acetaminophen 5-325 MG tablet               Information about OPIOIDS     PRESCRIPTION OPIOIDS: WHAT YOU NEED TO KNOW   We gave you an opioid (narcotic) pain medicine. It is important to manage your pain, but opioids are not always the best choice. You should first try all the other options your care team gave you. Take this medicine for as short a time (and as few doses) as possible.    Some activities can increase your pain, such as bandage changes or therapy sessions. It may help to take your pain medicine 30 to 60 minutes before these activities. Reduce your stress by getting enough sleep, working on hobbies you enjoy and practicing relaxation or meditation. Talk to your care team about ways to manage your pain beyond prescription opioids.    These medicines have risks:    DO NOT drive when on new or higher doses of pain medicine. These medicines can affect your alertness and reaction times, and you could be arrested for driving under the influence (DUI). If you need to use opioids long-term, talk to your care team about driving.    DO NOT operate heavy machinery    DO NOT do any other dangerous activities while taking these medicines.    DO NOT drink any alcohol while taking these medicines.     If the opioid prescribed includes acetaminophen, DO NOT take with any other medicines that contain acetaminophen. Read all labels carefully. Look for the word  acetaminophen  or  Tylenol.  Ask your pharmacist if you have questions or are unsure.    You can get addicted to pain medicines, especially if you have a history of addiction (chemical, alcohol or substance dependence). Talk to your care team about ways to reduce this risk.    All opioids tend to cause constipation. Drink plenty of water and eat foods that have a lot of fiber, such as fruits, vegetables, prune juice, apple juice and high-fiber cereal. Take a laxative (Miralax, milk of magnesia, Colace, Senna) if you don t move your bowels at least  every other day. Other side effects include upset stomach, sleepiness, dizziness, throwing up, tolerance (needing more of the medicine to have the same effect), physical dependence and slowed breathing.    Store your pills in a secure place, locked if possible. We will not replace any lost or stolen medicine. If you don t finish your medicine, please throw away (dispose) as directed by your pharmacist. The [place] Pollution Control Agency has more information about safe disposal: [email or web address]         Primary Care Provider Office Phone # Fax #    Aleksandra Isabelle Starks [phone]       [address] [place] [id number]        Goals        General    Psychosocial (pt-stated)     Notes - Note edited  4/19/2016 11:10 AM by Zulma Kim, BSW    As of today's date 4/19/2016 goal is met at 76 - 100%.   Goal Status:  Complete   Pt has resource ([employer]) that can evaluate pt's home.  Pt has decided to complete two other tasks (hiring yard work and cleaning out of her house assistance) prior to having [employer] come out.  I want a resource that can evaluate my home for DME/potential remodeling so I can age in place  As of today's date 3/17/2016 goal is met at 0 - 25%.   Goal Status:  Active    LUKE Coffey          Equal Access to Services     LEE WALSH AH: Hadii aad ku hadasho Soomaali, waaxda luqadaha, qaybta kaalmada adeegyada, daija driscoll . So [facility] [phone].    ATENCIÓN: Si habla español, tiene a sanchez disposición servicios gratuitos de asistencia lingüística. Llame al [phone].    We comply with applicable federal civil rights laws and [place] laws. We do not discriminate on the basis of race, color, national origin, age, disability, sex, sexual orientation, or gender identity.            Thank you!     Thank you for choosing [facility]  for  your care. Our goal is always to provide you with excellent care. Hearing back from our patients is one way we can continue to improve our services. Please take a few minutes to complete the written survey that you may receive in the mail after your visit with us. Thank you!             Your Updated Medication List - Protect others around you: Learn how to safely use, store and throw away your medicines at www.disposemymeds.org.          This list is accurate as of 12/4/18  9:25 AM.  Always use your most recent med list.                   Brand Name Dispense Instructions for use Diagnosis    CALCIUM + D PO      Take  by mouth. 600mg/400 IU bid        camphor-menthol 0.5-0.5 % external lotion    DERMASARRA    222 mL    Apply up to 4x daily as needed for itch on the back.    Notalgia paresthetica       ESTRIOL 0.5MG/GM CREAM     30 g    Insert 1 gram vaginally 2 times per week.    Postmenopausal atrophic vaginitis       * gabapentin 300 MG capsule    NEURONTIN    90 capsule    Take 1 capsule (300 mg) by mouth At Bedtime    Intervertebral disc disorder with radiculopathy of lumbar region       * gabapentin 100 MG capsule    NEURONTIN    30 capsule    Take 2 capsules at hs x 5 days, then 1 capsule at hs as directed    Intervertebral disc disorders with radiculopathy, lumbar region       HYDROcodone-acetaminophen 5-325 MG tablet    NORCO    15 tablet    Take 1 tablet by mouth every 6 hours as needed for severe pain    Intervertebral disc disorder with radiculopathy of lumbar region       lisinopril 5 MG tablet    PRINIVIL/ZESTRIL    90 tablet    Take 1 tablet (5 mg) by mouth daily    Essential hypertension       NAPROXEN PO      Take 220 mg by mouth 2 times daily as needed for moderate pain        OMEGA-3 FISH OIL PO      Take  by mouth.        simvastatin 20 MG tablet    ZOCOR    90 tablet    Take 1 tablet (20 mg) by mouth At Bedtime    Hyperlipidemia, unspecified hyperlipidemia type       UNABLE TO FIND      MEDICATION  NAME: Six-Flavor Tea pills, 8 pills (Chinese herbal medicain) Strengthen kidney merdian        vitamin B-12 1000 MCG tablet    CYANOCOBALAMIN     Take 1 tablet by mouth three times a week.        * Notice:  This list has 2 medication(s) that are the same as other medications prescribed for you. Read the directions carefully, and ask your doctor or other care provider to review them with you.

## 2018-12-04 NOTE — NURSING NOTE
LPN reviewed AVS with Pt.  Pt verbalized an understanding of information, and was asked to contact clinic with questions.    Sunni Sanchez LPN

## 2018-12-04 NOTE — PROGRESS NOTES
I had the pleasure of meeting Ms. Shyann Samuel on 12/4/2018 in the outpatient pain clinic in consult for Dr. Watson with regards to her chronic low back pain and medication management.   HPI:  Shyann Samuel is a 78 year old female who presents for bilateral neurogenic claudication of lower extremities secondary to advanced lumbar stenosis.  She tells me she has no back pain, only leg pain. The symptoms have been present for many years with intermittent periods of alleviation, however, her pain has now been progressively worsening.  She had a previous right hip replacement and has had that investigated and is not felt to be the cause of her symptoms.  She has been following with a physical therapist which noticed some obliquity in the pelvis and told her she needed a shoe lift. She notes that the heel lift completely resolved her right anterior thigh pain. However, her symptoms of bilateral aching/tightness of posterior thighs and calfs has continued.  She underwent a right-sided L3-4 and L4-5 transforaminal epidural steroid injections and this took away the pain for several hours but then the pain came back. Patient consulted with Dr. Chao Acevedo, who did recommend surgery, however, patient is seeking a second opinion with Dr. Jaspreet Chaney at [facility] on 1/9/19.   She has also recently increased gabapentin to 600 mg at bedtime at the suggestion of her PCP to avoid daytime drowsiness/dizziness; this seems to help somewhat with the symptoms.  They are much worse when she is up and walking, worst in the morning. Increased pain with prolonged sitting. She has had prior success with acupuncture, len chi, and physical therapy. She is currently having in-home acupuncture treatments and will be starting aquatic physical therapy next week, at the suggestion of Dr. Zamora. Patient does take Norco 5-325 mg sparingly, typically twice per week. Her last prescription was filled on 10/18/18 by Dr. Centeno for #15 tablets, which  has lasted her to today. She denies any adverse effects with use. She requests that I manage this medication, as well as her gabapentin.   ?  Past Medical History:   Diagnosis Date     Anterior corneal dystrophy      Back pain      Cataract      Esotropia      Hyperlipidemia      Hyperlipidemia      Hypertension      Insomnia      Osteoarthritis      Posterior vitreous detachment      Urinary tract infection     past medical history reviewed with patient.   Past Surgical History:   Procedure Laterality Date     C TOTAL HIP ARTHROPLASTY Right 10/2009    Replacement of right hip     CATARACT IOL, RT/LT Bilateral ~2005     EYE SURGERY  after 2003,pt unsure    blepharoplasty     HC EXTERNAL LEVATOR RESECTION Bilateral 12/2014    past surgical history reviewed with patient.   Medications:  Current Outpatient Prescriptions   Medication     Calcium-Vitamin D (CALCIUM + D PO)     cyanocolbalamin (VITAMIN  B-12) 1000 MCG tablet     ESTRIOL 0.5MG/GM CREAM     gabapentin (NEURONTIN) 100 MG capsule     gabapentin (NEURONTIN) 300 MG capsule     HYDROcodone-acetaminophen (NORCO) 5-325 MG per tablet     lisinopril (PRINIVIL/ZESTRIL) 5 MG tablet     methocarbamol (ROBAXIN) 750 MG tablet     NAPROXEN PO     Omega-3 Fatty Acids (OMEGA-3 FISH OIL PO)     simvastatin (ZOCOR) 20 MG tablet     UNABLE TO FIND     camphor-menthol (DERMASARRA) 0.5-0.5 % LOTN     Current Facility-Administered Medications   Medication     apraclonidine (IOPIDINE) 1 % ophthalmic solution 1 drop     A multi-state Prescription Monitoring Program reviewed and no aberrancies noted.     Allergies:     Allergies   Allergen Reactions     Benzalkonium Chloride      Eye irritation     Nitrofurantoin Other (See Comments)     Causes hast heart rate.  Causes fast heart rate.     Family History:  family history includes Cancer in an other family member; Cerebrovascular Disease in her mother; Crohn Disease in her mother; Osteoporosis in her mother; Other Cancer in her  "brother and father. There is no history of Glaucoma, Macular Degeneration, Melanoma, or Skin Cancer.  Social history: she lives in [place]. She is retired.    Smoking: never smoker. Alcohol: denies. Street drugs: denies.     ROS:  A 14 point review of systems was completed; results are listed at end of note.     Objective:   /85  Pulse 77  Resp 16  Ht 1.626 m (5' 4\")  Wt 79.8 kg (176 lb)  BMI 30.21 kg/m2  Body mass index is 30.21 kg/(m^2).  General: In no apparent distress  Mental status: Normal mood and affect, pleasant  Neuro: Alert, oriented. No sensory deficits.   Head: Atraumatic, normocephalic  Eyes: Extra-ocular movements grossly intact, no scleral icterus  Neck: Supple, Range of motion full  Respiratory: Non-labored breathing; No respiratory distress  Skin: No rashes or lesions noted on exposed areas of skin  Msk:   Lumbar Spine:  Spinous process tenderness with palpation of L3, L4, L5 vertebrae.   Strength 4/5 on right: plantarflexion, hamstrings, quadriceps. 5/5 on left.   Patellar and Achilles reflexes 2+ bilaterally.   Negative straight leg raise.   Gait is slow; antalgic, but symmetrical.    Imaging:   MR LUMBAR SPINE W/O CONTRAST 6/13/2018 4:45 PM  Impression:   1. Progression of multilevel lumbar spondylosis since 7/5/2011.  2. Increased advanced spinal canal stenosis L2-L4, moderate to  advanced spinal canal stenosis at L1-2 and moderate spinal canal  stenosis at L4-5.   3. Increased mild to moderate neural foraminal stenosis on the left at  L4-5 and bilaterally at L1-2.  4. Chronic superior endplate compression fractures L2-L5, new since  7/5/2011.    2 views lumbar spine radiographs 7/20/2018 9:29 AM     History: ; Lumbar radiculopathy     Comparison: MRI 6/30/2018     Findings:     Standing  Flexion and extension lateral views of the lumbar spine were  obtained.     5  lumbar type vertebral bodies are assumed for the purpose of this  dictation.     Contour irregularity of the " superior endplate of L2-L5, with  associated mild anterior wedging at this levels are unchanged.      There is multilevel degenerative changes of the lumbar spine, with  relatively preserved disc space. There is also lower lumbar  predominant facet arthropathy. Anterior endplate osteophytes, most  severe at T11-T12, with partially visualized changes of DISH in the  lower thoracic spine.     On the flexion/extension views, there is no evidence for dynamic  instability.     Vascular calcifications.         Impression:  1. Unchanged compression deformities of the superior endplates of  L2-L5.  2. No evidence for dynamic instability.    Assessment:  Ms. Shyann Samuel is a 77 yo female seen today for advanced spinal canal stenosis with bilateral neurogenic claudication.     Plan:   1. Patient education: I went over the above diagnoses and treatment plan with her and answered all of her questions.  2. Imaging review: I reviewed the imaging reports with her   3. Interventions: May repeat epidural steroid injections with alternate approach (?caudal).   -Patient is continuing with acupuncture as she finds this to be extremely beneficial.   4. Medications  1. Norco 5-325 mg refilled; one tablet daily as needed. #15 to last 4-6 weeks. Blood drug screen obtained and Controlled Substance Agreement signed today.   -patient will trial gabapentin 300 mg at BID dosing; if she is noting increased drowsiness/dizziness, will alter to 600 mg at bedtime. I will manage the gabapentin ongoing.   5. Exercise program: Continue with aquatic physical therapy.   6. Follow up: Return in 4-6 weeks. Will review Dr. Jaspreet Chaney's note through [facility] for second surgical opinion.     Total time spent was  35 minutes, and more than 50% of face to face time was spent in counseling and/or coordination of care regarding the above plan.    Thank you for the consult.   KELLY Zheng, AGPCNP-BC  [facility]          Answers for HPI/ROS submitted by the patient on 12/3/2018   JAISON 7 TOTAL SCORE: 0  General Symptoms: No  Skin Symptoms: No  HENT Symptoms: No  EYE SYMPTOMS: No  HEART SYMPTOMS: No  LUNG SYMPTOMS: No  INTESTINAL SYMPTOMS: No  URINARY SYMPTOMS: No  GYNECOLOGIC SYMPTOMS: No  BREAST SYMPTOMS: No  SKELETAL SYMPTOMS: Yes  BLOOD SYMPTOMS: No  NERVOUS SYSTEM SYMPTOMS: No  MENTAL HEALTH SYMPTOMS: No  Back pain: No  Muscle aches: Yes  Neck pain: No  Swollen joints: No  Joint pain: No  Bone pain: No  Muscle cramps: No  Muscle weakness: No  Joint stiffness: No  Bone fracture: No

## 2018-12-04 NOTE — PATIENT INSTRUCTIONS
1. Sign a controlled Substance Agreement.     2. Blood draw, performed today- Drug screen.     3. Hydrocodone- Acetaminophen (Norco) 5-325 mg tablets- Take 1 tablet by mouth every 6 hours as needed for severe pain.   15 tablets dispensed. Must last 30 days.     Follow up: 30 days with Jessica Lora.       To speak with a nurse, schedule/reschedule/cancel a clinic appointment, or request a medication refill call: [phone]     You can also reach us by [employer]: [email or web address]    For refills, please call on Monday, 1 week before your medication runs out. The doctors are not always in clinic, so this gives us time to get your prescriptions ready.  Please let us know the name of the medication you are requesting a refill of.

## 2018-12-04 NOTE — LETTER
12/4/2018       RE: Shyann Samuel  [address]  [place] [address]     Dear Colleague,    Thank you for referring your patient, Shyann Samuel, to the [facility] FOR [facility]ENSIVE [facility]IN MANAG[facility] at [facility]. Please see a copy of my visit note below.    I had the pleasure of meeting Ms. Shyann Samuel on 12/4/2018 in the outpatient pain clinic in consult for Dr. Watson with regards to her chronic low back pain and medication management.   HPI:  Shyann Samuel is a 78 year old female who presents for bilateral neurogenic claudication of lower extremities secondary to advanced lumbar stenosis.   She tells me she has no back pain, only leg pain. The symptoms have been present for many years with intermittent periods of alleviation, however, her pain has now been progressively worsening.  She had a previous right hip replacement and has had that investigated and is not felt to be the cause of her symptoms.  She has been following with a physical therapist which noticed some obliquity in the pelvis and told her she needed a shoe lift. She notes that the heel lift completely resolved her right anterior thigh pain. However, her symptoms of bilateral aching/tightness of posterior thighs and calfs has continued.  She  underwent a right-sided L3-4 and L4-5 transforaminal epidural steroid injections and this took away the pain for several hours but then the pain came back. Patient consulted with Dr. Chao Acevedo, who did recommend surgery, however, patient is seeking a second opinion with Dr. Jaspreet Chaney at [facility] on 1/9/19.   She has also recently increased gabapentin to 600 mg at bedtime at the suggestion of her PCP to avoid daytime drowsiness/dizziness; this seems to help somewhat with the symptoms.  They are much worse when she is up and walking, worst in the morning. Increased pain with prolonged sitting. She has had prior success with acupuncture, len chi,  and physical therapy. She is currently having in-home acupuncture treatments and will be starting aquatic physical therapy next week, at the suggestion of Dr. Zamora. Patient does take Norco 5-325 mg sparingly, typically twice per week. Her last prescription was filled on 10/18/18 by Dr. Centeno for #15 tablets, which has lasted her to today. She denies any adverse effects with use. She requests that I manage this medication, as well as her gabapentin.   ?  Past Medical History:   Diagnosis Date     Anterior corneal dystrophy      Back pain      Cataract      Esotropia      Hyperlipidemia      Hyperlipidemia      Hypertension      Insomnia      Osteoarthritis      Posterior vitreous detachment      Urinary tract infection     past medical history reviewed with patient.   Past Surgical History:   Procedure Laterality Date     C TOTAL HIP ARTHROPLASTY Right 10/2009    Replacement of right hip     CATARACT IOL, RT/LT Bilateral ~2005     EYE SURGERY  after 2003,pt unsure    blepharoplasty     HC EXTERNAL LEVATOR RESECTION Bilateral 12/2014    past surgical history reviewed with patient.   Medications:  Current Outpatient Prescriptions   Medication     Calcium-Vitamin D (CALCIUM + D PO)     cyanocolbalamin (VITAMIN  B-12) 1000 MCG tablet     ESTRIOL 0.5MG/GM CREAM     gabapentin (NEURONTIN) 100 MG capsule     gabapentin (NEURONTIN) 300 MG capsule     HYDROcodone-acetaminophen (NORCO) 5-325 MG per tablet     lisinopril (PRINIVIL/ZESTRIL) 5 MG tablet     methocarbamol (ROBAXIN) 750 MG tablet     NAPROXEN PO     Omega-3 Fatty Acids (OMEGA-3 FISH OIL PO)     simvastatin (ZOCOR) 20 MG tablet     UNABLE TO FIND     camphor-menthol (DERMASARRA) 0.5-0.5 % LOTN     Current Facility-Administered Medications   Medication     apraclonidine (IOPIDINE) 1 % ophthalmic solution 1 drop     A multi-state Prescription Monitoring Program reviewed and no aberrancies noted.     Allergies:     Allergies   Allergen Reactions     Benzalkonium  "Chloride      Eye irritation     Nitrofurantoin Other (See Comments)     Causes hast heart rate.  Causes fast heart rate.     Family History:  family history includes Cancer in an other family member; Cerebrovascular Disease in her mother; Crohn Disease in her mother; Osteoporosis in her mother; Other Cancer in her brother and father. There is no history of Glaucoma, Macular Degeneration, Melanoma, or Skin Cancer.  Social history: she lives in [place]. She is retired.    Smoking: never smoker. Alcohol: denies. Street drugs: denies.     ROS:  A 14 point review of systems was completed; results are listed at end of note.     Objective:   /85  Pulse 77  Resp 16  Ht 1.626 m (5' 4\")  Wt 79.8 kg (176 lb)  BMI 30.21 kg/m2  Body mass index is 30.21 kg/(m^2).  General: In no apparent distress  Mental status: Normal mood and affect, pleasant  Neuro: Alert, oriented. No sensory deficits.   Head: Atraumatic, normocephalic  Eyes: Extra-ocular movements grossly intact, no scleral icterus  Neck: Supple, Range of motion full  Respiratory: Non-labored breathing; No respiratory distress  Skin: No rashes or lesions noted on exposed areas of skin  Msk:   Lumbar Spine:  Spinous process tenderness with palpation of L3, L4, L5 vertebrae.   Strength 4/5 on right: plantarflexion, hamstrings, quadriceps. 5/5 on left.   Patellar and Achilles reflexes 2+ bilaterally.   Negative straight leg raise.   Gait is slow; antalgic, but symmetrical.    Imaging:   MR LUMBAR SPINE W/O CONTRAST 6/13/2018 4:45 PM  Impression:   1. Progression of multilevel lumbar spondylosis since 7/5/2011.  2. Increased advanced spinal canal stenosis L2-L4, moderate to  advanced spinal canal stenosis at L1-2 and moderate spinal canal  stenosis at L4-5.   3. Increased mild to moderate neural foraminal stenosis on the left at  L4-5 and bilaterally at L1-2.  4. Chronic superior endplate compression fractures L2-L5, new since  7/5/2011.    2 views lumbar spine " radiographs 7/20/2018 9:29 AM     History: ; Lumbar radiculopathy     Comparison: MRI 6/30/2018     Findings:     Standing  Flexion and extension lateral views of the lumbar spine were  obtained.     5  lumbar type vertebral bodies are assumed for the purpose of this  dictation.     Contour irregularity of the superior endplate of L2-L5, with  associated mild anterior wedging at this levels are unchanged.      There is multilevel degenerative changes of the lumbar spine, with  relatively preserved disc space. There is also lower lumbar  predominant facet arthropathy. Anterior endplate osteophytes, most  severe at T11-T12, with partially visualized changes of DISH in the  lower thoracic spine.     On the flexion/extension views, there is no evidence for dynamic  instability.     Vascular calcifications.         Impression:  1. Unchanged compression deformities of the superior endplates of  L2-L5.  2. No evidence for dynamic instability.    Assessment:  Ms. Shyann Samuel is a 77 yo female seen today for advanced spinal canal stenosis with bilateral neurogenic claudication.     Plan:   1. Patient education: I went over the above diagnoses and treatment plan with her and answered all of her questions.  2. Imaging review: I reviewed the imaging reports with her   3. Interventions: May repeat epidural steroid injections with alternate approach (?caudal).   -Patient is continuing with acupuncture as she finds this to be extremely beneficial.   4. Medications  1. Norco 5-325 mg refilled; one tablet daily as needed. #15 to last 4-6 weeks. Blood drug screen obtained and Controlled Substance Agreement signed today.   -patient will trial gabapentin 300 mg at BID dosing; if she is noting increased drowsiness/dizziness, will alter to 600 mg at bedtime. I will manage the gabapentin ongoing.   5. Exercise program: Continue with aquatic physical therapy.   6. Follow up: Return in 4-6 weeks. Will review Dr. Jaspreet Chaney's note  through [facility] for second surgical opinion.     Total time spent was  35 minutes, and more than 50% of face to face time was spent in counseling and/or coordination of care regarding the above plan.      Again, thank you for allowing me to participate in the care of your patient.      Sincerely,    KELLY Zheng CNP

## 2018-12-12 ENCOUNTER — TRANSFERRED RECORDS (OUTPATIENT)
Dept: HEALTH INFORMATION MANAGEMENT | Facility: CLINIC | Age: 78
End: 2018-12-12

## 2018-12-12 DIAGNOSIS — R60.0 LEG EDEMA, LEFT: Primary | ICD-10-CM

## 2018-12-12 LAB
6MAM SERPL QL CFM: NEGATIVE
AMPHETAMINES SPEC-MCNC: NEGATIVE NG/ML
APAP BLD-MCNC: NEGATIVE UG/ML
BARBITURATES SPEC-MCNC: NEGATIVE UG/ML
BENZODIAZ SPEC-MCNC: NEGATIVE NG/ML
BUPRENORPHINE SERPLBLD-MCNC: NEGATIVE NG/ML
CARBOXYTHC BLD-MCNC: NEGATIVE NG/ML
CARISOPRODOL IA: NEGATIVE UG/ML
COCAINE METABOLITE IA: NEGATIVE NG/ML
CODEINE SERPL-MCNC: NEGATIVE NG/ML
DECLARED MEDICATIONS: ABNORMAL
DIHYDROCODEINE: NEGATIVE NG/ML
ETHANOL BLD-MCNC: NEGATIVE GM/DL
FENTANYL IA: NEGATIVE NG/ML
GABAPENTIN CONFIRM: POSITIVE
GABAPENTIN IA: POSITIVE UG/ML
GABAPENTIN: 1.7 UG/ML
HYDROCODONE SERPL-MCNC: 7.9 NG/ML
HYDROMORPHONE SERPL-MCNC: NEGATIVE NG/ML
MEPERIDINE SERPLBLD-MCNC: NEGATIVE NG/ML
METHADONE BLD-MCNC: NEGATIVE NG/ML
MORPHINE SERPL-MCNC: NEGATIVE NG/ML
OPIATES SERPL QL CFM: POSITIVE
OPIATES SPEC-MCNC: POSITIVE NG/ML
OTHER DRUGS DETECTED: ABNORMAL
OXYCODONE SERPL QL: NEGATIVE
OXYCODONE SERPL-MCNC: NEGATIVE NG/ML
OXYCODONE SERPLBLD SCN-MCNC: NEGATIVE NG/ML
OXYMORPHONE SERPLBLD CFM-MCNC: NEGATIVE NG/ML
PCP SPEC-MCNC: NEGATIVE NG/ML
PROPOXYPH SPEC-MCNC: NEGATIVE NG/ML
TRAMADOL BLD-MCNC: NEGATIVE NG/ML

## 2018-12-18 ENCOUNTER — ANCILLARY PROCEDURE (OUTPATIENT)
Dept: ULTRASOUND IMAGING | Facility: CLINIC | Age: 78
End: 2018-12-18
Attending: INTERNAL MEDICINE
Payer: MEDICARE

## 2018-12-18 DIAGNOSIS — R60.0 LEG EDEMA, LEFT: ICD-10-CM

## 2018-12-19 ENCOUNTER — MEDICAL CORRESPONDENCE (OUTPATIENT)
Dept: HEALTH INFORMATION MANAGEMENT | Facility: CLINIC | Age: 78
End: 2018-12-19

## 2018-12-19 ENCOUNTER — THERAPY VISIT (OUTPATIENT)
Dept: PHYSICAL THERAPY | Facility: CLINIC | Age: 78
End: 2018-12-19
Payer: MEDICARE

## 2018-12-19 DIAGNOSIS — M54.41 BILATERAL LOW BACK PAIN WITH RIGHT-SIDED SCIATICA, UNSPECIFIED CHRONICITY: Primary | ICD-10-CM

## 2018-12-19 PROCEDURE — 97161 PT EVAL LOW COMPLEX 20 MIN: CPT | Mod: GP | Performed by: PHYSICAL THERAPIST

## 2018-12-19 PROCEDURE — G8979 MOBILITY GOAL STATUS: HCPCS | Mod: GP | Performed by: PHYSICAL THERAPIST

## 2018-12-19 PROCEDURE — G8980 MOBILITY D/C STATUS: HCPCS | Mod: GP | Performed by: PHYSICAL THERAPIST

## 2018-12-19 PROCEDURE — G8978 MOBILITY CURRENT STATUS: HCPCS | Mod: GP | Performed by: PHYSICAL THERAPIST

## 2018-12-19 PROCEDURE — 97110 THERAPEUTIC EXERCISES: CPT | Mod: GP | Performed by: PHYSICAL THERAPIST

## 2018-12-19 NOTE — PROGRESS NOTES
[facility] Initial Evaluation  Subjective:  Patient returns with worsening back and leg pain.  Patient has seen several MD's for this issue with recent visit on 11/30/2018 requesting patient try physical therapy again.  Picked something up wrong at home and started having severe pain in both calves.  Gabapentin helped. Also taking hydrocondone. Has neurogenic claudication.  Has orders for pool therapy but NP also thought she may need surgery.   Has had two consultations regarding surgery.  Both thought that she needed surgery.  At this point they plan to do a laminectomy.  In the meantime she has developed edema in her left leg.  Has been wearing a compression stocking.  Had an ultrasound but does not know the results yet.  Goes down some at night a bit.  Not able to get a shoe on.  Decreased the dose of Gabapentin since leg edema after she increased what she was taken.  Was also taking an herbal supplement and stopped taking this.         The history is provided by the patient. No  was used.   Shyann Samuel is a 78 year old female with a lumbar condition.  Condition occurred with:  Lifting.  Condition occurred: at home.  This is a chronic condition     Patient reports pain:  Central lumbar spine.  Radiates to:  Lower leg left and lower leg right.  Pain is described as aching and is constant and reported as 5/10.  Associated symptoms:  Edema. Pain is worse during the day.  Symptoms are exacerbated by standing, bending and walking and relieved by nothing.  Since onset symptoms are gradually worsening.  Special tests:  MRI.                            Red flags:  None as reported by the patient.                        Objective:    Gait:    Gait Type:  Antalgic   Assistive Devices:  Cane                 Lumbar/SI Evaluation  ROM:    AROM Lumbar:   Flexion:        Limited to hands to knees  Ext:                    Unable to do this due to pain   Side Bend:        Left:     Right:    Rotation:           Left:     Right:   Side Glide:        Left:     Right:           Lumbar Myotomes:      L2-4 (Quads):  Left:  4+    Right:  4  L4 (Ankle DF):  Right:  4    S1 (Toe Raise):  Left: 4    Right: 3+  Lumbar DTR's:  not assessed      Cord Signs:  not assessed    Lumbar Dermtomes:  normal                Neural Tension/Mobility:  Lumbar:  Not assessed        Lumbar Palpation:  not assessed      Functional Tests:  Core strength and proprioception lumbar: able to move sit to stand without hands and with hands.                                                         General     ROS    Assessment/Plan:    Patient is a 78 year old female with lumbar complaints.    Patient has the following significant findings with corresponding treatment plan.                Diagnosis 1:  Back pain  Pain -  manual therapy, self management, education and home program  Decreased ROM/flexibility - manual therapy, therapeutic exercise and home program  Decreased strength - therapeutic exercise, therapeutic activities and home program  Impaired gait - gait training, assistive devices and home program  Decreased function - therapeutic activities and home program    Therapy Evaluation Codes:   1) History comprised of:   Personal factors that impact the plan of care:      Living environment and Time since onset of symptoms.    Comorbidity factors that impact the plan of care are:      Weakness.     Medications impacting care: Pain.  2) Examination of Body Systems comprised of:   Body structures and functions that impact the plan of care:      Lumbar spine.   Activity limitations that impact the plan of care are:      Bending, Cooking, Dressing, Lifting, Standing, Walking and Sleeping.  3) Clinical presentation characteristics are:   Stable/Uncomplicated.  4) Decision-Making    Low complexity using standardized patient assessment instrument and/or measureable assessment of functional outcome.  Cumulative Therapy Evaluation is: Low  complexity.    Previous and current functional limitations:  (See Goal Flow Sheet for this information)    Short term and Long term goals: (See Goal Flow Sheet for this information)     Communication ability:  Patient appears to be able to clearly communicate and understand verbal and written communication and follow directions correctly.  Treatment Explanation - The following has been discussed with the patient:   RX ordered/plan of care  Anticipated outcomes  Possible risks and side effects  This patient would benefit from PT intervention to resume normal activities.   Rehab potential is good.    Frequency:  One visit  Duration:  One visit  Discharge Plan:  Independent in home treatment program.  Reach maximal therapeutic benefit.    Please refer to the daily flowsheet for treatment today, total treatment time and time spent performing 1:1 timed codes.

## 2018-12-19 NOTE — LETTER
DEPARTMENT OF HEALTH AND HUMAN SERVICES  CENTERS FOR MEDICARE & MEDICAID SERVICES    PLAN/UPDATED PLAN OF PROGRESS FOR OUTPATIENT REHABILITATION    PATIENTS NAME:  Shyann Samuel   : 1940  PROVIDER NUMBER:    [id number]  HICN:[id number]   PROVIDER NAME: [place] [facility]  MEDICAL RECORD NUMBER: [id number]   START OF CARE DATE:  SOC Date: 18   TYPE:  PT    PRIMARY/TREATMENT DIAGNOSIS: (Pertinent Medical Diagnosis)  Bilateral low back pain with right-sided sciatica, unspecified chronicity    VISITS FROM START OF CARE:  Rxs Used: 1     [place] [facility] Initial Evaluation  Subjective:  Patient returns with worsening back and leg pain.  Patient has seen several MD's for this issue with recent visit on 2018 requesting patient try physical therapy again.  Picked something up wrong at home and started having severe pain in both calves.  Gabapentin helped. Also taking hydrocondone. Has neurogenic claudication.  Has orders for pool therapy but NP also thought she may need surgery.   Has had two consultations regarding surgery.  Both thought that she needed surgery.  At this point they plan to do a laminectomy.  In the meantime she has developed edema in her left leg.  Has been wearing a compression stocking.  Had an ultrasound but does not know the results yet.  Goes down some at night a bit.  Not able to get a shoe on.  Decreased the dose of Gabapentin since leg edema after she increased what she was taken.  Was also taking an herbal supplement and stopped taking this.       The history is provided by the patient. No  was used.   Shyann Samuel is a 78 year old female with a lumbar condition.  Condition occurred with:  Lifting.  Condition occurred: at home.  This is a chronic condition     Patient reports pain:  Central lumbar spine.  Radiates to:  Lower leg left and lower leg right.  Pain is described as aching and is constant and  reported as 5/10.  Associated symptoms:  Edema. Pain is worse during the day.  Symptoms are exacerbated by standing, bending and walking and relieved by nothing.  Since onset symptoms are gradually worsening.  Special tests:  MRI.                      General health as reported by patient is good.  Pertinent medical history includes:  High blood pressure and osteoarthritis.  Medical allergies: yes (nitrofurantoin benzitlkonium choride).  Other surgeries include:  Orthopedic surgery (R hip replacement).  Current medications:  High blood pressure medication and pain medication.        Barriers include:  None as reported by patient.  Red flags:  None as reported by patient.  Objective:  Gait:    Gait Type:  Antalgic   Assistive Devices:  Cane  Lumbar/SI Evaluation  ROM:    AROM Lumbar:   Flexion:        Limited to hands to knees  Ext:                    Unable to do this due to pain   Side Bend:        Left:     Right:   Rotation:           Left:     Right:   Side Glide:        Left:     Right:         Lumbar Myotomes:    L2-4 (Quads):  Left:  4+    Right:  4  L4 (Ankle DF):  Right:  4  S1 (Toe Raise):  Left: 4    Right: 3+  Lumbar DTR's:  not assessed  Cord Signs:  not assessed  Lumbar Dermtomes:  normal  Neural Tension/Mobility:  Lumbar:  Not assessed    Lumbar Palpation:  not assessed  Functional Tests:  Core strength and proprioception lumbar: able to move sit to stand without hands and with hands.    Assessment/Plan:    Patient is a 78 year old female with lumbar complaints.    Patient has the following significant findings with corresponding treatment plan.                Diagnosis 1:  Back pain  Pain -  manual therapy, self management, education and home program  Decreased ROM/flexibility - manual therapy, therapeutic exercise and home program  Decreased strength - therapeutic exercise, therapeutic activities and home program  Impaired gait - gait training, assistive devices and home program  Decreased function  - therapeutic activities and home program    Therapy Evaluation Codes:   1) History comprised of:   Personal factors that impact the plan of care:      Living environment and Time since onset of symptoms.    Comorbidity factors that impact the plan of care are:      Weakness.     Medications impacting care: Pain.  2) Examination of Body Systems comprised of:   Body structures and functions that impact the plan of care:      Lumbar spine.   Activity limitations that impact the plan of care are:      Bending, Cooking, Dressing, Lifting, Standing, Walking and Sleeping.  3) Clinical presentation characteristics are:   Stable/Uncomplicated.  4) Decision-Making    Low complexity using standardized patient assessment instrument and/or measureable assessment of functional outcome.  Cumulative Therapy Evaluation is: Low complexity.  Previous and current functional limitations:  (See Goal Flow Sheet for this information)    Short term and Long term goals: (See Goal Flow Sheet for this information)     Communication ability:  Patient appears to be able to clearly communicate and understand verbal and written communication and follow directions correctly.  Treatment Explanation - The following has been discussed with the patient:   RX ordered/plan of care  Anticipated outcomes  Possible risks and side effects  This patient would benefit from PT intervention to resume normal activities.   Rehab potential is good.    Frequency:  One visit  Duration:  One visit  Discharge Plan:  Independent in home treatment program.  Reach maximal therapeutic benefit.          Caregiver Signature/Credentials _____________________________ Date ________       Treating Provider: Kat Wayne PT  I have reviewed and certified the need for these services and plan of treatment while under my care.        PHYSICIAN'S SIGNATURE:   _________________________________________  Date___________   Chivo Zamora MD    Certification period:  Beginning of Cert  "date period: 12/19/18 to  End of Cert period date: 03/18/19     Functional Level Progress Report: Please see attached \"Goal Flow sheet for Functional level.\"    ________ Continue Services or       ___X_____ DC Services                Service dates: From  SOC Date: 12/19/18 date to present                         "

## 2018-12-21 DIAGNOSIS — R60.0 LOWER EXTREMITY EDEMA: Primary | ICD-10-CM

## 2018-12-21 NOTE — PROGRESS NOTES
[facility] Initial Evaluation  Subjective:                                     General health as reported by patient is good.  Pertinent medical history includes:  High blood pressure and osteoarthritis.  Medical allergies: yes (nitrofurantoin benzitlkonium choride).  Other surgeries include:  Orthopedic surgery (R hip replacement).  Current medications:  High blood pressure medication and pain medication.          Barriers include:  None as reported by patient.    Red flags:  None as reported by patient.                        Objective:  System    Physical Exam    General     ROS    Assessment/Plan:

## 2018-12-26 ENCOUNTER — TELEPHONE (OUTPATIENT)
Dept: PHYSICAL MEDICINE AND REHAB | Facility: CLINIC | Age: 78
End: 2018-12-26

## 2018-12-26 NOTE — TELEPHONE ENCOUNTER
[facility] Call Center    Phone Message    May a detailed message be left on voicemail: yes    Reason for Call: Other: Pt calling to reschedule her 2/13 appt with Dr. Zamora. Please give pt a call back to reschedule, she said that if you call and reach her voicemail, wait a few minutes and call back since it takes her a little while to get to the phone.     Action Taken: Message routed to:  [facility] ([facility]): PM&R

## 2018-12-28 ENCOUNTER — OFFICE VISIT (OUTPATIENT)
Dept: FAMILY MEDICINE | Facility: CLINIC | Age: 78
End: 2018-12-28
Payer: MEDICARE

## 2018-12-28 VITALS
WEIGHT: 173 LBS | DIASTOLIC BLOOD PRESSURE: 83 MMHG | OXYGEN SATURATION: 97 % | BODY MASS INDEX: 29.53 KG/M2 | TEMPERATURE: 97.4 F | HEIGHT: 64 IN | HEART RATE: 91 BPM | SYSTOLIC BLOOD PRESSURE: 118 MMHG

## 2018-12-28 VITALS
TEMPERATURE: 97.4 F | SYSTOLIC BLOOD PRESSURE: 118 MMHG | HEIGHT: 64 IN | OXYGEN SATURATION: 97 % | HEART RATE: 91 BPM | WEIGHT: 173 LBS | DIASTOLIC BLOOD PRESSURE: 83 MMHG | BODY MASS INDEX: 29.53 KG/M2

## 2018-12-28 DIAGNOSIS — Z23 NEED FOR PNEUMOCOCCAL VACCINE: ICD-10-CM

## 2018-12-28 DIAGNOSIS — Z01.818 PREOP GENERAL PHYSICAL EXAM: Primary | ICD-10-CM

## 2018-12-28 DIAGNOSIS — M48.062 SPINAL STENOSIS OF LUMBAR REGION WITH NEUROGENIC CLAUDICATION: ICD-10-CM

## 2018-12-28 DIAGNOSIS — I10 BENIGN ESSENTIAL HYPERTENSION: ICD-10-CM

## 2018-12-28 DIAGNOSIS — I89.0 LYMPHEDEMA: Primary | ICD-10-CM

## 2018-12-28 LAB
ANION GAP SERPL CALCULATED.3IONS-SCNC: 6 MMOL/L (ref 3–14)
BUN SERPL-MCNC: 20 MG/DL (ref 7–30)
CALCIUM SERPL-MCNC: 9.1 MG/DL (ref 8.5–10.1)
CHLORIDE SERPL-SCNC: 106 MMOL/L (ref 94–109)
CO2 SERPL-SCNC: 25 MMOL/L (ref 20–32)
CREAT SERPL-MCNC: 0.64 MG/DL (ref 0.52–1.04)
ERYTHROCYTE [DISTWIDTH] IN BLOOD BY AUTOMATED COUNT: 14 % (ref 10–15)
GFR SERPL CREATININE-BSD FRML MDRD: 86 ML/MIN/{1.73_M2}
GLUCOSE SERPL-MCNC: 101 MG/DL (ref 70–99)
HCT VFR BLD AUTO: 49.4 % (ref 35–47)
HGB BLD-MCNC: 15.5 G/DL (ref 11.7–15.7)
INR PPP: 1.01 (ref 0.86–1.14)
MCH RBC QN AUTO: 29.8 PG (ref 26.5–33)
MCHC RBC AUTO-ENTMCNC: 31.4 G/DL (ref 31.5–36.5)
MCV RBC AUTO: 95 FL (ref 78–100)
PLATELET # BLD AUTO: 296 10E9/L (ref 150–450)
POTASSIUM SERPL-SCNC: 4.8 MMOL/L (ref 3.4–5.3)
RBC # BLD AUTO: 5.21 10E12/L (ref 3.8–5.2)
SODIUM SERPL-SCNC: 138 MMOL/L (ref 133–144)
WBC # BLD AUTO: 10.5 10E9/L (ref 4–11)

## 2018-12-28 ASSESSMENT — MIFFLIN-ST. JEOR
SCORE: 1254.97
SCORE: 1254.97

## 2018-12-28 NOTE — PATIENT INSTRUCTIONS
Gabapentin 200mg at bedtime through 12/30/18  Then  Lower gabapentin dose to 100mg at bedtime through 1/7/19 then stop    Stop fish oil, vitamins, herbs, supplements 10 days prior to procedure.

## 2018-12-28 NOTE — PROGRESS NOTES
Shyann Samuel is a 78 year old female here for the following issues:    Shyann is accompanied by her friend Dulce today.     Lower Extremity Edema  Shyann slipped and fell January 2017 and had lower extremity edema, left worse than right. Venous ultrasound 2/15/17 did not show DVT nor venous incompetency. She saw Dr. Ambriz, cardiology 4/26/17 for lower extremity edema who started her on spirnolactone 12.5 mg/d. Symptoms did not improve. . Her most recent ultrasound, 12/18/18 showed no evidence of left lower extremity DVT.  She has also been evaluated at vascular clinic and was told she did not have venous incompetency.she is now requesting referral to the lymphedema clinic. No redness, warmth or weeping. No pain with ambulation. No numbness.     Today she reports that her left foot is swollen and she is unable to wear a shoe. She is wearing a slipper in clinic today. She tries to elevate her leg during the day. She has compression stockings at home.     Chronic bilateral leg pain due to spinal stenosis  She was taking gabapentin for treatment of pain related to spinal stenosis. She has been on this for some time but felt it may have been contributing to the persistent leg edema. She was told she could taper off the Gabapentin by her neurosurgeon. She just switched from 300mg dose to 200mg dose yesterday. She needs a plan for tapering the gabapentin altogether.  She is taking Aleve 220mg once or twice daily for pain. Sometimes uses hydrocodone/Acetaminophen at bedtime if she cannot get comfortable.     Patient Active Problem List   Diagnosis     Chronic bilateral low back pain with sciatica     Status post hip replacement     Hyperlipidemia     Anterior corneal dystrophy     Hypertension     Insomnia     Osteoarthritis     Cataract     Dermatitis medicamentosa     Esotropia     Macular puckering     Nontoxic multinodular goiter     Posterior vitreous detachment     Recurrent UTI     Other symptoms involving nervous  "and musculoskeletal systems(171.13)     Atrophic vaginitis     Overactive bladder     Health Care Home     Lower extremity edema       Current Outpatient Medications   Medication Sig Dispense Refill     Calcium-Vitamin D (CALCIUM + D PO) Take  by mouth. 600mg/400 IU bid       camphor-menthol (DERMASARRA) 0.5-0.5 % LOTN Apply up to 4x daily as needed for itch on the back. 222 mL 11     cyanocolbalamin (VITAMIN  B-12) 1000 MCG tablet Take 1 tablet by mouth three times a week.       ESTRIOL 0.5MG/GM CREAM Insert 1 gram vaginally 2 times per week. 30 g 3     gabapentin (NEURONTIN) 100 MG capsule Take 2 capsules at hs x 5 days, then 1 capsule at hs as directed 30 capsule 1     gabapentin (NEURONTIN) 300 MG capsule Take 1 capsule (300 mg) by mouth At Bedtime 90 capsule 1     HYDROcodone-acetaminophen (NORCO) 5-325 MG tablet Take 1 tablet by mouth every 6 hours as needed for severe pain 15 tablet 0     lisinopril (PRINIVIL/ZESTRIL) 5 MG tablet Take 1 tablet (5 mg) by mouth daily 90 tablet 3     NAPROXEN PO Take 220 mg by mouth 2 times daily as needed for moderate pain       Omega-3 Fatty Acids (OMEGA-3 FISH OIL PO) Take  by mouth.       simvastatin (ZOCOR) 20 MG tablet Take 1 tablet (20 mg) by mouth At Bedtime 90 tablet 3     UNABLE TO FIND MEDICATION NAME: Six-Flavor Tea pills, 8 pills (Chinese herbal medicain) Strengthen kidney merdian         Allergies   Allergen Reactions     Benzalkonium Chloride      Eye irritation     Nitrofurantoin Other (See Comments)     Causes hast heart rate.  Causes fast heart rate.        EXAM  /83   Pulse 91   Temp 97.4  F (36.3  C) (Oral)   Ht 1.626 m (5' 4.02\")   Wt 78.5 kg (173 lb)   SpO2 97%   BMI 29.68 kg/m    Gen: Alert, pleasant, NAD  Cor: S1S2 no murmur  Lungs CTA  EXT: Asymmetry noted, no edema on right.   LEFT lower extremity: appears larger than the right with +1 pitting edema to the level of the knee.    Dry scaly skin on the plantar surface of the feet and around " nails, some thickening of the nails as well.     Leg measurements:   LEFT:   6 inches above the bony medial malleolus, circumference 12.5 in  RIGHT: 6 inches above the bony medial malleolus, circumference 10.75 in    Assessment:  (I89.0) Lymphedema  (primary encounter diagnosis)  Comment: due to poor drainage of lymphatics. Venous insufficiency and DVT have been ruled out  Plan: LYMPHEDEMA THERAPY REFERRAL        Referral to the lymphedema treatment therapist  Addendum: (R60.0) Edema of left lower extremity  Diagnosis removed per request of PT so that therapy for lymphedema will be covered by insurance. Lymphedema is an appropriate diagnosis.    (M48.062) Spinal stenosis of lumbar region with neurogenic claudication  Comment: she is weaning off Gabapentin per her neurosurgeon, just dropped down to Gabapentin 200mg q hs  Plan:   Patient Instructions   Gabapentin 200mg at bedtime through 12/30/18  Then  Lower gabapentin dose to 100mg at bedtime through 1/7/19 then stop      Aleksandra Starks MD  Internal Medicine/Pediatrics                Aleksandra Starks MD  Internal Medicine/Pediatrics    I, Sunshine Chen, am serving as a scribe to document services personally performed by Dr. Aleksandra Starks, based on data collection and the provider's statements to me. Dr. Starks has reviewed, edited, and approved the above note.

## 2018-12-28 NOTE — NURSING NOTE
Screening Questionnaire for Adult Immunization    Are you sick today?   No   Do you have allergies to medications, food, a vaccine component or latex?   No   Have you ever had a serious reaction after receiving a vaccination?   No   Do you have a long-term health problem with heart disease, lung disease, asthma, kidney disease, metabolic disease (e.g. diabetes), anemia, or other blood disorder?   No   Do you have cancer, leukemia, HIV/AIDS, or any other immune system problem?   No   In the past 3 months, have you taken medications that affect  your immune system, such as prednisone, other steroids, or anticancer drugs; drugs for the treatment of rheumatoid arthritis, Crohn s disease, or psoriasis; or have you had radiation treatments?   No   Have you had a seizure, or a brain or other nervous system problem?   No   During the past year, have you received a transfusion of blood or blood     products, or been given immune (gamma) globulin or antiviral drug?   No   For women: Are you pregnant or is there a chance you could become        pregnant during the next month?   No   Have you received any vaccinations in the past 4 weeks?   No     Immunization questionnaire answers were all negative.        Per orders of Dr. Starks, injection of PPSV 23 given by Ivonne Nelson. Patient instructed to remain in clinic for 15 minutes afterwards, and to report any adverse reaction to me immediately.       Screening performed by Ivonne Nelson on 12/28/2018 at 4:17 PM.

## 2018-12-28 NOTE — NURSING NOTE
"78 year old  Chief Complaint   Patient presents with     Swelling     pt left leg swelling.pt has reduced gabapentin but leg is still swelling       Blood pressure 118/83, pulse 91, temperature 97.4  F (36.3  C), temperature source Oral, height 1.626 m (5' 4.02\"), weight 78.5 kg (173 lb), SpO2 97 %, not currently breastfeeding. Body mass index is 29.68 kg/m .  Patient Active Problem List   Diagnosis     Chronic bilateral low back pain with sciatica     Status post hip replacement     Hyperlipidemia     Anterior corneal dystrophy     Hypertension     Insomnia     Osteoarthritis     Cataract     Dermatitis medicamentosa     Esotropia     Macular puckering     Nontoxic multinodular goiter     Posterior vitreous detachment     Recurrent UTI     Other symptoms involving nervous and musculoskeletal systems(781.99)     Atrophic vaginitis     Overactive bladder     Health Care Home     Lower extremity edema       Wt Readings from Last 2 Encounters:   12/28/18 78.5 kg (173 lb)   12/04/18 79.8 kg (176 lb)     BP Readings from Last 3 Encounters:   12/28/18 118/83   12/04/18 133/85   11/21/18 141/83         Current Outpatient Medications   Medication     Calcium-Vitamin D (CALCIUM + D PO)     camphor-menthol (DERMASARRA) 0.5-0.5 % LOTN     cyanocolbalamin (VITAMIN  B-12) 1000 MCG tablet     ESTRIOL 0.5MG/GM CREAM     gabapentin (NEURONTIN) 100 MG capsule     gabapentin (NEURONTIN) 300 MG capsule     HYDROcodone-acetaminophen (NORCO) 5-325 MG tablet     lisinopril (PRINIVIL/ZESTRIL) 5 MG tablet     NAPROXEN PO     Omega-3 Fatty Acids (OMEGA-3 FISH OIL PO)     simvastatin (ZOCOR) 20 MG tablet     UNABLE TO FIND     Current Facility-Administered Medications   Medication     apraclonidine (IOPIDINE) 1 % ophthalmic solution 1 drop       Social History     Tobacco Use     Smoking status: Never Smoker     Smokeless tobacco: Never Used   Substance Use Topics     Alcohol use: No     Drug use: No       Health Maintenance Due   Topic Date " Due     URINE DRUG SCREEN Q1 YR  04/08/1956     PHQ-9 Q1YR  04/08/1959     ZOSTER IMMUNIZATION (1 of 2) 04/08/1991     ADVANCE DIRECTIVE PLANNING Q5 YRS  04/08/1996     DEXA SCAN SCREENING (SYSTEM ASSIGNED)  04/08/2006     DTAP/TDAP/TD IMMUNIZATION (2 - Td) 06/24/2010     FALL RISK ASSESSMENT  11/28/2018       No results found for: PAP      December 28, 2018 2:46 PM

## 2018-12-28 NOTE — PROGRESS NOTES
[facility] [place]  [address]  [facility] [address]  [phone]  Dept: [phone]    PRE-OP EVALUATION:  Today's date: 2018    Shyann Samuel (: 1940) presents for pre-operative evaluation assessment as requested by Dr. Jaspreet Chaney.  She requires evaluation and anesthesia risk assessment prior to undergoing surgery/procedure for treatment of  .    Proposed Surgery/ Procedure: Laminectomy L1-L5  Date of Surgery/ Procedure: 2018  Time of Surgery/ Procedure: 9:30AM  Hospital/Surgical Facility: [facility]  Fax number for surgical facility:    Primary Physician: Aleksandra Starks  Type of Anesthesia Anticipated: General    Patient has a Health Care Directive or Living Will:  Yes, pt is in the process of revising.     1. NO - Do you have a history of heart attack, stroke, stent, bypass or surgery on an artery in the head, neck, heart or legs?  2. NO - Do you ever have any pain or discomfort in your chest?  3. NO - Do you have a history of  Heart Failure?  4. NO - Are you troubled by shortness of breath when: walking on the level, up a slight hill or at night?  5. NO - Do you currently have a cold, bronchitis or other respiratory infection?  6. NO - Do you have a cough, shortness of breath or wheezing?  7. NO - Do you sometimes get pains in the calves of your legs when you walk?  8. NO - Do you or anyone in your family have previous history of blood clots?  9. NO - Do you or does anyone in your family have a serious bleeding problem such as prolonged bleeding following surgeries or cuts?  10. Yes - Have you ever had problems with anemia or been told to take iron pills? After right hip replacement surgery. Not currently on iron.   11. NO - Have you had any abnormal blood loss such as black, tarry or bloody stools, or abnormal vaginal bleeding?  12. Yes - Have you ever had a blood transfusion? After right hip replacement surgery   13. NO -  Have you or any of your relatives ever had problems with anesthesia?  14. NO - Do you have sleep apnea, excessive snoring or daytime drowsiness?  15. NO - Do you have any prosthetic heart valves?  16. YES - Do you have prosthetic joints? Total Right hip replacement in 2009  17. NO - Is there any chance that you may be pregnant?      HPI:     HPI related to upcoming procedure:   Shyann is a 79 yo woman who presents for pre-op evaluation. She has lumbar spinal stenosis. She does not have back pain but she has had progressive bilateral leg pain with ambulation. She is to undergo elective lumbar laminectomy L1-5.      Shyann has no history of heart dx,lung dx, or DM. Nonsmoker. No hx of sleep apnea. No personal or family history of bleeding or clotting disorders. No personal or family history of intolerance to anesthesia. She has not had any previous complications with surgeries.   .   Hypertension  Shyann has a history of hypertension, she is taking lisinopril 5 mg daily. Blood pressures have been in target range. She has no chest pain, palpitations or dizziness.  BP Readings from Last 3 Encounters:   12/28/18 118/83   12/28/18 118/83   12/04/18 133/85     Lower extremity edema  Shyann fell and injured her leg in Jan 2017. Since that time she has had persistent left lower extremity edema. She has had several leg ultrasounds (most recent 12/18/18), all negative for DVT. She was assessed by the vascular clinic and did not have venous incompetency.  She is now referred to a lymphedema therapist. She has no orthopnea/PND or chest pain.       MEDICAL HISTORY:     Patient Active Problem List    Diagnosis Date Noted     Lower extremity edema 12/21/2018     Priority: Medium     Health Care Home 03/17/2016     Priority: Medium     Date:  4-19-16  Status: Closed  Care Coordinator/:  LUKE Coffey       Overactive bladder 09/03/2014     Priority: Medium     Atrophic vaginitis 12/19/2012     Priority: Medium      Other symptoms involving nervous and musculoskeletal systems(781.99) 11/27/2012     Priority: Medium     Recurrent UTI 11/21/2012     Priority: Medium     Cataract 09/27/2012     Priority: Medium     Utility update for deleted IMO code  [employer] Update utility       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 Provider to review and confirm.       Status post hip replacement 06/23/2011     Priority: Medium     (Problem list name updated by automated process. Provider to review and confirm.)        Past Medical History:   Diagnosis Date     Anterior corneal dystrophy      Back pain      Cataract      Esotropia      Hyperlipidemia      Hyperlipidemia      Hypertension      Insomnia      Osteoarthritis      Posterior vitreous detachment      Urinary tract infection      Past Surgical History:   Procedure Laterality Date     C TOTAL HIP ARTHROPLASTY Right 10/2009    Replacement of right hip     CATARACT IOL, RT/LT Bilateral ~2005     EYE SURGERY  after 2003,pt unsure    blepharoplasty     HC EXTERNAL LEVATOR RESECTION Bilateral 12/2014     TONSILLECTOMY  1946     Current Outpatient Medications   Medication Sig Dispense Refill     Calcium-Vitamin D (CALCIUM + D PO) Take  by mouth. 600mg/400 IU bid       cyanocolbalamin (VITAMIN  B-12) 1000 MCG tablet Take 1 tablet by mouth three times a week.       ESTRIOL 0.5MG/GM CREAM Insert 1 gram vaginally 2 times per  "week. 30 g 3     gabapentin (NEURONTIN) 100 MG capsule Take 2 capsules at hs x 5 days, then 1 capsule at hs as directed 30 capsule 1     HYDROcodone-acetaminophen (NORCO) 5-325 MG tablet Take 1 tablet by mouth every 6 hours as needed for severe pain 15 tablet 0     lisinopril (PRINIVIL/ZESTRIL) 5 MG tablet Take 1 tablet (5 mg) by mouth daily 90 tablet 3     NAPROXEN PO Take 220 mg by mouth 2 times daily as needed for moderate pain       Omega-3 Fatty Acids (OMEGA-3 FISH OIL PO) Take  by mouth.       simvastatin (ZOCOR) 20 MG tablet Take 1 tablet (20 mg) by mouth At Bedtime 90 tablet 3     OTC products: None, except as noted above  She is instructed to hold all vitamins, herbs, supplements and NSAIDS 10 days prior to surgery.    Allergies   Allergen Reactions     Benzalkonium Chloride      Eye irritation     Nitrofurantoin Other (See Comments)     Causes hast heart rate.  Causes fast heart rate.      Latex Allergy: NO    Social History     Tobacco Use     Smoking status: Never Smoker     Smokeless tobacco: Never Used   Substance Use Topics     Alcohol use: No     History   Drug Use No       REVIEW OF SYSTEMS:   Constitutional, HEENT, cardiovascular, pulmonary, gi and gu systems are negative, except as otherwise noted.    EXAM:   /83   Pulse 91   Temp 97.4  F (36.3  C) (Oral)   Ht 1.626 m (5' 4.02\")   Wt 78.5 kg (173 lb)   SpO2 97%   BMI 29.68 kg/m      GENERAL APPEARANCE: alert and no distress     EYES: EOMI, PERRL     HENT: ear canals and TM's normal and nose and mouth without ulcers or lesions     NECK: no adenopathy, no asymmetry, masses, or scars and thyroid normal to palpation     RESP: lungs clear to auscultation - no rales, rhonchi or wheezes     CV: regular rates and rhythm, normal S1 S2,  no murmur     ABD soft +BS, nontender     MS: extremities normal- no gross deformities noted.      EXT: lower extremity asymmetry. Left leg: +1 edema to the level of the knee. No redness/warmth. Dorsum of left " foot also with +1             edema     SKIN: no suspicious lesions or rashes    DIAGNOSTICS:   EKG: (12/28/18)appears normal, NSR, rate85, normal axis, normal intervals, no acute ST/T changes c/w ischemia, no LVH by voltage criteria, unchanged from previous tracings . Read by Aleksandra Starks MD    Results for orders placed or performed in visit on 12/28/18   Basic metabolic panel   Result Value Ref Range    Sodium 138 133 - 144 mmol/L    Potassium 4.8 3.4 - 5.3 mmol/L    Chloride 106 94 - 109 mmol/L    Carbon Dioxide 25 20 - 32 mmol/L    Anion Gap 6 3 - 14 mmol/L    Glucose 101 (H) 70 - 99 mg/dL    Urea Nitrogen 20 7 - 30 mg/dL    Creatinine 0.64 0.52 - 1.04 mg/dL    GFR Estimate 86 >60 mL/min/[1.73_m2]    GFR Estimate If Black >90 >60 mL/min/[1.73_m2]    Calcium 9.1 8.5 - 10.1 mg/dL   CBC with platelets   Result Value Ref Range    WBC 10.5 4.0 - 11.0 10e9/L    RBC Count 5.21 (H) 3.8 - 5.2 10e12/L    Hemoglobin 15.5 11.7 - 15.7 g/dL    Hematocrit 49.4 (H) 35.0 - 47.0 %    MCV 95 78 - 100 fl    MCH 29.8 26.5 - 33.0 pg    MCHC 31.4 (L) 31.5 - 36.5 g/dL    RDW 14.0 10.0 - 15.0 %    Platelet Count 296 150 - 450 10e9/L   INR   Result Value Ref Range    INR 1.01 0.86 - 1.14     IMPRESSION:   (Z01.818) Preop general physical exam  (primary encounter diagnosis)  Comment: elective lumbar laminectomy for history of spinal stenosis with bilateral leg pain  Plan: Basic metabolic panel, CBC with platelets, INR,        Routine UA with micro reflex to culture        No contraindication to surgery. Proceed as planned    (Z23) Need for pneumococcal vaccine  Comment:PCV 23 is due  Plan: ADMIN: Vaccine, Initial ([id number])        Given    (I10) Benign essential hypertension  Comment: blood pressure in good range  Plan: hold Lisinopril for 24 hours prior to surgery.     The proposed surgical procedure is considered INTERMEDIATE risk.    REVISED CARDIAC RISK INDEX  The patient has the following serious cardiovascular risks for perioperative  complications such as (MI, PE, VFib and 3  AV Block):  No serious cardiac risks  INTERPRETATION: 0 risks: Class I (very low risk - 0.4% complication rate)    The patient has the following additional risks for perioperative complications:  No identified additional risks      ICD-10-CM    1. Preop general physical exam Z01.818 Basic metabolic panel     CBC with platelets     INR     Routine UA with micro reflex to culture   2. Need for pneumococcal vaccine Z23 ADMIN: Vaccine, Initial ([id number])       RECOMMENDATIONS:     --Consult hospital rounder / IM to assist post-op medical management of hypertension.    --Patient is to HOLD all scheduled medications on the day of surgery .  Hold vitamins, herbs, supplements or NSAIDs for 10 days prior to procedure.    ACE Inhibitor or Angiotensin Receptor Blocker (ARB) Use  Ace inhibitor or Angiotensin Receptor Blocker (ARB) and should HOLD this medication for the 24 hours prior to surgery.      APPROVAL GIVEN to proceed with proposed procedure, without further diagnostic evaluation    Signed Electronically by: Aleksandra Starks MD    Copy of this evaluation report is provided to requesting physician.    [place] Preop Guidelines    Revised Cardiac Risk Index    I, Sunshine Chen, am serving as a scribe to document services personally performed by Dr. Aleksandra Starks, based on data collection and the provider's statements to me. Dr. Starks has reviewed, edited, and approved the above note.

## 2018-12-30 PROBLEM — M48.062 SPINAL STENOSIS OF LUMBAR REGION WITH NEUROGENIC CLAUDICATION: Status: ACTIVE | Noted: 2018-12-30

## 2018-12-31 ENCOUNTER — TELEPHONE (OUTPATIENT)
Dept: ANESTHESIOLOGY | Facility: CLINIC | Age: 78
End: 2018-12-31

## 2018-12-31 NOTE — TELEPHONE ENCOUNTER
Health Call Center    Phone Message    May a detailed message be left on voicemail: yes    Reason for Call: Medication Refill Request    Has the patient contacted the pharmacy for the refill? Yes   Name of medication being requested: HYDROcodone-acetaminophen (NORCO)   Provider who prescribed the medication: Jessica Lora  [place] [facility] [place]  Date medication is needed: Pt will be out 1/7/19. [place] [facility] [place] for appt,requesting to  pres[place] [facility] [place]      Action Taken: Message routed to:  [facility] ([facility] pain clinic

## 2019-01-02 NOTE — TELEPHONE ENCOUNTER
I called and left a voice message for the patient informing them that Jessica Lora needs to see her in clinic for a refill. You can call me back at [phone] with any questions or if you want to be seen sooner by her.    Yahaira Melendez, CMA

## 2019-01-04 ENCOUNTER — OFFICE VISIT (OUTPATIENT)
Dept: ANESTHESIOLOGY | Facility: CLINIC | Age: 79
End: 2019-01-04
Payer: MEDICARE

## 2019-01-04 ENCOUNTER — HOSPITAL ENCOUNTER (OUTPATIENT)
Dept: PHYSICAL THERAPY | Facility: CLINIC | Age: 79
Setting detail: THERAPIES SERIES
End: 2019-01-04
Attending: INTERNAL MEDICINE
Payer: MEDICARE

## 2019-01-04 VITALS — SYSTOLIC BLOOD PRESSURE: 145 MMHG | DIASTOLIC BLOOD PRESSURE: 91 MMHG | HEART RATE: 89 BPM | OXYGEN SATURATION: 98 %

## 2019-01-04 DIAGNOSIS — M51.16 INTERVERTEBRAL DISC DISORDER WITH RADICULOPATHY OF LUMBAR REGION: ICD-10-CM

## 2019-01-04 DIAGNOSIS — M51.16 INTERVERTEBRAL DISC DISORDERS WITH RADICULOPATHY, LUMBAR REGION: ICD-10-CM

## 2019-01-04 DIAGNOSIS — R60.0 LOWER EXTREMITY EDEMA: Primary | ICD-10-CM

## 2019-01-04 DIAGNOSIS — R60.0 EDEMA OF LEFT LOWER EXTREMITY: ICD-10-CM

## 2019-01-04 PROCEDURE — 97140 MANUAL THERAPY 1/> REGIONS: CPT | Mod: GP,ZF | Performed by: PHYSICAL THERAPIST

## 2019-01-04 PROCEDURE — 97110 THERAPEUTIC EXERCISES: CPT | Mod: GP,ZF | Performed by: PHYSICAL THERAPIST

## 2019-01-04 PROCEDURE — 97161 PT EVAL LOW COMPLEX 20 MIN: CPT | Mod: GP,ZF | Performed by: PHYSICAL THERAPIST

## 2019-01-04 RX ORDER — GABAPENTIN 100 MG/1
100 CAPSULE ORAL 3 TIMES DAILY
Qty: 90 CAPSULE | Refills: 3 | Status: SHIPPED | OUTPATIENT
Start: 2019-01-04 | End: 2019-04-10

## 2019-01-04 RX ORDER — HYDROCODONE BITARTRATE AND ACETAMINOPHEN 5; 325 MG/1; MG/1
1 TABLET ORAL DAILY PRN
Qty: 30 TABLET | Refills: 0 | Status: SHIPPED | OUTPATIENT
Start: 2019-01-04 | End: 2019-04-10

## 2019-01-04 ASSESSMENT — ANXIETY QUESTIONNAIRES
4. TROUBLE RELAXING: NOT AT ALL
5. BEING SO RESTLESS THAT IT IS HARD TO SIT STILL: NOT AT ALL
6. BECOMING EASILY ANNOYED OR IRRITABLE: NOT AT ALL
GAD7 TOTAL SCORE: 2
3. WORRYING TOO MUCH ABOUT DIFFERENT THINGS: NOT AT ALL
7. FEELING AFRAID AS IF SOMETHING AWFUL MIGHT HAPPEN: SEVERAL DAYS
GAD7 TOTAL SCORE: 2
1. FEELING NERVOUS, ANXIOUS, OR ON EDGE: SEVERAL DAYS
GAD7 TOTAL SCORE: 2
2. NOT BEING ABLE TO STOP OR CONTROL WORRYING: NOT AT ALL

## 2019-01-04 ASSESSMENT — PAIN SCALES - GENERAL: PAINLEVEL: NO PAIN (1)

## 2019-01-04 NOTE — PATIENT INSTRUCTIONS
1. Gabapentin increased.   Start Gabapentin as directed.     AM   PM   Bedtime  0                    0   100mg (1 tab). Current dose,  increase as tolerated to the next line    100mg (1 tab)  0   100 (1 tab).  After 7 days, increase as tolerated to the next line    100mg (1 tab)  100mg (1 tab)  100 (1 tab).  .      Call with any problems or when you are at this dose.    Caution for sedation.    Do not drive until you know how the medication affects you.   You can go slower if you need to or increasing only one dose at a time.  Do not stop abruptly once at higher doses.  This medication must be tapered off.    2. Refill given for Norco- 5-325. (With additional Tablets included.)    Narcotic prescriptions given today:  HYDROcodone-acetaminophen (NORCO) 5-325 MG tablet- Take 1 tablet by mouth every 6 hours as needed for severe pain. 30 tablets dispensed. You may fill on 1/4/19    All future refills will be for 15 tablets.     Follow up: As indicated for refills.       To speak with a nurse, schedule/reschedule/cancel a clinic appointment, or request a medication refill call: [phone]     You can also reach us by [employer]: [email or web address]    For refills, please call on Monday, 1 week before your medication runs out. The doctors are not always in clinic, so this gives us time to get your prescriptions ready.  Please let us know the name of the medication you are requesting a refill of.

## 2019-01-04 NOTE — PROGRESS NOTES
01/04/19 1200   Quick Adds   Quick Adds Certification   Rehab Discipline   Discipline PT   Type of Visit   Type of visit Initial Edema Evaluation   General Information   Start of care 01/04/19   Referring physician Aleksandra Starks   Orders Evaluate and treat as indicated   Order date 12/28/18   Medical diagnosis L LE lympnedema   Onset of illness / date of surgery 01/01/17   Edema onset 01/01/17   Affected body parts LLE   Edema etiology Trauma   Edema etiology comments slipped on ice 1/1/17   Pertinent history of current problem (PT: include personal factors and/or comorbidities that impact the POC; OT: include additional occupational profile info) Shyann slipped and fell January 2017 and had lower extremity edema, left worse than right. Venous ultrasound 2/15/17 did not show DVT nor venous incompetency. She saw Dr. Ambriz, cardiology 4/26/17 for lower extremity edema who started her on spirnolactone 12.5 mg/d. Symptoms did not improve. . Her most recent ultrasound, 12/18/18 showed no evidence of left lower extremity DVT.  She has also been evaluated at vascular clinic and was told she did not have venous incompetency.. She was taking gabapentin for treatment of pain related to spinal stenosis. She has been on this for some time but felt it may have been contributing to the persistent leg edema. She is currently decreasing the doseage and has gone from 300 to 100 mg.  PMH: spinal stenosis,R hip replacement. Having spinal surgery in Jan. 24   Surgical / medical history reviewed Yes   Edema special tests Ultrasound  (negative for clot and neg venous insufficiency)   Prior level of functional mobility no pain, walked w/o a cane   Prior treatment Compression garments   Community support Family / friend caregiver   Patient role / employment history Disabled   Psychosocial concerns Impaired body image   Living environment [place] [facility]  (has elevator)   Current assistive devices Standard cane   Assistive device  "comments using cane for 3-4 months   Fall Risk Screen   Fall screen completed by PT   Have you fallen 2 or more times in the past year? No   Have you fallen and had an injury in the past year? No   Is patient a fall risk? No   Abuse Screen (yes response referral indicated)   Feels Unsafe at Home or Work/School no   Feels Threatened by Someone no   Does Anyone Try to Keep You From Having Contact with Others or Doing Things Outside Your Home? no   Physical Signs of Abuse Present no   System Outcome Measures   Outcome Measures Lymphedema   Lymphedema Life Impact Scale (score range 0-72). A higher score indicates greater impairment. 10   Patient / Family Goals   Patient / family goals statement \" I want to know what's going on with my leg and if there's something I can do to reduce the swelling.\"   Pain   Patient currently in pain Yes   Pain location B legs due to spinal stenosis   Pain rating mod-severe   Vitals Signs   Vital Signs Comments 5'4\", 78 kg, BMI 30   Cognitive Status   Orientation Orientation to person, place and time   Level of consciousness Alert   Follows commands and answers questions 100% of the time   Edema Exam / Assessment   Skin condition Pitting;Non-pitting;Dryness;Intact   Skin condition comments firm, fibrotic lower L leg and foot, moderate edema L medial thigh   Pitting 1+   Pitting location lower leg   Scar No   Stemmer sign Positive   Stemmer sign comments L 2nd toe   Girth Measurements   Girth Measurements Refer to separate girth measurement flowsheet   Volume LE   Right LE (mL) 3928 to 60 cm   Left LE (mL) 5358 to 60 cm   LE volume comparison LLE volume greater than RLE volume   % difference 36.4   Range of Motion   ROM comments NT   Strength   Strength comments NT   Sensory   Sensory perception comments normal to light touch   Planned Edema Interventions   Planned edema interventions Manual lymph drainage;Gradient compression bandaging;Fit for compression garment;Exercises;Precautions to " prevent infection / exacerbation;Education;Manual therapy;ADL training;Skin care / precautions;Home management program development   Clinical Impression   Criteria for skilled therapeutic intervention met Yes   Therapy diagnosis L LE lymphedema   Influenced by the following impairments / conditions Stage 2   Functional limitations due to impairments / conditions unable to get shoes to fit, difficult to walk   Clinical Presentation Stable/Uncomplicated   Clinical Presentation Rationale Pt has not had lymphedema treated and is expected to respond well to above interventions   Clinical Decision Making (Complexity) Low complexity   Treatment frequency Other   Treatment duration 3-4xwx3w then 1xmx2m   Patient / family and/or staff in agreement with plan of care Yes   Risks and benefits of therapy have been explained Yes   Clinical impression comments Pt presents with 2 year history of L LE edema s/p fall that is untreated. She is motivated and wants to get treatment prior to LB surgery on 1/24/19   Education Assessment   Preferred learning style Listening;Reading;Demonstration   Barriers to learning No barriers   Goals   Edema Eval Goals 1;2;3   Goal 1   Goal identifier Home program   Goal description In order to improve tolerance for functional mobility, ADLs, and recreational activities outside of physical therapy, patient will demonstrate independence with home program of exercise and lifestyle modification and possible compression.    Target date 03/01/13   Goal 2   Goal identifier LLIS   Goal description Patient will have decreased negative  impact on daily life from lymphedema and be able to  perform daily activities and mobility with less difficulty as indicated by improved LLIS score of 3 or more points   Target date 03/31/19   Goal 3   Goal identifier volume of L LE   Goal description Pt will lose 5% of more of the volume of L LE in order to reduce chance of infection   Target date 03/31/19   Total Evaluation  Time   OT Eval, Low Complexity Minutes (08855) 15   Certification   Certification date from 01/04/19   Certification date to 04/04/19   Medical Diagnosis B LE lymphedema   Certification I certify the need for these services furnished under this plan of treatment and while under my care.  (Physician co-signature of this document indicates review and certification of the therapy plan).

## 2019-01-04 NOTE — PROGRESS NOTES
Date of visit: 1/4/2019    Chief complaint:   Chief Complaint   Patient presents with     Pain Management     Return visit- Follow up appointment.        Interval history:  Shyann Samuel is a 78 year old female last seen by me on 12/4/18 for bilateral neurogenic claudication of lower extremities secondary to advanced lumbar stenosis.  Since last seen, she obtained a second opinion regarding second lumbar surgery from Dr. Jaspreet Chaney through [facility]. She has elected to proceed with recommended L5-S1 laminectomy which she tells me has been scheduled on 1/24/19. Updated imaging is required which patient is pursuing. She will be going to skilled nursing facility for rehabilitative care afterwards. Patient was also seen in lymphedema clinic earlier today for evaluation; she reports that she has been scheduled for seven therapy appointments prior to her laminectomy with Dr. Chaney.   Patient is here today requesting refills of Norco 5-325 mg. #15 tablets were prescribed on 12/4/18 to last 4-6 weeks. She states she has #3 remaining tablets. She is requesting a small increase today as she needs to take one tablet prior to leaving her house for appointments and now has many more unanticipated appointments in regards to her lumbar surgery/workup and lymphedema therapy.   She is consistently taking gabapentin 100 mg at night and questions increasing this dose.     Recommendations/plan at the last visit included:  1. Patient education: I went over the above diagnoses and treatment plan with her and answered all of her questions.  2. Imaging review: I reviewed the imaging reports with her   3. Interventions: May repeat epidural steroid injections with alternate approach (?caudal).   -Patient is continuing with acupuncture as she finds this to be extremely beneficial.   4. Medications  1. Norco 5-325 mg refilled; one tablet daily as needed. #15 to last 4-6 weeks. Blood drug screen obtained and Controlled Substance Agreement  signed today.   -patient will trial gabapentin 300 mg at BID dosing; if she is noting increased drowsiness/dizziness, will alter to 600 mg at bedtime. I will manage the gabapentin ongoing.   5. Exercise program: Continue with aquatic physical therapy.   6. Follow up: Return in 4-6 weeks. Will review Dr. Jaspreet Chaney's note through [facility] for second surgical opinion.     Original subjective:  Shyann Samuel is a 78 year old female who presents for bilateral neurogenic claudication of lower extremities secondary to advanced lumbar stenosis.  She tells me she has no back pain, only leg pain. The symptoms have been present for many years with intermittent periods of alleviation, however, her pain has now been progressively worsening.  She had a previous right hip replacement and has had that investigated and is not felt to be the cause of her symptoms.  She has been following with a physical therapist which noticed some obliquity in the pelvis and told her she needed a shoe lift. She notes that the heel lift completely resolved her right anterior thigh pain. However, her symptoms of bilateral aching/tightness of posterior thighs and calfs has continued.  She underwent a right-sided L3-4 and L4-5 transforaminal epidural steroid injections and this took away the pain for several hours but then the pain came back. Patient consulted with Dr. Chao Acevedo, who did recommend surgery, however, patient is seeking a second opinion with Dr. Jaspreet Chaney at [facility] on 1/9/19.   She has also recently increased gabapentin to 600 mg at bedtime at the suggestion of her PCP to avoid daytime drowsiness/dizziness; this seems to help somewhat with the symptoms.  They are much worse when she is up and walking, worst in the morning. Increased pain with prolonged sitting. She has had prior success with acupuncture, len chi, and physical therapy. She is currently having in-home acupuncture treatments and will be starting aquatic  physical therapy next week, at the suggestion of Dr. Zamora. Patient does take Norco 5-325 mg sparingly, typically twice per week. Her last prescription was filled on 10/18/18 by Dr. Centeno for #15 tablets, which has lasted her to today. She denies any adverse effects with use. She requests that I manage this medication, as well as her gabapentin.     Medications:  Current Outpatient Medications   Medication Sig Dispense Refill     Calcium-Vitamin D (CALCIUM + D PO) Take  by mouth. 600mg/400 IU bid       cyanocolbalamin (VITAMIN  B-12) 1000 MCG tablet Take 1 tablet by mouth three times a week.       ESTRIOL 0.5MG/GM CREAM Insert 1 gram vaginally 2 times per week. 30 g 3     gabapentin (NEURONTIN) 100 MG capsule Take 2 capsules at hs x 5 days, then 1 capsule at hs as directed 30 capsule 1     HYDROcodone-acetaminophen (NORCO) 5-325 MG tablet Take 1 tablet by mouth every 6 hours as needed for severe pain 15 tablet 0     lisinopril (PRINIVIL/ZESTRIL) 5 MG tablet Take 1 tablet (5 mg) by mouth daily 90 tablet 3     NAPROXEN PO Take 220 mg by mouth 2 times daily as needed for moderate pain       Omega-3 Fatty Acids (OMEGA-3 FISH OIL PO) Take  by mouth.       simvastatin (ZOCOR) 20 MG tablet Take 1 tablet (20 mg) by mouth At Bedtime 90 tablet 3     order for DME Equipment being ordered: compression knee high or thigh high stockings, velcro compression garments, lymphedema bandaging, darco boots. 4 each 3       Medical History: any changes in medical history since they were last seen? No    Review of Systems:  The 14 system ROS was reviewed from the intake questionnaire; results listed at end of note.     Physical Exam:  Blood pressure (!) 145/91, pulse 89, SpO2 98 %, not currently breastfeeding.  General: In no apparent distress  Mental status: Normal mood and affect, pleasant  Neuro: Alert, oriented. No sensory deficits.   Head: Atraumatic, normocephalic  Eyes: Extra-ocular movements grossly intact, no scleral icterus  Neck:  Supple, Range of motion full  Respiratory: Non-labored breathing; No respiratory distress  Skin: No rashes or lesions noted on exposed areas of skin    Assessment:   Shyann Samuel is a 78 year old female who is seen at the pain clinic for advanced spinal canal stenosis with bilateral neurogenic claudication.     Plan:  1. Medication Management:   -Will provide a one-month increase of Norco 5-325 mg to a quantity of #30 to last for 4-6 weeks. All future refills will be for #15 tablets. Surgeon will manage post-surgical pain; patient is aware of this. She will follow up with our clinic prior to requiring refill.    -Increase gabapentin 100 mg as tolerated to t.i.d.   2. Follow up: RTC in 4-8 weeks or prior to requiring a refill.     Total time spent was 20 minutes, and more than 50% of face to face time was spent in counseling and/or coordination of care regarding plan of care.    KELLY Zheng, RORY-BC  Pain Management  Department of Interventional Pain Management and Anesthesiology   [facility]        Answers for HPI/ROS submitted by the patient on 1/4/2019   JAISON 7 TOTAL SCORE: 2

## 2019-01-04 NOTE — LETTER
1/4/2019       RE: Shyann Samuel  [address]  [place] [address]     Dear Colleague,    Thank you for referring your patient, Shyann Samuel, to the [facility] FOR [facility]HENSIVE PAIN MANAGEMENT at [facility]. Please see a copy of my visit note below.    Date of visit: 1/4/2019    Chief complaint:   Chief Complaint   Patient presents with     Pain Management     Return visit- Follow up appointment.        Interval history:  Shyann Samuel is a 78 year old female last seen by me on 12/4/18 for bilateral neurogenic claudication of lower extremities secondary to advanced lumbar stenosis.  Since last seen, she obtained a second opinion regarding second lumbar surgery from Dr. Jaspreet Chaney through [facility]. She has elected to proceed with recommended L5-S1 laminectomy which she tells me has been scheduled on 1/24/19. Updated imaging is required which patient is pursuing. She will be going to skilled nursing facility for rehabilitative care afterwards. Patient was also seen in lymphedema clinic earlier today for evaluation; she reports that she has been scheduled for seven therapy appointments prior to her laminectomy with Dr. Chaney.   Patient is here today requesting refills of Norco 5-325 mg. #15 tablets were prescribed on 12/4/18 to last 4-6 weeks. She states she has #3 remaining tablets. She is requesting a small increase today as she needs to take one tablet prior to leaving her house for appointments and now has many more unanticipated appointments in regards to her lumbar surgery/workup and lymphedema therapy.   She is consistently taking gabapentin 100 mg at night and questions increasing this dose.     Recommendations/plan at the last visit included:  1. Patient education: I went over the above diagnoses and treatment plan with her and answered all of her questions.  2. Imaging review: I reviewed the imaging reports with her   3. Interventions: May repeat  epidural steroid injections with alternate approach (?caudal).   -Patient is continuing with acupuncture as she finds this to be extremely beneficial.   4. Medications  1. Norco 5-325 mg refilled; one tablet daily as needed. #15 to last 4-6 weeks. Blood drug screen obtained and Controlled Substance Agreement signed today.   -patient will trial gabapentin 300 mg at BID dosing; if she is noting increased drowsiness/dizziness, will alter to 600 mg at bedtime. I will manage the gabapentin ongoing.   5. Exercise program: Continue with aquatic physical therapy.   6. Follow up: Return in 4-6 weeks. Will review Dr. Jaspreet Chaney's note through [facility] for second surgical opinion.     Original subjective:  Shyann Samuel is a 78 year old female who presents for bilateral neurogenic claudication of lower extremities secondary to advanced lumbar stenosis.  She tells me she has no back pain, only leg pain. The symptoms have been present for many years with intermittent periods of alleviation, however, her pain has now been progressively worsening.  She had a previous right hip replacement and has had that investigated and is not felt to be the cause of her symptoms.  She has been following with a physical therapist which noticed some obliquity in the pelvis and told her she needed a shoe lift. She notes that the heel lift completely resolved her right anterior thigh pain. However, her symptoms of bilateral aching/tightness of posterior thighs and calfs has continued.  She underwent a right-sided L3-4 and L4-5 transforaminal epidural steroid injections and this took away the pain for several hours but then the pain came back. Patient consulted with Dr. Chao Acevedo, who did recommend surgery, however, patient is seeking a second opinion with Dr. Jaspreet Chaney at [facility] on 1/9/19.   She has also recently increased gabapentin to 600 mg at bedtime at the suggestion of her PCP to avoid daytime drowsiness/dizziness; this seems  to help somewhat with the symptoms.  They are much worse when she is up and walking, worst in the morning. Increased pain with prolonged sitting. She has had prior success with acupuncture, len chi, and physical therapy. She is currently having in-home acupuncture treatments and will be starting aquatic physical therapy next week, at the suggestion of Dr. Zamora. Patient does take Norco 5-325 mg sparingly, typically twice per week. Her last prescription was filled on 10/18/18 by Dr. Centeno for #15 tablets, which has lasted her to today. She denies any adverse effects with use. She requests that I manage this medication, as well as her gabapentin.     Medications:  Current Outpatient Medications   Medication Sig Dispense Refill     Calcium-Vitamin D (CALCIUM + D PO) Take  by mouth. 600mg/400 IU bid       cyanocolbalamin (VITAMIN  B-12) 1000 MCG tablet Take 1 tablet by mouth three times a week.       ESTRIOL 0.5MG/GM CREAM Insert 1 gram vaginally 2 times per week. 30 g 3     gabapentin (NEURONTIN) 100 MG capsule Take 2 capsules at hs x 5 days, then 1 capsule at hs as directed 30 capsule 1     HYDROcodone-acetaminophen (NORCO) 5-325 MG tablet Take 1 tablet by mouth every 6 hours as needed for severe pain 15 tablet 0     lisinopril (PRINIVIL/ZESTRIL) 5 MG tablet Take 1 tablet (5 mg) by mouth daily 90 tablet 3     NAPROXEN PO Take 220 mg by mouth 2 times daily as needed for moderate pain       Omega-3 Fatty Acids (OMEGA-3 FISH OIL PO) Take  by mouth.       simvastatin (ZOCOR) 20 MG tablet Take 1 tablet (20 mg) by mouth At Bedtime 90 tablet 3     order for DME Equipment being ordered: compression knee high or thigh high stockings, velcro compression garments, lymphedema bandaging, darco boots. 4 each 3       Medical History: any changes in medical history since they were last seen? No    Review of Systems:  The 14 system ROS was reviewed from the intake questionnaire; results listed at end of note.     Physical Exam:  Blood  pressure (!) 145/91, pulse 89, SpO2 98 %, not currently breastfeeding.  General: In no apparent distress  Mental status: Normal mood and affect, pleasant  Neuro: Alert, oriented. No sensory deficits.   Head: Atraumatic, normocephalic  Eyes: Extra-ocular movements grossly intact, no scleral icterus  Neck: Supple, Range of motion full  Respiratory: Non-labored breathing; No respiratory distress  Skin: No rashes or lesions noted on exposed areas of skin    Assessment:   Shyann Samuel is a 78 year old female who is seen at the pain clinic for advanced spinal canal stenosis with bilateral neurogenic claudication.     Plan:  1. Medication Management:   -Will provide a one-month increase of Norco 5-325 mg to a quantity of #30 to last for 4-6 weeks. All future refills will be for #15 tablets. Surgeon will manage post-surgical pain; patient is aware of this. She will follow up with our clinic prior to requiring refill.    -Increase gabapentin 100 mg as tolerated to t.i.d.   2. Follow up: RTC in 4-8 weeks or prior to requiring a refill.     Total time spent was 20 minutes, and more than 50% of face to face time was spent in counseling and/or coordination of care regarding plan of care.    KELLY Zheng, RORY-BC  Pain Management  Department of Interventional Pain Management and Anesthesiology   [facility]

## 2019-01-04 NOTE — PROGRESS NOTES
[facility]        OUTPATIENT PHYSICAL THERAPY EDEMA EVALUATION  PLAN OF TREATMENT FOR OUTPATIENT REHABILITATION  (COMPLETE FOR INITIAL CLAIMS ONLY)  Patient's Last Name, First Name, Shyann Carballo                              Provider s Name:   [facility] Medical Record No.  [id number]     Start of Care Date:  01/04/19   Onset Date:  01/01/17   Type:  PT   Medical Diagnosis:  B LE lymphedema   Therapy Diagnosis:  L LE lymphedema Visits from SOC:  1                                     __________________________________________________________________________________   Plan of Treatment/Functional Goals:    Manual lymph drainage, Gradient compression bandaging, Fit for compression garment, Exercises, Precautions to prevent infection / exacerbation, Education, Manual therapy, ADL training, Skin care / precautions, Home management program development        GOALS  1. Goal description: In order to improve tolerance for functional mobility, ADLs, and recreational activities outside of physical therapy, patient will demonstrate independence with home program of exercise and lifestyle modification and possible compression.        Target date: 03/01/13  2. Goal description: Patient will have decreased negative  impact on daily life from lymphedema and be able to  perform daily activities and mobility with less difficulty as indicated by improved LLIS score of 3 or more points       Target date: 03/31/19  3. Goal description: Pt will lose 5% of more of the volume of L LE in order to reduce chance of infection       Target date: 03/31/19    Treatment frequency: Other   Treatment duration: 3-4xwx3w then 1xmx2m    Patricia Martins PT                                    I CERTIFY THE NEED FOR THESE SERVICES FURNISHED UNDER        THIS PLAN OF TREATMENT AND WHILE UNDER MY CARE      (Physician co-signature of this document indicates review and certification of the therapy plan).                   Certification date from: 01/04/19       Certification date to: 04/04/19           Referring physician: Aleksandra Starks   Initial Assessment  See Epic Evaluation- Start of care: 01/04/19

## 2019-01-05 ASSESSMENT — ANXIETY QUESTIONNAIRES: GAD7 TOTAL SCORE: 2

## 2019-01-07 ENCOUNTER — HOSPITAL ENCOUNTER (OUTPATIENT)
Dept: PHYSICAL THERAPY | Facility: CLINIC | Age: 79
Setting detail: THERAPIES SERIES
End: 2019-01-07
Attending: INTERNAL MEDICINE
Payer: MEDICARE

## 2019-01-07 PROCEDURE — 97140 MANUAL THERAPY 1/> REGIONS: CPT | Mod: GP | Performed by: PHYSICAL THERAPIST

## 2019-01-08 ENCOUNTER — HOSPITAL ENCOUNTER (OUTPATIENT)
Dept: PHYSICAL THERAPY | Facility: CLINIC | Age: 79
Setting detail: THERAPIES SERIES
End: 2019-01-08
Attending: INTERNAL MEDICINE
Payer: MEDICARE

## 2019-01-08 DIAGNOSIS — I89.0 LYMPHEDEMA OF LEFT LOWER EXTREMITY: Primary | ICD-10-CM

## 2019-01-08 DIAGNOSIS — R60.0 LOWER EXTREMITY EDEMA: ICD-10-CM

## 2019-01-08 PROCEDURE — 97535 SELF CARE MNGMENT TRAINING: CPT | Mod: GP | Performed by: PHYSICAL THERAPIST

## 2019-01-08 PROCEDURE — 97140 MANUAL THERAPY 1/> REGIONS: CPT | Mod: GP | Performed by: PHYSICAL THERAPIST

## 2019-01-09 ENCOUNTER — HOSPITAL ENCOUNTER (OUTPATIENT)
Dept: PHYSICAL THERAPY | Facility: CLINIC | Age: 79
Setting detail: THERAPIES SERIES
End: 2019-01-09
Attending: INTERNAL MEDICINE
Payer: MEDICARE

## 2019-01-09 DIAGNOSIS — R60.0 LOWER EXTREMITY EDEMA: Primary | ICD-10-CM

## 2019-01-09 PROCEDURE — 97140 MANUAL THERAPY 1/> REGIONS: CPT | Mod: GP | Performed by: PHYSICAL THERAPIST

## 2019-01-10 ENCOUNTER — ANCILLARY PROCEDURE (OUTPATIENT)
Dept: MRI IMAGING | Facility: CLINIC | Age: 79
End: 2019-01-10
Attending: NEUROLOGICAL SURGERY
Payer: MEDICARE

## 2019-01-10 DIAGNOSIS — M48.062 SPINAL STENOSIS OF LUMBAR REGION WITH NEUROGENIC CLAUDICATION: ICD-10-CM

## 2019-01-15 ENCOUNTER — HOSPITAL ENCOUNTER (OUTPATIENT)
Dept: PHYSICAL THERAPY | Facility: CLINIC | Age: 79
Setting detail: THERAPIES SERIES
End: 2019-01-15
Attending: INTERNAL MEDICINE
Payer: MEDICARE

## 2019-01-15 PROCEDURE — 97535 SELF CARE MNGMENT TRAINING: CPT | Mod: GP | Performed by: PHYSICAL THERAPIST

## 2019-01-15 PROCEDURE — 97140 MANUAL THERAPY 1/> REGIONS: CPT | Mod: GP | Performed by: PHYSICAL THERAPIST

## 2019-01-16 ENCOUNTER — HOSPITAL ENCOUNTER (OUTPATIENT)
Dept: PHYSICAL THERAPY | Facility: CLINIC | Age: 79
Setting detail: THERAPIES SERIES
End: 2019-01-16
Attending: INTERNAL MEDICINE
Payer: MEDICARE

## 2019-01-16 PROCEDURE — 97140 MANUAL THERAPY 1/> REGIONS: CPT | Mod: GP,ZF | Performed by: PHYSICAL THERAPIST

## 2019-01-17 ENCOUNTER — HOSPITAL ENCOUNTER (OUTPATIENT)
Dept: PHYSICAL THERAPY | Facility: CLINIC | Age: 79
Setting detail: THERAPIES SERIES
End: 2019-01-17
Attending: INTERNAL MEDICINE
Payer: MEDICARE

## 2019-01-17 PROCEDURE — 97535 SELF CARE MNGMENT TRAINING: CPT | Mod: GP | Performed by: PHYSICAL THERAPIST

## 2019-01-17 PROCEDURE — 97140 MANUAL THERAPY 1/> REGIONS: CPT | Mod: GP | Performed by: PHYSICAL THERAPIST

## 2019-01-21 ENCOUNTER — HOSPITAL ENCOUNTER (OUTPATIENT)
Dept: PHYSICAL THERAPY | Facility: CLINIC | Age: 79
Setting detail: THERAPIES SERIES
End: 2019-01-21
Attending: INTERNAL MEDICINE
Payer: MEDICARE

## 2019-01-21 PROCEDURE — 97140 MANUAL THERAPY 1/> REGIONS: CPT | Mod: GP | Performed by: PHYSICAL THERAPIST

## 2019-01-21 PROCEDURE — 97535 SELF CARE MNGMENT TRAINING: CPT | Mod: GP | Performed by: PHYSICAL THERAPIST

## 2019-01-24 ENCOUNTER — TRANSFERRED RECORDS (OUTPATIENT)
Dept: HEALTH INFORMATION MANAGEMENT | Facility: CLINIC | Age: 79
End: 2019-01-24

## 2019-02-08 ENCOUNTER — TRANSFERRED RECORDS (OUTPATIENT)
Dept: HEALTH INFORMATION MANAGEMENT | Facility: CLINIC | Age: 79
End: 2019-02-08

## 2019-02-14 ENCOUNTER — DOCUMENTATION ONLY (OUTPATIENT)
Dept: CARE COORDINATION | Facility: CLINIC | Age: 79
End: 2019-02-14

## 2019-02-14 ENCOUNTER — TELEPHONE (OUTPATIENT)
Dept: FAMILY MEDICINE | Facility: CLINIC | Age: 79
End: 2019-02-14

## 2019-02-14 NOTE — PROGRESS NOTES
[facility] now requests orders and shares plan of care/discharge summaries for some patients through [employer]..  Please REPLY TO THIS MESSAGE OR ROUTE BACK TO THE AUTHOR in order to give authorization for orders when needed.  This is considered a verbal order, you will still receive a faxed copy of orders for signature.  Thank you for your assistance in improving collaboration for our patients.    ORDER    Skilled nurse 1week4 plus 3 PRN visits  PT eval and treat  OT eval and treat  HA 1 week4 for bathing assistance

## 2019-02-14 NOTE — TELEPHONE ENCOUNTER
[facility] Call Center    Phone Message    May a detailed message be left on voicemail: yes    Reason for Call: Order(s): Home Care Orders: Other: [facility] is requesting skilled nursing 1 time a week for 4 weeks.  Home Health Aid once a week for 4 weeks.  PT and OT eval and treat.  Please follow up at [phone]    Action Taken: Message routed to:  [facility]: CATHY

## 2019-02-15 NOTE — TELEPHONE ENCOUNTER
OK for home health care orders as stated in message, per Dr Starks.  LM re: this for Jaye, nurse at [facility].   She is to call back with any questions.  Amara Perez RN  [facility]

## 2019-02-18 ENCOUNTER — TRANSFERRED RECORDS (OUTPATIENT)
Dept: HEALTH INFORMATION MANAGEMENT | Facility: CLINIC | Age: 79
End: 2019-02-18

## 2019-02-18 ENCOUNTER — DOCUMENTATION ONLY (OUTPATIENT)
Dept: CARE COORDINATION | Facility: CLINIC | Age: 79
End: 2019-02-18

## 2019-02-18 NOTE — PROGRESS NOTES
[facility] now requests orders and shares plan of care/discharge summaries for some patients through [facility].  Please REPLY TO THIS MESSAGE OR ROUTE BACK TO THE AUTHOR in order to give authorization for orders when needed.  This is considered a verbal order, you will still receive a faxed copy of orders for signature.  Thank you for your assistance in improving collaboration for our patients.    ORDER    Patient seen for PT evaluation on 2/15/19, plan for continued PT 2w3, 1w1.     Thank you for your time,   Roxanne Nguyen PT

## 2019-02-19 ENCOUNTER — TELEPHONE (OUTPATIENT)
Dept: FAMILY MEDICINE | Facility: CLINIC | Age: 79
End: 2019-02-19

## 2019-02-19 NOTE — TELEPHONE ENCOUNTER
[facility] Call Center    Phone Message    May a detailed message be left on voicemail: yes    Reason for Call: Other: Please contact [facility], for orders for lymphedema care.  Please call her at [phone]. She did not leave what she was requesting, just that it was for lymphedema.      Action Taken: Message routed to:  [facility]: [place]

## 2019-02-19 NOTE — TELEPHONE ENCOUNTER
[facility] Call Center    Phone Message    May a detailed message be left on voicemail: yes    Reason for Call: Order(s): Other:   Reason for requested: OT Lymphedema order for 1 visit in 30 days. Verbal is okay. [phone].  Date needed: asap   Provider name: Dr. Starks       Action Taken: Message routed to:  [facility]:

## 2019-02-20 NOTE — TELEPHONE ENCOUNTER
Call to Mckinley at [facility] for her that it is OK for lymphedema treatment as requested, per Dr Starks.   She is to call back with questions.  Amara Perez RN  [facility]

## 2019-02-22 ENCOUNTER — DOCUMENTATION ONLY (OUTPATIENT)
Dept: FAMILY MEDICINE | Facility: CLINIC | Age: 79
End: 2019-02-22

## 2019-02-22 NOTE — PROGRESS NOTES
[facility] now requests verbal orders via Epic. Please respond to this message as soon as possible.     OT lymphedema therapy 2x/week for 4 weeks to meet the following goals.  GOALS TO BE MET BY DISCHARGE  1. Pt will be able to tolerate gradient compression bandaging 23 hrs/day or wearing  of compression  garment during waking hours to prevent reaccumulation of extracellular fluid.  2. Pt will perform fluid mobilization HEP w/ minimal assistance to improve lymphatic flow and venous return.  3. Pt will be independent in edema management to maintain reduced limb girth on days home care staff are unavailable.   4. Patient will verbalize understanding of techniques to independently manage their edema at home.  5. Pt will be independent in donning/doffing, wearing schedule, and care of compression garment to maintain edema long term.  6. circumferential measurement will be reduced by 3 percent in the LLE to promote improved skin integrity and enable pt to perform safe transfers and improved functional mobility    Please respond to this message. Thank you  Edie Ramirez OTR/L CLT

## 2019-03-08 ENCOUNTER — TELEPHONE (OUTPATIENT)
Dept: FAMILY MEDICINE | Facility: CLINIC | Age: 79
End: 2019-03-08

## 2019-03-08 NOTE — TELEPHONE ENCOUNTER
Placed a call to the care coordinator, [facility], provided verbal order OK for SW referral per her request.   Kim Thorpe RN  03/08/19  4:14 PM

## 2019-03-08 NOTE — TELEPHONE ENCOUNTER
[facility] Call Center    Phone Message    May a detailed message be left on voicemail: yes    Reason for Call: Order(s): Home Care Orders: Other: Calling for  Orders, Need 1 visit in 14 days for community research and long-term planning. Please call with verbal at [phone].     Action Taken: Message routed to:  [facility]

## 2019-03-14 ENCOUNTER — DOCUMENTATION ONLY (OUTPATIENT)
Dept: OTHER | Facility: CLINIC | Age: 79
End: 2019-03-14

## 2019-03-22 ENCOUNTER — MYC MEDICAL ADVICE (OUTPATIENT)
Dept: FAMILY MEDICINE | Facility: CLINIC | Age: 79
End: 2019-03-22

## 2019-03-22 DIAGNOSIS — I89.0 LYMPHEDEMA: Primary | ICD-10-CM

## 2019-03-26 DIAGNOSIS — Z98.890 STATUS POST LUMBAR LAMINECTOMY: Primary | ICD-10-CM

## 2019-03-27 ENCOUNTER — HOSPITAL ENCOUNTER (OUTPATIENT)
Dept: PHYSICAL THERAPY | Facility: CLINIC | Age: 79
Setting detail: THERAPIES SERIES
End: 2019-03-27
Attending: INTERNAL MEDICINE
Payer: MEDICARE

## 2019-03-27 DIAGNOSIS — I89.0 LYMPHEDEMA: ICD-10-CM

## 2019-03-27 PROCEDURE — 97535 SELF CARE MNGMENT TRAINING: CPT | Mod: GP,ZF | Performed by: PHYSICAL THERAPIST

## 2019-03-27 PROCEDURE — 97140 MANUAL THERAPY 1/> REGIONS: CPT | Mod: GP,ZF | Performed by: PHYSICAL THERAPIST

## 2019-03-28 NOTE — PROGRESS NOTES
[facility]      OUTPATIENT PHYSICAL THERAPY  PLAN OF TREATMENT FOR OUTPATIENT REHABILITATION    Patient's Last Name, First Name, M.I.                YOB: 1940  Shyann Samuel                                                   Provider s Name:   [facility] Medical Record No.  [id number]     Start of Care Date:  01/04/19   Onset Date:  01/01/17   Type:  PT   Medical Diagnosis:   Lymphedema L LE   Therapy Diagnosis:  L LE lymphedema Visits from SOC:  9                                     __________________________________________________________________________________   Plan of Treatment/Functional Goals:    Manual lymph drainage, Gradient compression bandaging, Fit for compression garment, Exercises, Precautions to prevent infection / exacerbation, Education, Manual therapy, ADL training, Skin care / precautions, Home management program development        GOALS  1. Goal description: In order to improve tolerance for functional mobility, ADLs, and recreational activities outside of physical therapy, patient will demonstrate independence with home program of exercise and lifestyle modification and possible compression.        Target date: 07/02/19  2. Goal description: Patient will have decreased negative impact on daily life from lymphedema and be able to  perform daily activities and mobility with less difficulty as indicated by improved LLIS score of 3 or more points       Target date: 07/02/19  3. Goal description: Pt will lose 5% of more of the volume of L LE in order to reduce chance of infection       Target date: 07/02/19        Treatment duration: 3-4x/wk x 3w then 1x/mo x 2mo    Carolee Snyder, PT                                          I CERTIFY THE NEED FOR THESE SERVICES FURNISHED UNDER THIS PLAN OF TREATMENT AND WHILE UNDER MY CARE     (Physician signature in associated  progress note indicates review and certification of the therapy plan)                Certification date from: 04/04/19       Certification date to: 07/02/19           Referring physician: Aleksandra Starks   Initial Assessment  See Epic Evaluation- Start of care: 01/04/19

## 2019-03-28 NOTE — PROGRESS NOTES
Outpatient Physical Therapy Progress Note     Patient: Shyann Samuel  : 1940    Beginning/End Dates of Reporting Period:  19 to 3/27/2019    Referring Provider: Dr. Aleksandra Starks    Therapy Diagnosis: L LE Lymphedema     Client Self Report: Pt had her back surgery 19 but did not get edema therapy in the hospital. She went to rehab on 19 and started edema therapy after about 5 days.  Pt then had some inconsistent edema treatment and at rehab and then with home care until she was discharged from home care on 3/22/19.  Pt has not had toe wraps, she is not sure at this point what she should do.  Pt does report the radiating back pain is much better but very upset that she every used neurontin as it caused this exacerbation of swelling.    Objective Measurements:    Objective Measure: L LE volume  Details: 5321.94, -.70%     Goals:  Goal Identifier Home program   Goal Description In order to improve tolerance for functional mobility, ADLs, and recreational activities outside of physical therapy, patient will demonstrate independence with home program of exercise and lifestyle modification and possible compression.    Target Date 19   Date Met  (Target date changed from 3/1/19, NOT MET)   Progress:     Goal Identifier LLIS   Goal Description Patient will have decreased negative impact on daily life from lymphedema and be able to  perform daily activities and mobility with less difficulty as indicated by improved LLIS score of 3 or more points   Target Date 19   Date Met      Progress:     Goal Identifier volume of L LE   Goal Description Pt will lose 5% of more of the volume of L LE in order to reduce chance of infection   Target Date 19   Date Met      Progress:     Progress Toward Goals:   Progress limited due to back surgery. Pt initially presented in preparation for surgery. Goals were set for 3/31/19.  She did meet Volume goal with reduction of >14% within 3 weeks of evaluation  however following surgery, rehab, home therapy with inconsistent compression and decreased ability by patient to complete home program, she now has refilled.  Pt will benefit from intensive treatment however CLT educated patient that she will need to do her part in getting recommended garments for more success with long term management.  Pt still hoping that her edema will go away. She agrees to CLTs plan.      Plan:  Changes to goals: Goal dates extended    Discharge:  No

## 2019-04-01 ENCOUNTER — THERAPY VISIT (OUTPATIENT)
Dept: PHYSICAL THERAPY | Facility: CLINIC | Age: 79
End: 2019-04-01
Payer: MEDICARE

## 2019-04-01 DIAGNOSIS — Z98.890 STATUS POST LUMBAR LAMINECTOMY: ICD-10-CM

## 2019-04-01 PROCEDURE — 97110 THERAPEUTIC EXERCISES: CPT | Mod: GP | Performed by: PHYSICAL THERAPIST

## 2019-04-01 PROCEDURE — 97161 PT EVAL LOW COMPLEX 20 MIN: CPT | Mod: GP | Performed by: PHYSICAL THERAPIST

## 2019-04-01 NOTE — LETTER
DEPARTMENT OF HEALTH AND HUMAN SERVICES  CENTERS FOR MEDICARE & MEDICAID SERVICES    PLAN/UPDATED PLAN OF PROGRESS FOR OUTPATIENT REHABILITATION    PATIENTS NAME:  Shyann Samuel   : 1940  PROVIDER NUMBER:    [id number]  HICN:[id number]  PROVIDER NAME: [facility] PHYSICAL THERAP[facility]  MEDICAL RECORD NUMBER: [id number]   START OF CARE DATE:  SOC Date: 19   TYPE:  PT    PRIMARY/TREATMENT DIAGNOSIS: (Pertinent Medical Diagnosis)  Status post lumbar laminectomy    VISITS FROM START OF CARE:  Rxs Used: 1     [place] [facility] Initial Evaluation  Subjective:  The history is provided by the patient. No  was used.   Shyann Samuel is a 78 year old female with a lumbar condition.  Condition occurred with:  Other reason.  Condition occurred: other.  This is a new condition  Patient is returning to outpatient PT following L1-5 decompression lumbar laminectomy on 19.  Patient received home PT after the surgery.  She also is receiving PT for lymph edema.  Prior to surgery had severe lumbar spinal stenosis with neurogenic claudication.  Today has left LE wrapped to knee that she did with velcro open toed walking boot due to the swelling.  Can not get a shoe on.  Surgery got rid of back pain for the most part.  Pain in the thigh has not come back.  Mostly just bothered by ache in her back.  Pain with bending over.  Sleeping OK except for normal insomnia.  Will be continuing treatment for her lymph edema.  Was in transitional care and reports some incontinence while there.  Using a cane and supposed to be walking in the driveway 5-6 minutes.  Was trying to do this daily..    Patient reports pain:  Lower lumbar spine and mid lumbar spine.    Pain is described as aching and is intermittent and reported as 1/10.  Associated symptoms:  Loss of strength. Pain is worse during the day.  Symptoms are exacerbated by lifting and relieved by analgesics.  Since onset  symptoms are gradually improving.    Previous treatment includes physical therapy (PT and OT weekly).                    General health as reported by patient is good.  Pertinent medical history includes:  High blood pressure, overweight and osteoarthritis.  Medical allergies: yes (nitrofurandtoin benzalkonium chloride).  Other surgeries include:  Orthopedic surgery (right hip replacement L1-L5 laminectomy).  Current medications:  High blood pressure medication.      Primary job tasks include:  Prolonged sitting and other (some cooking, a little cleaning, laundry etc.).  Barriers include:  None as reported by patient.  Red flags:  None as reported by patient.  Oswestry Score: 28 %               Objective:  Gait:    Gait Type:  Antalgic   Assistive Devices:  Cane  Lumbar/SI Evaluation  ROM:    AROM Lumbar:   Flexion:        Hands to mid thigh  Ext:                    Very limited   Side Bend:        Left:     Right:   Rotation:           Left:     Right:   Side Glide:        Left:     Right:           Lumbar Myotomes:    T12-L3 (Hip Flex):  Left: 5    Right: 5  L2-4 (Quads):  Left:  5-    Right:  4  L4 (Ankle DF):  Left:  5-    Right:  4    S1 (Toe Raise):  Left: 4    Right: 4  Lumbar DTR's:  not assessed  Cord Signs:  not assessed  Lumbar Dermtomes:  normal  Neural Tension/Mobility:  Lumbar:  Not assessed    Lumbar Palpation:    Tenderness present at Left:    Erector Spinae  Tenderness present at Right: Erector Spinae  Functional Tests:  Core strength and proprioception lumbar: can move sit to stand without using her hands one time.  Lumbar Provocation:  not assessed  Assessment/Plan:    Patient is a 78 year old female with lumbar complaints.    Patient has the following significant findings with corresponding treatment plan.                Diagnosis 1:  Back pain/deconditioned  Pain -  self management and home program  Decreased ROM/flexibility - manual therapy, therapeutic exercise and home program  Decreased  strength - therapeutic exercise, therapeutic activities and home program  Impaired gait - gait training, assistive devices and home program    Therapy Evaluation Codes:   1) History comprised of:   Personal factors that impact the plan of care:      None.    Comorbidity factors that impact the plan of care are:      Osteoarthritis.     Medications impacting care: None.  2) Examination of Body Systems comprised of:   Body structures and functions that impact the plan of care:      Lumbar spine.   Activity limitations that impact the plan of care are:      Bathing, Bending, Dressing, Squatting/kneeling, Stairs and Walking.  3) Clinical presentation characteristics are:   Stable/Uncomplicated.  4) Decision-Making    Low complexity using standardized patient assessment instrument and/or measureable assessment of functional outcome.  Cumulative Therapy Evaluation is: Low complexity.  Previous and current functional limitations:  (See Goal Flow Sheet for this information)    Short term and Long term goals: (See Goal Flow Sheet for this information)     Communication ability:  Patient appears to be able to clearly communicate and understand verbal and written communication and follow directions correctly.  Treatment Explanation - The following has been discussed with the patient:   RX ordered/plan of care  Anticipated outcomes  Possible risks and side effects  This patient would benefit from PT intervention to resume normal activities.   Rehab potential is excellent.    Frequency:  2 X a month, once daily  Duration:  for 3 months  Discharge Plan:  Achieve all LTG.  Independent in home treatment program.  Reach maximal therapeutic benefit.        Caregiver Signature/Credentials _____________________________ Date ________       Treating Provider: Kat Hollingsworth ATC   I have reviewed and certified the need for these services and plan of treatment while under my care.        PHYSICIAN'S SIGNATURE:    "_________________________________________  Date___________   Aleksandra Starks MD    Certification period:  Beginning of Cert date period: 04/01/19 to  End of Cert period date: 06/29/19     Functional Level Progress Report: Please see attached \"Goal Flow sheet for Functional level.\"    ____X____ Continue Services or       ________ DC Services                Service dates: From  SOC Date: 04/01/19 date to present                         "

## 2019-04-01 NOTE — PROGRESS NOTES
[facility] Initial Evaluation  Subjective:  The history is provided by the patient. No  was used.   Shyann Samuel is a 78 year old female with a lumbar condition.  Condition occurred with:  Other reason.  Condition occurred: other.  This is a new condition  Patient is returning to outpatient PT following L1-5 decompression lumbar laminectomy on 1/24/19.  Patient received home PT after the surgery.  She also is receiving PT for lymph edema.  Prior to surgery had severe lumbar spinal stenosis with neurogenic claudication.  Today has left LE wrapped to knee that she did with velcro open toed walking boot due to the swelling.  Can not get a shoe on.  Surgery got rid of back pain for the most part.  Pain in the thigh has not come back.  Mostly just bothered by ache in her back.  Pain with bending over.  Sleeping OK except for normal insomnia.  Will be continuing treatment for her lymph edema.  Was in transitional care and reports some incontinence while there.  Using a cane and supposed to be walking in the driveway 5-6 minutes.  Was trying to do this daily..    Patient reports pain:  Lower lumbar spine and mid lumbar spine.    Pain is described as aching and is intermittent and reported as 1/10.  Associated symptoms:  Loss of strength. Pain is worse during the day.  Symptoms are exacerbated by lifting and relieved by analgesics.  Since onset symptoms are gradually improving.    Previous treatment includes physical therapy (PT and OT weekly).                          Red flags:  None as reported by the patient.                        Objective:    Gait:    Gait Type:  Antalgic   Assistive Devices:  Cane                 Lumbar/SI Evaluation  ROM:    AROM Lumbar:   Flexion:        Hands to mid thigh  Ext:                    Very limited   Side Bend:        Left:     Right:   Rotation:           Left:     Right:   Side Glide:        Left:     Right:           Lumbar Myotomes:     T12-L3 (Hip Flex):  Left: 5    Right: 5  L2-4 (Quads):  Left:  5-    Right:  4  L4 (Ankle DF):  Left:  5-    Right:  4    S1 (Toe Raise):  Left: 4    Right: 4  Lumbar DTR's:  not assessed      Cord Signs:  not assessed    Lumbar Dermtomes:  normal                Neural Tension/Mobility:  Lumbar:  Not assessed        Lumbar Palpation:    Tenderness present at Left:    Erector Spinae  Tenderness present at Right: Erector Spinae  Functional Tests:  Core strength and proprioception lumbar: can move sit to stand without using her hands one time.        Lumbar Provocation:  not assessed                                                     General     ROS    Assessment/Plan:    Patient is a 78 year old female with lumbar complaints.    Patient has the following significant findings with corresponding treatment plan.                Diagnosis 1:  Back pain/deconditioned  Pain -  self management and home program  Decreased ROM/flexibility - manual therapy, therapeutic exercise and home program  Decreased strength - therapeutic exercise, therapeutic activities and home program  Impaired gait - gait training, assistive devices and home program    Therapy Evaluation Codes:   1) History comprised of:   Personal factors that impact the plan of care:      None.    Comorbidity factors that impact the plan of care are:      Osteoarthritis.     Medications impacting care: None.  2) Examination of Body Systems comprised of:   Body structures and functions that impact the plan of care:      Lumbar spine.   Activity limitations that impact the plan of care are:      Bathing, Bending, Dressing, Squatting/kneeling, Stairs and Walking.  3) Clinical presentation characteristics are:   Stable/Uncomplicated.  4) Decision-Making    Low complexity using standardized patient assessment instrument and/or measureable assessment of functional outcome.  Cumulative Therapy Evaluation is: Low complexity.    Previous and current functional limitations:   (See Goal Flow Sheet for this information)    Short term and Long term goals: (See Goal Flow Sheet for this information)     Communication ability:  Patient appears to be able to clearly communicate and understand verbal and written communication and follow directions correctly.  Treatment Explanation - The following has been discussed with the patient:   RX ordered/plan of care  Anticipated outcomes  Possible risks and side effects  This patient would benefit from PT intervention to resume normal activities.   Rehab potential is excellent.    Frequency:  2 X a month, once daily  Duration:  for 3 months  Discharge Plan:  Achieve all LTG.  Independent in home treatment program.  Reach maximal therapeutic benefit.    Please refer to the daily flowsheet for treatment today, total treatment time and time spent performing 1:1 timed codes.

## 2019-04-02 NOTE — PROGRESS NOTES
[facility] Initial Evaluation  Subjective:                                     General health as reported by patient is good.  Pertinent medical history includes:  High blood pressure, overweight and osteoarthritis.  Medical allergies: yes (nitrofurandtoin benzalkonium chloride).  Other surgeries include:  Orthopedic surgery (right hip replacement L1-L5 laminectomy).  Current medications:  High blood pressure medication.      Primary job tasks include:  Prolonged sitting and other (some cooking, a little cleaning, laundry etc.).    Barriers include:  None as reported by patient.    Red flags:  None as reported by patient.      Oswestry Score: 28 %                 Objective:  System    Physical Exam    General     ROS    Assessment/Plan:

## 2019-04-10 ENCOUNTER — OFFICE VISIT (OUTPATIENT)
Dept: PHYSICAL MEDICINE AND REHAB | Facility: CLINIC | Age: 79
End: 2019-04-10
Payer: MEDICARE

## 2019-04-10 VITALS
SYSTOLIC BLOOD PRESSURE: 124 MMHG | DIASTOLIC BLOOD PRESSURE: 80 MMHG | OXYGEN SATURATION: 96 % | BODY MASS INDEX: 30.02 KG/M2 | HEART RATE: 77 BPM | WEIGHT: 175 LBS

## 2019-04-10 DIAGNOSIS — M48.062 SPINAL STENOSIS OF LUMBAR REGION WITH NEUROGENIC CLAUDICATION: Primary | ICD-10-CM

## 2019-04-10 NOTE — LETTER
4/10/2019       RE: Shyann Samuel  [address]  [place] [address]     Dear Colleague,    Thank you for referring your patient, Shyann Samuel, to the [facility] PHYSICAL MEDICINE AND REHABILITATION at [facility]. Please see a copy of my visit note below.          Service Date: 04/10/2019      INTERVAL HISTORY:   Shyann Samuel is a very delightful 79-year-old woman.  She has returned to the Rehab Clinic for followup.  The patient was initially seen in clinic on 11/21/2018.  At that time, it was my impression that her symptomatology was very consistent with lumbar spinal stenosis.  I did recommend therapy to see if we could be helpful in that regard.  The patient reports that she did not have the therapy, but she did see a spine surgeon, Dr. Jaspreet Chaney at [facility].  She reports that she had surgery on 01/24 of this year.  She notes complete resolution of her leg pain and she states that she is eternally grateful to me for coming up with the correct diagnosis.  I am delighted to learn that she has had a successful surgery.  I did discuss with her that I prescribed the therapy to see if that would be helpful without surgery but I am delighted to hear that the surgery has resolved her symptoms.  She reports that she now notes that she is quite deconditioned.  She has discussed with the therapist and she has a program that she will implement on her own including walking and stair climbing to regain strength and function.      She continues to treat chronic lymphedema of the left lower limb and is being seen in our Lymphedema Clinic and I surely hope that that will resolve as well.      PHYSICAL EXAMINATION:  Shyann is a delightful, alert, oriented and cooperative woman.  She does have lymphedema wrap of the left lower limb up to the knee.  She walks very nicely with her cane.  She does use a wheelchair for longer distances.      At this point in time, Shyann  has had resolution of her symptoms that were related to lumbar spinal stenosis.  She has had surgery for that, has had resolution of her symptoms and now she is working on her own on a therapy program.  At this point in time, I have nothing further to offer her, but would be happy to see her in the future should some problem arise.      Total time spent with the patient on this evaluation over 15 minutes and essentially was all spent discussing the above issues and the importance of continuing on with her home exercise program.         D: 04/10/2019   T: 04/10/2019   MT: LADAN      Name:     SAMIRA GREGG   MRN:      [id number]        Account:      [id number]   :      1940           Service Date: 04/10/2019      Document: [id number]        Again, thank you for allowing me to participate in the care of your patient.      Sincerely,    Chivo Zamoar MD

## 2019-04-10 NOTE — NURSING NOTE
Chief Complaint   Patient presents with     RECHECK     [facility] RETURN 3 MO F/U       Cayla Ospina, EMT

## 2019-04-10 NOTE — PROGRESS NOTES
Service Date: 04/10/2019      INTERVAL HISTORY:   Shyann Samuel is a very delightful 79-year-old woman.  She has [facility] the [facility] for followup.  The patient was initially seen in clinic on 11/21/2018.  At that time, it was my impression that her symptomatology was very consistent with lumbar spinal stenosis.  I did recommend therapy to see if we could be helpful in that regard.  The patient reports that she did not have the therapy, but she did see a spine surgeon, Dr. Jaspreet Chaney at [facility].  She reports that she had surgery on 01/24 of this year.  She notes complete resolution of her leg pain and she states that she is eternally grateful to me for coming up with the correct diagnosis.  I am delighted to learn that she has had a successful surgery.  I did discuss with her that I prescribed the therapy to see if that would be helpful without surgery but I am delighted to hear that the surgery has resolved her symptoms.  She reports that she now notes that she is quite deconditioned.  She has discussed with the therapist and she has a program that she will implement on her own including walking and stair climbing to regain strength and function.      She continues to treat chronic lymphedema of the left lower limb and is being seen in our Lymphedema Clinic and I surely hope that that will resolve as well.      PHYSICAL EXAMINATION:  Shyann is a delightful, alert, oriented and cooperative woman.  She does have lymphedema wrap of the left lower limb up to the knee.  She walks very nicely with her cane.  She does use a wheelchair for longer distances.      At this point in time, Shyann has had resolution of her symptoms that were related to lumbar spinal stenosis.  She has had surgery for that, has had resolution of her symptoms and now she is working on her own on a therapy program.  At this point in time, I have nothing further to offer her, but would be happy to see her in the future should  some problem arise.      Total time spent with the patient on this evaluation over 15 minutes and essentially was all spent discussing the above issues and the importance of continuing on with her home exercise program.      MD LISSETTE Meier MD             D: 04/10/2019   T: 04/10/2019   MT: LADAN      Name:     SAMIRA GREGG   MRN:      [id number]        Account:      [id number]   :      1940           Service Date: 04/10/2019      Document: [id number]

## 2019-04-15 ENCOUNTER — HOSPITAL ENCOUNTER (OUTPATIENT)
Dept: PHYSICAL THERAPY | Facility: CLINIC | Age: 79
Setting detail: THERAPIES SERIES
End: 2019-04-15
Attending: INTERNAL MEDICINE
Payer: MEDICARE

## 2019-04-15 PROCEDURE — 97140 MANUAL THERAPY 1/> REGIONS: CPT | Mod: GP | Performed by: PHYSICAL THERAPIST

## 2019-04-17 ENCOUNTER — HOSPITAL ENCOUNTER (OUTPATIENT)
Dept: PHYSICAL THERAPY | Facility: CLINIC | Age: 79
Setting detail: THERAPIES SERIES
End: 2019-04-17
Attending: INTERNAL MEDICINE
Payer: MEDICARE

## 2019-04-17 PROCEDURE — 97140 MANUAL THERAPY 1/> REGIONS: CPT | Mod: GP | Performed by: PHYSICAL THERAPIST

## 2019-04-19 ENCOUNTER — HOSPITAL ENCOUNTER (OUTPATIENT)
Dept: PHYSICAL THERAPY | Facility: CLINIC | Age: 79
Setting detail: THERAPIES SERIES
End: 2019-04-19
Attending: PHYSICAL THERAPIST
Payer: MEDICARE

## 2019-04-19 PROCEDURE — 97535 SELF CARE MNGMENT TRAINING: CPT | Mod: GP,ZF | Performed by: PHYSICAL THERAPIST

## 2019-04-19 PROCEDURE — 97140 MANUAL THERAPY 1/> REGIONS: CPT | Mod: GP,ZF | Performed by: PHYSICAL THERAPIST

## 2019-04-22 ENCOUNTER — HOSPITAL ENCOUNTER (OUTPATIENT)
Dept: PHYSICAL THERAPY | Facility: CLINIC | Age: 79
Setting detail: THERAPIES SERIES
End: 2019-04-22
Attending: INTERNAL MEDICINE
Payer: MEDICARE

## 2019-04-22 DIAGNOSIS — R60.0 LOWER EXTREMITY EDEMA: Primary | ICD-10-CM

## 2019-04-22 PROCEDURE — 97140 MANUAL THERAPY 1/> REGIONS: CPT | Mod: GP | Performed by: PHYSICAL THERAPIST

## 2019-04-22 PROCEDURE — 97535 SELF CARE MNGMENT TRAINING: CPT | Mod: GP,KX | Performed by: PHYSICAL THERAPIST

## 2019-04-24 ENCOUNTER — HOSPITAL ENCOUNTER (OUTPATIENT)
Dept: PHYSICAL THERAPY | Facility: CLINIC | Age: 79
Setting detail: THERAPIES SERIES
End: 2019-04-24
Attending: INTERNAL MEDICINE
Payer: MEDICARE

## 2019-04-24 PROCEDURE — 97535 SELF CARE MNGMENT TRAINING: CPT | Mod: GP,ZF | Performed by: PHYSICAL THERAPIST

## 2019-04-24 PROCEDURE — 97140 MANUAL THERAPY 1/> REGIONS: CPT | Mod: GP,ZF | Performed by: PHYSICAL THERAPIST

## 2019-04-26 ENCOUNTER — HOSPITAL ENCOUNTER (OUTPATIENT)
Dept: PHYSICAL THERAPY | Facility: CLINIC | Age: 79
Setting detail: THERAPIES SERIES
End: 2019-04-26
Attending: INTERNAL MEDICINE
Payer: MEDICARE

## 2019-04-26 PROCEDURE — 97140 MANUAL THERAPY 1/> REGIONS: CPT | Mod: GP,KX,ZF | Performed by: PHYSICAL THERAPIST

## 2019-04-26 PROCEDURE — 97535 SELF CARE MNGMENT TRAINING: CPT | Mod: GP,KX,ZF | Performed by: PHYSICAL THERAPIST

## 2019-04-29 ENCOUNTER — HOSPITAL ENCOUNTER (OUTPATIENT)
Dept: PHYSICAL THERAPY | Facility: CLINIC | Age: 79
Setting detail: THERAPIES SERIES
End: 2019-04-29
Attending: INTERNAL MEDICINE
Payer: MEDICARE

## 2019-04-29 PROCEDURE — 97535 SELF CARE MNGMENT TRAINING: CPT | Mod: GP | Performed by: PHYSICAL THERAPIST

## 2019-04-29 PROCEDURE — 97140 MANUAL THERAPY 1/> REGIONS: CPT | Mod: GP | Performed by: PHYSICAL THERAPIST

## 2019-05-01 ENCOUNTER — HOSPITAL ENCOUNTER (OUTPATIENT)
Dept: PHYSICAL THERAPY | Facility: CLINIC | Age: 79
Setting detail: THERAPIES SERIES
End: 2019-05-01
Attending: INTERNAL MEDICINE
Payer: MEDICARE

## 2019-05-01 PROCEDURE — 97535 SELF CARE MNGMENT TRAINING: CPT | Mod: GP,KX,ZF | Performed by: PHYSICAL THERAPIST

## 2019-05-01 PROCEDURE — 97140 MANUAL THERAPY 1/> REGIONS: CPT | Mod: GP,KX,ZF | Performed by: PHYSICAL THERAPIST

## 2019-05-03 ENCOUNTER — HOSPITAL ENCOUNTER (OUTPATIENT)
Dept: PHYSICAL THERAPY | Facility: CLINIC | Age: 79
Setting detail: THERAPIES SERIES
End: 2019-05-03
Attending: INTERNAL MEDICINE
Payer: MEDICARE

## 2019-05-03 PROCEDURE — 97140 MANUAL THERAPY 1/> REGIONS: CPT | Mod: GP,KX,ZF | Performed by: PHYSICAL THERAPIST

## 2019-05-03 PROCEDURE — 97535 SELF CARE MNGMENT TRAINING: CPT | Mod: GP,KX,ZF | Performed by: PHYSICAL THERAPIST

## 2019-05-07 ENCOUNTER — HOSPITAL ENCOUNTER (OUTPATIENT)
Dept: PHYSICAL THERAPY | Facility: CLINIC | Age: 79
Setting detail: THERAPIES SERIES
End: 2019-05-07
Attending: INTERNAL MEDICINE
Payer: MEDICARE

## 2019-05-07 PROCEDURE — 97140 MANUAL THERAPY 1/> REGIONS: CPT | Mod: GP,KX | Performed by: PHYSICAL THERAPIST

## 2019-05-10 ENCOUNTER — HOSPITAL ENCOUNTER (OUTPATIENT)
Dept: PHYSICAL THERAPY | Facility: CLINIC | Age: 79
Setting detail: THERAPIES SERIES
End: 2019-05-10
Attending: PHYSICAL THERAPIST
Payer: MEDICARE

## 2019-05-10 PROCEDURE — 97140 MANUAL THERAPY 1/> REGIONS: CPT | Mod: GP,KX,ZF | Performed by: PHYSICAL THERAPIST

## 2019-05-10 NOTE — PROGRESS NOTES
Outpatient Physical Therapy Progress Note     Patient: Shyann Samuel  : 1940    Beginning/End Dates of Reporting Period:  19 to 5/10/2019    Referring Provider: Dr. Aleksandra Starks    Therapy Diagnosis: Lymphedema L LE     Client Self Report: Pt found out that she gets 2 garments per quarter.  KT tape still on.      Objective Measurements:  Objective Measure: LLIS  Details: 8  Objective Measure: L LE volume  Details: 4657.5(-12.5% from 3/27/19)  Objective Measure: edema  Details: Foot was slightly softened today.  Thigh continues to demonstrate good reduction, knee still with increase fluid.     Outcome Measures (most recent score):  Lymphedema Life Impact Scale (score range 0-72). A higher score indicates greater impairment.: 8    Goals:  Goal Identifier Home program   Goal Description In order to improve tolerance for functional mobility, ADLs, and recreational activities outside of physical therapy, patient will demonstrate independence with home program of exercise and lifestyle modification.   Target Date 19   Date Met  05/10/19   Progress:     Goal Identifier LLIS   Goal Description Patient will have decreased negative impact on daily life from lymphedema and be able to  perform daily activities and mobility with less difficulty as indicated by improved LLIS score of 3 or more points   Target Date 19   Date Met      Progress:     Goal Identifier volume of L LE   Goal Description Pt will lose 5% of more of the volume of L LE in order to reduce chance of infection   Target Date 19   Date Met  19   Progress:     Goal Identifier compression   Goal Description Pt will be independent with day and night time compression options to prevent return of edema and infection   Target Date 19   Date Met      Progress:     Progress Toward Goals:   Progress this reporting period: Pt has been seen since her back surgery. She has reduced in swelling by 12.5% and been fit for garments but  not yet received them.  Pt is doing her self massage, exercise, skin care and applying compression bandaging to the best of her ability.  2/4 goals met.      Plan:  Continue therapy per current plan of care. Pt will have 1 more session next week and then a follow up 2 weeks later.  May plan to add 1x/month for 1-2 additional months to ensure good management of lymphedema during maintenance phase of treatment    Discharge:  No

## 2019-05-15 ENCOUNTER — HOSPITAL ENCOUNTER (OUTPATIENT)
Dept: PHYSICAL THERAPY | Facility: CLINIC | Age: 79
Setting detail: THERAPIES SERIES
End: 2019-05-15
Attending: PHYSICAL THERAPIST
Payer: MEDICARE

## 2019-05-15 PROCEDURE — 97535 SELF CARE MNGMENT TRAINING: CPT | Mod: GP,KX,ZF | Performed by: PHYSICAL THERAPIST

## 2019-05-15 PROCEDURE — 97140 MANUAL THERAPY 1/> REGIONS: CPT | Mod: GP,KX,ZF | Performed by: PHYSICAL THERAPIST

## 2019-05-28 ENCOUNTER — HOSPITAL ENCOUNTER (OUTPATIENT)
Dept: PHYSICAL THERAPY | Facility: CLINIC | Age: 79
Setting detail: THERAPIES SERIES
End: 2019-05-28
Attending: INTERNAL MEDICINE
Payer: MEDICARE

## 2019-05-28 PROCEDURE — 97535 SELF CARE MNGMENT TRAINING: CPT | Mod: GP | Performed by: PHYSICAL THERAPIST

## 2019-05-28 PROCEDURE — 97140 MANUAL THERAPY 1/> REGIONS: CPT | Mod: GP | Performed by: PHYSICAL THERAPIST

## 2019-06-25 ENCOUNTER — HOSPITAL ENCOUNTER (OUTPATIENT)
Dept: PHYSICAL THERAPY | Facility: CLINIC | Age: 79
Setting detail: THERAPIES SERIES
End: 2019-06-25
Attending: INTERNAL MEDICINE
Payer: MEDICARE

## 2019-06-25 PROCEDURE — 97535 SELF CARE MNGMENT TRAINING: CPT | Mod: GP | Performed by: PHYSICAL THERAPIST

## 2019-06-25 PROCEDURE — 97140 MANUAL THERAPY 1/> REGIONS: CPT | Mod: GP | Performed by: PHYSICAL THERAPIST

## 2019-06-25 NOTE — PROGRESS NOTES
Outpatient Physical Therapy Discharge Note     Patient: Shyann Samuel  : 1940    Beginning/End Dates of Reporting Period:  19 to 2019    Referring Provider: Aleksandra Starks MD    Therapy Diagnosis: L LE Lymphedema     Client Self Report: Pt reports that her stomach has been upset recently.      Objective Measurements:  Objective Measure: LLIS  Details: 6  Objective Measure: L LE volume  Details: 4558.61(-14.9%)  Objective Measure: edema  Details: 1+ pitting at foot and pre-tib, fibrosis behind the ankle and toes (stemmer +) but otherwise soft.      Outcome Measures (most recent score):       Goals:  Goal Identifier Home program   Goal Description In order to improve tolerance for functional mobility, ADLs, and recreational activities outside of physical therapy, patient will demonstrate independence with home program of exercise and lifestyle modification.   Target Date 19   Date Met  05/10/19   Progress:     Goal Identifier LLIS   Goal Description Patient will have decreased negative impact on daily life from lymphedema and be able to  perform daily activities and mobility with less difficulty as indicated by improved LLIS score of 3 or more points   Target Date 19   Date Met  19   Progress:     Goal Identifier volume of L LE   Goal Description Pt will lose 5% of more of the volume of L LE in order to reduce chance of infection   Target Date 19   Date Met  19   Progress:     Goal Identifier compression   Goal Description Pt will be independent with day and night time compression options to prevent return of edema and infection   Target Date 19   Date Met  19   Progress:     Progress Toward Goals:   Progress this reporting period: Pt has not been seen for 1 month to ensure good management at home with her garments and home program. She returns today with volume in the L LE and skin appearance/color improved.  Pt is doing well, will  discharge.    Plan:  Discharge from therapy.    Discharge:    Reason for Discharge: Patient has met all goals.    Equipment Issued: CCL1 custom thigh high for L LE, Tribute for full L LE, bandaging supplies, home program. Pt still pursuing the pump    Discharge Plan: Patient to continue home program.

## 2019-06-26 ENCOUNTER — TELEPHONE (OUTPATIENT)
Dept: FAMILY MEDICINE | Facility: CLINIC | Age: 79
End: 2019-06-26

## 2019-06-26 NOTE — TELEPHONE ENCOUNTER
Called patient, LVM that she does not need a separate lab appointment at the same time as her office visit. She can visit lab after visit with Dr. Starks, as orders can be placed at that time. Call back if you would like to speak to a nurse.   Kim Thorpe RN

## 2019-06-26 NOTE — TELEPHONE ENCOUNTER
[facility] Call Center    Phone Message    May a detailed message be left on voicemail: yes, Pt has a scheduled lab apt on 7/3/2019    Reason for Call: Order(s): Other:   Reason for requested: Lipids and Cholesterol  Date needed: 7/3/2019  Provider name: MARCUS LI      Action Taken: Message routed to:  [facility]: [facility] Nurse Pool

## 2019-07-03 ENCOUNTER — OFFICE VISIT (OUTPATIENT)
Dept: FAMILY MEDICINE | Facility: CLINIC | Age: 79
End: 2019-07-03
Payer: MEDICARE

## 2019-07-03 VITALS
HEIGHT: 63 IN | SYSTOLIC BLOOD PRESSURE: 117 MMHG | WEIGHT: 171.25 LBS | HEART RATE: 72 BPM | OXYGEN SATURATION: 96 % | BODY MASS INDEX: 30.34 KG/M2 | DIASTOLIC BLOOD PRESSURE: 71 MMHG

## 2019-07-03 DIAGNOSIS — R39.15 URINARY URGENCY: Primary | ICD-10-CM

## 2019-07-03 DIAGNOSIS — R53.83 OTHER FATIGUE: ICD-10-CM

## 2019-07-03 DIAGNOSIS — I10 ESSENTIAL HYPERTENSION: ICD-10-CM

## 2019-07-03 DIAGNOSIS — R73.09 ELEVATED GLUCOSE: ICD-10-CM

## 2019-07-03 DIAGNOSIS — E78.5 HYPERLIPIDEMIA, UNSPECIFIED HYPERLIPIDEMIA TYPE: ICD-10-CM

## 2019-07-03 LAB
% GRANULOCYTES: 60.3 %G (ref 40–75)
ALBUMIN SERPL-MCNC: 3.5 G/DL (ref 3.2–4.5)
ALP SERPL-CCNC: 75 U/L (ref 40–150)
ALT SERPL-CCNC: 20 U/L (ref 0–50)
ALT SERPL-CCNC: 25 U/L (ref 0–50)
AST SERPL-CCNC: 19 U/L (ref 0–45)
AST SERPL-CCNC: 20 U/L (ref 0–45)
BILIRUB SERPL-MCNC: 0.8 MG/DL (ref 0.2–1.3)
BILIRUBIN UR: NEGATIVE
BLOOD UR: NEGATIVE
BUN SERPL-MCNC: 10 MG/DL (ref 7–30)
CALCIUM SERPL-MCNC: 9.2 MG/DL (ref 8.5–10.4)
CHLORIDE SERPLBLD-SCNC: 104 MMOL/L (ref 94–109)
CHOLEST SERPL-MCNC: 180 MG/DL (ref 0–200)
CHOLEST/HDLC SERPL: 2.9 {RATIO} (ref 0–5)
CO2 SERPL-SCNC: 29 MMOL/L (ref 20–32)
CREAT SERPL-MCNC: 0.7 MG/DL (ref 0.6–1.3)
EGFR CALCULATED (BLACK REFERENCE): 104.1
EGFR CALCULATED (NON BLACK REFERENCE): 86
ERYTHROCYTE [DISTWIDTH] IN BLOOD BY AUTOMATED COUNT: 13.2 %
FASTING SPECIMEN: YES
GLUCOSE SERPL-MCNC: 106 MG/DL (ref 60–109)
GLUCOSE SERPL-MCNC: 107 MG/DL (ref 60–109)
GLUCOSE URINE: NEGATIVE
GRANULOCYTES #: 4.6 K/UL (ref 1.6–8.3)
HBA1C MFR BLD: 5.8 % (ref 4.1–5.7)
HCT VFR BLD AUTO: 45.5 % (ref 35–47)
HDLC SERPL-MCNC: 62 MG/DL
HEMOGLOBIN: 14.6 G/DL (ref 11.7–15.7)
KETONES UR QL: NEGATIVE
LDLC SERPL CALC-MCNC: 82 MG/DL (ref 0–129)
LEUKOCYTE ESTERASE UR: ABNORMAL
LYMPHOCYTES # BLD AUTO: 2.3 K/UL (ref 0.8–5.3)
LYMPHOCYTES NFR BLD AUTO: 30.4 %L (ref 20–48)
MCH RBC QN AUTO: 28.5 PG (ref 26.5–35)
MCHC RBC AUTO-ENTMCNC: 32.1 G/DL (ref 32–36)
MCV RBC AUTO: 88.9 FL (ref 78–100)
MID #: 0.7 K/UL (ref 0–2.2)
MID %: 9.3 %M (ref 0–20)
NITRITE UR QL STRIP: NEGATIVE
PH UR STRIP: 5.5 [PH] (ref 4.5–8)
PLATELET # BLD AUTO: 277 K/UL (ref 150–450)
POTASSIUM SERPL-SCNC: 4 MMOL/L (ref 3.4–5.3)
PROT SERPL-MCNC: 7 G/DL (ref 6.8–8.8)
PROTEIN UR: NEGATIVE
RBC # BLD AUTO: 5.12 M/UL (ref 3.8–5.2)
SODIUM SERPL-SCNC: 146 MMOL/L (ref 137.3–146.3)
SP GR UR STRIP: <=1.005 (ref 1–1.03)
TRIGL SERPL-MCNC: 178 MG/DL (ref 0–150)
UROBILINOGEN UR STRIP-ACNC: ABNORMAL
VLDL-CHOLESTEROL: 36 (ref 7–32)
WBC # BLD AUTO: 7.6 K/UL (ref 4–11)

## 2019-07-03 RX ORDER — LISINOPRIL 5 MG/1
5 TABLET ORAL DAILY
Qty: 90 TABLET | Refills: 3 | Status: SHIPPED | OUTPATIENT
Start: 2019-07-03 | End: 2020-06-11

## 2019-07-03 RX ORDER — SIMVASTATIN 20 MG
20 TABLET ORAL AT BEDTIME
Qty: 90 TABLET | Refills: 3 | Status: SHIPPED | OUTPATIENT
Start: 2019-07-03 | End: 2020-06-11

## 2019-07-03 ASSESSMENT — MIFFLIN-ST. JEOR: SCORE: 1225.78

## 2019-07-03 ASSESSMENT — PATIENT HEALTH QUESTIONNAIRE - PHQ9: SUM OF ALL RESPONSES TO PHQ QUESTIONS 1-9: 6

## 2019-07-03 NOTE — NURSING NOTE
"79 year old  Chief Complaint   Patient presents with     Gastrointestinal Problem     diarrhea and fatigue x 2 wks to a 1 mon      UTI     urgency to urination with short stream.  spasm,      Recheck Medication     refill on medication simvastatin and lisinpril        Blood pressure 117/71, pulse 72, height 1.6 m (5' 2.99\"), weight 77.7 kg (171 lb 4 oz), SpO2 96 %, not currently breastfeeding. Body mass index is 30.34 kg/m .  Patient Active Problem List   Diagnosis     Chronic bilateral low back pain with sciatica     Status post hip replacement     Low back pain     Hyperlipidemia     Anterior corneal dystrophy     Hypertension     Insomnia     Osteoarthritis     Cataract     Dermatitis medicamentosa     Esotropia     Macular puckering     Nontoxic multinodular goiter     Posterior vitreous detachment     Recurrent UTI     Other symptoms involving nervous and musculoskeletal systems(781.99)     Atrophic vaginitis     Overactive bladder     Health Care Home     Lower extremity edema     Spinal stenosis of lumbar region with neurogenic claudication       Wt Readings from Last 2 Encounters:   07/03/19 77.7 kg (171 lb 4 oz)   04/10/19 79.4 kg (175 lb)     BP Readings from Last 3 Encounters:   07/03/19 117/71   04/10/19 124/80   01/04/19 (!) 145/91         Current Outpatient Medications   Medication     Calcium-Vitamin D (CALCIUM + D PO)     cyanocolbalamin (VITAMIN  B-12) 1000 MCG tablet     ESTRIOL 0.5MG/GM CREAM     lisinopril (PRINIVIL/ZESTRIL) 5 MG tablet     Omega-3 Fatty Acids (OMEGA-3 FISH OIL PO)     order for DME     order for DME     order for DME     simvastatin (ZOCOR) 20 MG tablet     NAPROXEN PO     Current Facility-Administered Medications   Medication     apraclonidine (IOPIDINE) 1 % ophthalmic solution 1 drop       Social History     Tobacco Use     Smoking status: Never Smoker     Smokeless tobacco: Never Used   Substance Use Topics     Alcohol use: No     Drug use: No       Health Maintenance Due "   Topic Date Due     DEXA  1940     URINE DRUG SCREEN  1940     PHQ-9  1940     ZOSTER IMMUNIZATION (1 of 2) 04/08/1991     MEDICARE ANNUAL WELLNESS VISIT  04/08/2006     DTAP/TDAP/TD IMMUNIZATION (2 - Td) 06/24/2010     FALL RISK ASSESSMENT  11/28/2018       No results found for: PAP      July 3, 2019 10:06 AM

## 2019-07-03 NOTE — PROGRESS NOTES
Shyann Samuel is a 79 year old female, with history of lumbar laminectomy in January 2019, hypertension, hyperlipidemia, and chronic left leg edema.  She is here for the following issues:    Fatigue  Shyann reports over the past month or so, she has had fatigue that has worsened in the past 2 wks .  She had a lumbar laminectomy surgery on in late January, then went to a TCU and is finally home. She expected to feel more energy by now.   No fevers, no depression.  She has had GI illness (see below), was told that she had prediabetes when she went to the transitional care and wonders if that is the source. However, she denies polydipsia/uria. Appetite has been dampened.    PHQ9 score = 6    Recurrent Diarrhea  Shyann reports a one month history of liquidy stools, nonbloody, 3-4 times per day.  It was associated with some incontinence.  [place] appetite but no vomiting.  Over the past 2 weeks, stools have becomed more formed and are now soft, once per day.  She has had a long standing history of some fecal incontinence, which worsened with the diarrhea, but is now back to baseline. No recent antibiotics or travel.    Urinary Symptoms  Last week, she had urinary urgency as well as some cramping at the end of the urine stream. This lasted 2-3 day. Symptoms seemed to have resolved since she has been drinking more.  No hematuria. She has been drinking cranberry juice. She reports she has had some vaginal discomfort which has since resolved, no bleeding.    Hyperlipidemia  Shyann is currently taking 20 mg simvastatin daily to treat her hyperlipidemia, she is fasting today.  NO hx of chest pain or palpitations.  She has well managed hypertension. She needs lab and medication refills.    Hypertension   Jer is on 5 mg lisinipril daily to treat hypertension.  She noted elevated blood pressures, 130s/80s while in TCU.  She reports since returning home, home blood pressures have been lower.   BP Readings from Last 3  "Encounters:   07/03/19 117/71   04/10/19 124/80   01/04/19 (!) 145/91       Lymphedema  She has chronic lymphedema in the left leg.  She has been a custom thigh high compression stocking on her left leg. She [facility].  Lymphedema specialist has recommended a pneumatic compression boot.  She may need prior authorization. .  She denies numbness, pain, palpitations or chest pain.    Spine Surgery on 1/24/2019  She had a L1-L5 decompressive laminectomy and reports marked improvement of radicular symptoms. She has some  Localized aching at her lumbar spine.  She \"wishes she had done surgery sooner\".  She was in PT and has a home exercise program.    Patient Active Problem List   Diagnosis     Chronic bilateral low back pain with sciatica     Status post hip replacement     Low back pain     Hyperlipidemia     Anterior corneal dystrophy     Hypertension     Insomnia     Osteoarthritis     Cataract     Dermatitis medicamentosa     Esotropia     Macular puckering     Nontoxic multinodular goiter     Posterior vitreous detachment     Recurrent UTI     Other symptoms involving nervous and musculoskeletal systems(781.99)     Atrophic vaginitis     Overactive bladder     Health Care Home     Lower extremity edema     Spinal stenosis of lumbar region with neurogenic claudication       Current Outpatient Medications   Medication Sig Dispense Refill     Calcium-Vitamin D (CALCIUM + D PO) Take  by mouth. 600mg/400 IU bid       cyanocolbalamin (VITAMIN  B-12) 1000 MCG tablet Take 1 tablet by mouth three times a week.       ESTRIOL 0.5MG/GM CREAM Insert 1 gram vaginally 2 times per week. 30 g 3     lisinopril (PRINIVIL/ZESTRIL) 5 MG tablet Take 1 tablet (5 mg) by mouth daily 90 tablet 3     NAPROXEN PO Take 220 mg by mouth 2 times daily as needed for moderate pain       Omega-3 Fatty Acids (OMEGA-3 FISH OIL PO) Take  by mouth.       order for DME Equipment being ordered: Darco shoes, compression knee or " "thigh high 20-40mmHg 3 each 1     order for DME Equipment being ordered: bandaging supplies, velcro bandaging alternative garment for L full leg 1 each 0     order for DME Equipment being ordered: compression knee high or thigh high stockings, velcro compression garments, lymphedema bandaging, darco boots. 4 each 3     simvastatin (ZOCOR) 20 MG tablet Take 1 tablet (20 mg) by mouth At Bedtime 90 tablet 3       Allergies   Allergen Reactions     Benzalkonium Chloride      Eye irritation     Nitrofurantoin Other (See Comments)     Causes hast heart rate.  Causes fast heart rate.        EXAM  /71 (BP Location: Left arm, Patient Position: Sitting, Cuff Size: Adult Regular)   Pulse 72   Ht 1.6 m (5' 2.99\")   Wt 77.7 kg (171 lb 4 oz)   SpO2 96%   BMI 30.34 kg/m    Gen: Alert, pleasant, NAD  Neck : no LAD or TM  COR: S1,S2, no murmur  Lungs: CTA bilaterally, no rhonchi, wheezes or rales  Ext:  Asymmetry, left lower extremity edema.  She is wearing compression stocking, no swelling at the right ankle, no calf tenderness of either calf. pretibial and ankle edema  Back: well healed incision over lower lumbar spine, midline. Mild tenderness with palpation over paralumbar muscles bilaterally.    Results for orders placed or performed in visit on 07/03/19   Urinalysis ([place])   Result Value Ref Range    Specific Gravity Urine <=1.005 1.005 - 1.030    pH Urine 5.5 4.5 - 8.0    Leukocyte Esterase UR 1+ (A) Negative    Nitrite Urine Negative Negative    Protein UR Negative Negative    Glucose Urine Negative Negative    Ketones Urine Negative Negative    Urobilinogen mg/dL 0.2 E.U./dL 0.2 E.U./dL    Bilirubin UR Negative Negative    Blood UR Negative Negative   Comprehensive Metabolic Panel ([place])   Result Value Ref Range    Glucose 107.0 60.0 - 109.0 mg/dL    Urea Nitrogen 10.0 7.0 - 30.0 mg/dL    Calcium 9.2 8.5 - 10.4 mg/dL    Creatinine 0.7 0.6 - 1.3 mg/dL    eGFR Calculated (Non Black Reference) 86.0 >60.0 "    eGFR Calculated (Black Reference) 104.1 >60.0    Sodium 146.0 137.3 - 146.3 mmol/L    Potassium 4.0 3.4 - 5.3 mmol/L    Chloride 104.0 94.0 - 109.0 mmol/L    Carbon Dioxide 29.0 20.0 - 32.0 mmol/L    Albumin 3.5 3.2 - 4.5 g/dL    Alkaline Phosphatase 75.0 40.0 - 150.0 U/L    ALT 20.0 0.0 - 50.0 U/L    AST 19.0 0.0 - 45.0 U/L    Bilirubin Total 0.8 0.2 - 1.3 mg/dL    Protein Total 7.0 6.8 - 8.8 g/dL   Lipid Panel Plus ([place])   Result Value Ref Range    FASTING SPECIMEN YES     Glucose 106.0 60.0 - 109.0 mg/dL    ALT 25.0 0.0 - 50.0 U/L    AST 20.0 0.0 - 45.0 U/L    Cholesterol 180.0 0.0 - 200.0    HDL Cholesterol 62.0 >50.0    Triglycerides 178.0 (H) 0.0 - 150.0    Cholesterol/HDL Ratio 2.9 0.0 - 5.0    LDL Cholesterol Direct 82.0 0.0 - 129.0    VLDL-Cholesterol 36.0 (H) 7.0 - 32.0   CBC with Diff Plt ([facility])   Result Value Ref Range    WBC 7.6 4.0 - 11.0 K/uL    Lymphocytes # 2.3 0.8 - 5.3 K/uL    % Lymphocytes 30.4 20.0 - 48.0 %L    Mid # 0.7 0.0 - 2.2 K/uL    Mid % 9.3 0.0 - 20.0 %M    GRANULOCYTES # 4.6 1.6 - 8.3 K/uL    % Granulocytes 60.3 40.0 - 75.0 %G    RBC 5.12 3.80 - 5.20 M/uL    Hemoglobin 14.6 11.7 - 15.7 g/dL    Hematocrit 45.5 35.0 - 47.0 %    MCV 88.9 78.0 - 100.0 fL    MCH 28.5 26.5 - 35.0 pg    MCHC 32.1 32.0 - 36.0 g/dL    Platelets 277.0 150.0 - 450.0 K/uL    RDW 13.2 %   Hemoglobin A1c ([place])   Result Value Ref Range    Hemoglobin A1C 5.8 (H) 4.1 - 5.7 %   Urine Culture Aerobic Bacterial   Result Value Ref Range    Specimen Description Midstream Urine     Culture Micro (A)      50,000 to 100,000 colonies/mL  Escherichia coli  Susceptibility testing in progress         Assessment:  (R39.15) Urinary urgency  (primary encounter diagnosis)  Comment: Improving, DDX is cystitis, infection, overactive bladder  Plan: Urinalysis ([place]), Urine Culture Aerobic       ADDENDUM: Ecoli growing on culture. Will contact patient and start ciprofloxacin 500mg bid x  7 days.         (I10)  Essential hypertension  Comment: Well controlled  Plan: lisinopril (PRINIVIL/ZESTRIL) 5 MG tablet,         Comprehensive Metabolic Panel ([place])        Refilled X a year    (E78.5) Hyperlipidemia, unspecified hyperlipidemia type  Comment: Fasting  Plan: simvastatin (ZOCOR) 20 MG tablet, Comprehensive        Metabolic Panel ([place]), Lipid Panel Plus         ([place])        Refilled times a year    (R53.83) Other fatigue  Comment: Suspect this is due to diarrheal illness, setting back her recovery  Plan: CBC with Diff Plt ([facility])        Rule out reversible causes, inform patient it may take 6-12 months to recover energy after lumbar surgery. Activity as toleratd    (R73.09) Elevated glucose  Comment: Per patient report, elevated blood sugars  Plan: Hemoglobin A1c ([place])        A1c 5.8%, does not meet criteria for DM    Total visit time today 40 minutes.  More than 50% of the time was spent reviewing hospital records, surgical records, discussing current treatment plans. Need for close follow up.    Aleksandra Starks MD  Internal Medicine/Pediatrics      I, Suresh Cordova, am serving as a scribe to document services personally performed by Dr. Aleksandra Starks, based on data collection and the provider's statements to me. Dr. Starks has reviewed, edited, and approved the above note.

## 2019-07-04 LAB
BACTERIA SPEC CULT: ABNORMAL
SPECIMEN SOURCE: ABNORMAL

## 2019-07-04 RX ORDER — CIPROFLOXACIN 500 MG/1
TABLET, FILM COATED ORAL
Qty: 14 TABLET | Refills: 0 | Status: SHIPPED | OUTPATIENT
Start: 2019-07-04 | End: 2020-04-16

## 2019-08-12 ENCOUNTER — THERAPY VISIT (OUTPATIENT)
Dept: PHYSICAL THERAPY | Facility: CLINIC | Age: 79
End: 2019-08-12
Payer: MEDICARE

## 2019-08-12 DIAGNOSIS — M54.50 LOW BACK PAIN: ICD-10-CM

## 2019-08-12 PROCEDURE — 97110 THERAPEUTIC EXERCISES: CPT | Mod: GP | Performed by: PHYSICAL THERAPIST

## 2019-08-12 NOTE — PROGRESS NOTES
Subjective:  HPI  Oswestry Score: 30 %                 Objective:  System    Physical Exam    General     ROS    Assessment/Plan:    PROGRESS  REPORT    Progress reporting period is from 4/1/2019 to 8/12/2019.       SUBJECTIVE  Subjective changes noted by patient:  .  Subjective: Had an appointment a few months ago that she had to cancel. Last visit was on 4/1/2019.  She had to see MD about being tired.  Pati got an intestinal virus for two weeks and finally is getting her strength back.  Has had difficulty sleeping.  Trying to be systematic with sleep.  Has not been able to exercise.  Has an ache in her back with standing at the sink, back feels week.  Trying to walk up and down the driveway.  Length is 200 steps.  Can climb stairs if not very high.  Uses railing.  Taking one step at a time.  Can do some balance.  Can move sit to stand without holding on.      Current pain level is 1/10  .     Previous pain level was   Initial Pain level: 2/10.   Changes in function:  Yes (See Goal flowsheet attached for changes in current functional level)  Adverse reaction to treatment or activity: None    OBJECTIVE  Changes noted in objective findings:  Yes,   Objective: Only able to raise arms overhead  to 90 degrees of flexion with pain in the right shoulder.  Can step up onto a 7 inch stop using a railing with both legs.  Left leg is more difficult and has greater lack for strength.  Wearing compression garmet for lympedema on the left leg today.  Has slipper on the left due to foot swelling.  Can move sit to stand for 5 reps from a chair without using hands before fatigue.  Discussed trying to perfom exercises in standing to help with back strength.  Some pain in the right anterior thigh with shifting from supine to right sidelying on the table today.       ASSESSMENT/PLAN  Updated problem list and treatment plan: Diagnosis 1:  Back pain  Pain -  self management, education and home program  Decreased strength - therapeutic  exercise, therapeutic activities and home program  Decreased function - therapeutic activities and home program  STG/LTGs have been met or progress has been made towards goals:  Yes (See Goal flow sheet completed today.)  Assessment of Progress: The patient's condition is improving.  Self Management Plans:  Patient has been instructed in a home treatment program.  I have re-evaluated this patient and find that the nature, scope, duration and intensity of the therapy is appropriate for the medical condition of the patient.  Shyann continues to require the following intervention to meet STG and LTG's:  PT    Recommendations:  This patient would benefit from continued therapy.     Frequency:  1 X a month, once daily  Duration:  for 2 months        Please refer to the daily flowsheet for treatment today, total treatment time and time spent performing 1:1 timed codes.

## 2019-08-12 NOTE — LETTER
DEPARTMENT OF HEALTH AND HUMAN SERVICES  CENTERS FOR MEDICARE & MEDICAID SERVICES    PLAN/UPDATED PLAN OF PROGRESS FOR OUTPATIENT REHABILITATION    PATIENTS NAME:  Shyann Samuel   : 1940  PROVIDER NUMBER:    [id number]  HICN: [id number]  PROVIDER NAME: [place] [facility] PHYSICAL THERAPY  MEDICAL RECORD NUMBER: [id number]   START OF CARE DATE:  SOC Date: 19   TYPE:  PT  PRIMARY/TREATMENT DIAGNOSIS: (Pertinent Medical Diagnosis)  Low back pain    VISITS FROM START OF CARE:  Rxs Used: 2                   Assessment/Plan:    PROGRESS  REPORT    Progress reporting period is from 2019 to 2019.       SUBJECTIVE  Subjective changes noted by patient:  .  Subjective: Had an appointment a few months ago that she had to cancel. Last visit was on 2019.  She had to see MD about being tired.  Pati got an intestinal virus for two weeks and finally is getting her strength back.  Has had difficulty sleeping.  Trying to be systematic with sleep.  Has not been able to exercise.  Has an ache in her back with standing at the sink, back feels week.  Trying to walk up and down the driveway.  Length is 200 steps.  Can climb stairs if not very high.  Uses railing.  Taking one step at a time.  Can do some balance.  Can move sit to stand without holding on.      Current pain level is 1/10  .     Previous pain level was   Initial Pain level: 2/10.   Changes in function:  Yes (See Goal flowsheet attached for changes in current functional level)  Adverse reaction to treatment or activity: None  Oswestry Score: 30 %     OBJECTIVE  Changes noted in objective findings:  Yes,   Objective: Only able to raise arms overhead  to 90 degrees of flexion with pain in the right shoulder.  Can step up onto a 7 inch stop using a railing with both legs.  Left leg is more difficult and has greater lack for strength.  Wearing compression garmet for lympedema on the left leg today.  Has slipper on the left due to  "foot swelling.  Can move sit to stand for 5 reps from a chair without using hands before fatigue.  Discussed trying to perfom exercises in standing to help with back strength.  Some pain in the right anterior thigh with shifting from supine to right sidelying on the table today.       ASSESSMENT/PLAN  Updated problem list and treatment plan: Diagnosis 1:  Back pain  Pain -  self management, education and home program  Decreased strength - therapeutic exercise, therapeutic activities and home program  Decreased function - therapeutic activities and home program  STG/LTGs have been met or progress has been made towards goals:  Yes (See Goal flow sheet completed today.)  Assessment of Progress: The patient's condition is improving.  Self Management Plans:  Patient has been instructed in a home treatment program.  I have re-evaluated this patient and find that the nature, scope, duration and intensity of the therapy is appropriate for the medical condition of the patient.  Shyann continues to require the following intervention to meet STG and LTG's:  PT    Recommendations:  This patient would benefit from continued therapy.     Frequency:  1 X a month, once daily  Duration:  for 2 months          Caregiver Signature/Credentials _____________________________ Date ________       Treating Provider: Kat Ramsay  I have reviewed and certified the need for these services and plan of treatment while under my care.        PHYSICIAN'S SIGNATURE:   _________________________________________  Date___________   Aleksandra Starks MD    Certification period:  Beginning of Cert date period: 06/30/19 to  End of Cert period date: 09/26/19     Functional Level Progress Report: Please see attached \"Goal Flow sheet for Functional level.\"    ____X____ Continue Services or       ________ DC Services                Service dates: From  SOC Date: 04/01/19 date to present                         "

## 2019-09-11 ENCOUNTER — THERAPY VISIT (OUTPATIENT)
Dept: PHYSICAL THERAPY | Facility: CLINIC | Age: 79
End: 2019-09-11
Payer: MEDICARE

## 2019-09-11 DIAGNOSIS — M48.062 SPINAL STENOSIS OF LUMBAR REGION WITH NEUROGENIC CLAUDICATION: Primary | ICD-10-CM

## 2019-09-11 PROCEDURE — 97530 THERAPEUTIC ACTIVITIES: CPT | Mod: GP | Performed by: PHYSICAL THERAPIST

## 2019-09-11 PROCEDURE — 97110 THERAPEUTIC EXERCISES: CPT | Mod: GP | Performed by: PHYSICAL THERAPIST

## 2019-09-11 NOTE — PROGRESS NOTES
Subjective:  HPI                    Objective:  System    Physical Exam        General Evaluation:                          Functional Assessment:      Timed up and go:  12.5/sec                                           ROS    Assessment/Plan:    PROGRESS  REPORT    Progress reporting period is from 8/12/2019 to 9/11/2019.       SUBJECTIVE  Subjective changes noted by patient:  .  Subjective: Would like to have a disability parking certificate filled out today.  Is getting the device at home to help with her leg swelling.  Will use this besides the compression garment.  Back pain varies day to day.  Up to 10 minutes at a time walking.  Using the cane outside and feels this is helpful.  Does not feel safe without the cane.    Current pain level is 1/10  .     Previous pain level was   Initial Pain level: 2/10.   Changes in function:  Yes (See Goal flowsheet attached for changes in current functional level)  Adverse reaction to treatment or activity: None    OBJECTIVE  Changes noted in objective findings:  Yes,   Objective: Discussed trying to walk 10 minutes every day now instead of every other day.  Work on lower extremity strengthening every other day.  TUG test at 12.5 sec.  Able to climb up 6 inch steps, flight of 10 steps  step over step with some left knee pain and using the cane.  Descending can only able to do one step at at a time with use of a cane.      ASSESSMENT/PLAN  Updated problem list and treatment plan: Diagnosis 1:  Back pain  Pain -  self management, education and home program  Decreased ROM/flexibility - manual therapy, therapeutic exercise and home program  Decreased strength - therapeutic exercise, therapeutic activities and home program  Impaired gait - gait training, assistive devices and home program  Decreased function - therapeutic activities and home program  STG/LTGs have been met or progress has been made towards goals:  Yes (See Goal flow sheet completed today.)  Assessment of  Progress: The patient's condition is improving.  The patient's condition has potential to improve.  Self Management Plans:  Patient has been instructed in a home treatment program.  I have re-evaluated this patient and find that the nature, scope, duration and intensity of the therapy is appropriate for the medical condition of the patient.  Shyann continues to require the following intervention to meet STG and LTG's:  PT    Recommendations:  This patient would benefit from continued therapy.     Frequency:  1 X a month, once daily  Duration:  for 3 months        Please refer to the daily flowsheet for treatment today, total treatment time and time spent performing 1:1 timed codes.

## 2019-09-18 ENCOUNTER — TELEPHONE (OUTPATIENT)
Dept: FAMILY MEDICINE | Facility: CLINIC | Age: 79
End: 2019-09-18

## 2019-09-18 ENCOUNTER — MYC MEDICAL ADVICE (OUTPATIENT)
Dept: FAMILY MEDICINE | Facility: CLINIC | Age: 79
End: 2019-09-18

## 2019-09-18 DIAGNOSIS — I89.0 LYMPHEDEMA: Primary | ICD-10-CM

## 2019-09-18 NOTE — TELEPHONE ENCOUNTER
[facility] Call Center    Phone Message    May a detailed message be left on voicemail: yes    Reason for Call: Other: Lexus from [employer] calling to see if the forms she faxed over yesterday for Dr. Starks were received.  There were two forms, one was a document for medical necessity and the other was for a Rx for pneumatic compression device.  Please reach out to Lexus at [phone], to let her know if they have been received.      Action Taken: Message routed to:  [facility]: [facility]

## 2019-09-18 NOTE — TELEPHONE ENCOUNTER
Called Lexus at [facility] as requested, [facility] that forms for patient were received.   Kim Thorpe RN

## 2019-09-20 NOTE — TELEPHONE ENCOUNTER
Patient requests podiatry referral to discuss shoes due to lymphedema. She is being treated through Lymphedema clinic, recent compression device with good effect.     Kim Thorpe RN  09/20/19  11:19 AM

## 2019-09-26 NOTE — TELEPHONE ENCOUNTER
RECORDS RECEIVED FROM: Left foot swelling per pt. Pt can't find shoes that fit her swollen foot and would like to discuss orthotic shoes. Referred by Dr. Aleksandra Starks. Records in [facility].   DATE RECEIVED: Oct 30, 2019    NOTES STATUS DETAILS   OFFICE NOTE from referring provider Internal 9/18/19 mychart with dr. Starks   OFFICE NOTE from other specialist N/A    DISCHARGE SUMMARY from hospital N/A    DISCHARGE REPORT from the ER N/A    OPERATIVE REPORT N/A    MEDICATION LIST Internal    IMPLANT RECORD/STICKER N/A    LABS     CBC/DIFF N/A    CULTURES N/A    INJECTIONS DONE IN RADIOLOGY N/A    MRI N/A    CT SCAN N/A    US Internal  2018   XRAYS (IMAGES & REPORTS) Internal 2018   TUMOR     PATHOLOGY  Slides & report N/A

## 2019-10-03 ENCOUNTER — HEALTH MAINTENANCE LETTER (OUTPATIENT)
Age: 79
End: 2019-10-03

## 2019-10-16 ENCOUNTER — THERAPY VISIT (OUTPATIENT)
Dept: PHYSICAL THERAPY | Facility: CLINIC | Age: 79
End: 2019-10-16
Payer: MEDICARE

## 2019-10-16 DIAGNOSIS — Z98.890 STATUS POST LUMBAR LAMINECTOMY: ICD-10-CM

## 2019-10-16 DIAGNOSIS — M54.50 LOW BACK PAIN: Primary | ICD-10-CM

## 2019-10-16 PROCEDURE — 97110 THERAPEUTIC EXERCISES: CPT | Mod: KX | Performed by: PHYSICAL THERAPIST

## 2019-10-16 PROCEDURE — 97112 NEUROMUSCULAR REEDUCATION: CPT | Mod: KX | Performed by: PHYSICAL THERAPIST

## 2019-10-16 NOTE — LETTER
DEPARTMENT OF HEALTH AND HUMAN SERVICES  CENTERS FOR MEDICARE & MEDICAID SERVICES    PLAN/UPDATED PLAN OF PROGRESS FOR OUTPATIENT REHABILITATION    PATIENTS NAME:  Shyann Samuel     : 1940    PROVIDER NUMBER:    [id number]    HICN:  [id number]      PROVIDER NAME: [place] [facility] - [facility] PHYSICAL THERAPY    MEDICAL RECORD NUMBER: [id number]     START OF CARE DATE:  SOC Date: 19   TYPE:  PT    PRIMARY/TREATMENT DIAGNOSIS: (Pertinent Medical Diagnosis)  Low back pain  Status post lumbar laminectomy    VISITS FROM START OF CARE:  Rxs Used: 4     Subjective:  HPI  Oswestry Score: 25.71 %                 PROGRESS  REPORT  Progress reporting period is from 2019  to 10/16/2019.       SUBJECTIVE  Subjective changes noted by patient:  .  Subjective: Adjusting the cane helped quite a bit.  Walking more upright.  BAck exercises are going well.  Also has been sitting with feet on the floor and this has helped her back.  Keeps forgetting the cane in the house.  Walking outside with it.  Has been our shopping.  Up to 4 times up and down the driveway which takes 12 minutes but has to stop due to fatigue.  Having some left knee pain as well and this is also aggravating walking.  Has also walked in garden which has been very challenging.  Also now using lymph edema gament that she is wearing for an hour at night.  Going to get a prescription for diabetic shoes.  Only has back pain with standing in kitchen for longer periods or washing    Current pain level is  Current Pain level: 1/10.     Previous pain level was   Initial Pain level: 2/10.   Changes in function:  Yes (See Goal flowsheet attached for changes in current functional level)  Adverse reaction to treatment or activity: None    OBJECTIVE  Changes noted in objective findings:  Yes,   Objective: Sit to stand out of chair without hands today.  Has some 8 inch steps at home.  Able to step up 2 times on 8 inch step using a railing but  weakness noted in the knee and hip and had to push/pull with arm due to leg weakness.  Can walk up 6 inch steps, step over step with support of the railing.                 PATIENTS NAME:  Shyann Samuel   : 1940    ASSESSMENT/PLAN  Updated problem list and treatment plan: Diagnosis 1:  Back pain  Pain -  self management, education and home program  Decreased ROM/flexibility - manual therapy, therapeutic exercise and home program  Decreased joint mobility - manual therapy, therapeutic exercise and home program  Decreased strength - therapeutic exercise, therapeutic activities and home program  Impaired balance - neuro re-education, gait training, therapeutic activities, adaptive equipment/assistive device and home program  Decreased function - therapeutic activities and home program  STG/LTGs have been met or progress has been made towards goals:  Yes (See Goal flow sheet completed today.)  Assessment of Progress: The patient's condition is improving.  Self Management Plans:  Patient has been instructed in a home treatment program.  I have re-evaluated this patient and find that the nature, scope, duration and intensity of the therapy is appropriate for the medical condition of the patient.  Shyann continues to require the following intervention to meet STG and LTG's:  PT    Recommendations:  This patient would benefit from continued therapy.     Frequency:  1 X a month, once daily  Duration:  for 2 months        Please refer to the daily flowsheet for treatment today, total treatment time and time spent performing 1:1 timed codes.            Caregiver Signature/Credentials _____________________________ Date ________       Treating Provider: Kat Hollingsworth ATC   I have reviewed and certified the need for these services and plan of treatment while under my care.        PHYSICIAN'S SIGNATURE:   _____________________________________  Date___________       Aleksandra Starks MD    Certification period:   "Beginning of Cert date period: 09/27/19 to End of Cert period date: 12/25/19     Functional Level Progress Report: Please see attached \"Goal Flow sheet for Functional level.\"    ____X____ Continue Services or       ________ DC Services                Service dates: From  SOC Date: 04/01/19 date to present                         "

## 2019-10-16 NOTE — PROGRESS NOTES
Subjective:  HPI  Oswestry Score: 25.71 %                 Objective:  System    Physical Exam    General     ROS    Assessment/Plan:    PROGRESS  REPORT    Progress reporting period is from 9/11/2019  to 10/16/2019.       SUBJECTIVE  Subjective changes noted by patient:  .  Subjective: Adjusting the cane helped quite a bit.  Walking more upright.  BAck exercises are going well.  Also has been sitting with feet on the floor and this has helped her back.  Keeps forgetting the cane in the house.  Walking outside with it.  Has been our shopping.  Up to 4 times up and down the driveway which takes 12 minutes but has to stop due to fatigue.  Having some left knee pain as well and this is also aggravating walking.  Has also walked in garden which has been very challenging.  Also now using lymph edema gament that she is wearing for an hour at night.  Going to get a prescription for diabetic shoes.  Only has back pain with standing in kitchen for longer periods or washing    Current pain level is  Current Pain level: 1/10.     Previous pain level was   Initial Pain level: 2/10.   Changes in function:  Yes (See Goal flowsheet attached for changes in current functional level)  Adverse reaction to treatment or activity: None    OBJECTIVE  Changes noted in objective findings:  Yes,   Objective: Sit to stand out of chair without hands today.  Has some 8 inch steps at home.  Able to step up 2 times on 8 inch step using a railing but weakness noted in the knee and hip and had to push/pull with arm due to leg weakness.  Can walk up 6 inch steps, step over step with support of the railing.       ASSESSMENT/PLAN  Updated problem list and treatment plan: Diagnosis 1:  Back pain  Pain -  self management, education and home program  Decreased ROM/flexibility - manual therapy, therapeutic exercise and home program  Decreased joint mobility - manual therapy, therapeutic exercise and home program  Decreased strength - therapeutic exercise,  therapeutic activities and home program  Impaired balance - neuro re-education, gait training, therapeutic activities, adaptive equipment/assistive device and home program  Decreased function - therapeutic activities and home program  STG/LTGs have been met or progress has been made towards goals:  Yes (See Goal flow sheet completed today.)  Assessment of Progress: The patient's condition is improving.  Self Management Plans:  Patient has been instructed in a home treatment program.  I have re-evaluated this patient and find that the nature, scope, duration and intensity of the therapy is appropriate for the medical condition of the patient.  Shyann continues to require the following intervention to meet STG and LTG's:  PT    Recommendations:  This patient would benefit from continued therapy.     Frequency:  1 X a month, once daily  Duration:  for 2 months        Please refer to the daily flowsheet for treatment today, total treatment time and time spent performing 1:1 timed codes.

## 2019-10-28 ASSESSMENT — ENCOUNTER SYMPTOMS
DOUBLE VISION: 0
BLOOD IN STOOL: 0
EYE IRRITATION: 0
RECTAL PAIN: 0
EYE WATERING: 0
MUSCLE WEAKNESS: 0
DIARRHEA: 0
EYE REDNESS: 0
NECK PAIN: 0
CONSTIPATION: 0
EYE PAIN: 0
JOINT SWELLING: 0
NAUSEA: 0
MYALGIAS: 0
STIFFNESS: 0
JAUNDICE: 0
HEARTBURN: 0
ARTHRALGIAS: 1
ABDOMINAL PAIN: 0
BLOATING: 0
BACK PAIN: 0
BOWEL INCONTINENCE: 1
MUSCLE CRAMPS: 0
VOMITING: 0

## 2019-10-30 ENCOUNTER — PRE VISIT (OUTPATIENT)
Dept: ORTHOPEDICS | Facility: CLINIC | Age: 79
End: 2019-10-30

## 2019-10-30 ENCOUNTER — OFFICE VISIT (OUTPATIENT)
Dept: ORTHOPEDICS | Facility: CLINIC | Age: 79
End: 2019-10-30
Attending: INTERNAL MEDICINE
Payer: MEDICARE

## 2019-10-30 DIAGNOSIS — I89.0 LYMPHEDEMA: ICD-10-CM

## 2019-10-30 DIAGNOSIS — B35.1 OM (ONYCHOMYCOSIS): Primary | ICD-10-CM

## 2019-10-30 RX ORDER — TERBINAFINE HYDROCHLORIDE 250 MG/1
250 TABLET ORAL DAILY
Qty: 90 TABLET | Refills: 0 | Status: SHIPPED | OUTPATIENT
Start: 2019-10-30 | End: 2020-01-28

## 2019-10-30 NOTE — LETTER
10/30/2019       RE: Shyann Samuel  [address]  [place] [id number]     Dear Colleague,    Thank you for referring your patient, Shyann Samuel, to the [facility] at [facility]. Please see a copy of my visit note below.    terChief Complaint:   Chief Complaint   Patient presents with     Consult     Swelling, left leg and foot. Patient stated that she was treated and told that it is chronic. Patient stated that that she cant fit her feet in her shoes. Patient stated that she has researched diabetic shoes.        Subjective: Shyann is a 79 year old female who presents to the clinic today for evaluation of poorly fitting shoes. Needs advice on where to go and what style of shoe to wear to accommodate lymphedema of left leg. Looking for a tennis shoe with velcro strap. Also admits discoloration to toenails.     ROS: Denies pain to feet or toenails. Admits edema to Left extremity. Denies rashes or open lesions, numbness or tingling.     Past Medical History:   Diagnosis Date     Anterior corneal dystrophy      Back pain      Cataract      Chronic osteoarthritis      Esotropia      Hyperlipidemia      Hyperlipidemia      Hypertension      Insomnia      Osteoarthritis      Posterior vitreous detachment      Urinary tract infection      Past Surgical History:   Procedure Laterality Date     C TOTAL HIP ARTHROPLASTY Right 10/2009    Replacement of right hip     CATARACT IOL, RT/LT Bilateral ~2005     EYE SURGERY  after 2003,pt unsure    blepharoplasty     HC EXTERNAL LEVATOR RESECTION Bilateral 12/2014     TONSILLECTOMY  1946     Family History   Problem Relation Age of Onset     Cerebrovascular Disease Mother         probably consequent to Crohn's disease     Crohn's Disease Mother      Osteoporosis Mother         probably consequent to Crohn's disease     Other Cancer Father         kidney cancer, benign brain tumor     Cancer Other         Father (kidney  meningioma), Brother (neuroblastoma)     Other Cancer Brother         adult neuroblastoma (dispute about precise diagnos     Glaucoma No family hx of      Macular Degeneration No family hx of      Melanoma No family hx of      Skin Cancer No family hx of      Social History     Tobacco Use     Smoking status: Never Smoker     Smokeless tobacco: Never Used   Substance Use Topics     Alcohol use: No     Allergies   Allergen Reactions     Benzalkonium Chloride      Eye irritation     Nitrofurantoin Other (See Comments)     Causes hast heart rate.  Causes fast heart rate.     Current Outpatient Rx   Medication Sig Dispense Refill     Calcium-Vitamin D (CALCIUM + D PO) Take  by mouth. 600mg/400 IU bid       ciprofloxacin (CIPRO) 500 MG tablet Take one po bid x 7 days 14 tablet 0     cyanocolbalamin (VITAMIN  B-12) 1000 MCG tablet Take 1 tablet by mouth three times a week.       ESTRIOL 0.5MG/GM CREAM Insert 1 gram vaginally 2 times per week. 30 g 3     lisinopril (PRINIVIL/ZESTRIL) 5 MG tablet Take 1 tablet (5 mg) by mouth daily 90 tablet 3     NAPROXEN PO Take 220 mg by mouth 2 times daily as needed for moderate pain       Omega-3 Fatty Acids (OMEGA-3 FISH OIL PO) Take  by mouth.       order for DME Equipment being ordered: [place] shoes, compression knee or thigh high 20-40mmHg 3 each 1     order for DME Equipment being ordered: bandaging supplies, velcro bandaging alternative garment for L full leg 1 each 0     order for DME Equipment being ordered: compression knee high or thigh high stockings, velcro compression garments, lymphedema bandaging, darco boots. 4 each 3     simvastatin (ZOCOR) 20 MG tablet Take 1 tablet (20 mg) by mouth At Bedtime 90 tablet 3       Objective:   There were no vitals taken for this visit.    Problem focused exam performed.  General: Patient is well groomed, appears stated age, is alert, cooperative and in no acute distress.  Skin: LE skin color, texture and turgor are normal. Toenails are  thickened, discolored bilaterally. 3rd digits have white plaque discoloration.     Previous Laboratory Studies:   Lab Results   Component Value Date    A1C 5.8 07/03/2019    A1C 5.8 04/10/2018    A1C 5.4 02/11/2016    A1C 5.7 04/21/2015    A1C 5.6 07/11/2007    A1C 5.8 01/03/2005     Lab Results   Component Value Date    AST 20.0 07/03/2019    ALT 25.0 07/03/2019                             Assessment:   - Onychomycosis  - Lymphedema    Plan:   - Pt seen and evaluated. Diagnosis and treatment options were discussed.   - Prescribed oral terbinafine x 3 months.   - Referral to Orthotics to discuss purchase of Dr. Pa shoes. Prefer Angie style - stretch top and velcro strap to accommodate swelling.  - Pt to return to clinic as needed    Again, thank you for allowing me to participate in the care of your patient.      Sincerely,    Miri Fagan PA-C

## 2019-10-30 NOTE — NURSING NOTE
Reason For Visit:   Chief Complaint   Patient presents with     Consult     Swelling, left leg and foot. Patient stated that she was treated and told that it is chronic. Patient stated that that she cant fit her feet in her shoes. Patient stated that she has researched diabetic shoes.      Referring:   MARCUS LI      Pain Assessment  Patient Currently in Pain: Denies        Allergies   Allergen Reactions     Benzalkonium Chloride      Eye irritation     Nitrofurantoin Other (See Comments)     Causes hast heart rate.  Causes fast heart rate.           Amy Robledo LPN

## 2019-10-30 NOTE — PROGRESS NOTES
Jana Complaint:   Chief Complaint   Patient presents with     Consult     Swelling, left leg and foot. Patient stated that she was treated and told that it is chronic. Patient stated that that she cant fit her feet in her shoes. Patient stated that she has researched diabetic shoes.        Subjective: Shyann is a 79 year old female who presents to the clinic today for evaluation of poorly fitting shoes. Needs advice on where to go and what style of shoe to wear to accommodate lymphedema of left leg. Looking for a tennis shoe with velcro strap. Also admits discoloration to toenails.     ROS: Denies pain to feet or toenails. Admits edema to Left extremity. Denies rashes or open lesions, numbness or tingling.     Past Medical History:   Diagnosis Date     Anterior corneal dystrophy      Back pain      Cataract      Chronic osteoarthritis      Esotropia      Hyperlipidemia      Hyperlipidemia      Hypertension      Insomnia      Osteoarthritis      Posterior vitreous detachment      Urinary tract infection      Past Surgical History:   Procedure Laterality Date     C TOTAL HIP ARTHROPLASTY Right 10/2009    Replacement of right hip     CATARACT IOL, RT/LT Bilateral ~2005     EYE SURGERY  after 2003,pt unsure    blepharoplasty     HC EXTERNAL LEVATOR RESECTION Bilateral 12/2014     TONSILLECTOMY  1946     Family History   Problem Relation Age of Onset     Cerebrovascular Disease Mother         probably consequent to Crohn's disease     Crohn's Disease Mother      Osteoporosis Mother         probably consequent to Crohn's disease     Other Cancer Father         kidney cancer, benign brain tumor     Cancer Other         Father (kidney meningioma), Brother (neuroblastoma)     Other Cancer Brother         adult neuroblastoma (dispute about precise diagnos     Glaucoma No family hx of      Macular Degeneration No family hx of      Melanoma No family hx of      Skin Cancer No family hx of      Social History     Tobacco Use      Smoking status: Never Smoker     Smokeless tobacco: Never Used   Substance Use Topics     Alcohol use: No     Allergies   Allergen Reactions     Benzalkonium Chloride      Eye irritation     Nitrofurantoin Other (See Comments)     Causes hast heart rate.  Causes fast heart rate.     Current Outpatient Rx   Medication Sig Dispense Refill     Calcium-Vitamin D (CALCIUM + D PO) Take  by mouth. 600mg/400 IU bid       ciprofloxacin (CIPRO) 500 MG tablet Take one po bid x 7 days 14 tablet 0     cyanocolbalamin (VITAMIN  B-12) 1000 MCG tablet Take 1 tablet by mouth three times a week.       ESTRIOL 0.5MG/GM CREAM Insert 1 gram vaginally 2 times per week. 30 g 3     lisinopril (PRINIVIL/ZESTRIL) 5 MG tablet Take 1 tablet (5 mg) by mouth daily 90 tablet 3     NAPROXEN PO Take 220 mg by mouth 2 times daily as needed for moderate pain       Omega-3 Fatty Acids (OMEGA-3 FISH OIL PO) Take  by mouth.       order for DME Equipment being ordered: Darco shoes, compression knee or thigh high 20-40mmHg 3 each 1     order for DME Equipment being ordered: bandaging supplies, velcro bandaging alternative garment for L full leg 1 each 0     order for DME Equipment being ordered: compression knee high or thigh high stockings, velcro compression garments, lymphedema bandaging, darco boots. 4 each 3     simvastatin (ZOCOR) 20 MG tablet Take 1 tablet (20 mg) by mouth At Bedtime 90 tablet 3       Objective:   There were no vitals taken for this visit.    Problem focused exam performed.  General: Patient is well groomed, appears stated age, is alert, cooperative and in no acute distress.  Skin: LE skin color, texture and turgor are normal. Toenails are thickened, discolored bilaterally. 3rd digits have white plaque discoloration.     Previous Laboratory Studies:   Lab Results   Component Value Date    A1C 5.8 07/03/2019    A1C 5.8 04/10/2018    A1C 5.4 02/11/2016    A1C 5.7 04/21/2015    A1C 5.6 07/11/2007    A1C 5.8 01/03/2005     Lab  Results   Component Value Date    AST 20.0 07/03/2019    ALT 25.0 07/03/2019                             Assessment:   - Onychomycosis  - Lymphedema    Plan:   - Pt seen and evaluated. Diagnosis and treatment options were discussed.   - Prescribed oral terbinafine x 3 months.   - Referral to Orthotics to discuss purchase of Dr. Pa shoes. Prefer Angie style - stretch top and velcro strap to accommodate swelling.  - Pt to return to clinic as needed      Answers for HPI/ROS submitted by the patient on 10/28/2019   General Symptoms: No  Skin Symptoms: No  HENT Symptoms: No  EYE SYMPTOMS: Yes  HEART SYMPTOMS: No  LUNG SYMPTOMS: No  INTESTINAL SYMPTOMS: Yes  URINARY SYMPTOMS: No  GYNECOLOGIC SYMPTOMS: No  BREAST SYMPTOMS: No  SKELETAL SYMPTOMS: Yes  BLOOD SYMPTOMS: No  NERVOUS SYSTEM SYMPTOMS: No  MENTAL HEALTH SYMPTOMS: No  Eye pain: No  Vision loss: No  Dry eyes: Yes  Watery eyes: No  Eye bulging: No  Double vision: No  Flashing of lights: No  Spots: No  Floaters: No  Redness: No  Crossed eyes: No  Tunnel Vision: No  Yellowing of eyes: No  Eye irritation: No  Heart burn or indigestion: No  Nausea: No  Vomiting: No  Abdominal pain: No  Bloating: No  Constipation: No  Diarrhea: No  Blood in stool: No  Black stools: No  Rectal or Anal pain: No  Fecal incontinence: Yes  Yellowing of skin or eyes: No  Vomit with blood: No  Change in stools: No  Back pain: No  Muscle aches: No  Neck pain: No  Swollen joints: No  Joint pain: Yes  Bone pain: No  Muscle cramps: No  Muscle weakness: No  Joint stiffness: No  Bone fracture: No

## 2020-02-08 ENCOUNTER — HEALTH MAINTENANCE LETTER (OUTPATIENT)
Age: 80
End: 2020-02-08

## 2020-04-08 ENCOUNTER — TELEPHONE (OUTPATIENT)
Dept: PHYSICAL THERAPY | Facility: CLINIC | Age: 80
End: 2020-04-08

## 2020-04-08 NOTE — TELEPHONE ENCOUNTER
Discussed upcoming appointment.  Patient wants to be seen in the clinic and will call the hub number to schedule an appt. After 5/4.

## 2020-04-16 ENCOUNTER — VIRTUAL VISIT (OUTPATIENT)
Dept: FAMILY MEDICINE | Facility: CLINIC | Age: 80
End: 2020-04-16
Payer: MEDICARE

## 2020-04-16 ENCOUNTER — TELEPHONE (OUTPATIENT)
Dept: FAMILY MEDICINE | Facility: CLINIC | Age: 80
End: 2020-04-16

## 2020-04-16 DIAGNOSIS — N30.00 ACUTE CYSTITIS WITHOUT HEMATURIA: Primary | ICD-10-CM

## 2020-04-16 RX ORDER — CIPROFLOXACIN 500 MG/1
500 TABLET, FILM COATED ORAL 2 TIMES DAILY
Qty: 6 TABLET | Refills: 0 | Status: SHIPPED | OUTPATIENT
Start: 2020-04-16 | End: 2020-09-01

## 2020-04-16 NOTE — PROGRESS NOTES
"Shyann Samuel is a 80 year old female who is being evaluated via a billable telephone visit.      The patient has been notified of following: UTI,  Urgency to urinate,  Short streams of urine and vaginal itching x 1 day     \"This telephone visit will be conducted via a call between you and your physician/provider. We have found that certain health care needs can be provided without the need for a physical exam.  This service lets us provide the care you need with a short phone conversation.  If a prescription is necessary we can send it directly to your pharmacy.  If lab work is needed we can place an order for that and you can then stop by our lab to have the test done at a later time.    Telephone visits are billed at different rates depending on your insurance coverage. During this emergency period, for some insurers they may be billed the same as an in-person visit.  Please reach out to your insurance provider with any questions.    If during the course of the call the physician/provider feels a telephone visit is not appropriate, you will not be charged for this service.\"    Patient has given verbal consent for Telephone visit?  Yes    Shyann Samuel is a 80 year old female with history of chronic low back pain, hypertension, hyperlipidemia, osteoarthritis and hx of recurrent UTI, overactive bladder. She wishes to address    UTI symptoms  Shyann reports that since yesterday afternoon, she has been experiencing UTI symptoms. States she has noted urinary urgency, frequency, scant urine when attempting to void. No blood.  Denies fever, flank pain or nausea. States she has been sleeping poorly, and likely not drinking enough fluids. States symptms feel similar to previous UTI symptoms. No coffee or tea. No Etoh.    Last UTI was July 2019, Ecoli, pansensitive, treated with Ciprofloxacin.     Poor sleep  She reports a longstanding history of poor sleep. Did some cognitive behavior work which helped, \"so I need to get " "back to that\". Lots on her mind at bedtime and can easily fall asleep but finds herself awakening in the middle of the night. Last night she went to bed early and slept for 12 hr. Feels better today.     Patient Active Problem List   Diagnosis     Chronic bilateral low back pain with sciatica     Status post hip replacement     Hyperlipidemia     Anterior corneal dystrophy     Hypertension     Insomnia     Osteoarthritis     Cataract     Dermatitis medicamentosa     Esotropia     Macular puckering     Nontoxic multinodular goiter     Posterior vitreous detachment     Recurrent UTI     Other symptoms involving nervous and musculoskeletal systems(781.99)     Atrophic vaginitis     Overactive bladder     Health Care Home     Lower extremity edema     Spinal stenosis of lumbar region with neurogenic claudication       Current Outpatient Medications   Medication Sig Dispense Refill     Calcium-Vitamin D (CALCIUM + D PO) Take  by mouth. 600mg/400 IU bid       ciprofloxacin (CIPRO) 500 MG tablet Take one po bid x 7 days 14 tablet 0     cyanocolbalamin (VITAMIN  B-12) 1000 MCG tablet Take 1 tablet by mouth three times a week.       ESTRIOL 0.5MG/GM CREAM Insert 1 gram vaginally 2 times per week. 30 g 3     lisinopril (PRINIVIL/ZESTRIL) 5 MG tablet Take 1 tablet (5 mg) by mouth daily 90 tablet 3     NAPROXEN PO Take 220 mg by mouth 2 times daily as needed for moderate pain       Omega-3 Fatty Acids (OMEGA-3 FISH OIL PO) Take  by mouth.       simvastatin (ZOCOR) 20 MG tablet Take 1 tablet (20 mg) by mouth At Bedtime 90 tablet 3       Allergies   Allergen Reactions     Benzalkonium Chloride      Eye irritation     Nitrofurantoin Other (See Comments)     Causes hast heart rate.  Causes fast heart rate.        EXAM  There were no vitals taken for this visit.    Assessment:  (N30.00) Acute cystitis without hematuria  (primary encounter diagnosis)  Comment: one day history of UTI symptoms, uncomplicated cystitis  Plan: " ciprofloxacin (CIPRO) 500 MG tablet        Discussed treatment with cipro x 3 days, plenty of fluids. Contact MD if not improving as we would need to collect urine specimen under those circumstances. She agrees with plan.    Aleksandra Starks MD  Internal Medicine/Pediatrics      Start: 3:17 pm  End: 3:28 PM  Total is 11 minutes

## 2020-04-16 NOTE — TELEPHONE ENCOUNTER
Spoke to pt - states that since yesterday afternoon, she has been experiencing UTI symptoms. States she has noted urinary urgency, frequency, scant urine when attempting to void. Denies fever, illness. States she has been sleeping poorly, and likely not drinking enough fluids. States symptms feel similar to previous UTI symptoms.  Phone appt made with Dr Starks this afternoon.   She agrees with plan.  Amara Perez RN  [facility]

## 2020-04-16 NOTE — TELEPHONE ENCOUNTER
Health Call Center    Phone Message      May a detailed message be left on voicemail: yes     Reason for Call: Medication Question or concern regarding medication   Prescription Clarification  Name of Medication: ciprofloxacin (CIPRO) 250 MG tablet   Prescribing Provider: Dr. Starks   Pharmacy: [id number] [address] AT [facility]    What on the order needs clarification?  Patient would like a call back to discuss getting this medication filled. Patient states she has developed another UTI and prefers not to come into the clinic for a urine lab to be done. Please advise.     Patient would like the Nurse to know that she has a land line and is a little slow at getting to the phone so please call multiple times and give her some time to get to the phone.    Action Taken: Message routed to:  [facility]: PCC    Travel Screening: Not Applicable

## 2020-04-21 ENCOUNTER — MYC MEDICAL ADVICE (OUTPATIENT)
Dept: FAMILY MEDICINE | Facility: CLINIC | Age: 80
End: 2020-04-21

## 2020-06-10 ENCOUNTER — MYC MEDICAL ADVICE (OUTPATIENT)
Dept: FAMILY MEDICINE | Facility: CLINIC | Age: 80
End: 2020-06-10

## 2020-06-10 DIAGNOSIS — I10 ESSENTIAL HYPERTENSION: ICD-10-CM

## 2020-06-10 DIAGNOSIS — E78.5 HYPERLIPIDEMIA, UNSPECIFIED HYPERLIPIDEMIA TYPE: ICD-10-CM

## 2020-06-10 NOTE — TELEPHONE ENCOUNTER
Last Office Visit: 4/16/20  [facility] Appointments: none  Medication last refilled:   - simvastatin 7/3/19 #90 + 3 refills  - lisinopril 7/3/19 #90 + 3 refills  Last lipid panel 7/3/19  Last CMP 7/3/19    Medication is being filled for 1 time refill only due to:  patient due for visit and labs - she was notified via [email or web address] message    iKm Thorpe RN  06/11/20  1:16 PM

## 2020-06-11 RX ORDER — LISINOPRIL 5 MG/1
5 TABLET ORAL DAILY
Qty: 90 TABLET | Refills: 0 | Status: SHIPPED | OUTPATIENT
Start: 2020-06-11 | End: 2020-09-18

## 2020-06-11 RX ORDER — SIMVASTATIN 20 MG
20 TABLET ORAL AT BEDTIME
Qty: 90 TABLET | Refills: 0 | Status: SHIPPED | OUTPATIENT
Start: 2020-06-11 | End: 2020-09-18

## 2020-06-16 ENCOUNTER — TELEPHONE (OUTPATIENT)
Dept: OPHTHALMOLOGY | Facility: CLINIC | Age: 80
End: 2020-06-16

## 2020-06-16 NOTE — TELEPHONE ENCOUNTER
Spoke to pt at 1400  H/o bilateral blepharoplasty in past  Feels lids drooping again-- affecting vision now  Worsening over long period    Seen by Dr. Burns last in 2015    Scheduled July 27th when  possibly reopening-- pt will call prior to verify     Note to Dr. Burns's care coordinator    Aubrey Pham, RN 2:10 PM 06/18/20    --      Left message with direct number at [phone]  Aubrey Pham RN 5:31 PM 06/17/20        [facility] Call Center    Phone Message    May a detailed message be left on voicemail: yes     Reason for Call: Appointment Intake    Referring Provider Name: unknown  Diagnosis and/or Symptoms: dropping eyelid new patient    Action Taken: Message routed to:  [facility] EYE    Travel Screening: Not Applicable

## 2020-06-19 NOTE — TELEPHONE ENCOUNTER
FUTURE VISIT INFORMATION      FUTURE VISIT INFORMATION:    Date: 7/27/20    Time: 2:30pm    Location: [facility]  REFERRAL INFORMATION:    Referring provider:  self    Referring providers clinic:  N/A    Reason for visit/diagnosis  h/o lid surgery-- new lid drooping effecting vision-- seen by Dr. Burns last in 2015    RECORDS REQUESTED FROM:       Clinic name Comments Records Status Imaging Status   [facility] Epic

## 2020-07-27 ENCOUNTER — PRE VISIT (OUTPATIENT)
Dept: OPHTHALMOLOGY | Facility: CLINIC | Age: 80
End: 2020-07-27

## 2020-07-27 ENCOUNTER — OFFICE VISIT (OUTPATIENT)
Dept: OPHTHALMOLOGY | Facility: CLINIC | Age: 80
End: 2020-07-27
Payer: MEDICARE

## 2020-07-27 DIAGNOSIS — H57.813 BROW PTOSIS, BILATERAL: Primary | ICD-10-CM

## 2020-07-27 DIAGNOSIS — H02.403 PTOSIS OF EYELID, BILATERAL: ICD-10-CM

## 2020-07-27 DIAGNOSIS — H02.834 DERMATOCHALASIS OF BOTH UPPER EYELIDS: ICD-10-CM

## 2020-07-27 DIAGNOSIS — H02.831 DERMATOCHALASIS OF BOTH UPPER EYELIDS: ICD-10-CM

## 2020-07-27 ASSESSMENT — REFRACTION_WEARINGRX
OS_ADD: +2.50
SPECS_TYPE: LINED BIFOCAL
OD_ADD: +2.50
OD_CYLINDER: +0.75
OS_HBASE: BASE OUT
OS_CYLINDER: +0.75
OD_AXIS: 020
OS_AXIS: 090
OS_SPHERE: -3.50
OD_SPHERE: -1.00
OD_HBASE: BASE OUT

## 2020-07-27 ASSESSMENT — VISUAL ACUITY
OS_CC: 20/20-
OD_CC: 20/40-
METHOD: SNELLEN - LINEAR

## 2020-07-27 ASSESSMENT — TONOMETRY
OD_IOP_MMHG: 18
IOP_METHOD: ICARE
OS_IOP_MMHG: 17

## 2020-07-27 ASSESSMENT — CONF VISUAL FIELD
OS_NORMAL: 1
METHOD: COUNTING FINGERS
OD_NORMAL: 1

## 2020-07-27 NOTE — PROGRESS NOTES
Chief Complaints and History of Present Illnesses   Patient presents with     Droopy Both Upper Lids     Chief Complaint(s) and History of Present Illness(es)     Droopy Both Upper Lids     In right upper lid and left upper lid.  Severity is moderate.  Disease is   recurrent.  Duration of 1 year.  It is worse throughout the day.  Since   onset it is gradually worsening.  Associated signs and symptoms include   dry eyes (uses eye drops before she goes to bed for the dryness and that   helps ).  Negative for eye pain.              Comments     Post BULB and ptosis repair 11/12/2014   Pt feels that she is having some difficulty with her peripheral vision   that she has been noticing for the last year or so and it has been   gradually worsening.  She used a computer program called brain health to   try to improve her peripheral vision and she noticed that she was having   to raise her eyebrows a lot to do the exercises the computer program told   her to do.      She notes that when she is driving and merging right she is not confident   that she is seeing well - she has to look several times to make sure there   isn't something to the right of her.      Irma Iqbal, COT 2:25 PM 07/27/2020       FUNCTIONAL COMPLAINTS RELATED TO DROOPY EYELIDS/BROWS:  Shyann Samuel describes upper lids interfering with superior visual field and interfering with activities of daily living including reading, driving and watching television.     EXAM:   Dominant eye right    MRD1: Right eye 0.5   Left eye 0.5  Dermatochalasis with excess skin touching eyelashes  Brow ptosis with brow resting below superior orbital rim    VISUAL FIELD:  Right eye untaped:12 degrees Right eye taped: 50 degrees  Left eye untaped: 12 degrees Left eye taped: 50 degrees    Right eye visual field improves by: 38 degrees  Left eye visual field improves by: 38 degrees           Assessment & Plan     Shyann Samuel is a 80 year old female with the following  diagnoses:   1. Brow ptosis, bilateral    2. Dermatochalasis of both upper eyelids    3. Ptosis of eyelid, bilateral         PLAN:  Bilateral upper blepharoplasty (skin)  Bilateral upper lids ptosis repair (levator)  Bilateral brow ptosis repair (browpexy) - Brow ptosis repair is being performed to support and lift the brows which are resting below the orbital rim and to prevent post blepharoplasty brow descent which will make the lid position worse.              Chao Miles MD, MPH  Oculoplastics Fellow    Attending Physician Attestation:  Complete documentation of historical and exam elements from today's encounter can be found in the full encounter summary report (not reduplicated in this progress note).  I personally obtained the chief complaint(s) and history of present illness.  I confirmed and edited as necessary the review of systems, past medical/surgical history, family history, social history, and examination findings as documented by others; and I examined the patient myself.  I personally reviewed the relevant tests, images, and reports as documented above.  I formulated and edited as necessary the assessment and plan and discussed the findings and management plan with the patient and family. I personally reviewed the ophthalmic test(s) associated with this encounter, agree with the interpretation(s) as documented by the resident/fellow, and have edited the corresponding report(s) as necessary.   -Espinoza Burns MD    Today with Shyann Samuel, I reviewed the indications, risks, benefits, and alternatives of the proposed surgical procedure including, but not limited to, failure obtain the desired result  and need for additional surgery, bleeding, infection, loss of vision, loss of the eye, and the remote possibility of permanent damage to any organ system or death with the use of anesthesia.  I provided multiple opportunities for the questions, answered all questions to the best of my  ability, and confirmed that my answers and my discussion were understood.     - Espinoza Burns MD 3:12 PM 7/27/2020

## 2020-08-07 ENCOUNTER — THERAPY VISIT (OUTPATIENT)
Dept: PHYSICAL THERAPY | Facility: CLINIC | Age: 80
End: 2020-08-07
Payer: MEDICARE

## 2020-08-07 DIAGNOSIS — M54.50 LOW BACK PAIN: Primary | ICD-10-CM

## 2020-08-07 PROCEDURE — 97161 PT EVAL LOW COMPLEX 20 MIN: CPT | Mod: GP | Performed by: PHYSICAL THERAPIST

## 2020-08-07 PROCEDURE — 97110 THERAPEUTIC EXERCISES: CPT | Mod: GP | Performed by: PHYSICAL THERAPIST

## 2020-08-07 NOTE — LETTER
DEPARTMENT OF HEALTH AND HUMAN SERVICES  CENTERS FOR MEDICARE & MEDICAID SERVICES    PLAN/UPDATED PLAN OF PROGRESS FOR OUTPATIENT REHABILITATION@       PATIENTS NAME:  Shyann Samuel     : 1940    PROVIDER NUMBER:    [id number]    HICN: [id number]    PROVIDER NAME: [facility]    MEDICAL RECORD NUMBER: [id number]     START OF CARE DATE:  SOC Date: 20   TYPE:  PT    PRIMARY/TREATMENT DIAGNOSIS: (Pertinent Medical Diagnosis)  Low back pain    VISITS FROM START OF CARE:  Rxs Used: 1     [place] [facility] Initial Evaluation  Subjective:  The history is provided by the patient. No  was used.   Patient Health History  Shyann Samuel being seen for Exercise regimen post-L1-L5 laminectomy.   Problem began: 2019.   Problem occurred: This was surgery to relieve long-standing leg pain.   Pain is reported as 2/10 on pain scale.  General health as reported by patient is good.  Pertinent medical history includes: overweight.   Medical allergies: other. Other medical allergies details: Benzalkonium chloride, nitrofurantoin.   Surgeries include:  Orthopedic surgery and other. Other surgery history details: spine surgery.    Current medications:  Anti-inflammatory, high blood pressure medication and other. Other medications details: cholesterol-lowering medication.    Current occupation is retired.   Primary job tasks include:  Other.   Other job/home tasks details: Light housework, cooking, ADL.                Therapist Generated HPI Evaluation  Problem details: Not sleeping well lately and having more pain in back and leg.  Had surgery on 2019.  Wearing compression stocking left leg.  Was feeling better in February but since limited contact with people since onset of COVID she has had more pain.  Has aching in the low back. Has been going up and down the stairs once a day as an exercise but has left knee pain. Has some left  hamstring pain after walking up a steep hill in her yard Hurts with using a sink that is too low.    Interested in progressing exercises.  Trying to walk 15 min in the driveway every other day.  Feels physically tired at the end of 15 minutes..         Type of problem:  Lumbar.  PATIENTS NAME:  Shyann Samuel   : 1940    This is a recurrent condition.  Where condition occurred: at home.  Patient reports pain:  Lumbar spine left.  Pain is described as aching and is intermittent.  Pain radiates to:  Gluteals left. Pain is worse during the day.  Associated symptoms:  Loss of strength. Symptoms are exacerbated by certain positions and walking                  Objective:  Gait:    Assistive Devices:  Cane  Lumbar/SI Evaluation  ROM:    AROM Lumbar:   Flexion:        Pain left hamstring  Ext:                    Very limited   Side Bend:        Left:     Right:   Rotation:           Left:     Right:   Side Glide:        Left:     Right:   Lumbar Myotomes:    T12-L3 (Hip Flex):  Right: 4  L4 (Ankle DF):  Left:  5-    Right:  5-  S1 (Toe Raise):  Left: 4    Right: 5-  Lumbar DTR's:  not assessed  Cord Signs:  not assessed  Lumbar Dermtomes:  normal  Neural Tension/Mobility:  Lumbar:  Not assessed    Lumbar Palpation:  not assessed  Functional Tests:  Core strength and proprioception lumbar: able to move sit to stand without use of hands.  Lumbar Provocation:  not assessed  Assessment/Plan:    Patient is a 80 year old female with lumbar complaints.    Patient has the following significant findings with corresponding treatment plan.                Diagnosis 1:  Back pain  Pain -  self management, education and home program  Decreased ROM/flexibility - manual therapy, therapeutic exercise and home program  Decreased strength - therapeutic exercise, therapeutic activities and home program  Decreased function - therapeutic activities and home program    Therapy Evaluation Codes:   1) History comprised of:   Personal  factors that impact the plan of care:      Time since onset of symptoms.    Comorbidity factors that impact the plan of care are:      Osteoarthritis and Overweight.     Medications impacting care: None.  2) Examination of Body Systems comprised of:   Body structures and functions that impact the plan of care:      Lumbar spine.   Activity limitations that impact the plan of care are:      Bending, Lifting, Squatting/kneeling, Stairs, Standing and Walking.  PATIENTS NAME:  Shyann Samuel   : 1940    3) Clinical presentation characteristics are:   Stable/Uncomplicated.  4) Decision-Making    Low complexity using standardized patient assessment instrument and/or measureable assessment of functional outcome.  Cumulative Therapy Evaluation is: Low complexity.    Previous and current functional limitations:  (See Goal Flow Sheet for this information)    Short term and Long term goals: (See Goal Flow Sheet for this information)     Communication ability:  Patient appears to be able to clearly communicate and understand verbal and written communication and follow directions correctly.  Treatment Explanation - The following has been discussed with the patient:   RX ordered/plan of care  Anticipated outcomes  Possible risks and side effects  This patient would benefit from PT intervention to resume normal activities.   Rehab potential is good.    Frequency:  1 X a month, once daily  Duration:  for 3 months  Discharge Plan:  Achieve all LTG.  Independent in home treatment program.  Reach maximal therapeutic benefit.    Caregiver Signature/Credentials _____________________________ Date ________       Treating Provider: Kat Wayne PT,ATC   I have reviewed and certified the need for these services and plan of treatment while under my care.        PHYSICIAN'S SIGNATURE:   _________________________________________  Date___________   Aleksandra Starks MD     Certification period:  Beginning  date period:  "08/07/20 to  End of Cert period date: 11/04/20     Functional Level Progress Report: Please see attached \"Goal Flow sheet for Functional level.\"    ____X____ Continue Services or       ________ DC Services                Service dates: From  SOC Date: 08/07/20 date to present                         "

## 2020-09-01 ENCOUNTER — OFFICE VISIT (OUTPATIENT)
Dept: OPHTHALMOLOGY | Facility: CLINIC | Age: 80
End: 2020-09-01
Payer: MEDICARE

## 2020-09-01 DIAGNOSIS — H02.834 DERMATOCHALASIS OF BOTH UPPER EYELIDS: ICD-10-CM

## 2020-09-01 DIAGNOSIS — H52.203 MYOPIC ASTIGMATISM OF BOTH EYES: Primary | ICD-10-CM

## 2020-09-01 DIAGNOSIS — Z96.1 PSEUDOPHAKIA OF BOTH EYES: ICD-10-CM

## 2020-09-01 DIAGNOSIS — H51.8 DIVERGENCE INSUFFICIENCY: ICD-10-CM

## 2020-09-01 DIAGNOSIS — H52.13 MYOPIC ASTIGMATISM OF BOTH EYES: Primary | ICD-10-CM

## 2020-09-01 DIAGNOSIS — H02.831 DERMATOCHALASIS OF BOTH UPPER EYELIDS: ICD-10-CM

## 2020-09-01 DIAGNOSIS — H02.88A MEIBOMIAN GLAND DYSFUNCTION (MGD) OF UPPER AND LOWER LIDS OF BOTH EYES: ICD-10-CM

## 2020-09-01 DIAGNOSIS — H02.88B MEIBOMIAN GLAND DYSFUNCTION (MGD) OF UPPER AND LOWER LIDS OF BOTH EYES: ICD-10-CM

## 2020-09-01 ASSESSMENT — REFRACTION_FINALRX
OD_HPRISM: 3.0
OS_HBASE: OUT
OD_HBASE: OUT
OS_HPRISM: 3.0

## 2020-09-01 ASSESSMENT — REFRACTION_WEARINGRX
OD_AXIS: 020
OS_AXIS: 095
OS_HPRISM: 3.0 BO
OD_ADD: +2.50
OS_SPHERE: -3.50
OS_ADD: +2.50
OD_AXIS: 025
OD_HBASE: BASE OUT
OD_SPHERE: -1.00
OS_AXIS: 090
OS_SPHERE: -2.75
OS_HBASE: BASE OUT
OD_SPHERE: -0.50
OD_ADD: +2.25
OD_HPRISM: 3.0 BO
SPECS_TYPE: BIFOCAL
OS_ADD: +2.25
OD_CYLINDER: +0.75
SPECS_TYPE: LINED BIFOCAL
OD_CYLINDER: +0.50
OS_CYLINDER: +0.75
OS_CYLINDER: +0.75

## 2020-09-01 ASSESSMENT — VISUAL ACUITY
OS_PH_CC: 20/25
OS_CC: 20/30
OD_CC: 20/50
OS_CC+: -2
OD_PH_CC+: -1+2
OS_PH_CC+: -2
OD_PH_CC: 20/30
METHOD: SNELLEN - LINEAR
CORRECTION_TYPE: GLASSES
OD_CC+: -2

## 2020-09-01 ASSESSMENT — TONOMETRY
OS_IOP_MMHG: 14
IOP_METHOD: TONOPEN
OD_IOP_MMHG: 16

## 2020-09-01 ASSESSMENT — CONF VISUAL FIELD
OS_NORMAL: 1
METHOD: COUNTING FINGERS
OD_NORMAL: 1

## 2020-09-01 ASSESSMENT — REFRACTION_MANIFEST
OS_AXIS: 090
OS_CYLINDER: +0.75
OD_AXIS: 170
OD_SPHERE: -1.25
OD_ADD: +2.75
OS_SPHERE: -3.50
OD_CYLINDER: +1.25
OS_ADD: +2.75

## 2020-09-01 ASSESSMENT — CUP TO DISC RATIO
OS_RATIO: 0.1
OD_RATIO: 0.1

## 2020-09-01 NOTE — NURSING NOTE
Chief Complaint(s) and History of Present Illness(es)     COMPREHENSIVE EYE EXAM     In both eyes.  Associated symptoms include dryness.  Negative for eye pain, redness and tearing.  Pain was noted as 0/10.              Comments     Complete eye exam and rx for new glasses. Pt notes she is unsure of any real vision changes, but with the new glasses not seeing as well (but could be due to BUL drooping). Pt has plans to have lid surgery soon. Pt does c/o dry eyes at night x years.    Ocular meds:  AT's at bedtime/QAM (PRN) REMY Lindsey, COMT 9:37 AM September 1, 2020

## 2020-09-01 NOTE — PROGRESS NOTES
History  HPI     COMPREHENSIVE EYE EXAM     In both eyes.  Associated symptoms include dryness.  Negative for eye pain, redness and tearing.  Pain was noted as 0/10.              Comments     Complete eye exam and rx for new glasses. Pt notes she is unsure of any real vision changes, but with the new glasses not seeing as well (but could be due to BUL drooping). Pt has plans to have lid surgery soon. Pt does c/o dry eyes at night x years.    Ocular meds:  AT's at bedtime/QAM (PRN) BE    KISHAN Vazquez 9:37 AM September 1, 2020             Last edited by Debora Lindsey COMT on 9/1/2020  9:37 AM. (History)          Assessment/Plan  (H52.203,  H52.13) Myopic astigmatism of both eyes  (primary encounter diagnosis)  Comment: Myopic astigmatism with presbyopia both eyes   Plan: REFRACTION         Educated patient on condition and clinical findings. Dispensed spectacle prescription for full time wear. Monitor annually.    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    (H02.88A,  H02.88B) Meibomian gland dysfunction (MGD) of upper and lower lids of both eyes  Comment: Good comfort with current drop regimen  Plan:  Continue use of artificial tears twice each day both eyes. Recommended warm compresses as needed.    (Z96.1) Pseudophakia of both eyes  Comment: Clear lenses both eyes, stable   Plan:  No treatment indicated at this time. Monitor annually.    (H02.831,  H02.834) Dermatochalasis of both upper eyelids  Comment: Following with Dr. Burns  Plan:  Continue management with Dr. Burns.    Return to clinic in 1 year for comprehensive eye exam.    Complete documentation of historical and exam elements from today's encounter can  be found in the full encounter summary report (not reduplicated in this progress  note). I personally obtained the chief complaint(s) and history of present illness. I  confirmed and edited as necessary  the review of systems, past medical/surgical  history, family history, social history, and examination findings as documented by  others; and I examined the patient myself. I personally reviewed the relevant tests,  images, and reports as documented above. I formulated and edited as necessary the  assessment and plan and discussed the findings and management plan with the  patient and family.    Espinoza Hernadez OD, FAAO

## 2020-09-16 DIAGNOSIS — Z11.59 ENCOUNTER FOR SCREENING FOR OTHER VIRAL DISEASES: Primary | ICD-10-CM

## 2020-09-16 PROBLEM — H02.831 DERMATOCHALASIS OF BOTH UPPER EYELIDS: Status: ACTIVE | Noted: 2020-09-16

## 2020-09-16 PROBLEM — H02.834 DERMATOCHALASIS OF BOTH UPPER EYELIDS: Status: ACTIVE | Noted: 2020-09-16

## 2020-09-16 PROBLEM — H57.813 BROW PTOSIS, BILATERAL: Status: ACTIVE | Noted: 2020-09-16

## 2020-09-16 PROBLEM — H02.403 PTOSIS OF EYELID, BILATERAL: Status: ACTIVE | Noted: 2020-09-16

## 2020-09-17 ENCOUNTER — TELEPHONE (OUTPATIENT)
Dept: OPHTHALMOLOGY | Facility: CLINIC | Age: 80
End: 2020-09-17

## 2020-09-17 NOTE — TELEPHONE ENCOUNTER
Spoke with patient to schedule surgery with Dr. Espinoza Burns.    Surgery was scheduled on 10/14 at [facility]  Patient will have H&P at [facility]     Patient is aware a COVID-19 test is needed before their procedure. The test should be with-in 4 days of their procedure.   Test Details: Date 10/12  Location  [facility] [place]    Post-Op visit was scheduled on  10/26  Patient is aware a / is needed day of surgery.   Surgery packet was mailed, patient has my direct contact information for any further questions.

## 2020-09-18 ENCOUNTER — OFFICE VISIT (OUTPATIENT)
Dept: FAMILY MEDICINE | Facility: CLINIC | Age: 80
End: 2020-09-18
Payer: MEDICARE

## 2020-09-18 VITALS
HEIGHT: 63 IN | DIASTOLIC BLOOD PRESSURE: 82 MMHG | HEART RATE: 91 BPM | SYSTOLIC BLOOD PRESSURE: 133 MMHG | WEIGHT: 172.75 LBS | OXYGEN SATURATION: 98 % | TEMPERATURE: 97.2 F | BODY MASS INDEX: 30.61 KG/M2

## 2020-09-18 DIAGNOSIS — R73.09 ELEVATED GLUCOSE: ICD-10-CM

## 2020-09-18 DIAGNOSIS — Z23 NEED FOR TD VACCINE: ICD-10-CM

## 2020-09-18 DIAGNOSIS — E78.5 HYPERLIPIDEMIA, UNSPECIFIED HYPERLIPIDEMIA TYPE: ICD-10-CM

## 2020-09-18 DIAGNOSIS — Z23 NEED FOR PROPHYLACTIC VACCINATION AND INOCULATION AGAINST INFLUENZA: ICD-10-CM

## 2020-09-18 DIAGNOSIS — I10 ESSENTIAL HYPERTENSION: Primary | ICD-10-CM

## 2020-09-18 LAB
ALBUMIN SERPL-MCNC: 3.5 G/DL (ref 3.2–4.5)
ALP SERPL-CCNC: 55 U/L (ref 40–150)
ALT SERPL-CCNC: 20 U/L (ref 0–50)
AST SERPL-CCNC: 23 U/L (ref 0–45)
BILIRUB SERPL-MCNC: 0.7 MG/DL (ref 0.2–1.3)
BUN SERPL-MCNC: 19 MG/DL (ref 7–30)
CALCIUM SERPL-MCNC: 9.7 MG/DL (ref 8.5–10.4)
CHLORIDE SERPLBLD-SCNC: 102 MMOL/L (ref 94–109)
CHOLEST SERPL-MCNC: 174 MG/DL (ref 0–200)
CHOLEST/HDLC SERPL: 2.9 {RATIO} (ref 0–5)
CO2 SERPL-SCNC: 28 MMOL/L (ref 20–32)
CREAT SERPL-MCNC: 0.5 MG/DL (ref 0.6–1.3)
EGFR CALCULATED (BLACK REFERENCE): 153.1
EGFR CALCULATED (NON BLACK REFERENCE): 126.5
FASTING SPECIMEN: YES
GLUCOSE SERPL-MCNC: 113 MG/DL (ref 60–99)
HDLC SERPL-MCNC: 60 MG/DL
LDLC SERPL CALC-MCNC: 75 MG/DL (ref 0–129)
POTASSIUM SERPL-SCNC: 4.6 MMOL/L (ref 3.4–5.3)
PROT SERPL-MCNC: 6.7 G/DL (ref 6.8–8.8)
SODIUM SERPL-SCNC: 144 MMOL/L (ref 137.3–146.3)
TRIGL SERPL-MCNC: 199 MG/DL (ref 0–150)
VLDL-CHOLESTEROL: 40 (ref 7–32)

## 2020-09-18 RX ORDER — SIMVASTATIN 20 MG
20 TABLET ORAL AT BEDTIME
Qty: 90 TABLET | Refills: 3 | Status: SHIPPED | OUTPATIENT
Start: 2020-09-18 | End: 2020-09-18

## 2020-09-18 RX ORDER — SIMVASTATIN 20 MG
20 TABLET ORAL AT BEDTIME
Qty: 90 TABLET | Refills: 3 | Status: SHIPPED | OUTPATIENT
Start: 2020-09-18 | End: 2021-10-06

## 2020-09-18 RX ORDER — LISINOPRIL 5 MG/1
5 TABLET ORAL DAILY
Qty: 90 TABLET | Refills: 3 | Status: SHIPPED | OUTPATIENT
Start: 2020-09-18 | End: 2020-09-18

## 2020-09-18 RX ORDER — LISINOPRIL 5 MG/1
5 TABLET ORAL DAILY
Qty: 90 TABLET | Refills: 3 | Status: SHIPPED | OUTPATIENT
Start: 2020-09-18 | End: 2021-10-06

## 2020-09-18 ASSESSMENT — MIFFLIN-ST. JEOR: SCORE: 1233.84

## 2020-09-18 NOTE — NURSING NOTE
"80 year old  Chief Complaint   Patient presents with     Recheck Medication     meds check and labs        Blood pressure 133/82, pulse 91, temperature 97.2  F (36.2  C), temperature source Axillary, height 1.61 m (5' 3.39\"), weight 78.4 kg (172 lb 12 oz), SpO2 98 %, not currently breastfeeding. Body mass index is 30.23 kg/m .  Patient Active Problem List   Diagnosis     Chronic bilateral low back pain with sciatica     Status post hip replacement     Hyperlipidemia     Anterior corneal dystrophy     Hypertension     Insomnia     Osteoarthritis     Cataract     Dermatitis medicamentosa     Esotropia     Macular puckering     Nontoxic multinodular goiter     Posterior vitreous detachment     Recurrent UTI     Other symptoms involving nervous and musculoskeletal systems(781.99)     Atrophic vaginitis     Overactive bladder     Back pain     Health Care Home     Lower extremity edema     Spinal stenosis of lumbar region with neurogenic claudication     Brow ptosis, bilateral     Dermatochalasis of both upper eyelids     Ptosis of eyelid, bilateral       Wt Readings from Last 2 Encounters:   09/18/20 78.4 kg (172 lb 12 oz)   07/03/19 77.7 kg (171 lb 4 oz)     BP Readings from Last 3 Encounters:   09/18/20 133/82   07/03/19 117/71   04/10/19 124/80         Current Outpatient Medications   Medication     Calcium-Vitamin D (CALCIUM + D PO)     cyanocolbalamin (VITAMIN  B-12) 1000 MCG tablet     ESTRIOL 0.5MG/GM CREAM     lisinopril (ZESTRIL) 5 MG tablet     NAPROXEN PO     Omega-3 Fatty Acids (OMEGA-3 FISH OIL PO)     simvastatin (ZOCOR) 20 MG tablet     No current facility-administered medications for this visit.        Social History     Tobacco Use     Smoking status: Never Smoker     Smokeless tobacco: Never Used   Substance Use Topics     Alcohol use: No     Drug use: No       Health Maintenance Due   Topic Date Due     DEXA  1940     URINE DRUG SCREEN  1940     ZOSTER IMMUNIZATION (1 of 2) 04/08/1991     " MEDICARE ANNUAL WELLNESS VISIT  04/08/2006     DTAP/TDAP/TD IMMUNIZATION (2 - Td) 06/24/2010     INFLUENZA VACCINE (1) 09/01/2020       No results found for: PAP      September 18, 2020 9:04 AM    Prior to immunization administration, verified patients identity using patient s name and date of birth. Please see Immunization Activity for additional information.     Screening Questionnaire for Adult Immunization    Are you sick today?   No   Do you have allergies to medications, food, a vaccine component or latex?   No   Have you ever had a serious reaction after receiving a vaccination?   No   Do you have a long-term health problem with heart, lung, kidney, or metabolic disease (e.g., diabetes), asthma, a blood disorder, no spleen, complement component deficiency, a cochlear implant, or a spinal fluid leak?  Are you on long-term aspirin therapy?   No   Do you have cancer, leukemia, HIV/AIDS, or any other immune system problem?   No   Do you have a parent, brother, or sister with an immune system problem?   No   In the past 3 months, have you taken medications that affect  your immune system, such as prednisone, other steroids, or anticancer drugs; drugs for the treatment of rheumatoid arthritis, Crohn s disease, or psoriasis; or have you had radiation treatments?   No   Have you had a seizure, or a brain or other nervous system problem?   No   During the past year, have you received a transfusion of blood or blood    products, or been given immune (gamma) globulin or antiviral drug?   No   For women: Are you pregnant or is there a chance you could become       pregnant during the next month?   No   Have you received any vaccinations in the past 4 weeks?   No     Immunization questionnaire answers were all negative.        Per orders of Dr. Starks , injection of Flu shot  given by Makayla Garcia. Patient instructed to remain in clinic for 15 minutes afterwards, and to report any adverse reaction to me  immediately.       Screening performed by Makayla Garcia on 9/18/2020 at 9:04 AM.

## 2020-09-18 NOTE — PROGRESS NOTES
"Shyann Samuel is a 80 year old female here for the following issues:       Essential hypertension  Shyann has a history of hypertension. She is here for BP check. She has a home BP machine which is accurate. Home readings show -125 at home, no pain, pressure. No chest pain, palpitations.   BP Readings from Last 3 Encounters:   09/18/20 133/82   07/03/19 117/71   04/10/19 124/80       Hyperlipidemia, unspecified hyperlipidemia type  On Simvastatin 20mg daily. She is fasting today and is due for labs and refills. No hx of CAD or stroke. Nonsmoker. Blood pressure is well controlled, no hx of Diabetes.     Chronic lymphedema  Shyann has chronic left lower extremity lymphedema. She wears a support stocking and also uses a pneumatic compression device at night. No reported redness over skin or weeping.     Sleep disturbance  Shyann reports she has been awakening in the middle of the night, sometimes due to \"overthinking things\". She denies depression or anxiety, feels she is coping well with the COVID pandemic. \"I'm a good introvert\".  Before her laminectomy in 2019 she felt cold all the time. Now she notes she is sometimes awakening, feeling too warm. No drenching sweats.     Vaccines due  Flu vaccine is requested today  She is due for Td booster  She brings in copy of 2 step mantoux testing (negative) so that her chart can be updated    Patient Active Problem List   Diagnosis     Chronic bilateral low back pain with sciatica     Status post hip replacement     Hyperlipidemia     Anterior corneal dystrophy     Hypertension     Insomnia     Osteoarthritis     Cataract     Dermatitis medicamentosa     Esotropia     Macular puckering     Nontoxic multinodular goiter     Posterior vitreous detachment     Recurrent UTI     Other symptoms involving nervous and musculoskeletal systems(781.99)     Atrophic vaginitis     Overactive bladder     Back pain     Health Care Home     Lower extremity edema     Spinal stenosis of " "lumbar region with neurogenic claudication     Brow ptosis, bilateral     Dermatochalasis of both upper eyelids     Ptosis of eyelid, bilateral       Current Outpatient Medications   Medication Sig Dispense Refill     Calcium-Vitamin D (CALCIUM + D PO) Take  by mouth. 600mg/400 IU bid       cyanocolbalamin (VITAMIN  B-12) 1000 MCG tablet Take 1 tablet by mouth three times a week.       ESTRIOL 0.5MG/GM CREAM Insert 1 gram vaginally 2 times per week. 30 g 3     lisinopril (ZESTRIL) 5 MG tablet Take 1 tablet (5 mg) by mouth daily 90 tablet 0     NAPROXEN PO Take 220 mg by mouth 2 times daily as needed for moderate pain       Omega-3 Fatty Acids (OMEGA-3 FISH OIL PO) Take  by mouth.       simvastatin (ZOCOR) 20 MG tablet Take 1 tablet (20 mg) by mouth At Bedtime 90 tablet 0       Allergies   Allergen Reactions     Benzalkonium Chloride      Eye irritation     Nitrofurantoin Other (See Comments)     Causes hast heart rate.  Causes fast heart rate.        EXAM  /82 (BP Location: Right arm, Patient Position: Sitting, Cuff Size: Adult Regular)   Pulse 91   Temp 97.2  F (36.2  C) (Axillary)   Ht 1.61 m (5' 3.39\")   Wt 78.4 kg (172 lb 12 oz)   SpO2 98%   BMI 30.23 kg/m    Gen: Alert, pleasant, NAD, Overweight  COR: S1,S2, no murmur  Lungs: CTA bilaterally, no rhonchi, wheezes or rales  Ext: wearing support stocking on left lower extremity, not removed.      Assessment:  (I10) Essential hypertension  (primary encounter diagnosis)  Comment: blood pressures are in target range  Plan: Comprehensive Metabolic Panel ([facility]),         lisinopril (ZESTRIL) 5 MG tablet, DISCONTINUED:        lisinopril (ZESTRIL) 5 MG tablet        Refilled medication for one year, will check labs today    (E78.5) Hyperlipidemia, unspecified hyperlipidemia type  Comment: fasting today  Recent Labs   Lab Test 09/18/20  0952 07/03/19  1049   CHOL 174.0 180.0   HDL 60.0 62.0   LDL 75.0 82.0   TRIG 199.0* 178.0*   CHOLHDLRATIO 2.9 2.9     Plan: " Lipid Panel ([place]), Comprehensive         Metabolic Panel ([facility]), simvastatin (ZOCOR)         20 MG tablet, DISCONTINUED: simvastatin (ZOCOR)        20 MG tablet        Will check fasting lipid profile today and refill medication for one year    (Z23) Need for prophylactic vaccination and inoculation against influenza  Comment: due for routine flu vaccine  Plan: FluBlok preserve-free/prefilled, 18+ years         [[id number]]        Given today    (Z23) Need for Td vaccine  Comment: no Td booster on record for more than 10 yrs  Plan: TD (ADULT, 7+) PRESERVE FREE        Give today.    Aleksandra Starks MD  Internal Medicine/Pediatrics

## 2020-10-09 ENCOUNTER — OFFICE VISIT (OUTPATIENT)
Dept: FAMILY MEDICINE | Facility: CLINIC | Age: 80
End: 2020-10-09
Payer: MEDICARE

## 2020-10-09 VITALS
HEIGHT: 62 IN | HEART RATE: 100 BPM | SYSTOLIC BLOOD PRESSURE: 118 MMHG | BODY MASS INDEX: 31.74 KG/M2 | OXYGEN SATURATION: 96 % | TEMPERATURE: 97.9 F | DIASTOLIC BLOOD PRESSURE: 76 MMHG | WEIGHT: 172.5 LBS

## 2020-10-09 DIAGNOSIS — H02.834 DERMATOCHALASIS OF BOTH UPPER EYELIDS: ICD-10-CM

## 2020-10-09 DIAGNOSIS — E78.5 HYPERLIPIDEMIA, UNSPECIFIED HYPERLIPIDEMIA TYPE: ICD-10-CM

## 2020-10-09 DIAGNOSIS — Z01.818 PREOP GENERAL PHYSICAL EXAM: Primary | ICD-10-CM

## 2020-10-09 DIAGNOSIS — H57.813 BROW PTOSIS, BILATERAL: ICD-10-CM

## 2020-10-09 DIAGNOSIS — H02.403 PTOSIS OF EYELID, BILATERAL: ICD-10-CM

## 2020-10-09 DIAGNOSIS — I10 ESSENTIAL HYPERTENSION: ICD-10-CM

## 2020-10-09 DIAGNOSIS — H02.831 DERMATOCHALASIS OF BOTH UPPER EYELIDS: ICD-10-CM

## 2020-10-09 DIAGNOSIS — H50.00 ESOTROPIA: ICD-10-CM

## 2020-10-09 ASSESSMENT — MIFFLIN-ST. JEOR: SCORE: 1213.95

## 2020-10-09 NOTE — NURSING NOTE
"80 year old  Chief Complaint   Patient presents with     Pre-Op Exam     DOS 10/14/2020 Dr. Espinoza Burns [facility] [place]  Eye surgery        Blood pressure (!) 147/87, pulse 100, temperature 97.9  F (36.6  C), temperature source Temporal, height 1.58 m (5' 2.21\"), weight 78.2 kg (172 lb 8 oz), SpO2 96 %, not currently breastfeeding. Body mass index is 31.34 kg/m .  Patient Active Problem List   Diagnosis     Chronic bilateral low back pain with sciatica     Status post hip replacement     Hyperlipidemia     Anterior corneal dystrophy     Hypertension     Insomnia     Osteoarthritis     Cataract     Dermatitis medicamentosa     Esotropia     Macular puckering     Nontoxic multinodular goiter     Posterior vitreous detachment     Recurrent UTI     Other symptoms involving nervous and musculoskeletal systems(781.99)     Atrophic vaginitis     Overactive bladder     Back pain     Health Care Home     Lower extremity edema     Spinal stenosis of lumbar region with neurogenic claudication     Brow ptosis, bilateral     Dermatochalasis of both upper eyelids     Ptosis of eyelid, bilateral       Wt Readings from Last 2 Encounters:   10/09/20 78.2 kg (172 lb 8 oz)   09/18/20 78.4 kg (172 lb 12 oz)     BP Readings from Last 3 Encounters:   10/09/20 (!) 147/87   09/18/20 133/82   07/03/19 117/71         Current Outpatient Medications   Medication     Calcium-Vitamin D (CALCIUM + D PO)     cyanocolbalamin (VITAMIN  B-12) 1000 MCG tablet     ESTRIOL 0.5MG/GM CREAM     lisinopril (ZESTRIL) 5 MG tablet     NAPROXEN PO     Omega-3 Fatty Acids (OMEGA-3 FISH OIL PO)     simvastatin (ZOCOR) 20 MG tablet     No current facility-administered medications for this visit.        Social History     Tobacco Use     Smoking status: Never Smoker     Smokeless tobacco: Never Used   Substance Use Topics     Alcohol use: No     Drug use: No       Health Maintenance Due   Topic Date Due     DEXA  1940     URINE DRUG SCREEN  1940     ZOSTER " IMMUNIZATION (1 of 2) 04/08/1991     MEDICARE ANNUAL WELLNESS VISIT  04/08/2006       No results found for: PAP      October 9, 2020 10:40 AM

## 2020-10-09 NOTE — PATIENT INSTRUCTIONS

## 2020-10-09 NOTE — PROGRESS NOTES
[facility]  [address], [address]  [place] [facility] [address]  Phone: [phone]  Fax: [phone]  Primary Provider: Aleksandra Starks  [facility] Performing Provider Dr. Espinoza Burns     PREOPERATIVE EVALUATION:  Today's date: 10/9/2020    Shyann Samuel is a 80 year old female who presents for a preoperative evaluation.    Surgical Information:  Surgery Details 10/9/2020   Surgery/Procedure: Ptosis / Blepheroplasty   Surgery Location: [facility]   Surgeon: Dr. Espinoza Burns   Surgery Date: 10/14/2020   Time of Surgery: 8:40 am   Where patient plans to recover: Other   Additional recovery plan details: At home with friend     Fax number for surgical facility: [phone]  Type of Anesthesia Anticipated: General    Subjective     HPI related to upcoming procedure: History of worsening peripheral vision. Met with ophthalmology who recommended revision of previously performed procedure  Preop Questions 10/9/2020   1. Have you ever had a heart attack or stroke? No   2. Have you ever had surgery on your heart or blood vessels, such as a stent placement, a coronary artery bypass, or surgery on an artery in your head, neck, heart, or legs? No   3. Do you have chest pain with activity? No   4. Do you have a history of  heart failure? No   5. Do you currently have a cold, bronchitis or symptoms of other infection? No   6. Do you have a cough, shortness of breath, or wheezing? No   7. Do you or anyone in your family have previous history of blood clots? No   8. Do you or does anyone in your family have a serious bleeding problem such as prolonged bleeding following surgeries or cuts? No   9. Have you ever had problems with anemia or been told to take iron pills? YES - After previous hip replacement surgery patient required a blood transfusion and a Rx for iron infusions. No other concerning history   10. Have you had any abnormal blood loss such as black, tarry or bloody stools,  or abnormal vaginal bleeding? No   11. Have you ever had a blood transfusion? YES - See hip replacement above   11a. Have you ever had a transfusion reaction? No   Are you willing to have a blood transfusion if it is medically needed before, during, or after your surgery? Yes   13. Have you or any of your relatives ever had problems with anesthesia? No   14. Do you have sleep apnea, excessive snoring or daytime drowsiness? No   15. Do you have any artifical heart valves or other implanted medical devices like a pacemaker, defibrillator, or continuous glucose monitor? No   16. Do you have artificial joints? YES - RIGHT hip   17. Are you allergic to latex? No   18. Is there any chance that you may be pregnant? No   [id number]}  Hypertension  - patient's BP mildly elevated today  - checks her pressures at home and states she is generally 110s/80s  - previously stable on daily lisinopril 5mg     HLD  - most recent lipid panel on 9/18 of this year as noted in chart  - continues on daily simvastatin 20mg   - advised to discontinue fish oil supplement prior to surgery    Chronic pain  - patient previously advised to discontinue advil but did not realize this meant naproxen as well  - last NSAID was 10/8/20      Review of Systems  Constitutional, neuro, ENT, endocrine, pulmonary, cardiac, gastrointestinal, genitourinary, musculoskeletal, integument and psychiatric systems are negative, except as otherwise noted.    Patient Active Problem List    Diagnosis Date Noted     Brow ptosis, bilateral 09/16/2020     Priority: Medium     Added automatically from request for surgery [id number]       Dermatochalasis of both upper eyelids 09/16/2020     Priority: Medium     Added automatically from request for surgery [id number]       Ptosis of eyelid, bilateral 09/16/2020     Priority: Medium     Added automatically from request for surgery [id number]       Spinal stenosis of lumbar region with neurogenic claudication 12/30/2018     Priority: Medium      Lower extremity edema 12/21/2018     Priority: Medium     Health Care Home 03/17/2016     Priority: Medium     Date:  4-19-16  Status: Closed  Care Coordinator/:  LUKE Coffey       Back pain 01/27/2016     Priority: Medium     Overactive bladder 09/03/2014     Priority: Medium     Atrophic vaginitis 12/19/2012     Priority: Medium     Other symptoms involving nervous and musculoskeletal systems(781.99) 11/27/2012     Priority: Medium     Recurrent UTI 11/21/2012     Priority: Medium     Cataract 09/27/2012     Priority: Medium     Utility update for deleted IMO code  [employer] Update utility       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 Provider to review and confirm.       Status post hip replacement 06/23/2011     Priority: Medium     (Problem list name updated by automated process. Provider to review and confirm.)        Past Medical History:   Diagnosis Date     Anterior corneal dystrophy      Back pain      Cataract      Chronic osteoarthritis      Esotropia      Hyperlipidemia      Hyperlipidemia      Hypertension      Insomnia      Osteoarthritis      Posterior vitreous detachment      Urinary tract infection      Past Surgical History:   Procedure Laterality Date     C TOTAL HIP ARTHROPLASTY Right 10/2009    Replacement of right hip     CATARACT IOL, RT/LT Bilateral ~2005     EYE SURGERY  after 2003,pt unsure     "blepharoplasty     HC EXTERNAL LEVATOR RESECTION Bilateral 12/2014     LAMINECTOMY LUMBAR TWO LEVELS  01/24/2019    L1 to L5 laminectomy [facility]      TONSILLECTOMY  1946     Current Outpatient Medications   Medication Sig Dispense Refill     Calcium-Vitamin D (CALCIUM + D PO) Take  by mouth. 600mg/400 IU bid       cyanocolbalamin (VITAMIN  B-12) 1000 MCG tablet Take 1 tablet by mouth three times a week.       ESTRIOL 0.5MG/GM CREAM Insert 1 gram vaginally 2 times per week. 30 g 3     lisinopril (ZESTRIL) 5 MG tablet Take 1 tablet (5 mg) by mouth daily 90 tablet 3     NAPROXEN PO Take 220 mg by mouth 2 times daily as needed for moderate pain       Omega-3 Fatty Acids (OMEGA-3 FISH OIL PO) Take  by mouth.       simvastatin (ZOCOR) 20 MG tablet Take 1 tablet (20 mg) by mouth At Bedtime 90 tablet 3       Allergies   Allergen Reactions     Benzalkonium Chloride      Eye irritation     Nitrofurantoin Other (See Comments)     Causes hast heart rate.  Causes fast heart rate.        Social History     Tobacco Use     Smoking status: Never Smoker     Smokeless tobacco: Never Used   Substance Use Topics     Alcohol use: No     Family History   Problem Relation Age of Onset     Cerebrovascular Disease Mother         probably consequent to Crohn's disease     Crohn's Disease Mother      Osteoporosis Mother         probably consequent to Crohn's disease     Other Cancer Father         kidney cancer, benign brain tumor     Cancer Other         Father (kidney meningioma), Brother (neuroblastoma)     Other Cancer Brother         adult neuroblastoma (dispute about precise diagnos     Glaucoma No family hx of      Macular Degeneration No family hx of      Melanoma No family hx of      Skin Cancer No family hx of      History   Drug Use No            Objective   BP (!) 147/87 (BP Location: Right arm, Patient Position: Sitting, Cuff Size: Adult Large)   Pulse 100   Temp 97.9  F (36.6  C) (Temporal)   Ht 1.58 m (5' 2.21\")  "  Wt 78.2 kg (172 lb 8 oz)   SpO2 96%   BMI 31.34 kg/m    Physical Exam    GENERAL APPEARANCE: healthy, alert and no distress     EYES: EOMI, PERRL     HENT: ear canals and TM's normal and nose and mouth without ulcers or lesions     NECK: no adenopathy, no asymmetry, masses, or scars and thyroid normal to palpation     RESP: lungs clear to auscultation - no rales, rhonchi or wheezes     CV: regular rates and rhythm, normal S1 S2, no S3 or S4 and no murmur, click or rub     ABDOMEN:  soft, nontender, no HSM or masses and bowel sounds normal     MS: extremities normal- no gross deformities noted, no evidence of inflammation in joints, FROM in all extremities.     SKIN: no suspicious lesions or rashes     NEURO: Normal strength and tone, sensory exam grossly normal, mentation intact and speech normal     PSYCH: mentation appears normal. and affect normal/bright     LYMPHATICS: No cervical adenopathy    Recent Labs   Lab Test 09/18/20  0952 07/03/19  1049 12/28/18  1627   HGB  --  14.6 15.5   PLT  --   --  296   INR  --   --  1.01   .0 146.0 138   POTASSIUM 4.6 4.0 4.8   CR 0.5* 0.7 0.64   A1C  --  5.8*  --         PRE-OP Diagnostics:  No labs were ordered during this visit.  EKG: appears normal, NSR, normal axis, normal intervals, no acute ST/T changes c/w ischemia, no LVH by voltage criteria, unchanged from previous tracings         Assessment & Plan   The proposed surgical procedure is considered LOW risk.    REVISED CARDIAC RISK INDEX  The patient has the following serious cardiovascular risks for perioperative complications:  No serious cardiac risks = 0 points    INTERPRETATION: 0 points: Class I (very low risk - 0.4% complication rate)     Shyann was seen today for pre-op exam.    Diagnoses and all orders for this visit:    Preop general physical exam  -     EKG 12-lead complete w/read - Clinics    Essential hypertension  - OK to continue lisinopril on the night prior to scheduled surgery  - OK to  continue lisinopril after surgery pending appropriate recovery  - DISCONTINUE Fish Oil supplements prior to surgery, OK to resume following procedure    Hyperlipidemia, unspecified hyperlipidemia type  - OK to continue simvastatin prior to and following scheduled procedure    Brow ptosis, bilateral    Dermatochalasis of both upper eyelids    Ptosis of eyelid, bilateral    Esotropia          The patient has the following additional risks and recommendations for perioperative complications:     - No identified additional risk factors other than previously addressed     MEDICATION INSTRUCTIONS:  Patient is to take all scheduled medications on the day of surgery EXCEPT for modifications listed above:    RECOMMENDATION:  APPROVAL GIVEN to proceed with proposed procedure, without further diagnostic evaluation.    No follow-ups on file.    Signed Electronically by: Aubrey Alves MD    Copy of this evaluation report is provided to requesting physician.    Preop [facility] Preop Guidelines    Revised Cardiac Risk Index

## 2020-10-12 DIAGNOSIS — Z11.59 ENCOUNTER FOR SCREENING FOR OTHER VIRAL DISEASES: ICD-10-CM

## 2020-10-12 PROCEDURE — U0003 INFECTIOUS AGENT DETECTION BY NUCLEIC ACID (DNA OR RNA); SEVERE ACUTE RESPIRATORY SYNDROME CORONAVIRUS 2 (SARS-COV-2) (CORONAVIRUS DISEASE [COVID-19]), AMPLIFIED PROBE TECHNIQUE, MAKING USE OF HIGH THROUGHPUT TECHNOLOGIES AS DESCRIBED BY CMS-2020-01-R: HCPCS | Performed by: PATHOLOGY

## 2020-10-13 ENCOUNTER — ANESTHESIA EVENT (OUTPATIENT)
Dept: SURGERY | Facility: AMBULATORY SURGERY CENTER | Age: 80
End: 2020-10-13

## 2020-10-13 LAB
SARS-COV-2 RNA SPEC QL NAA+PROBE: NOT DETECTED
SPECIMEN SOURCE: NORMAL

## 2020-10-14 ENCOUNTER — HOSPITAL ENCOUNTER (OUTPATIENT)
Facility: AMBULATORY SURGERY CENTER | Age: 80
Discharge: HOME | End: 2020-10-14
Attending: OPHTHALMOLOGY | Admitting: OPHTHALMOLOGY
Payer: MEDICARE

## 2020-10-14 ENCOUNTER — ANESTHESIA (OUTPATIENT)
Dept: SURGERY | Facility: AMBULATORY SURGERY CENTER | Age: 80
End: 2020-10-14

## 2020-10-14 VITALS
SYSTOLIC BLOOD PRESSURE: 133 MMHG | DIASTOLIC BLOOD PRESSURE: 70 MMHG | TEMPERATURE: 97.7 F | BODY MASS INDEX: 30.48 KG/M2 | WEIGHT: 172 LBS | OXYGEN SATURATION: 97 % | HEART RATE: 74 BPM | HEIGHT: 63 IN | RESPIRATION RATE: 16 BRPM

## 2020-10-14 DIAGNOSIS — H57.813 BROW PTOSIS, BILATERAL: ICD-10-CM

## 2020-10-14 DIAGNOSIS — H02.831 DERMATOCHALASIS OF BOTH UPPER EYELIDS: ICD-10-CM

## 2020-10-14 DIAGNOSIS — H02.834 DERMATOCHALASIS OF BOTH UPPER EYELIDS: ICD-10-CM

## 2020-10-14 DIAGNOSIS — H02.403 PTOSIS OF EYELID, BILATERAL: ICD-10-CM

## 2020-10-14 PROCEDURE — 67904 REPAIR EYELID DEFECT: CPT | Mod: RT

## 2020-10-14 PROCEDURE — 67904 REPAIR EYELID DEFECT: CPT | Mod: 50 | Performed by: OPHTHALMOLOGY

## 2020-10-14 PROCEDURE — 67900 REPAIR BROW DEFECT: CPT | Mod: 50 | Performed by: OPHTHALMOLOGY

## 2020-10-14 PROCEDURE — 67900 REPAIR BROW DEFECT: CPT | Mod: LT

## 2020-10-14 RX ORDER — ACETAMINOPHEN 325 MG/1
975 TABLET ORAL ONCE
Status: COMPLETED | OUTPATIENT
Start: 2020-10-14 | End: 2020-10-14

## 2020-10-14 RX ORDER — ERYTHROMYCIN 5 MG/G
OINTMENT OPHTHALMIC PRN
Status: DISCONTINUED | OUTPATIENT
Start: 2020-10-14 | End: 2020-10-14 | Stop reason: HOSPADM

## 2020-10-14 RX ORDER — FENTANYL CITRATE 50 UG/ML
25-50 INJECTION, SOLUTION INTRAMUSCULAR; INTRAVENOUS
Status: DISCONTINUED | OUTPATIENT
Start: 2020-10-14 | End: 2020-10-15 | Stop reason: HOSPADM

## 2020-10-14 RX ORDER — NALOXONE HYDROCHLORIDE 0.4 MG/ML
.1-.4 INJECTION, SOLUTION INTRAMUSCULAR; INTRAVENOUS; SUBCUTANEOUS
Status: DISCONTINUED | OUTPATIENT
Start: 2020-10-14 | End: 2020-10-15 | Stop reason: HOSPADM

## 2020-10-14 RX ORDER — FENTANYL CITRATE 50 UG/ML
INJECTION, SOLUTION INTRAMUSCULAR; INTRAVENOUS PRN
Status: DISCONTINUED | OUTPATIENT
Start: 2020-10-14 | End: 2020-10-14

## 2020-10-14 RX ORDER — PROPOFOL 10 MG/ML
INJECTION, EMULSION INTRAVENOUS PRN
Status: DISCONTINUED | OUTPATIENT
Start: 2020-10-14 | End: 2020-10-14

## 2020-10-14 RX ORDER — SODIUM CHLORIDE, SODIUM LACTATE, POTASSIUM CHLORIDE, CALCIUM CHLORIDE 600; 310; 30; 20 MG/100ML; MG/100ML; MG/100ML; MG/100ML
500 INJECTION, SOLUTION INTRAVENOUS CONTINUOUS
Status: DISCONTINUED | OUTPATIENT
Start: 2020-10-14 | End: 2020-10-15 | Stop reason: HOSPADM

## 2020-10-14 RX ORDER — TETRACAINE HYDROCHLORIDE 5 MG/ML
SOLUTION OPHTHALMIC PRN
Status: DISCONTINUED | OUTPATIENT
Start: 2020-10-14 | End: 2020-10-14 | Stop reason: HOSPADM

## 2020-10-14 RX ORDER — LIDOCAINE 40 MG/G
CREAM TOPICAL
Status: DISCONTINUED | OUTPATIENT
Start: 2020-10-14 | End: 2020-10-15 | Stop reason: HOSPADM

## 2020-10-14 RX ORDER — ERYTHROMYCIN 5 MG/G
OINTMENT OPHTHALMIC
Qty: 3.5 G | Refills: 0 | Status: SHIPPED | OUTPATIENT
Start: 2020-10-14 | End: 2020-12-07

## 2020-10-14 RX ORDER — ONDANSETRON 4 MG/1
4 TABLET, ORALLY DISINTEGRATING ORAL EVERY 30 MIN PRN
Status: DISCONTINUED | OUTPATIENT
Start: 2020-10-14 | End: 2020-10-15 | Stop reason: HOSPADM

## 2020-10-14 RX ORDER — LIDOCAINE HYDROCHLORIDE AND EPINEPHRINE 10; 10 MG/ML; UG/ML
INJECTION, SOLUTION INFILTRATION; PERINEURAL PRN
Status: DISCONTINUED | OUTPATIENT
Start: 2020-10-14 | End: 2020-10-14 | Stop reason: HOSPADM

## 2020-10-14 RX ORDER — ONDANSETRON 2 MG/ML
4 INJECTION INTRAMUSCULAR; INTRAVENOUS EVERY 30 MIN PRN
Status: DISCONTINUED | OUTPATIENT
Start: 2020-10-14 | End: 2020-10-15 | Stop reason: HOSPADM

## 2020-10-14 RX ORDER — LIDOCAINE HYDROCHLORIDE 20 MG/ML
INJECTION, SOLUTION INFILTRATION; PERINEURAL PRN
Status: DISCONTINUED | OUTPATIENT
Start: 2020-10-14 | End: 2020-10-14

## 2020-10-14 RX ORDER — SODIUM CHLORIDE, SODIUM LACTATE, POTASSIUM CHLORIDE, CALCIUM CHLORIDE 600; 310; 30; 20 MG/100ML; MG/100ML; MG/100ML; MG/100ML
INJECTION, SOLUTION INTRAVENOUS CONTINUOUS
Status: DISCONTINUED | OUTPATIENT
Start: 2020-10-14 | End: 2020-10-15 | Stop reason: HOSPADM

## 2020-10-14 RX ORDER — ONDANSETRON 2 MG/ML
INJECTION INTRAMUSCULAR; INTRAVENOUS PRN
Status: DISCONTINUED | OUTPATIENT
Start: 2020-10-14 | End: 2020-10-14

## 2020-10-14 RX ADMIN — ACETAMINOPHEN 975 MG: 325 TABLET ORAL at 08:12

## 2020-10-14 RX ADMIN — FENTANYL CITRATE 50 MCG: 50 INJECTION, SOLUTION INTRAMUSCULAR; INTRAVENOUS at 08:37

## 2020-10-14 RX ADMIN — ONDANSETRON 4 MG: 2 INJECTION INTRAMUSCULAR; INTRAVENOUS at 08:35

## 2020-10-14 RX ADMIN — SODIUM CHLORIDE, SODIUM LACTATE, POTASSIUM CHLORIDE, CALCIUM CHLORIDE: 600; 310; 30; 20 INJECTION, SOLUTION INTRAVENOUS at 08:30

## 2020-10-14 RX ADMIN — PROPOFOL 20 MG: 10 INJECTION, EMULSION INTRAVENOUS at 08:42

## 2020-10-14 RX ADMIN — LIDOCAINE HYDROCHLORIDE 20 MG: 20 INJECTION, SOLUTION INFILTRATION; PERINEURAL at 08:42

## 2020-10-14 RX ADMIN — FENTANYL CITRATE 50 MCG: 50 INJECTION, SOLUTION INTRAMUSCULAR; INTRAVENOUS at 08:35

## 2020-10-14 ASSESSMENT — MIFFLIN-ST. JEOR: SCORE: 1228.28

## 2020-10-14 NOTE — BRIEF OP NOTE
[facility] Brief Operative Note    Pre-operative diagnosis: Brow ptosis, bilateral [H57.813]  Dermatochalasis of both upper eyelids [H02.831, H02.834]  Ptosis of eyelid, bilateral [H02.403]   Post-operative diagnosis Same   Procedure: Procedure(s):  Bilateral upper eyelid ptosis repair, bilateral upper eyelid blepharoplasty, bilateral brow pexy   Surgeon(s): Surgeon(s) and Role:     * Espinoza Burns MD - Primary     * Chao Miles MD - Fellow - Assisting   Estimated blood loss: 2 mL    Specimens: * No specimens in log *   Findings: As expected

## 2020-10-14 NOTE — ANESTHESIA PREPROCEDURE EVALUATION
Anesthesia Pre-Procedure Evaluation    Patient: Shyann Samuel   MRN:     [id number] Gender:   female   Age:    80 year old :      1940        Preoperative Diagnosis: Brow ptosis, bilateral [H57.813]  Dermatochalasis of both upper eyelids [H02.831, H02.834]  Ptosis of eyelid, bilateral [H02.403]   Procedure(s):  Bilateral upper eyelid ptosis repair, bilateral upper eyelid blepharoplasty, bilateral brow pexy     LABS:  CBC:   Lab Results   Component Value Date    WBC 10.5 2018    WBC 8.5 2015    HGB 14.6 2019    HGB 15.5 2018    HCT 45.5 2019    HCT 49.4 (H) 2018     2018     2015     BMP:   Lab Results   Component Value Date    .0 2020    .0 2019    POTASSIUM 4.6 2020    POTASSIUM 4.0 2019    CHLORIDE 102.0 2020    CHLORIDE 104.0 2019    CO2 28.0 2020    CO2 29.0 2019    BUN 19.0 2020    BUN 10.0 2019    CR 0.5 (L) 2020    CR 0.7 2019    .0 (H) 2020    .0 2019    .0 2019     COAGS:   Lab Results   Component Value Date    INR 1.01 2018     POC: No results found for: BGM, HCG, HCGS  OTHER:   Lab Results   Component Value Date    A1C 5.8 (H) 2019    ROBERT 9.7 2020    MAG 2.1 2016    ALBUMIN 3.5 2020    PROTTOTAL 6.7 (L) 2020    ALT 20.0 2020    AST 23.0 2020    ALKPHOS 55.0 2020    BILITOTAL 0.7 2020    TSH 0.82 2015    CRP 55.0 (H) 2012        Preop Vitals    BP Readings from Last 3 Encounters:   10/14/20 (!) 149/87   10/09/20 118/76   20 133/82    Pulse Readings from Last 3 Encounters:   10/14/20 87   10/09/20 100   20 91      Resp Readings from Last 3 Encounters:   10/14/20 18   18 16   18 16    SpO2 Readings from Last 3 Encounters:   10/14/20 98%   10/09/20 96%   20 98%      Temp Readings from Last 1 Encounters:   10/14/20 36.6  " C (97.8  F) (Oral)    Ht Readings from Last 1 Encounters:   10/14/20 1.607 m (5' 3.25\")      Wt Readings from Last 1 Encounters:   10/14/20 78 kg (172 lb)    Estimated body mass index is 30.23 kg/m  as calculated from the following:    Height as of this encounter: 1.607 m (5' 3.25\").    Weight as of this encounter: 78 kg (172 lb).     LDA:  Peripheral IV 10/14/20 Right Hand (Active)   Site Assessment WDL 10/14/20 0823   Line Status Infusing 10/14/20 0823   Phlebitis Scale 0-->no symptoms 10/14/20 0823   Number of days: 0        Past Medical History:   Diagnosis Date     Anterior corneal dystrophy      Back pain      Cataract      Chronic osteoarthritis      Esotropia      Hyperlipidemia      Hyperlipidemia      Hypertension      Insomnia      Osteoarthritis      Posterior vitreous detachment      Urinary tract infection       Past Surgical History:   Procedure Laterality Date     C TOTAL HIP ARTHROPLASTY Right 10/2009    Replacement of right hip     CATARACT IOL, RT/LT Bilateral ~2005     EYE SURGERY  after 2003,pt unsure    blepharoplasty     HC EXTERNAL LEVATOR RESECTION Bilateral 12/2014     LAMINECTOMY LUMBAR TWO LEVELS  01/24/2019    L1 to L5 laminectomy [facility]      TONSILLECTOMY  1946      Allergies   Allergen Reactions     Benzalkonium Chloride      Eye irritation     Nitrofurantoin Other (See Comments)     Causes hast heart rate.  Causes fast heart rate.        Anesthesia Evaluation     .             ROS/MED HX    ENT/Pulmonary:  - neg pulmonary ROS     Neurologic:  - neg neurologic ROS     Cardiovascular:     (+) Dyslipidemia, hypertension----. : . . . :. .       METS/Exercise Tolerance:     Hematologic:     (+) Anemia, -      Musculoskeletal:  - neg musculoskeletal ROS       GI/Hepatic:  - neg GI/hepatic ROS       Renal/Genitourinary:  - ROS Renal section negative       Endo:  - neg endo ROS       Psychiatric:  - neg psychiatric ROS       Infectious Disease:  - neg infectious disease ROS     "   Malignancy:      - no malignancy   Other:                         PHYSICAL EXAM:   Mental Status/Neuro: A/A/O   Airway:   Mallampati: II  Mouth/Opening: Full  TM distance: > 6 cm  Neck ROM: Full   Respiratory: Auscultation: CTAB     Resp. Rate: Normal     Resp. Effort: Normal      CV: Rhythm: Regular  Rate: Age appropriate  Heart: Normal Sounds  Edema: None   Comments:      Dental: Normal Dentition                Assessment:   ASA SCORE: 2    H&P: History and physical reviewed and following examination; no interval change.   Smoking Status:  Non-Smoker/Unknown   NPO Status: NPO Appropriate     Plan:   Anes. Type:  MAC   Pre-Medication: None   Induction:  N/a   Airway: Native Airway   Access/Monitoring: PIV   Maintenance: N/a     Postop Plan:   Postop Pain: None  Postop Sedation/Airway: Not planned  Disposition: Outpatient     PONV Management:   Adult Risk Factors: Female, Non-Smoker   Prevention: Ondansetron, Dexamethasone     CONSENT: Direct conversation   Plan and risks discussed with: Patient   Blood Products: Consent Deferred (Minimal Blood Loss)                   Po Riggs MD  Staff Anesthesiologist  [phone]

## 2020-10-14 NOTE — DISCHARGE INSTRUCTIONS
Post-operative Instructions    Ophthalmic Plastic and Reconstructive Surgery  Espinoza Burns M.D.  Chao Miles M.D., M.P.H.    All instructions apply to the operated eye(s) or eyelid(s)    What to expect after surgery:    There will be some swelling, bruising, and likely a black eye (even into the lower eyelids and cheeks). Also expect crusting and discharge from the eye and/or incisions.     A small amount of surface bleeding is normal for the first 48 hours after surgery.    You may notice some bloody tears for the first few days after surgery. This is normal.    Your eye(s) and eyelid(s) may be painful and tender. This is normal after surgery. Use the pain medication as prescribed. If your pain does not improve despite the medication, contact the office.    Wound care and personal care:    If a patch or bandage has been placed, please leave this in place until seen in clinic. Prevent the bandage from getting wet.     Apply ice compresses 15 minutes on 15 minutes off while awake for the first 2 days after surgery, then switch to warm compresses 4 times a day until seen by your physician.     For warm packs you can place a cup of dry uncooked rice in a clean cotton sock. Place sock in microwave 30 seconds to one minute. Next place the warm sock into a plastic bag and wrap the bag with clean warm wet washcloth and place over operated eye.      You may shower or wash your hair the day after surgery. Do not bathe or go swimming for 1 week to prevent contamination of your wounds.    Do not apply make-up to the eyes or eyelids for 2 weeks after surgery.    Activity restrictions and driving:    Avoid heavy lifting, bending, exercise or strenuous activity for 1 week after surgery.    You may resume other activities and return to work as tolerated.    You may not resume driving until have you stopped using narcotic pain medications(such as Norco, Percocet, Tylenol #3).    Sinus Precautions for 1 week: Sneeze  with mouth open. Cough with mouth open. No blowing nose. No straws. No bending over.    Medications:    Restart all your regular home medications and eye drops today. If you take Plavix or Aspirin on a regular basis, wait for 3 days after your surgery before restarting these in order to decrease the risk of bleeding complications.    Avoid aspirin and aspirin-like medications (Motrin, Aleve, Ibuprofen, Rachana-[place] etc) for 5 days to reduce the risk of bleeding. You may take Tylenol (acetaminophen) for pain.    In addition to your home medications, take the following post-operative medications as prescribed by your physician:    Apply antibiotic ointment (erythromycin) to all sutures three times a day, and into the operated eye(s) at night.       Contact information and follow-up:    Return to the Eye Clinic for a follow-up appointment with your physician as  scheduled. If no appointment has been scheduled, call [phone] for an  appointment with Dr. Burns within 1 to 2 weeks from your date of surgery.  -     Please email a few photos of your eye(s) or other operative site(s) to [email or web address] prior to your follow up visit.      For severe pain, bleeding, or loss of vision, call the Eye Clinic at [phone].    After hours or on weekends and holidays, call [phone] and ask to speak with the ophthalmologist on call.        [facility]  Home Care Following Anesthesia  For 24 hours after surgery:  1. Get plenty of rest.  A responsible adult must stay with you for at least 24 hours after you leave the surgery center.  2. Do not drive or use heavy equipment.  If you have weakness or tingling, don't drive or use heavy equipment until this feeling goes away.   3. Do not drink alcohol.   4. Avoid strenuous or risky activities.  Ask for help when climbing stairs.  5. You may feel lightheaded.  IF so, sit for a few minutes before standing.  Have someone help you get  up.   6. If you have nausea (feel sick to your stomach): Drink only clear liquids such as apple juice, ginger ale, broth or 7-Up.  Rest may also help.  Be sure to drink enough fluids.  Move to a regular diet as you feel able.   7. You may have a slight fever.  Call the doctor if your fever is over 100 F (37.7 C) (taken under the tongue) or lasts longer than 24 hours.  8. You may have a dry mouth, a sore throat, muscle aches or trouble sleeping. These should go away after 24 hours.  9. Do not make important or legal decisions.            Tips for taking pain medications  To get the best pain relief possible, remember these points:    Take pain medications as directed, before pain becomes severe.    Pain medication can upset your stomach: taking it with food may help.    Constipation is a common side effect of pain medication. Drink plenty of  fluids.    Eat foods high in fiber. Take a stool softener if recommended by your doctor or pharmacist.    Do not drink alcohol, drive or operate machinery while taking pain medications.    Ask about other ways to control pain, such as with heat, ice or relaxation.    Tylenol/Acetaminophen Consumption  To help encourage the safe use of acetaminophen, the makers of TYLENOL  have lowered the maximum daily dose for single-ingredient Extra Strength TYLENOL  (acetaminophen) products sold in the [place] from 8 pills per day (4,000 mg) to 6 pills per day (3,000 mg). The dosing interval has also changed from 2 pills every 4-6 hours to 2 pills every 6 hours.    If you feel your pain relief is insufficient, you may take Tylenol/Acetaminophen in addition to your narcotic pain medication.     Be careful not to exceed 3,000 mg of Tylenol/Acetaminophen in a 24 hour period from all sources.    If you are taking extra strength Tylenol/acetaminophen (500 mg), the maximum dose is 6 tablets in 24 hours.    If you are taking regular strength acetaminophen (325 mg), the maximum dose is 9 tablets in 24  hours.    Call a doctor for any of the followin. Signs of infection (fever, growing tenderness at the surgery site, a large amount of drainage or bleeding, severe pain, foul-smelling drainage, redness, swelling).  2. It has been over 8 to 10 hours since surgery and you are still not able to urinate (pass water).  3. Headache for over 24 hours.  4. Numbness, tingling or weakness the day after surgery (if you had spinal anesthesia).  5. Signs of Covid-19 infection (temperature over 100 degrees, shortness of breath, cough, loss of taste/smell, generalized body aches, persistent headache, chills, sore throat, nausea/vomiting/diarrhea)  Your doctor is:  Dr. Espinoza Burns, Ophthalmology: [phone]                    Or dial [phone] and ask for the resident on call for:  Ophthalmology  For emergency care, call the:  [facility]:  [phone] (TTY for hearing impaired: [phone])

## 2020-10-14 NOTE — ANESTHESIA POSTPROCEDURE EVALUATION
Anesthesia POST Procedure Evaluation    Patient: Shyann Samuel   MRN:     [id number] Gender:   female   Age:    80 year old :      1940        Preoperative Diagnosis: Brow ptosis, bilateral [H57.813]  Dermatochalasis of both upper eyelids [H02.831, H02.834]  Ptosis of eyelid, bilateral [H02.403]   Procedure(s):  Bilateral upper eyelid ptosis repair, bilateral upper eyelid blepharoplasty, bilateral brow pexy   Postop Comments: No value filed.     Anesthesia Type: MAC       Disposition: Outpatient   Postop Pain Control: Uneventful            Sign Out: Well controlled pain   PONV: No   Neuro/Psych: Uneventful            Sign Out: Acceptable/Baseline neuro status   Airway/Respiratory: Uneventful            Sign Out: Acceptable/Baseline resp. status   CV/Hemodynamics: Uneventful            Sign Out: Acceptable CV status   Other NRE: NONE   DID A NON-ROUTINE EVENT OCCUR? No         Last Anesthesia Record Vitals:  CRNA VITALS  10/14/2020 0847 - 10/14/2020 0947      10/14/2020             Pulse:  95    SpO2:  96 %    Resp Rate (set):  10          Last PACU Vitals:  Vitals Value Taken Time   /70 10/14/20 0921   Temp 36.5  C (97.7  F) 10/14/20 0921   Pulse 79 10/14/20 0921   Resp 16 10/14/20 0921   SpO2 97 % 10/14/20 0921   Temp src     NIBP     Pulse 95 10/14/20 0918   SpO2 96 % 10/14/20 0918   Resp     Temp     Ht Rate     Temp 2           Electronically Signed By: Po Riggs MD, 2020, 10:40 AM

## 2020-10-14 NOTE — ANESTHESIA CARE TRANSFER NOTE
Patient: Shyann Samuel    Procedure(s):  Bilateral upper eyelid ptosis repair, bilateral upper eyelid blepharoplasty, bilateral brow pexy    Diagnosis: Brow ptosis, bilateral [H57.813]  Dermatochalasis of both upper eyelids [H02.831, H02.834]  Ptosis of eyelid, bilateral [H02.403]  Diagnosis Additional Information: No value filed.    Anesthesia Type:   MAC     Note:  Airway :Room Air  Patient transferred to:Phase II  Comments: Uneventful transport to Phase II: VSS; IV patent; pt comfortable; Report given to RN; pt. Responds appropriately to commandsHandoff Report: Identifed the Patient, Identified the Reponsible Provider, Reviewed the pertinent medical history, Discussed the surgical course, Reviewed Intra-OP anesthesia mangement and issues during anesthesia, Set expectations for post-procedure period and Allowed opportunity for questions and acknowledgement of understanding      Vitals: (Last set prior to Anesthesia Care Transfer)    CRNA VITALS  10/14/2020 0847 - 10/14/2020 0918      10/14/2020             Pulse:  95    SpO2:  96 %    Resp Rate (set):  10                Electronically Signed By: KELLY Raines CRNA  October 14, 2020  9:18 AM

## 2020-10-16 ENCOUNTER — TELEPHONE (OUTPATIENT)
Dept: OPHTHALMOLOGY | Facility: CLINIC | Age: 80
End: 2020-10-16
Payer: MEDICARE

## 2020-10-16 PROCEDURE — 99207 PR NO CHARGE LOS: CPT | Performed by: OPHTHALMOLOGY

## 2020-10-16 NOTE — TELEPHONE ENCOUNTER
Telephone call to Shyann Samuel    Doing well with no pain, good vision, and no bleeding. All questions were answered, she is doing well, and postoperative care was reviewed.  A postop appointment has been scheduled.    Chao Miles MD

## 2020-10-26 ENCOUNTER — VIRTUAL VISIT (OUTPATIENT)
Dept: OPHTHALMOLOGY | Facility: CLINIC | Age: 80
End: 2020-10-26
Payer: MEDICARE

## 2020-10-26 ENCOUNTER — TELEPHONE (OUTPATIENT)
Dept: OPHTHALMOLOGY | Facility: CLINIC | Age: 80
End: 2020-10-26

## 2020-10-26 DIAGNOSIS — Z98.890 POSTOPERATIVE EYE STATE: Primary | ICD-10-CM

## 2020-10-26 PROCEDURE — 99024 POSTOP FOLLOW-UP VISIT: CPT | Mod: 95 | Performed by: OPHTHALMOLOGY

## 2020-10-26 NOTE — PROGRESS NOTES
"Shyann Samuel is a 80 year old female who is being evaluated via a billable telephone visit.      The patient has been notified of following:     \"This telephone visit will be conducted via a call between you and your physician/provider. We have found that certain health care needs can be provided without the need for a physical exam.  This service lets us provide the care you need with a short phone conversation.  If a prescription is necessary we can send it directly to your pharmacy.  If lab work is needed we can place an order for that and you can then stop by our lab to have the test done at a later time.    Telephone visits are billed at different rates depending on your insurance coverage. During this emergency period, for some insurers they may be billed the same as an in-person visit.  Please reach out to your insurance provider with any questions.    If during the course of the call the physician/provider feels a telephone visit is not appropriate, you will not be charged for this service.\"    Patient has given verbal consent for Telephone visit?  Yes    What phone number would you like to be contacted at? mobile    How would you like to obtain your AVS? Mail a copy    Phone call duration: 7 minutes    Espinoza Burns MD    No chief complaint on file.    Chief Complaint(s) and History of Present Illness(es)     No visit information to display             Assessment & Plan     Shyann Samuel is a 80 year old female with the following diagnoses:   1. Postoperative eye state         Doing well     PLAN    Continue antibiotic ointment or bland lubricating ointment (eg vaseline or aquaphor) to the incision(s) two times a day.    Gently massage along the incision(s) two times a day.    Use warm soaks over the incision(s) four times a day until swelling and bruises resolve.    Return to clinic 6-8 wks                Attending Physician Attestation:  I personally called this patient. Complete documentation of " historical and exam elements from today's encounter can be found in the full encounter summary report (not reduplicated in this progress note).  I personally obtained the chief complaint(s) and history of present illness.  I confirmed and edited as necessary the review of systems, past medical/surgical history, family history, and social history.  I formulated and edited as necessary the assessment and plan and discussed the findings and management plan with the patient and family.     -Espinoza Burns MD

## 2020-12-07 ENCOUNTER — OFFICE VISIT (OUTPATIENT)
Dept: OPHTHALMOLOGY | Facility: CLINIC | Age: 80
End: 2020-12-07
Payer: MEDICARE

## 2020-12-07 DIAGNOSIS — Z98.890 POSTOPERATIVE EYE STATE: Primary | ICD-10-CM

## 2020-12-07 PROCEDURE — 99024 POSTOP FOLLOW-UP VISIT: CPT | Mod: GC | Performed by: OPHTHALMOLOGY

## 2020-12-07 ASSESSMENT — REFRACTION_WEARINGRX
OD_SPHERE: -1.00
OS_ADD: +2.25
OD_CYLINDER: +0.50
OD_ADD: +2.50
OS_CYLINDER: +0.75
OS_HBASE: BASE OUT
OD_HBASE: BASE OUT
OS_AXIS: 090
OS_AXIS: 095
OD_CYLINDER: +0.75
OS_SPHERE: -2.75
OD_AXIS: 020
OD_SPHERE: -0.50
OS_SPHERE: -3.50
OS_CYLINDER: +0.75
SPECS_TYPE: LINED BIFOCAL
OD_AXIS: 025
OS_ADD: +2.50
OD_HPRISM: 3.0 BO
OS_HPRISM: 3.0 BO
SPECS_TYPE: BIFOCAL
OD_ADD: +2.25

## 2020-12-07 ASSESSMENT — VISUAL ACUITY
OD_CC+: -2/+1
OD_CC: 20/50
METHOD: SNELLEN - LINEAR
CORRECTION_TYPE: GLASSES
OS_CC: 20/30

## 2020-12-07 ASSESSMENT — TONOMETRY
OS_IOP_MMHG: 12
IOP_METHOD: ICARE
OD_IOP_MMHG: 13

## 2020-12-07 NOTE — NURSING NOTE
Chief Complaints and History of Present Illnesses   Patient presents with     Post Op (Ophthalmology) Both Eyes     Post Bilateral upper eyelid ptosis repair, bilateral upper eyelid blepharoplasty, bilateral brow pexy 10/14/2020     Chief Complaint(s) and History of Present Illness(es)     Post Op (Ophthalmology) Both Eyes     Laterality: both eyes    Course: gradually improving    Associated symptoms: swelling (some residual swelling)    Comments: Post Bilateral upper eyelid ptosis repair, bilateral upper eyelid blepharoplasty, bilateral brow pexy 10/14/2020              Comments     She has been healing well.  She denies any having any eye pain or lid discomfort.  The stitches are resolving slowly.  Her upper lids remain a bit puffy, but the swelling is decreasing.  She is using Vaseline and massaging the incision sites as directed.    Sylvie Simeon, ANA 12:59 PM  December 7, 2020

## 2020-12-07 NOTE — PROGRESS NOTES
Chief Complaints and History of Present Illnesses   Patient presents with     Post Op (Ophthalmology) Both Eyes     Post Bilateral upper eyelid ptosis repair, bilateral upper eyelid blepharoplasty, bilateral brow pexy 10/14/2020     Chief Complaint(s) and History of Present Illness(es)     Post Op (Ophthalmology) Both Eyes     In both eyes.  Since onset it is gradually improving.  Associated   symptoms include swelling (some residual swelling). Additional comments:   Post Bilateral upper eyelid ptosis repair, bilateral upper eyelid   blepharoplasty, bilateral brow pexy 10/14/2020              Comments     She has been healing well.  She denies any having any eye pain or lid   discomfort.  The stitches are resolving slowly.  Her upper lids remain a   bit puffy, but the swelling is decreasing.  She is using Vaseline and   massaging the incision sites as directed.    Sylvie Cailin, COT 12:59 PM  December 7, 2020                      Assessment & Plan     Shyann Samuel is a 80 year old female with the following diagnoses:   1. Postoperative eye state         POW6 s/p upper lid bleph and levator resection for ptosis and browpexy.  Healing nicely, still with residual edema along upper eyelids but incisions are healing well and the eyelid height and position are excellent and nicely symmetric.  Should continue to improve as the swelling resolves.    Plan:  - continue warm compresses until swelling is resolved  - continue vaseline and gentle massage of incisions  - RTC prn            Chao Miles MD, MPH  Oculoplastics Fellow  Attending Physician Attestation:  Complete documentation of historical and exam elements from today's encounter can be found in the full encounter summary report (not reduplicated in this progress note).  I personally obtained the chief complaint(s) and history of present illness.  I confirmed and edited as necessary the review of systems, past medical/surgical history, family history, social  history, and examination findings as documented by others; and I examined the patient myself.  I personally reviewed the relevant tests, images, and reports as documented above.  I formulated and edited as necessary the assessment and plan and discussed the findings and management plan with the patient and family.

## 2020-12-28 NOTE — PROGRESS NOTES
"Family Medicine Telephone Visit Note    TELEPHONE VISIT DETAILS and Consent  Can you please confirm what state you are currently located in? [place]   \"If you are located in a state other than [place] we cannot proceed with your visit.  This is due to state licensing laws that do not allow your provider to practice in another state.  This is not a billing or insurance issue. Please let me know if you need prescriptions filled or have other immediate concerns and I will get that message to your care team. I can also have you speak to our  to make an appointment when you are back in the [place].      Shyann Samuel is a 80 year old female who is being evaluated via a billable telephone visit.      The patient has been notified of following:     \"This telephone visit will be conducted via a call between you and your physician/provider. We have found that certain health care needs can be provided without the need for a physical exam.  This service lets us provide the care you need with a short phone conversation.  If a prescription is necessary we can send it directly to your pharmacy.  If lab work is needed we can place an order for that and you can then stop by our lab to have the test done at a later time.    Telephone visits are billed at different rates depending on your insurance coverage. During this emergency period, for some insurers they may be billed the same as an in-person visit.  Please reach out to your insurance provider with any questions.    If during the course of the call the physician/provider feels a telephone visit is not appropriate, you will not be charged for this service.\"    Patient has given verbal consent for Telephone visit?  Yes    How would you like to obtain your AVS? MyChart    Subjective     Shyann Samuel is a 80 year old female who presents to clinic today for the following health issues:    Chief Complaint   Patient presents with     RECHECK     seasonel affective disorder. " for light therapy        Mood changes  Shyann is a 81 yo woman who notes change in mood over the winter months. Is wondering if a lightbox would be helpful. Long grey days. Overall she has a sense of well being.  Does not feel profoundly depressed or anxious though it has been a difficult year.  She reports that she has no energy when she wakes up in the morning.    PHQ 7/3/2019 12/23/2020   PHQ-9 Total Score 6 5   Q9: Thoughts of better off dead/self-harm past 2 weeks Not at all Not at all     JAISON-7 SCORE 12/3/2018 1/4/2019 12/23/2020   Total Score 0 (minimal anxiety) 2 (minimal anxiety) 0 (minimal anxiety)   Total Score 0 2 0       Sleep disturbance  Shyann reports that sleep is somewhat challenging.  She is able to easily fall asleep but awakens within 2 to 4 hours and then has difficulty falling back to sleep.  In 2016, she had met with the therapist to try cognitive behavioral therapy.  It was very effective.  Sleep has been poor since the summer months and she is decided to try some of her CBT skills to improve her symptoms.  She declines referral back to sleep therapist at this time, wanting to work on things at home herself.  Bedtime is consistent, falls asleep by 1145 and then is up by 7 AM.  She does not drink alcohol or caffeine.    Patient Active Problem List   Diagnosis     Chronic bilateral low back pain with sciatica     Status post hip replacement     Hyperlipidemia     Anterior corneal dystrophy     Hypertension     Insomnia     Osteoarthritis     Cataract     Dermatitis medicamentosa     Esotropia     Macular puckering     Nontoxic multinodular goiter     Posterior vitreous detachment     Recurrent UTI     Other symptoms involving nervous and musculoskeletal systems(781.99)     Atrophic vaginitis     Overactive bladder     Back pain     Health Care Home     Lower extremity edema     Spinal stenosis of lumbar region with neurogenic claudication     Brow ptosis, bilateral     Dermatochalasis of both  "upper eyelids     Ptosis of eyelid, bilateral     Past Surgical History:   Procedure Laterality Date     C TOTAL HIP ARTHROPLASTY Right 10/2009    Replacement of right hip     CATARACT IOL, RT/LT Bilateral ~2005     EYE SURGERY  after 2003,pt unsure    blepharoplasty     HC EXTERNAL LEVATOR RESECTION Bilateral 12/2014     LAMINECTOMY LUMBAR TWO LEVELS  01/24/2019    L1 to L5 laminectomy [facility]      REPAIR PTOSIS Bilateral 10/14/2020    Procedure: Bilateral upper eyelid ptosis repair, bilateral upper eyelid blepharoplasty, bilateral brow pexy;  Surgeon: Espinoza Burns MD;  Location: [facility] OR     TONSILLECTOMY  1945       Social History     Tobacco Use     Smoking status: Never Smoker     Smokeless tobacco: Never Used   Substance Use Topics     Alcohol use: No     Family History   Problem Relation Age of Onset     Cerebrovascular Disease Mother         probably consequent to Crohn's disease     Crohn's Disease Mother      Osteoporosis Mother         probably consequent to Crohn's disease     Other Cancer Father         kidney cancer, benign brain tumor     Cancer Other         Father (kidney meningioma), Brother (neuroblastoma)     Other Cancer Brother         adult neuroblastoma (dispute about precise diagnos     Glaucoma No family hx of      Macular Degeneration No family hx of      Melanoma No family hx of      Skin Cancer No family hx of          Reviewed and updated as needed this visit by Provider                 Review of Systems   Constitutional, HEENT, cardiovascular, pulmonary, gi and gu systems are negative, tends toward constipation,  except as otherwise noted.      Objective    Ht 1.607 m (5' 3.25\")   Wt 78 kg (172 lb)   BMI 30.23 kg/m      Body mass index is 30.23 kg/m .  General:alert and no distress    ASSESSMENT:  (R45.86) Mood change  (primary encounter diagnosis)  Comment: Shyann is interested in trying a light box to see if this would give her more energy in the morning.  She also " has concomitant sleep disturbance that needs to be addressed  Plan: Discussed use of a light box and will send her information on how to choose one and use it properly.    (G47.9) Sleep disturbance  Comment: She has longstanding history of sleep disturbance, had benefit in the past from CBT.  Plan: I have recommended she repeat exercises she has learned in 2016 .  Gave information about the National sleep foundation.  If needed, we can refer her back to the sleep clinic.  She declines referral at this time      Aleksandra Starks MD      After Visit Information:  Patient chose to view AVS via [employer]    Start time: 3:10 PM  End time: 3:26 PM  Total : 16 minutes

## 2020-12-29 ENCOUNTER — VIRTUAL VISIT (OUTPATIENT)
Dept: FAMILY MEDICINE | Facility: CLINIC | Age: 80
End: 2020-12-29
Payer: MEDICARE

## 2020-12-29 VITALS — WEIGHT: 172 LBS | BODY MASS INDEX: 30.48 KG/M2 | HEIGHT: 63 IN

## 2020-12-29 DIAGNOSIS — G47.9 SLEEP DISTURBANCE: ICD-10-CM

## 2020-12-29 DIAGNOSIS — R45.86 MOOD CHANGE: Primary | ICD-10-CM

## 2020-12-29 ASSESSMENT — MIFFLIN-ST. JEOR: SCORE: 1228.28

## 2020-12-29 NOTE — PATIENT INSTRUCTIONS
[employer]  Contact me if you wish to have a referral to see Dr. Sainz in the sleep clinic      Light Box information  Positioning and distance - The light box should be positioned at a distance that enables patients to receive 10,000 lux while seated and facing the box, with the light projected downward to minimize aversive glare. Commercial light box instructions should give the distance at which 10,000 lux is achieved, which is typically approximately 40 to 80 cm (16 to 31 inches). The distance from the eyes to the light box is important because light intensity follows the inverse square law; if the distance between the eye and the light source is doubled, the intensity of light that is received drops to one-quarter of the original intensity.  ?Time of day - Bright light therapy generally commences in the early morning, soon after awakening (eg, 7:00 AM). Patients should administer light therapy at approximately the same time each day, including weekends, holidays, and vacations. Most light therapy studies required a regular time for light treatment to start and thus stipulated a regular wake-up time for the subjects. A regular wake-up time may be important for optimal effectiveness of the bright light treatment. The effectiveness of variable timing of the bright light therapy is unknown.  If morning bright light treatment alone is not fully effective after two to four weeks of treatment, adding evening (eg, 8:00 PM) bright light treatment may be helpful. A minority of patients with SAD may benefit from bright light in the evening rather than morning bright light [81]. The self-report version of the Morningness-Eveningness Questionnaire is in the public domain and can be used to determine the best time of day to commence bright light therapy. However, the questionnaire is generally used for research rather than standard clinical care.  ?Duration of exposure - The duration of early morning exposure  to standard light boxes emitting 10,000 lux is generally 30 minutes/day, but some studies have used 45 or 60 minutes per session. However, no head-to-head trials have compared the efficacy of different lengths of exposure.   For patients who do not respond to initial treatment with 30 minutes/day, some studies have increased the duration of exposure to 45 minutes/day, and if nonresponse persists, to 60 minutes/day (algorithm 1). However, no studies have compared the efficacy of a fixed dose of 30 minutes/day with an increasing dose, and the potential benefit of increasing exposure must be weighed against the added time burden.  If evening bright light therapy is added, the duration of evening exposure is generally 30 to 60 minutes. Patients who switch from morning to evening exposure continue the same length of exposure.  Bright light boxes emitting light that is less than 10,000 lux require longer exposures. As an example, morning light therapy with a 2500 lux light box requires an exposure of two hours to achieve the same benefit of 10,000 lux for 30 minutes.   ?Looking at the device - The eyes are open during bright light therapy, with light visible at least in the peripheral vision. Patients can glance at the box but should avoid staring directly at the light.  ?Patient activity - During bright light therapy, patients can engage in any activity, such as reading, eating, watching television, or working on a computer. Although patients are typically seated, it is reasonable to place the light box on a stand so that patients can engage in other activities, such as riding a stationary bicycle.

## 2021-02-02 DIAGNOSIS — G47.00 INSOMNIA, UNSPECIFIED TYPE: ICD-10-CM

## 2021-02-02 DIAGNOSIS — G47.9 SLEEP DISTURBANCE: Primary | ICD-10-CM

## 2021-03-07 ENCOUNTER — IMMUNIZATION (OUTPATIENT)
Dept: NURSING | Facility: CLINIC | Age: 81
End: 2021-03-07
Payer: MEDICARE

## 2021-03-07 PROCEDURE — 0031A PR COVID VAC JANSSEN AD26 0.5ML: CPT

## 2021-03-07 PROCEDURE — 91303 PR COVID VAC JANSSEN AD26 0.5ML: CPT

## 2021-03-27 ENCOUNTER — HEALTH MAINTENANCE LETTER (OUTPATIENT)
Age: 81
End: 2021-03-27

## 2021-03-29 ENCOUNTER — VIRTUAL VISIT (OUTPATIENT)
Dept: SLEEP MEDICINE | Facility: CLINIC | Age: 81
End: 2021-03-29
Attending: INTERNAL MEDICINE
Payer: MEDICARE

## 2021-03-29 VITALS — BODY MASS INDEX: 28.68 KG/M2 | HEIGHT: 64 IN | WEIGHT: 168 LBS

## 2021-03-29 DIAGNOSIS — G47.9 SLEEP DISTURBANCE: ICD-10-CM

## 2021-03-29 DIAGNOSIS — G47.00 INSOMNIA, UNSPECIFIED TYPE: ICD-10-CM

## 2021-03-29 DIAGNOSIS — F51.04 CHRONIC INSOMNIA: Primary | ICD-10-CM

## 2021-03-29 PROCEDURE — 99443 PR PHYSICIAN TELEPHONE EVALUATION 21-30 MIN: CPT | Mod: 95 | Performed by: INTERNAL MEDICINE

## 2021-03-29 ASSESSMENT — MIFFLIN-ST. JEOR: SCORE: 1222.04

## 2021-03-29 NOTE — PATIENT INSTRUCTIONS
Your BMI is Body mass index is 28.84 kg/m .  Weight management is a personal decision.  If you are interested in exploring weight loss strategies, the following discussion covers the approaches that may be successful. Body mass index (BMI) is one way to tell whether you are at a healthy weight, overweight, or obese. It measures your weight in relation to your height.  A BMI of 18.5 to 24.9 is in the healthy range. A person with a BMI of 25 to 29.9 is considered overweight, and someone with a BMI of 30 or greater is considered obese. More than two-thirds of [place] adults are considered overweight or obese.  Being overweight or obese increases the risk for further weight gain. Excess weight may lead to heart disease and diabetes.  Creating and following plans for healthy eating and physical activity may help you improve your health.  Weight control is part of healthy lifestyle and includes exercise, emotional health, and healthy eating habits. Careful eating habits lifelong are the mainstay of weight control. Though there are significant health benefits from weight loss, long-term weight loss with diet alone may be very difficult to achieve- studies show long-term success with dietary management in less than 10% of people. Attaining a healthy weight may be especially difficult to achieve in those with severe obesity. In some cases, medications, devices and surgical management might be considered.  What can you do?  If you are overweight or obese and are interested in methods for weight loss, you should discuss this with your provider.     Consider reducing daily calorie intake by 500 calories.     Keep a food journal.     Avoiding skipping meals, consider cutting portions instead.    Diet combined with exercise helps maintain muscle while optimizing fat loss. Strength training is particularly important for building and maintaining muscle mass. Exercise helps reduce stress, increase energy, and improves fitness.  Increasing exercise without diet control, however, may not burn enough calories to loose weight.       Start walking three days a week 10-20 minutes at a time    Work towards walking thirty minutes five days a week     Eventually, increase the speed of your walking for 1-2 minutes at time    In addition, we recommend that you review healthy lifestyles and methods for weight loss available through the National Institutes of Health patient information sites:  http://win.niddk.nih.gov/publications/index.htm    And look into health and wellness programs that may be available through your health insurance provider, employer, local community center, or tanvir club.        [facility]      Treating Insomnia  Good sleeping habits are a key part of treatment. If needed, some medications may help you sleep better at first. Making healthy lifestyle changes and learning to relax can improve your sleep. Treating insomnia takes commitment, but trust that your efforts will pay off. Talk to your doctor before taking any medication.    Healthy Lifestyle  Your lifestyle affects your health and your sleep. Here are some healthy habits:    Keep a regular sleep schedule. Go to bed and get up at the same time each day.    Exercise regularly. It may help you reduce stress. Avoid strenuous exercise for two to four hours before bedtime.    Avoid or limit naps.    Use your bed only for sleep and sex.    Don t spend too much time in bed trying to fall asleep. If you can t fall asleep, get up and do something until you become tired and drowsy.    Avoid or limit caffeine and nicotine. They can keep you awake at night. Also avoid alcohol. It may help you fall asleep at first, but your sleep will not be restful.    Before Bedtime  To sleep better every night, try these tips:    Have a bedtime routine to let your body and mind know when it s time to sleep.    Going to bed should be relaxing so try to do only relaxing things around  bedtime. Sleep will come sooner.    If your worries don t let you sleep, write them down in a diary. Then close it, and go to bed.    Make sure the room is not too hot or too cold. If it s not dark enough, an eye mask can help. If it s noisy, try using earplugs.    Learn to Relax  Stress, anxiety, and body tension may keep you awake at night. To unwind before bedtime, try reading a book, meditation, or yoga. Also, try the following:    Deep breathing. Sit or lie back in a chair. Take a slow, deep breath. Hold it for 5 counts. Then breathe out slowly through your mouth. Keep doing this until you feel relaxed.    Imagery. Think of the last fun trip you took. In your mind, walk through the trip from start to finish. Put as much detail into the memory as you can remember. It will help you relax.    Cognitive Behavioral Treatment (CBT)  CBT is the most effective treatment for long-term insomnia. It tries to address the underlying causes of your sleep problems, including your habits and how you think about sleep.      Suggested Resources  Insomnia Treatment Books     Overcoming Insomnia by Matty Woodall and Elizabeth Lucas (2008)    No More Sleepless Nights by Valdemar Regalado and Maureen Carvalho (1996)    Say Henry to Insomnia by Ricardo Mcnair (2009)    The Insomnia Workbook by Roxanne Ling and Berlin Tobar (2009)    The Insomnia Answer by Matthew Gonzalez and Amado Ortiz (2006)      Stress Management and Relaxation Books    The Relaxation and Stress Reduction Workbook by Avelina Ontiveros, Rosina Gant and Mango Chris (2008)    Stress Management Workbook: Techniques and Self-Assessment Procedures by Felicia Del Cid and Jed Reina (1997)    A Mindfulness-Based Stress Reduction Workbook by Jackson Oneil and Sana Quinn (2010)    The Complete Stress Management Workbook by Delonte Javier, Ruiz Miranda and Tal Cabral (1996)    Assert Yourself by Nereyda Garvey and Alistair Garvey  (1977)    Relaxation Resources for Computer Download   These websites offer resources to help you relax. This list is for information only. [facility] is not responsible for the quality of services or the actions of any person or organization.  Progressive Muscle Relaxation (PMR):     http://www.[employer]/progressive-muscle-relaxation-exercise.html     http://studentsupport.[facility]/counseling/resources/self-help/relaxation-and-stress-management/   Deep Breathing Exercises:    http://www.[employer]/breathing-awareness.html     Meditation:     www[employer] Media    www.[employer]guidedZenossmeditation-site.com You may have to pay for some of these resources.    Guided Imagery:    http://www.[employer]/guided-imagery-scripts.html     http://[employer]/library/jvwjeeiils-bvyxby-uwhbven/     Counseling / Behavioral Health  [facility]  Visit [email or web address] or call [phone] to find a clinic close to you.      This is not a prescription and these resources are optional. You must pay for any costs when using these resources. Please ask your insurance carrier if you can be reimbursed for these resources. If so, you are responsible for sending the needed details to your insurance carrier. These resources may also be tax deductible as medical expenses. Check with your .     These programs and publications are not affiliated in any way with [facility].    How does cognitive behavioral therapy for insomnia work?  The cognitive side of cognitive behavioral therapy for insomnia teaches you to recognize and change beliefs that affect your ability to sleep. The behavioral part of cognitive behavioral therapy for insomnia helps you develop good sleep habits and avoid behaviors that keep you from sleeping well.   Cognitive behavioral therapy for insomnia contains one or more of the following elements:   Sleep education. To make effective changes,  it's important to understand the basics of sleep -- for example, understanding sleep cycles and learning how beliefs, behaviors and outside factors can affect your sleep.   Cognitive control and psychotherapy. This type of therapy helps you control or eliminate negative thoughts and worries that keep you awake. It may also involve eliminating false or worrisome beliefs about sleep, such as the idea that a single restless night will make you sick.   Sleep restriction. Lying in bed when you're awake can become a habit that leads to poor sleep. Limiting the amount of time you spend in bed can make you sleepier when you do go to bed. That way you're more likely to fall asleep and stay asleep.   Remaining passively awake. This involves avoiding any effort to fall asleep. Paradoxically, worrying that you can't sleep can actually keep you awake. Letting go of this worry can help you relax and make it easier to fall asleep.   Stimulus control therapy. This method helps remove factors that condition the mind to resist sleep. For example, you might be coached to use the bed only for sleep and sex, and to leave the bedroom if you can't go to sleep within 15 minutes.   Sleep hygiene. This method of therapy involves changing basic lifestyle habits that influence sleep, such as smoking or drinking too much caffeine late in the day, drinking too much alcohol, or not getting regular exercise. You may be told to avoid napping and taught to maintain a consistent sleep schedule. It also includes tips that help you sleep better, such as ways to wind down an hour or two before bedtime.   Relaxation training. This method helps you calm your mind and body. Approaches include meditation, hypnosis and muscle relaxation.   Biofeedback. This method allows you to observe biological signs such as heart rate and muscle tension. Your sleep specialist may have you take a biofeedback device home to record your daily patterns. This information can  help identify patterns that affect sleep.   Sleep diary. To understand how to best treat your insomnia, your sleep therapist may have you keep a detailed sleep diary for one to two weeks. In the diary, you'll write down when you go to bed, when you get up, how much time you spend in bed unable to sleep, total sleep time and other details about your sleep patterns.   The most effective treatment approach may combine several of these methods.   Cognitive behavioral therapy vs. pills  Some newer sleeping medications have been approved for long-term use. But they may not be the best long-term insomnia treatment.   Sleep medications can be a very effective short-term treatment -- for example, they can provide immediate relief during a period of high stress or grief.   Cognitive behavioral therapy for insomnia may be a good treatment choice if you have long-term sleep problems. You may want to try it if you're worried about becoming dependent on sleep medications, if medications aren't effective or if they cause bothersome side effects.   Unlike pills, cognitive behavioral therapy for insomnia addresses the underlying causes of insomnia rather than just relieving symptoms. But it takes time -- and effort -- to make it work. In some cases, a combination of sleep medication and cognitive behavioral therapy for insomnia may be the best approach.

## 2021-03-29 NOTE — PROGRESS NOTES
"Shyann Samuel is a 80 year old female being evaluated via a billable telephone visit.     \"This telephone visit will be conducted via a call between you and your physician/provider. We have found that certain health care needs can be provided without the need for an in-person visit or physical exam.  This service lets us provide the care you need with a telephone conversation.  If a prescription is necessary we can send it directly to your pharmacy.  If lab work is needed we can place an order for that and you can then stop by our lab to have the test done at a later time.\"    Telephone visits are billed at different rates depending on your insurance coverage.  Please reach out to your insurance provider with any questions.    Patient has given verbal consent for  a Telephone visit? Yes    What telephone number would you like your provider to contact at at:  [phone]    How would you like to obtain your AVS? Michelle Benoit, [facility]    Telephone Visit Details:     Telephone Visit Start Time:      Telephone Visit End Time:      Sleep Consultation Note:    Date on this visit: 3/29/2021    Shyann Samuel is sent by Aleksandra Starks for a sleep consultation regarding insomnia    Primary Physician: Aleksandra Starks     Chief complaint: Wake up middle of night and can't fall back sleep    Shyann Sameul 80 year old with PMH HTN, hyperlipidemia,  who presents to sleep clinic for telephone visit with concerns about insomnia. She has not had a previous sleep study.  She reports waking up in the  middle of night and can't fall back sleep. This  has been going on for 20 years. Insomnia started around the time her late  started getting sick, in and out of hospital, he passed away in 2003.  Doesn't happen every night. About  2 out of 3 nights she reports insomnia.      The insomnia is sporadic. She started having insomnia since summer 2020 and it has been going on since then and this has been the " "longest duration.  She noticed that she feel better on days when the sun is out and  started using bright light box.  At home, she spends time either in her work room which has bright florescent  over head light or in the office where she keeps the bright light box 54620 lux intensity.  She stays in those areas starting 5 PM, until getting ready to go to bed which is around 8:30PM. She needs at least  2 hours time prior to going to bed to wash compression stockings (left leg lymphedema),  She needs to sit down for 1 hour with compression stockings , wash her teeth, take medications,and then goes to bed.  It has been hard to recover  for the recent switch to  daylight saving time  She reports active mind and calls it \"Mind sabotage\" affects her sleep. Anxiety/depression are other factors/ the pandemic that have been affecting her sleep.  She took diphenhydramine for a long time in the past, she reported that the medication made her mind foggy the following day. So she discontinued it.  She has not tried any prescription /OTC medicine recently.    For the past 1 month since she started using the bright light exposure in evening time, Shyann has been going to bed at 2989-1579 PM, reads for few minutes and fall asleep and wakes up at 7-8 AM. Previously she was going to bed around 11PM. She reports no difficulty with falling asleep.   She wakes up several  times throughout the night.  Night time awakenings occurs due to use bathroom.  She  reports difficulty fall back asleep after the awakening between 3-4 AM. She reports active mind. She starts thinking about something happened during day/what she read. She Lays in bed.  It may take a couple hours for her to fall back asleep. Sometimes she gets up and  works at her desk/computer or reads, eventually in approx. 2 hours she goes back to bed and fall sleep until 7-8 AM but sometimes she may just stay awake.   If she spends lot of time awake during the night, she reports " non-refreshing sleep.  She reports daytime sleepiness/fatigue.  Patient does not know if she is  a morning or night person  Patient denies drowsiness while driving.    Shyann naps 4-5 days/week for 1/2 -1 hour, feels refreshed after naps.    Patient does not use electronics in bed.  Hasn't travelled in last 5-6 years, but  she thinks that she slept better away from home in the past when she travelled.      SLEEP SCALES:  Patient's [place] Sleepiness score 9/24   Insomnia severity index:16    Sleep Disordered Breathing  Shyann does not complain of snoring, snort arousals, choking/gasping for air, witnessed apneas or morning headaches. She reports occasional dry mouth.    Restless Legs symptoms  Shyann does not complain of restlessness feelings in the legs.    Sleep Behaviors  She denies any cataplexy, sleep paralysis, sleep hallucinations.  She denies any night time behaviors - sleep walking, sleep talking, sleep eating.  She does not complain acting out dreams.      Social History  Shyann retired 12 years ago.  Lives alone. She does not drink caffeinated beverages.  She drinks alcohol very occasionally. Does not use alcohol as a sleep aid.  Patient is a never smoker. Patient does not use drugs.  Does not use medical marijuana.    Allergies:    Allergies   Allergen Reactions     Benzalkonium Chloride      Eye irritation     Nitrofurantoin Other (See Comments)     Causes hast heart rate.  Causes fast heart rate.       Medications:    Current Outpatient Medications   Medication Sig Dispense Refill     cyanocolbalamin (VITAMIN  B-12) 1000 MCG tablet Take 1 tablet by mouth three times a week.       ESTRIOL 0.5MG/GM CREAM Insert 1 gram vaginally 2 times per week. 30 g 3     lisinopril (ZESTRIL) 5 MG tablet Take 1 tablet (5 mg) by mouth daily 90 tablet 3     Omega-3 Fatty Acids (OMEGA-3 FISH OIL PO) Take  by mouth.       simvastatin (ZOCOR) 20 MG tablet Take 1 tablet (20 mg) by mouth At Bedtime 90 tablet 3       Problem  List:  Patient Active Problem List    Diagnosis Date Noted     Brow ptosis, bilateral 09/16/2020     Priority: Medium     Added automatically from request for surgery [id number]       Dermatochalasis of both upper eyelids 09/16/2020     Priority: Medium     Added automatically from request for surgery [id number]       Ptosis of eyelid, bilateral 09/16/2020     Priority: Medium     Added automatically from request for surgery [id number]       Spinal stenosis of lumbar region with neurogenic claudication 12/30/2018     Priority: Medium     Lower extremity edema 12/21/2018     Priority: Medium     Health Care Home 03/17/2016     Priority: Medium     Date:  4-19-16  Status: Closed  Care Coordinator/:  Shawn Kim, LSW       Back pain 01/27/2016     Priority: Medium     Overactive bladder 09/03/2014     Priority: Medium     Atrophic vaginitis 12/19/2012     Priority: Medium     Other symptoms involving nervous and musculoskeletal systems(781.99) 11/27/2012     Priority: Medium     Recurrent UTI 11/21/2012     Priority: Medium     Cataract 09/27/2012     Priority: Medium     Utility update for deleted IMO code  [employer] Update utility       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 Provider to review and confirm.       Status post hip replacement 06/23/2011     Priority: Medium     (Problem list name updated by automated process. Provider to review and  confirm.)          Past Medical/Surgical History:  Past Medical History:   Diagnosis Date     Anterior corneal dystrophy      Back pain      Cataract      Chronic osteoarthritis      Esotropia      Hyperlipidemia      Hyperlipidemia      Hypertension      Insomnia      Osteoarthritis      Posterior vitreous detachment      Urinary tract infection      Past Surgical History:   Procedure Laterality Date     C TOTAL HIP ARTHROPLASTY Right 10/2009    Replacement of right hip     CATARACT IOL, RT/LT Bilateral ~2005     EYE SURGERY  after 2003,pt unsure    blepharoplasty     HC EXTERNAL LEVATOR RESECTION Bilateral 12/2014     LAMINECTOMY LUMBAR TWO LEVELS  01/24/2019    L1 to L5 laminectomy [facility]      REPAIR PTOSIS Bilateral 10/14/2020    Procedure: Bilateral upper eyelid ptosis repair, bilateral upper eyelid blepharoplasty, bilateral brow pexy;  Surgeon: Espinoza Burns MD;  Location: [facility] OR     TONSILLECTOMY  1945       Social History:  Social History     Socioeconomic History     Marital status:      Spouse name: Not on file     Number of children: Not on file     Years of education: Not on file     Highest education level: Not on file   Occupational History     Not on file   Social Needs     Financial resource strain: Not on file     Food insecurity     Worry: Not on file     Inability: Not on file     Transportation needs     Medical: Not on file     Non-medical: Not on file   Tobacco Use     Smoking status: Never Smoker     Smokeless tobacco: Never Used   Substance and Sexual Activity     Alcohol use: No     Drug use: No     Sexual activity: Not Currently   Lifestyle     Physical activity     Days per week: Not on file     Minutes per session: Not on file     Stress: Not on file   Relationships     Social connections     Talks on phone: Not on file     Gets together: Not on file     Attends [employer] service: Not on file     Active member of club or organization: Not on file     Attends  "meetings of clubs or organizations: Not on file     Relationship status: Not on file     Intimate partner violence     Fear of current or ex partner: Not on file     Emotionally abused: Not on file     Physically abused: Not on file     Forced sexual activity: Not on file   Other Topics Concern     Parent/sibling w/ CABG, MI or angioplasty before 65F 55M? Not Asked   Social History Narrative     Not on file       Family History:  Family History   Problem Relation Age of Onset     Cerebrovascular Disease Mother         probably consequent to Crohn's disease     Crohn's Disease Mother      Osteoporosis Mother         probably consequent to Crohn's disease     Other Cancer Father         kidney cancer, benign brain tumor     Cancer Other         Father (kidney meningioma), Brother (neuroblastoma)     Other Cancer Brother         adult neuroblastoma (dispute about precise diagnos     Glaucoma No family hx of      Macular Degeneration No family hx of      Melanoma No family hx of      Skin Cancer No family hx of           Physical Examination:  Vitals: Ht 1.626 m (5' 4\")   Wt 76.2 kg (168 lb)   BMI 28.84 kg/m    BMI= Body mass index is 28.84 kg/m .         [place] Total Score 3/18/2021   Total score - [place] 9       General: No apparent distress   Chest: No cough, no audible wheezing, able to talk in full sentences  Psych: coherent speech, normal rate and volume, able to articulate logical thoughts, able   to abstract reason, no tangential thoughts, no hallucinations   or delusions   Her affect is normal  Neuro:  Mental status: Alert and  Oriented X 3  Speech: normal       Impression/Plan:  Chronic Insomnia (maintenance)-multifactorial.  Psychophysiological,  paradoxical, anxiety/depression, possible TOAN, inadequate sleep hygiene.  We discussed stimulus control measures. Encouraged to follow good sleep hygiene/behavioral techniques.   She would be a good candidate for Cognitive Behavioral Therapy, referral to Sleep " "psychologist has been placed.      We discussed today that exposure to bright light in the evening is not the appropriate for her.  Recommended minimizing the exposure to bright lights a couple  hours before the bedtime and avoiding mind stimulating activities before bed.  We discussed the option of using bright light box soon after awakening at 8 AM with exposure to bright light for 30 to 60 minutes but she said that the bright light box in the morning made her feel nauseous, so she cant do it.  She reports anxiety/depression affect her sleep sometimes. We discussed the association of insomnia and mood disorders. Recommended the patient to follow-up with her primary care provider for evaluation and management of  mood disorders.       The nocturnal awakenings she reports could be due to possible TOAN(possible REM related). Recommended obtaining sleep study to evaluate for possible TOAN. She first wants to pursue the CBT -I and does not want to consider the PSG at this time, but will decide later.     HTN: She is currently being treated with lisinopril 5 mg daily.    Hyperlipidemia: She is currently being treated with simvastatin 20 mg daily    Encouraged healthy diet, and exercise.    Patient was strongly advised to avoid driving, operating any heavy machinery or other hazardous situations while drowsy or sleepy.  Patient was counseled on the importance of driving while alert, to pull over if drowsy, or nap before getting into the vehicle if sleepy.        CC: Aleksandra Starks      The above note was dictated using voice recognition software. Although reviewed after completion, some word and grammatical error may remain . Please contact the author for any clarifications.    \"I spent a total of 45 minutes  with Shyann Samuel during today's  Telephone visit. Most of this time was spent counseling the patient and  coordinating care regarding insomnia, mood disorders, CBT-I, sleep hygiene, TOAN,  PSG, and chart " "review., including documentation and further activities as noted above.\"     Lucina Teran MD  [address] [address] [phone]  Dept: [phone]                        "

## 2021-03-31 ENCOUNTER — TELEPHONE (OUTPATIENT)
Dept: SLEEP MEDICINE | Facility: CLINIC | Age: 81
End: 2021-03-31

## 2021-04-12 ENCOUNTER — OFFICE VISIT (OUTPATIENT)
Dept: FAMILY MEDICINE | Facility: CLINIC | Age: 81
End: 2021-04-12
Payer: MEDICARE

## 2021-04-12 VITALS
OXYGEN SATURATION: 94 % | DIASTOLIC BLOOD PRESSURE: 84 MMHG | TEMPERATURE: 97.1 F | BODY MASS INDEX: 29.78 KG/M2 | RESPIRATION RATE: 15 BRPM | SYSTOLIC BLOOD PRESSURE: 126 MMHG | WEIGHT: 173.5 LBS | HEART RATE: 90 BPM

## 2021-04-12 DIAGNOSIS — M25.559 PAIN IN JOINT, PELVIC REGION AND THIGH, UNSPECIFIED LATERALITY: Primary | ICD-10-CM

## 2021-04-12 DIAGNOSIS — I89.0 LYMPHEDEMA: ICD-10-CM

## 2021-04-12 NOTE — PATIENT INSTRUCTIONS
"  ASSESSMENT/PLAN:      1. Shyann is an 80 yo with anterior thigh aches when she starts walking. Likely a combination of some thigh weakness along with biomechanical stress on RIGHT inner thigh given asymmetry of LEFT leg with varus alignment and RIGHT leg with normal alignment. Also with lymphedema in LEFT leg, chronic   - Suggested returning to physical therapy. Ask for Kat Wayne if available.   - Also, suggested obtaining a light walker, e.g. \"Able Life Space Saver Walker, Lightweight Folding Mobility Rolling Walker for Seniors and Adults, 6-inch Wheels and Ski Glides, [place]\"  - Call the Lymphedema clinic for reassessment of LEFT lower leg.   - Also, call for an appt with Prerna Snell MD as you said that he wanted to see you for follow up given the replacement was so many years ago.     --Prasanth Mccloud MD  "

## 2021-04-12 NOTE — PROGRESS NOTES
"OVERVIEW: Shyann Samuel is a 81 year old female who presents to [facility] today for Oneil Pain (right side, worse with walking, getting up from sitting and sitting for long periods of time. ), Referral (PT same one she saw after hip surgery), and Leg Pain (Bilateral, front of thighs, more persistant than before)        ASSESSMENT/PLAN:      1. Shyann is an 82 yo with anterior thigh aches when she starts walking. Likely a combination of some thigh weakness along with biomechanical stress on RIGHT inner thigh given asymmetry of LEFT leg with varus alignment and RIGHT leg with normal alignment. Also with lymphedema in LEFT leg, chronic   - Suggested returning to physical therapy. Ask for Kat Wayne if available.   - Also, suggested obtaining a light walker, e.g. \"Able Life Space Saver Walker, Lightweight Folding Mobility Rolling Walker for Seniors and Adults, 6-inch Wheels and Ski Glides, [place] [employer]\"  - Call the Lymphedema clinic for reassessment of LEFT lower leg.   - Also, call for an appt with Prerna Snell MD as you said that he wanted to see you for follow up given the replacement was so many years ago.     --Prasanth Mccloud MD      SUBJECTIVE:   Shyann is her due to bilateral thigh pain on the right worse than the left.     When asked to describe the history of the pain Shyann informs me that in 2009, had a RIGHT total hip replacement. And, at about that time, was noted to have diffuse arthritis.   Also, near that time, started to have pains in lower legs, controlled with Herbert Chi. In June 2018, she said uncomfortably in her [place]anda[place]. Went to Walk-in clinic at the [facility], had X-rays to assure that it was not her hip. Was given Prednisone which helped, but then the pain in her RIGHT thigh returned and it was noted that she had a leg length discrepency that was mostly due to 'pelvic obliquity\". Tried a heel lift in her LEFT shoe. That helped her greatly.     However, then lifted a chair and felt back pain. " Was given Gabapentin at a small dose, then increased dose but still no help. At that point, she recalled that she had fallen a few years earlier and had some swelling in left leg. Was eventually diagnosed with Lymphedema. Also, was given hydrocodone at that time for when the pain worsens. Then went to a rehab doctor who diagnosed her with lumbar stenosis with claudication. It was recommended that she have surgery. She went for surgery in Jan 2019. Surgery worked for her, but it took her about a year to recover. Then, the pandemic started and she's been stuck at home.     About 4-5 months, in Nov- Dec 2020, started having pains in the fronts of both thighs. Early March 2021, added insoles to her shoes. That didn't help. She then started having pain in inner RIGHT thigh. She removed the insoles and the inner thigh pain resolved, but then the anterior thigh pains returned.      Shyann wanted to return to see her physical therapist, Kat Wayne PT, but didn't have a referral.     Shyann says that her main pains now are the pains in the front of her thighs that bother her when she walks. Also, with a pain in her RIGHT inner thigh so that she started using a cane in her house. But, then that pain subsided and now, only using a cane when she walks outside but not in the house.     Review Of Systems:    Has otherwise been in usual state of health, e.g.   Cardiovascular: negative  Respiratory: No shortness of breath, dyspnea on exertion, cough, or hemoptysis  Gastrointestinal: negative  Genitourinary: negative    Problem list per EMR:  Patient Active Problem List   Diagnosis     Chronic bilateral low back pain with sciatica     Status post hip replacement     Hyperlipidemia     Anterior corneal dystrophy     Hypertension     Insomnia     Osteoarthritis     Cataract     Dermatitis medicamentosa     Esotropia     Macular puckering     Nontoxic multinodular goiter     Posterior vitreous detachment     Recurrent UTI      Other symptoms involving nervous and musculoskeletal systems(781.99)     Atrophic vaginitis     Overactive bladder     Back pain     Health Care Home     Lower extremity edema     Spinal stenosis of lumbar region with neurogenic claudication     Brow ptosis, bilateral     Dermatochalasis of both upper eyelids     Ptosis of eyelid, bilateral       Current Outpatient Medications   Medication Sig Dispense Refill     cyanocolbalamin (VITAMIN  B-12) 1000 MCG tablet Take 1 tablet by mouth three times a week.       ESTRIOL 0.5MG/GM CREAM Insert 1 gram vaginally 2 times per week. 30 g 3     lisinopril (ZESTRIL) 5 MG tablet Take 1 tablet (5 mg) by mouth daily 90 tablet 3     NEW MED Barry Raya       Omega-3 Fatty Acids (OMEGA-3 FISH OIL PO) Take  by mouth.       simvastatin (ZOCOR) 20 MG tablet Take 1 tablet (20 mg) by mouth At Bedtime 90 tablet 3       Allergies   Allergen Reactions     Benzalkonium Chloride      Eye irritation     Nitrofurantoin Other (See Comments)     Causes hast heart rate.  Causes fast heart rate.            OBJECTIVE    Vitals: /84 (BP Location: Right arm, Patient Position: Sitting, Cuff Size: Adult Large)   Pulse 90   Temp 97.1  F (36.2  C) (Skin)   Resp 15   Wt 78.7 kg (173 lb 8 oz)   SpO2 94%   BMI 29.78 kg/m    BMI= Body mass index is 29.78 kg/m .  Very pleasant woman in no acute distress.  She is able to rise from a seated position with normal muscular strength given her age.  She does ambulate with her feet somewhat close together which gives her a sense of imbalance.  She is much more stable when she spreads her feet slightly wider apart when walking.  Also she tends to hold the cane in her left hand.  After I reviewed her x-ray and noted the varus alignment in her left leg and suggested that she carry the cane in her right arm she felt more comfortable with that.    On the examination table she was noted to be able to do straight leg raises without difficulty.  Also had  normal range of motion of both of her hips.  There were no masses felt in her thighs.  Her left lower leg is noteworthy for having prominent lymphedema.  She states that this is chronic.  She reported normal sensations distally.  Negative straight leg raise bilaterally.    Reviewed x-ray of lower extremities from July 2018:   Has varus alignment with medial DJD of LEFT leg.       SEE TOP OF NOTE FOR ASSESSMENT AND PLAN  35 minutes spent on the date of the encounter doing chart review, history and exam, documentation and further activities as noted in the note.     --Prasanth Mccloud MD  [facility], Department of Family Medicine and Community Health

## 2021-04-12 NOTE — NURSING NOTE
81 year old  Chief Complaint   Patient presents with     Oneil Pain     right side, worse with walking, getting up from sitting and sitting for long periods of time.      Referral     PT same one she saw after hip surgery     Leg Pain     Bilateral, front of thighs, more persistant than before       Blood pressure 126/84, pulse 90, temperature 97.1  F (36.2  C), temperature source Skin, resp. rate 15, weight 78.7 kg (173 lb 8 oz), SpO2 94 %, not currently breastfeeding. Body mass index is 29.78 kg/m .  Patient Active Problem List   Diagnosis     Chronic bilateral low back pain with sciatica     Status post hip replacement     Hyperlipidemia     Anterior corneal dystrophy     Hypertension     Insomnia     Osteoarthritis     Cataract     Dermatitis medicamentosa     Esotropia     Macular puckering     Nontoxic multinodular goiter     Posterior vitreous detachment     Recurrent UTI     Other symptoms involving nervous and musculoskeletal systems(781.99)     Atrophic vaginitis     Overactive bladder     Back pain     Health Care Home     Lower extremity edema     Spinal stenosis of lumbar region with neurogenic claudication     Brow ptosis, bilateral     Dermatochalasis of both upper eyelids     Ptosis of eyelid, bilateral       Wt Readings from Last 2 Encounters:   04/12/21 78.7 kg (173 lb 8 oz)   03/29/21 76.2 kg (168 lb)     BP Readings from Last 3 Encounters:   04/12/21 126/84   10/14/20 133/70   10/09/20 118/76         Current Outpatient Medications   Medication     cyanocolbalamin (VITAMIN  B-12) 1000 MCG tablet     ESTRIOL 0.5MG/GM CREAM     lisinopril (ZESTRIL) 5 MG tablet     NEW MED     Omega-3 Fatty Acids (OMEGA-3 FISH OIL PO)     simvastatin (ZOCOR) 20 MG tablet     No current facility-administered medications for this visit.        Social History     Tobacco Use     Smoking status: Never Smoker     Smokeless tobacco: Never Used   Substance Use Topics     Alcohol use: No     Drug use: No       Health  Maintenance Due   Topic Date Due     DEXA  Never done     URINE DRUG SCREEN  Never done     ZOSTER IMMUNIZATION (1 of 2) Never done     MEDICARE ANNUAL WELLNESS VISIT  Never done       No results found for: PAP      April 12, 2021 3:54 PM

## 2021-04-21 ENCOUNTER — THERAPY VISIT (OUTPATIENT)
Dept: PHYSICAL THERAPY | Facility: CLINIC | Age: 81
End: 2021-04-21
Payer: MEDICARE

## 2021-04-21 DIAGNOSIS — M25.559 PAIN IN JOINT, PELVIC REGION AND THIGH, UNSPECIFIED LATERALITY: ICD-10-CM

## 2021-04-21 PROCEDURE — 97110 THERAPEUTIC EXERCISES: CPT | Mod: GP | Performed by: PHYSICAL THERAPIST

## 2021-04-21 PROCEDURE — 97161 PT EVAL LOW COMPLEX 20 MIN: CPT | Mod: GP | Performed by: PHYSICAL THERAPIST

## 2021-04-21 NOTE — LETTER
DEPARTMENT OF HEALTH AND HUMAN SERVICES  CENTERS FOR MEDICARE & MEDICAID SERVICES    PLAN/UPDATED PLAN OF PROGRESS FOR OUTPATIENT REHABILITATION        PATIENTS NAME:  Shyann Samuel   : 1940  PROVIDER NUMBER:    [id number]  [id number]  PROVIDER NAME: [facility]  MEDICAL RECORD NUMBER: [id number]   START OF CARE DATE:  SOC Date: 21   TYPE:  PT  PRIMARY/TREATMENT DIAGNOSIS: (Pertinent Medical Diagnosis)  Pain in joint, pelvic region and thigh, unspecified laterality    VISITS FROM START OF CARE:  Rxs Used: 1     Physical Therapy Initial Evaluation  Subjective:  Patient Health History  Shyann Samuel being seen for Right leg groin pain, hobbling walk.   Problem began: 2021.   Problem occurred: Don't know   Pain is reported as 5/10 on pain scale.  General health as reported by patient is good.  Pertinent medical history includes: high blood pressure and overweight.   Medical allergies: other. Other medical allergies details: nitrofurantoin, benzalkonium chloride.   Surgeries include:  Orthopedic surgery.    Current medications:  High blood pressure medication and other. Other medications details: high lipids medication.    Current occupation is retired.    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                Therapist Generated HPI Evaluation  Problem details: Pt saw MD for this issue on 21  HPI: Pain in anterior thighs and concern with gait. Groin pain as well since the beginning of March, more intermittent. Went to see physician, who feels she is walking with her feet too close together. Did have CONSTANCE in , and she has not seen her surgeon for that recently  Pain: 5/10  Worse: sit to stand, harder chairs, walking (initial steps)  Better: higher chairs, pain dec as she walks  PMH: see EPIC  Exercise:usually does PT exercises, has been unable to do in the past 2 weeks.  Occupation: retired  Goals: decrease pain  Type of problem:  Right  hip.  This is a new condition.  Occurance: unsure.  Patient reports pain:  Groin and anterior.  Pain is described as aching and is intermittent.  PATIENTS NAME:  Shyann Samuel   : 1940    Since onset symptoms are gradually improving.  Symptoms are exacerbated by certain positions, standing and transfers  and relieved by activity/movement and analgesics.  Work activity restrictions: retired.  Barriers include:  Lives alone.          Objective:  Gait:    Assistive Devices:  Cane  Deviations:  General Deviations:  Base of support decr, stride length decr and juan decr  Hip Evaluation  Hip PROM:  Hip PROM:  Left Hip:   Normal  Right Hip:   Flexion: Left:  Right: restricted  Internal Rotation: Left:   Right: WNL s/p CONSTANCE  External Rotation: Left:   Right: WNL s/p CONSTANCE  Hip Strength:    Flexion:   Left: 3+/5   -  Pain:  Right: 3+/5   -  Pain:        Adduction:  Left: 4/5   -   Pain:Right: 4/5   -  Pain:  Knee Extension:  Left: 4+/5   -  Pain:Right: 4+/5    -  Pain:        Hip Special Testing:    Left hip negative for the following special tests:  Gian; Fadir/Labrum or SLR   Right hip positive for the following special tests:  SLRRight hip negative for the following special tests:  Gian or Fadir/Labrum    Hip Palpation:  Normal     Assessment/Plan:    Patient is a 81 year old female with right side hip complaints.    Patient has the following significant findings with corresponding treatment plan.                Diagnosis 1:  Right hip pain  Pain -  hot/cold therapy, self management, home program  Decreased ROM/flexibility - manual therapy, therapeutic exercise, therapeutic activity and home program  Impaired muscle performance - neuro re-education and home program  Decreased function - therapeutic activities and home program    Therapy Evaluation Codes:   1) History comprised of:   Personal factors that impact the plan of care:      Age.    Comorbidity factors that impact the plan of care are:      High blood  "pressure and Overweight.     Medications impacting care: High blood pressure.  2) Examination of Body Systems comprised of:   Body structures and functions that impact the plan of care:      Hip.  PATIENTS NAME:  Shyann Samuel   : 1940             Activity limitations that impact the plan of care are:      Bending, Dressing, Sitting, Squatting/kneeling, Standing and Walking.  3) Clinical presentation characteristics are:   Stable/Uncomplicated.  4) Decision-Making    Low complexity using standardized patient assessment instrument and/or measureable assessment of functional outcome.    Cumulative Therapy Evaluation is: Low complexity.  Previous and current functional limitations:  (See Goal Flow Sheet for this information)    Short term and Long term goals: (See Goal Flow Sheet for this information)   Communication ability:  Patient appears to be able to clearly communicate and understand verbal and written communication and follow directions correctly.  Treatment Explanation - The following has been discussed with the patient:   RX ordered/plan of care  Anticipated outcomes  Possible risks and side effects  This patient would benefit from PT intervention to resume normal activities.   Rehab potential is good.    Frequency:  1 time evaluation and treatment  Discharge Plan:  Achieve all LTG.  Independent in home treatment program.  Reach maximal therapeutic benefit.      Caregiver Signature/Credentials _____________________________ Date ________       Treating Provider: Shelly Ko SPT/Kat Wayne PT   I have reviewed and certified the need for these services and plan of treatment while under my care.       PHYSICIAN'S SIGNATURE:   _________________________________________  Date___________   Prasanth Mccloud MD    Certification period:  Beginning of Cert date period: 21 to  End of Cert period date: 21   Functional Level Progress Report: Please see attached \"Goal Flow sheet for Functional " "level.\"    ____X____ Continue Services or       ________ DC Services                Service dates: From  SOC Date: 04/21/21 date to present                         "

## 2021-04-21 NOTE — PROGRESS NOTES
Physical Therapy Initial Evaluation  Subjective:    Patient Health History  Shyann Samuel being seen for Right leg groin pain, hobbling walk.     Problem began: 4/6/2021.   Problem occurred: Don't know   Pain is reported as 5/10 on pain scale.  General health as reported by patient is good.  Pertinent medical history includes: high blood pressure and overweight.     Medical allergies: other. Other medical allergies details: nitrofurantoin, benzalkonium chloride.   Surgeries include:  Orthopedic surgery.    Current medications:  High blood pressure medication and other. Other medications details: high lipids medication.    Current occupation is retired.      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                Therapist Generated HPI Evaluation  Problem details: Pt saw MD for this issue on 4/12/21    HPI: Pain in anterior thighs and concern with gait. Groin pain as well since the beginning of March, more intermittent. Went to see physician, who feels she is walking with her feet too close together. Did have CONSTANCE in 2009, and she has not seen her surgeon for that recently    Pain: 5/10    Worse: sit to stand, harder chairs, walking (initial steps)    Better: higher chairs, pain dec as she walks    PMH: see EPIC    Exercise:usually does PT exercises, has been unable to do in the past 2 weeks.    Occupation: retired    Goals: decrease pain    .         Type of problem:  Right hip.    This is a new condition.  Occurance: unsure.    Patient reports pain:  Groin and anterior.  Pain is described as aching and is intermittent.    Since onset symptoms are gradually improving.  Symptoms are exacerbated by certain positions, standing and transfers  and relieved by activity/movement and analgesics.      Work activity restrictions: retired.  Barriers include:  Lives alone.                        Objective:    Gait:    Assistive Devices:  Cane  Deviations:  General Deviations:  Base of support decr, stride length decr  and juan decr                                                 Hip Evaluation  Hip PROM:  Hip PROM:  Left Hip:   Normal  Right Hip:   Flexion: Left:  Right: restricted        Internal Rotation: Left:   Right: WNL s/p CONSTANCE  External Rotation: Left:   Right: WNL s/p CONSTANCE              Hip Strength:    Flexion:   Left: 3+/5   -  Pain:  Right: 3+/5   -  Pain:                        Adduction:  Left: 4/5   -   Pain:Right: 4/5   -  Pain:        Knee Extension:  Left: 4+/5   -  Pain:Right: 4+/5    -  Pain:        Hip Special Testing:      Left hip negative for the following special tests:  Gian; Fadir/Labrum or SLR   Right hip positive for the following special tests:  SLRRight hip negative for the following special tests:  Gian or Fadir/Labrum    Hip Palpation:  Normal                    General     ROS    Assessment/Plan:    Patient is a 81 year old female with right side hip complaints.    Patient has the following significant findings with corresponding treatment plan.                Diagnosis 1:  Right hip pain  Pain -  hot/cold therapy, self management, home program  Decreased ROM/flexibility - manual therapy, therapeutic exercise, therapeutic activity and home program  Impaired muscle performance - neuro re-education and home program  Decreased function - therapeutic activities and home program    Therapy Evaluation Codes:   1) History comprised of:   Personal factors that impact the plan of care:      Age.    Comorbidity factors that impact the plan of care are:      High blood pressure and Overweight.     Medications impacting care: High blood pressure.  2) Examination of Body Systems comprised of:   Body structures and functions that impact the plan of care:      Hip.   Activity limitations that impact the plan of care are:      Bending, Dressing, Sitting, Squatting/kneeling, Standing and Walking.  3) Clinical presentation characteristics are:   Stable/Uncomplicated.  4) Decision-Making    Low complexity using  standardized patient assessment instrument and/or measureable assessment of functional outcome.  Cumulative Therapy Evaluation is: Low complexity.    Previous and current functional limitations:  (See Goal Flow Sheet for this information)    Short term and Long term goals: (See Goal Flow Sheet for this information)     Communication ability:  Patient appears to be able to clearly communicate and understand verbal and written communication and follow directions correctly.  Treatment Explanation - The following has been discussed with the patient:   RX ordered/plan of care  Anticipated outcomes  Possible risks and side effects  This patient would benefit from PT intervention to resume normal activities.   Rehab potential is good.    Frequency:  1 time evaluation and treatment  Discharge Plan:   Independent in home treatment program.  Reach maximal therapeutic benefit.    Please refer to the daily flowsheet for treatment today, total treatment time and time spent performing 1:1 timed codes.

## 2021-04-23 DIAGNOSIS — M79.605 PAIN OF LEFT LOWER EXTREMITY: ICD-10-CM

## 2021-04-23 DIAGNOSIS — M25.559 PAIN IN JOINT, PELVIC REGION AND THIGH, UNSPECIFIED LATERALITY: Primary | ICD-10-CM

## 2021-04-23 NOTE — CONFIDENTIAL NOTE
[employer] message received.   New orders for [place] [facility]     Call: [phone] to schedule imaging tests at the [facility]'s center.

## 2021-04-23 NOTE — TELEPHONE ENCOUNTER
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 / RIGHT leg pain / Questions about LEFT knee / Prasanth Mccloud MD / Medicare and [employer] / Pt requesting imaging order from Prasanth Mccloud MD, to have imaging done for this appt   DATE RECEIVED: May 27, 2021     NOTES STATUS DETAILS   OFFICE NOTE from referring provider Internal  Prasanth Mccloud MD   OFFICE NOTE from other specialist N/A    DISCHARGE SUMMARY from [facility] N/A    DISCHARGE REPORT from the ER N/A    OPERATIVE REPORT Internal    MEDICATION LIST Internal    IMPLANT RECORD/STICKER Internal    LABS     CBC/DIFF N/A    CULTURES N/A    INJECTIONS DONE IN RADIOLOGY N/A    MRI N/A    CT SCAN N/A    XRAYS (IMAGES & REPORTS) Internal    TUMOR     PATHOLOGY  Slides & report N/A      04/23/21   11:14 AM   COMPLETE  Raquel Martinez CMA

## 2021-04-27 DIAGNOSIS — M25.559 PAIN IN JOINT, PELVIC REGION AND THIGH, UNSPECIFIED LATERALITY: Primary | ICD-10-CM

## 2021-04-28 DIAGNOSIS — M25.551 RIGHT HIP PAIN: Primary | ICD-10-CM

## 2021-04-28 RX ORDER — HYDROCODONE BITARTRATE AND ACETAMINOPHEN 5; 325 MG/1; MG/1
1 TABLET ORAL EVERY 6 HOURS PRN
Qty: 10 TABLET | Refills: 0 | Status: SHIPPED | OUTPATIENT
Start: 2021-04-28 | End: 2021-05-07

## 2021-04-29 ENCOUNTER — ANCILLARY PROCEDURE (OUTPATIENT)
Dept: GENERAL RADIOLOGY | Facility: CLINIC | Age: 81
End: 2021-04-29
Attending: ORTHOPAEDIC SURGERY
Payer: MEDICARE

## 2021-04-29 ENCOUNTER — ANCILLARY PROCEDURE (OUTPATIENT)
Dept: GENERAL RADIOLOGY | Facility: CLINIC | Age: 81
End: 2021-04-29
Attending: FAMILY MEDICINE
Payer: MEDICARE

## 2021-04-29 DIAGNOSIS — M25.559 PAIN IN JOINT, PELVIC REGION AND THIGH, UNSPECIFIED LATERALITY: ICD-10-CM

## 2021-04-29 DIAGNOSIS — M79.605 PAIN OF LEFT LOWER EXTREMITY: ICD-10-CM

## 2021-04-29 PROCEDURE — 77073 BONE LENGTH STUDIES: CPT | Mod: XE | Performed by: RADIOLOGY

## 2021-04-29 PROCEDURE — 73502 X-RAY EXAM HIP UNI 2-3 VIEWS: CPT | Mod: RT | Performed by: RADIOLOGY

## 2021-04-29 PROCEDURE — 73562 X-RAY EXAM OF KNEE 3: CPT | Mod: LT | Performed by: RADIOLOGY

## 2021-05-03 ASSESSMENT — ANXIETY QUESTIONNAIRES
6. BECOMING EASILY ANNOYED OR IRRITABLE: NOT AT ALL
GAD7 TOTAL SCORE: 2
5. BEING SO RESTLESS THAT IT IS HARD TO SIT STILL: NOT AT ALL
2. NOT BEING ABLE TO STOP OR CONTROL WORRYING: NOT AT ALL
1. FEELING NERVOUS, ANXIOUS, OR ON EDGE: SEVERAL DAYS
7. FEELING AFRAID AS IF SOMETHING AWFUL MIGHT HAPPEN: SEVERAL DAYS
GAD7 TOTAL SCORE: 2
4. TROUBLE RELAXING: NOT AT ALL
7. FEELING AFRAID AS IF SOMETHING AWFUL MIGHT HAPPEN: SEVERAL DAYS
3. WORRYING TOO MUCH ABOUT DIFFERENT THINGS: NOT AT ALL

## 2021-05-03 ASSESSMENT — ENCOUNTER SYMPTOMS
MYALGIAS: 1
JOINT SWELLING: 0
ARTHRALGIAS: 1
STIFFNESS: 1
NECK PAIN: 0
NECK PAIN: 0
STIFFNESS: 1
BACK PAIN: 0
ARTHRALGIAS: 1
MYALGIAS: 1
MUSCLE WEAKNESS: 0
MUSCLE CRAMPS: 0
JOINT SWELLING: 0
MUSCLE WEAKNESS: 0
BACK PAIN: 0
MUSCLE CRAMPS: 0

## 2021-05-04 ASSESSMENT — ANXIETY QUESTIONNAIRES: GAD7 TOTAL SCORE: 2

## 2021-05-06 ENCOUNTER — OFFICE VISIT (OUTPATIENT)
Dept: ANESTHESIOLOGY | Facility: CLINIC | Age: 81
End: 2021-05-06
Payer: MEDICARE

## 2021-05-06 ENCOUNTER — MYC MEDICAL ADVICE (OUTPATIENT)
Dept: FAMILY MEDICINE | Facility: CLINIC | Age: 81
End: 2021-05-06

## 2021-05-06 VITALS
HEART RATE: 76 BPM | WEIGHT: 173 LBS | BODY MASS INDEX: 30.65 KG/M2 | RESPIRATION RATE: 16 BRPM | HEIGHT: 63 IN | DIASTOLIC BLOOD PRESSURE: 83 MMHG | SYSTOLIC BLOOD PRESSURE: 137 MMHG

## 2021-05-06 DIAGNOSIS — M25.559 PAIN IN JOINT, PELVIC REGION AND THIGH, UNSPECIFIED LATERALITY: ICD-10-CM

## 2021-05-06 DIAGNOSIS — M76.31 ILIOTIBIAL BAND SYNDROME OF RIGHT SIDE: Primary | ICD-10-CM

## 2021-05-06 PROCEDURE — 99203 OFFICE O/P NEW LOW 30 MIN: CPT | Mod: GC | Performed by: ANESTHESIOLOGY

## 2021-05-06 ASSESSMENT — ENCOUNTER SYMPTOMS
BACK PAIN: 0
MYALGIAS: 1
NECK PAIN: 0

## 2021-05-06 ASSESSMENT — MIFFLIN-ST. JEOR: SCORE: 1227.81

## 2021-05-06 ASSESSMENT — PAIN SCALES - GENERAL: PAINLEVEL: MODERATE PAIN (5)

## 2021-05-06 NOTE — PATIENT INSTRUCTIONS
Medications:    Voltaren gel- Apply to the painful area as prescribed (use three times day for 1 month to improve efficacy.)    Please provide the clinic with a minium of 1 week notice, on all prescription refills.     Referrals:    Physical Therapy Referral placed-   If you have not heard from the scheduling office within 2 business days, please call [phone] for all locations    Recommended Follow up:      3-6 months or sooner if needed.        To speak with a nurse, schedule/reschedule/cancel a clinic appointment, or request a medication refill call: [phone], option #1.    You can also reach us by [employer]: [email or web address]

## 2021-05-06 NOTE — LETTER
5/6/2021       RE: Shyann Samuel  [address]  [place] [facility] [id number]     Dear Colleague,    Thank you for referring your patient, Shyann Samuel, to the [facility] FOR [facility]HENSIVE PAIN MANAGEMENT [facility]. Please see a copy of my visit note below.      Pain Clinic New Patient Consult Note:    Referring Provider: Self   Primary care provider: Aleksandra Starks.    Shyann Samuel is a 81 year old y.o. old female who presents to the pain clinic with right hip pain    HPI:  Patient Supplied Answers To the [facility] Pain Questionnaire  [facility] -  Patient Entered Questionnaire/Answers 5/5/2021   What number best describes your pain right now:  0 = No pain  to  10 = Worst pain imaginable 4   How would you describe the pain? cramping, sharp, dull, aching   Which of the following worsen your pain? sitting, walking   Which of the following improve or reduce your pain?  lying down, sitting   What number best describes your average pain for the past week:  0 = No pain  to  10 = Worst pain imaginable 5   What number best describes your LOWEST pain in past 24 hours:  0 = No pain  to  10 = Worst pain imaginable 0   What number best describes your WORST pain in past 24 hours:  0 = No pain  to  10 = Worst pain imaginable 8   When is your pain worst? AM, PM   What non-medicine treatments have you already had for your pain? none   Have you tried treating your pain with medication?  Yes   Are you currently taking medications for your pain? Yes     January 2019 she has severe lumbar stenosis with neurogenic claudication and ultimately had an L1-L5 laminectomy which helped with that type of pain.  This pain was all in her legs.  Then after that surgery it took a year to recover from the surgery and then since the pandemic started she expierienced pain in the front of the thighs and buttocks area in March of 2021 and then at the beginning of April it  came back and has been worsening.  She is having pain in the right groin.  She sees Dr. Mccloud up in [place] who is her primary person for pain.  At that time he thought her pain had something to do with her muscles and she was referred to PT.  This has helped.  The groin pain at that time was what was bothering her the most.  She has a history of CONSTANCE in 2009 and her therapist thought her pain may be coming from this. She has an appointmetn with her orthopedic surgeon to talk about the hip next week with Dr. Snell.     She reports never having pain in her back but does have pain in her legs.  At present  She states that she has primarily a deep dull achy pain in the lateral hip and thigh and sometimes anyterior thigh and sometimes the groin that does radiate a little to the shin but never into the foot or ankle.  She has some pain in the posterior buttocka russell when she sits in certain chairs.  More padded chairs allow for less pain.  She has not pain when lying flat.  She has some worsening with pain as she sits for longer periods of time and also with walking longer distances.  She denies any weakness at this time but is using a cane for ambulation and has been since her hip replacement on the right side.  She has no back pain at this time.  She takes 2 vicodin a day one in the morning and one 6 hours later to manage pain.        Pain treatments:  Medications:    - Vicodin 5/325    - Naproxen 220 mg BID (no sie effects)   Herbal medicines: none   Physical therapy: yes, has gone to see her PT but only for recommendations and then gave HEP   Chiropractor: None  Pain physician: years ago in 2018 for spinal stenosis and started hydrocodone at that time.   Surgery: L2-L5 lami   Biofeedback: None  Acupuncture: yes, this was helpful but before the spine surgery.     Tests/Imaging reviewed with the patient:  No recent imaging of the lumbar spine     Significant Medical History:   Past Medical History:   Diagnosis Date      Anterior corneal dystrophy      Back pain      Cataract      Chronic osteoarthritis      Esotropia      Hyperlipidemia      Hyperlipidemia      Hypertension      Insomnia      Osteoarthritis      Posterior vitreous detachment      Urinary tract infection           Past Surgical History:  Past Surgical History:   Procedure Laterality Date     C TOTAL HIP ARTHROPLASTY Right 10/2009    Replacement of right hip     CATARACT IOL, RT/LT Bilateral ~2005     EYE SURGERY  after 2003,pt unsure    blepharoplasty     HC EXTERNAL LEVATOR RESECTION Bilateral 12/2014     LAMINECTOMY LUMBAR TWO LEVELS  01/24/2019    L1 to L5 laminectomy [facility]      REPAIR PTOSIS Bilateral 10/14/2020    Procedure: Bilateral upper eyelid ptosis repair, bilateral upper eyelid blepharoplasty, bilateral brow pexy;  Surgeon: Espinoza Burns MD;  Location: [facility] OR     TONSILLECTOMY  1946          Family History:  Family History   Problem Relation Age of Onset     Cerebrovascular Disease Mother         probably consequent to Crohn's disease     Crohn's Disease Mother      Osteoporosis Mother         probably consequent to Crohn's disease     Other Cancer Father         kidney cancer, benign brain tumor     Cancer Other         Father (kidney meningioma), Brother (neuroblastoma)     Other Cancer Brother         adult neuroblastoma (dispute about precise diagnos     Glaucoma No family hx of      Macular Degeneration No family hx of      Melanoma No family hx of      Skin Cancer No family hx of           Social History:  Social History     Socioeconomic History     Marital status:      Spouse name: Not on file     Number of children: Not on file     Years of education: Not on file     Highest education level: Not on file   Occupational History     Not on file   Social Needs     Financial resource strain: Not on file     Food insecurity     Worry: Not on file     Inability: Not on file     Transportation needs     Medical: Not on file      Non-medical: Not on file   Tobacco Use     Smoking status: Never Smoker     Smokeless tobacco: Never Used   Substance and Sexual Activity     Alcohol use: No     Drug use: No     Sexual activity: Not Currently   Lifestyle     Physical activity     Days per week: Not on file     Minutes per session: Not on file     Stress: Not on file   Relationships     Social connections     Talks on phone: Not on file     Gets together: Not on file     Attends Yazdanism service: Not on file     Active member of club or organization: Not on file     Attends meetings of clubs or organizations: Not on file     Relationship status: Not on file     Intimate partner violence     Fear of current or ex partner: Not on file     Emotionally abused: Not on file     Physically abused: Not on file     Forced sexual activity: Not on file   Other Topics Concern     Parent/sibling w/ CABG, MI or angioplasty before 65F 55M? Not Asked   Social History Narrative     Not on file     Social History     Social History Narrative     Not on file          Allergies:  Allergies   Allergen Reactions     Benzalkonium Chloride      Eye irritation     Nitrofurantoin Other (See Comments)     Causes hast heart rate.  Causes fast heart rate.       Current Medications:   Current Outpatient Medications   Medication Sig Dispense Refill     cyanocolbalamin (VITAMIN  B-12) 1000 MCG tablet Take 1 tablet by mouth three times a week.       ESTRIOL 0.5MG/GM CREAM Insert 1 gram vaginally 2 times per week. 30 g 3     lisinopril (ZESTRIL) 5 MG tablet Take 1 tablet (5 mg) by mouth daily 90 tablet 3     NEW MED Barry Raya       Omega-3 Fatty Acids (OMEGA-3 FISH OIL PO) Take  by mouth.       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     Psychosocial History:  None     History of treatment for behavioral disorder:   History of suicidal ideation or suicidal attempt: never     Physical Exam:     Vitals:    05/06/21 1110   BP: 137/83   Pulse: 76   Resp: 16   Weight: 78.5 kg (173 lb)   Height: 1.607 m (5' 3.25\")       General Appearance: No distress, seated comfortably  Mood: Euthymic  HE ENT: Non constricted pupils  Respiratory: Non labored breathing  CVS: Regular rate and rhythm  GI: Soft, non distended, no TTP  Skin: No rashes over exposed skin  MS: full ROM of the right hip with pain to palpation over the IT band specifically over the GT and the right lateral knee and insertion.  She has pain along the entire IT band on the right and a positive Obers test on the right.  She also has some tenderness in the external rotators of the hip on the right likely mild piriformis tendinopathy.  Negative SLR and seated slump for pain.  Normal and symmetric 5/5 strength in BLLE.   Neuro: sensation grossly intact.   Gait: slow and antalgic, ambulates with royal for assistance    Laboratory results:  Recent Labs   Lab Test 09/18/20  0952 07/03/19  1049 12/28/18  1627 04/10/18  0903   .0 146.0 138 140   POTASSIUM 4.6 4.0 4.8 5.2   CHLORIDE 102.0 104.0 106 107   CO2 28.0 29.0 25 27   ANIONGAP  --   --  6 6   .0* 106.0  107.0 101* 99   BUN 19.0 10.0 20 17   CR 0.5* 0.7 0.64 0.69   ROBERT 9.7 9.2 9.1 9.5       CBC RESULTS:   Recent Labs   Lab Test 07/03/19  1049 12/28/18  1627   WBC  --  10.5   RBC  --  5.21*   HGB 14.6 15.5   HCT 45.5 49.4*   MCV 88.9 95   MCH 28.5 29.8   MCHC 32.1 31.4*   RDW 13.2 14.0   PLT  --  296         Imaging:       ASSESSMENT AND PLAN: "     Encounter Diagnosis:  Right iliotibial band syndrome, and external hip rotator tendinopathy     Shyann Samuel is a 81 year old y.o. old female who presents to the pain clinic with right hip pain     I have summarized the patient s past medical history, discussed their clinical findings and the potential differential diagnosis with the patient. Significant past medical history pertinent to the patient s current condition includes localized msk pain with no radicular featrues.  The clinical findings reveal localized pain along the IT band. The differential diagnosis discussed with the patient are listed above. I have discussed anatomy and possible sources of the pain using models and/or pictures (diagrams). I have discussed multi- disciplinary pain management options withthe patient as pertaining to their case as detailed above. The pain management options we discussed included, but were not limited to the recommendations below.  I also discussed with patient the risks, benefits and alternatives to each pain management option.  All of the patient s questions and concerns were answered to the best of my ability.    RECOMMENDATIONS:     1. Medications: Will start voltaren gel for the patient to be used TID for a month and continue if providing benefit.  Dosing, side effects, risks/benefits/alternatives were discussed with the patient in detail.  - Recommend that she not use Norco unless having a pain flare and the use should only be for short period of time.      2. Physical therapy: I have refered the patient for outpatient physical therapy for stretching, strengthening and home exercise program for IT band syndrome and piriformis tendinopathy. The patient will also discuss spine care and posture. Pool therapy and stretches can be considered if available.      Follow up: as needed     ATTENDING ATTESTATION  I saw the patient along with the pain medicine fellow Dr. Chivo Zamora. Please see her note above for full  details. I have edited her note and agree with it. I was involved in gathering history, physical examination and development of the plan of care.     Again, thank you for allowing me to participate in the care of your patient.      Sincerely,    Megan Rucker MD

## 2021-05-06 NOTE — PROGRESS NOTES
Pain Clinic New Patient Consult Note:    Referring Provider: Self   Primary care provider: Aleksandra Starks.    Shyann Samuel is a 81 year old y.o. old female who presents to the pain clinic with right hip pain    HPI:  Patient Supplied Answers To the [facility] Pain Questionnaire  [facility] -  Patient Entered Questionnaire/Answers 5/5/2021   What number best describes your pain right now:  0 = No pain  to  10 = Worst pain imaginable 4   How would you describe the pain? cramping, sharp, dull, aching   Which of the following worsen your pain? sitting, walking   Which of the following improve or reduce your pain?  lying down, sitting   What number best describes your average pain for the past week:  0 = No pain  to  10 = Worst pain imaginable 5   What number best describes your LOWEST pain in past 24 hours:  0 = No pain  to  10 = Worst pain imaginable 0   What number best describes your WORST pain in past 24 hours:  0 = No pain  to  10 = Worst pain imaginable 8   When is your pain worst? AM, PM   What non-medicine treatments have you already had for your pain? none   Have you tried treating your pain with medication?  Yes   Are you currently taking medications for your pain? Yes     January 2019 she has severe lumbar stenosis with neurogenic claudication and ultimately had an L1-L5 laminectomy which helped with that type of pain.  This pain was all in her legs.  Then after that surgery it took a year to recover from the surgery and then since the pandemic started she expierienced pain in the front of the thighs and buttocks area in March of 2021 and then at the beginning of April it came back and has been worsening.  She is having pain in the right groin.  She sees Dr. Mccloud up in [place] who is her primary person for pain.  At that time he thought her pain had something to do with her muscles and she was referred to PT.  This has helped.  The groin pain at that time was what was bothering her the most.  She has a  history of CONSTANCE in 2009 and her therapist thought her pain may be coming from this. She has an appointmetn with her orthopedic surgeon to talk about the hip next week with Dr. Snell.     She reports never having pain in her back but does have pain in her legs.  At present  She states that she has primarily a deep dull achy pain in the lateral hip and thigh and sometimes anyterior thigh and sometimes the groin that does radiate a little to the shin but never into the foot or ankle.  She has some pain in the posterior buttocka russell when she sits in certain chairs.  More padded chairs allow for less pain.  She has not pain when lying flat.  She has some worsening with pain as she sits for longer periods of time and also with walking longer distances.  She denies any weakness at this time but is using a cane for ambulation and has been since her hip replacement on the right side.  She has no back pain at this time.  She takes 2 vicodin a day one in the morning and one 6 hours later to manage pain.        Pain treatments:  Medications:    - Vicodin 5/325    - Naproxen 220 mg BID (no sie effects)   Herbal medicines: none   Physical therapy: yes, has gone to see her PT but only for recommendations and then gave HEP   Chiropractor: None  Pain physician: years ago in 2018 for spinal stenosis and started hydrocodone at that time.   Surgery: L2-L5 lami   Biofeedback: None  Acupuncture: yes, this was helpful but before the spine surgery.     Tests/Imaging reviewed with the patient:  No recent imaging of the lumbar spine     Significant Medical History:   Past Medical History:   Diagnosis Date     Anterior corneal dystrophy      Back pain      Cataract      Chronic osteoarthritis      Esotropia      Hyperlipidemia      Hyperlipidemia      Hypertension      Insomnia      Osteoarthritis      Posterior vitreous detachment      Urinary tract infection           Past Surgical History:  Past Surgical History:   Procedure Laterality  Date     C TOTAL HIP ARTHROPLASTY Right 10/2009    Replacement of right hip     CATARACT IOL, RT/LT Bilateral ~2005     EYE SURGERY  after 2003,pt unsure    blepharoplasty     HC EXTERNAL LEVATOR RESECTION Bilateral 12/2014     LAMINECTOMY LUMBAR TWO LEVELS  01/24/2019    L1 to L5 laminectomy [facility]      REPAIR PTOSIS Bilateral 10/14/2020    Procedure: Bilateral upper eyelid ptosis repair, bilateral upper eyelid blepharoplasty, bilateral brow pexy;  Surgeon: Espinoza Burns MD;  Location: [facility] OR     TONSILLECTOMY  1946          Family History:  Family History   Problem Relation Age of Onset     Cerebrovascular Disease Mother         probably consequent to Crohn's disease     Crohn's Disease Mother      Osteoporosis Mother         probably consequent to Crohn's disease     Other Cancer Father         kidney cancer, benign brain tumor     Cancer Other         Father (kidney meningioma), Brother (neuroblastoma)     Other Cancer Brother         adult neuroblastoma (dispute about precise diagnos     Glaucoma No family hx of      Macular Degeneration No family hx of      Melanoma No family hx of      Skin Cancer No family hx of           Social History:  Social History     Socioeconomic History     Marital status:      Spouse name: Not on file     Number of children: Not on file     Years of education: Not on file     Highest education level: Not on file   Occupational History     Not on file   Social Needs     Financial resource strain: Not on file     Food insecurity     Worry: Not on file     Inability: Not on file     Transportation needs     Medical: Not on file     Non-medical: Not on file   Tobacco Use     Smoking status: Never Smoker     Smokeless tobacco: Never Used   Substance and Sexual Activity     Alcohol use: No     Drug use: No     Sexual activity: Not Currently   Lifestyle     Physical activity     Days per week: Not on file     Minutes per session: Not on file     Stress: Not on  file   Relationships     Social connections     Talks on phone: Not on file     Gets together: Not on file     Attends Worship service: Not on file     Active member of club or organization: Not on file     Attends meetings of clubs or organizations: Not on file     Relationship status: Not on file     Intimate partner violence     Fear of current or ex partner: Not on file     Emotionally abused: Not on file     Physically abused: Not on file     Forced sexual activity: Not on file   Other Topics Concern     Parent/sibling w/ CABG, MI or angioplasty before 65F 55M? Not Asked   Social History Narrative     Not on file     Social History     Social History Narrative     Not on file          Allergies:  Allergies   Allergen Reactions     Benzalkonium Chloride      Eye irritation     Nitrofurantoin Other (See Comments)     Causes hast heart rate.  Causes fast heart rate.       Current Medications:   Current Outpatient Medications   Medication Sig Dispense Refill     cyanocolbalamin (VITAMIN  B-12) 1000 MCG tablet Take 1 tablet by mouth three times a week.       ESTRIOL 0.5MG/GM CREAM Insert 1 gram vaginally 2 times per week. 30 g 3     lisinopril (ZESTRIL) 5 MG tablet Take 1 tablet (5 mg) by mouth daily 90 tablet 3     NEW MED Barry Raya       Omega-3 Fatty Acids (OMEGA-3 FISH OIL PO) Take  by mouth.       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     Psychosocial History:  None     History  "of treatment for behavioral disorder:   History of suicidal ideation or suicidal attempt: never     Review of Systems:  Review of Systems   Musculoskeletal: Positive for myalgias. Negative for back pain and neck pain.   Answers for HPI/ROS submitted by the patient on 5/3/2021   JAISON 7 TOTAL SCORE: 2  General Symptoms: No  Skin Symptoms: No  HENT Symptoms: No  EYE SYMPTOMS: No  HEART SYMPTOMS: No  LUNG SYMPTOMS: No  INTESTINAL SYMPTOMS: No  URINARY SYMPTOMS: No  GYNECOLOGIC SYMPTOMS: No  BREAST SYMPTOMS: No  SKELETAL SYMPTOMS: Yes  BLOOD SYMPTOMS: No  NERVOUS SYSTEM SYMPTOMS: No  MENTAL HEALTH SYMPTOMS: No  Swollen joints: No  Joint pain: Yes  Bone pain: Yes  Muscle cramps: No  Muscle weakness: No  Joint stiffness: Yes  Bone fracture: No        Physical Exam:     Vitals:    05/06/21 1110   BP: 137/83   Pulse: 76   Resp: 16   Weight: 78.5 kg (173 lb)   Height: 1.607 m (5' 3.25\")       General Appearance: No distress, seated comfortably  Mood: Euthymic  HE ENT: Non constricted pupils  Respiratory: Non labored breathing  CVS: Regular rate and rhythm  GI: Soft, non distended, no TTP  Skin: No rashes over exposed skin  MS: full ROM of the right hip with pain to palpation over the IT band specifically over the GT and the right lateral knee and insertion.  She has pain along the entire IT band on the right and a positive Obers test on the right.  She also has some tenderness in the external rotators of the hip on the right likely mild piriformis tendinopathy.  Negative SLR and seated slump for pain.  Normal and symmetric 5/5 strength in BLLE.   Neuro: sensation grossly intact.   Gait: slow and antalgic, ambulates with royal for assistance    Laboratory results:  Recent Labs   Lab Test 09/18/20  0952 07/03/19  1049 12/28/18  1627 04/10/18  0903   .0 146.0 138 140   POTASSIUM 4.6 4.0 4.8 5.2   CHLORIDE 102.0 104.0 106 107   CO2 28.0 29.0 25 27   ANIONGAP  --   --  6 6   .0* 106.0  107.0 101* 99   BUN 19.0 10.0 20 " 17   CR 0.5* 0.7 0.64 0.69   ROBERT 9.7 9.2 9.1 9.5       CBC RESULTS:   Recent Labs   Lab Test 07/03/19  1049 12/28/18  1627   WBC  --  10.5   RBC  --  5.21*   HGB 14.6 15.5   HCT 45.5 49.4*   MCV 88.9 95   MCH 28.5 29.8   MCHC 32.1 31.4*   RDW 13.2 14.0   PLT  --  296         Imaging:       ASSESSMENT AND PLAN:     Encounter Diagnosis:  Right iliotibial band syndrome, and external hip rotator tendinopathy     Shyann Samuel is a 81 year old y.o. old female who presents to the pain clinic with right hip pain     I have summarized the patient s past medical history, discussed their clinical findings and the potential differential diagnosis with the patient. Significant past medical history pertinent to the patient s current condition includes localized msk pain with no radicular featrues.  The clinical findings reveal localized pain along the IT band. The differential diagnosis discussed with the patient are listed above. I have discussed anatomy and possible sources of the pain using models and/or pictures (diagrams). I have discussed multi- disciplinary pain management options withthe patient as pertaining to their case as detailed above. The pain management options we discussed included, but were not limited to the recommendations below.  I also discussed with patient the risks, benefits and alternatives to each pain management option.  All of the patient s questions and concerns were answered to the best of my ability.    RECOMMENDATIONS:     1. Medications: Will start voltaren gel for the patient to be used TID for a month and continue if providing benefit.  Dosing, side effects, risks/benefits/alternatives were discussed with the patient in detail.  - Recommend that she not use Norco unless having a pain flare and the use should only be for short period of time.      2. Physical therapy: I have refered the patient for outpatient physical therapy for stretching, strengthening and home exercise program for IT band  syndrome and piriformis tendinopathy. The patient will also discuss spine care and posture. Pool therapy and stretches can be considered if available.      Follow up: as needed       ATTENDING ATTESTATION  I saw the patient along with the pain medicine fellow Dr. Chivo Zamora. Please see her note above for full details. I have edited her note and agree with it. I was involved in gathering history, physical examination and development of the plan of care.

## 2021-05-07 RX ORDER — HYDROCODONE BITARTRATE AND ACETAMINOPHEN 5; 325 MG/1; MG/1
TABLET ORAL
Qty: 10 TABLET | Refills: 0 | Status: SHIPPED | OUTPATIENT
Start: 2021-05-07 | End: 2021-06-25

## 2021-05-07 NOTE — TELEPHONE ENCOUNTER
Last refill of hydrocodone 5-325mg 4/28 - Dr Mccloud had an end date of 5/1, not sure why- thus, it is off her med list.  He prescribed 10 tabs - would you refill?  Amara Perez RN  [facility]

## 2021-05-11 ENCOUNTER — THERAPY VISIT (OUTPATIENT)
Dept: PHYSICAL THERAPY | Facility: CLINIC | Age: 81
End: 2021-05-11
Payer: MEDICARE

## 2021-05-11 DIAGNOSIS — M79.651 PAIN OF RIGHT THIGH: ICD-10-CM

## 2021-05-11 DIAGNOSIS — M76.31 ILIOTIBIAL BAND SYNDROME, RIGHT: ICD-10-CM

## 2021-05-11 PROCEDURE — 97140 MANUAL THERAPY 1/> REGIONS: CPT | Mod: GP | Performed by: PHYSICAL THERAPIST

## 2021-05-11 PROCEDURE — 97110 THERAPEUTIC EXERCISES: CPT | Mod: GP | Performed by: PHYSICAL THERAPIST

## 2021-05-11 PROCEDURE — 97161 PT EVAL LOW COMPLEX 20 MIN: CPT | Mod: GP | Performed by: PHYSICAL THERAPIST

## 2021-05-11 NOTE — LETTER
DEPARTMENT OF HEALTH AND HUMAN SERVICES  CENTERS FOR MEDICARE & MEDICAID SERVICES    PLAN/UPDATED PLAN OF PROGRESS FOR OUTPATIENT REHABILITATION          PATIENTS NAME:  Shyann Samuel     : 1940    PROVIDER NUMBER:    [id number]    [facility]  [id number]      PROVIDER NAME: [facility]    MEDICAL RECORD NUMBER: [id number]     START OF CARE DATE:  SOC Date: 21   TYPE:  PT    PRIMARY/TREATMENT DIAGNOSIS: (Pertinent Medical Diagnosis)  Pain of right thigh  Iliotibial band syndrome, right    VISITS FROM START OF CARE:  Rxs Used: 1     Physical Therapy Initial Evaluation  Subjective:    Patient Health History  Shyann Samuel being seen for Pain from iliotibial tract syndrome.   Problem occurred: Don't know, it just started.   Pain is reported as 5/10 on pain scale.  General health as reported by patient is good.  Pertinent medical history includes: high blood pressure and overweight.   Red flags:  None as reported by patient.  Medical allergies: other. Other medical allergies details: benzalkonium chloride, nitrofurantoin.   Surgeries include:  Orthopedic surgery and other. Other surgery history details: surgery for droopy eyelids.    Current medications:  Anti-inflammatory, high blood pressure medication and pain medication.    Current occupation is Retired.   Primary job tasks include:  Other.   Other job/home tasks details: Light housework, cooking.                Therapist Generated HPI Evaluation  Problem details: MD order for ITB syndrome R 21.         Type of problem:  Right hip.  This is a new condition.  Condition occurred with:  Insidious onset.  Where condition occurred: for unknown reasons.  Patient reports pain:  Lateral and IT band (glut).  Pain is described as aching and is intermittent.  Pain radiates to:  No radiation. Pain is the same all the time.  Since onset symptoms are unchanged.  Associated symptoms:  Loss of strength and loss of motion/stiffness.  Symptoms are exacerbated by walking, standing and activity  and relieved by rest and heat.  Barriers include:  None as reported by patient.  PATIENTS NAME:  Shyann Samuel   : 1940              Objective:      Hip Evaluation    Gait:    Gait Type:  Antalgic   Assistive Devices:  Walker    Hip Strength:  : abd 3+/5 and ER 4/5.    Hip Palpation:    Right hip tenderness present at:  IT Band; Piriformis and Bursa    Functional Testing:  Functional test hip: Not able to SLS 5sec R due to pain and loss of balance.    Assessment/Plan:    Patient is a 81 year old female with R ITB pain complaints.    Patient has the following significant findings with corresponding treatment plan.                Diagnosis 1:  ITB syndrome  Pain -  manual therapy, education and home program  Decreased strength - therapeutic exercise and therapeutic activities  Impaired gait - gait training  Decreased function - therapeutic activities    Therapy Evaluation Codes:   1) History comprised of:   Personal factors that impact the plan of care:      Age.    Comorbidity factors that impact the plan of care are:      Osteoarthritis.     Medications impacting care: None.  2) Examination of Body Systems comprised of:   Body structures and functions that impact the plan of care:      Hip.   Activity limitations that impact the plan of care are:      Squatting/kneeling, Stairs, Standing and Walking.  3) Clinical presentation characteristics are:   Stable/Uncomplicated.  4) Decision-Making    Low complexity using standardized patient assessment instrument and/or measureable assessment of functional outcome.  Cumulative Therapy Evaluation is: Low complexity.    Previous and current functional limitations:  (See Goal Flow Sheet for this information)    Short term and Long term goals: (See Goal Flow Sheet for this information)     Communication ability:  Patient appears to be able to clearly communicate and understand verbal and written communication  "and follow directions correctly.  Treatment Explanation - The following has been discussed with the patient:   RX ordered/plan of care  Anticipated outcomes  Possible risks and side effects  This patient would benefit from PT intervention to resume normal activities.   Rehab potential is excellent.    Frequency:  2 X a month, once daily  Duration:  for 3 months  PATIENTS NAME:  Shyann Samuel   : 1940      Discharge Plan:  Achieve all LTG.  Independent in home treatment program.  Reach maximal therapeutic benefit.              Caregiver Signature/Credentials _____________________________ Date ________                 Treating Provider: Arnulfo Presley PT       I have reviewed and certified the need for these services and plan of treatment while under my care.        PHYSICIAN'S SIGNATURE:   _____________________________________  Date___________                                Megan Rucker MD    Certification period:  Beginning of Cert date period: 21 to  End of Cert period date: 21     Functional Level Progress Report: Please see attached \"Goal Flow sheet for Functional level.\"    ____X____ Continue Services or       ________ DC Services                Service dates: From  SOC Date: 21 date to present                         "

## 2021-05-11 NOTE — PROGRESS NOTES
Physical Therapy Initial Evaluation  Subjective:    Patient Health History  Shyann Samuel being seen for Pain from iliotibial tract syndrome.       Problem occurred: Don't know, it just started.   Pain is reported as 5/10 on pain scale.  General health as reported by patient is good.  Pertinent medical history includes: high blood pressure and overweight.   Red flags:  None as reported by patient.  Medical allergies: other. Other medical allergies details: benzalkonium chloride, nitrofurantoin.   Surgeries include:  Orthopedic surgery and other. Other surgery history details: surgery for droopy eyelids.    Current medications:  Anti-inflammatory, high blood pressure medication and pain medication.    Current occupation is Retired.   Primary job tasks include:  Other.   Other job/home tasks details: Light housework, cooking.                Therapist Generated HPI Evaluation  Problem details: MD order for ITB syndrome R 5/6/21.         Type of problem:  Right hip.    This is a new condition.  Condition occurred with:  Insidious onset.  Where condition occurred: for unknown reasons.  Patient reports pain:  Lateral and IT band (glut).  Pain is described as aching and is intermittent.  Pain radiates to:  No radiation. Pain is the same all the time.  Since onset symptoms are unchanged.  Associated symptoms:  Loss of strength and loss of motion/stiffness. Symptoms are exacerbated by walking, standing and activity  and relieved by rest and heat.      Barriers include:  None as reported by patient.                        Objective:    Gait:    Gait Type:  Antalgic   Assistive Devices:  Walker                                                   Hip Evaluation    Hip Strength:  : abd 3+/5 and ER 4/5.                            Hip Palpation:      Right hip tenderness present at:  IT Band; Piriformis and Bursa  Functional Testing:  Functional test hip: Not able to SLS 5sec R due to pain and loss of balance.                        General     ROS    Assessment/Plan:    Patient is a 81 year old female with R ITB pain complaints.    Patient has the following significant findings with corresponding treatment plan.                Diagnosis 1:  ITB syndrome  Pain -  manual therapy, education and home program  Decreased strength - therapeutic exercise and therapeutic activities  Impaired gait - gait training  Decreased function - therapeutic activities    Therapy Evaluation Codes:   1) History comprised of:   Personal factors that impact the plan of care:      Age.    Comorbidity factors that impact the plan of care are:      Osteoarthritis.     Medications impacting care: None.  2) Examination of Body Systems comprised of:   Body structures and functions that impact the plan of care:      Hip.   Activity limitations that impact the plan of care are:      Squatting/kneeling, Stairs, Standing and Walking.  3) Clinical presentation characteristics are:   Stable/Uncomplicated.  4) Decision-Making    Low complexity using standardized patient assessment instrument and/or measureable assessment of functional outcome.  Cumulative Therapy Evaluation is: Low complexity.    Previous and current functional limitations:  (See Goal Flow Sheet for this information)    Short term and Long term goals: (See Goal Flow Sheet for this information)     Communication ability:  Patient appears to be able to clearly communicate and understand verbal and written communication and follow directions correctly.  Treatment Explanation - The following has been discussed with the patient:   RX ordered/plan of care  Anticipated outcomes  Possible risks and side effects  This patient would benefit from PT intervention to resume normal activities.   Rehab potential is excellent.    Frequency:  2 X a month, once daily  Duration:  for 3 months  Discharge Plan:  Achieve all LTG.  Independent in home treatment program.  Reach maximal therapeutic benefit.    Please refer to the  daily flowsheet for treatment today, total treatment time and time spent performing 1:1 timed codes.

## 2021-05-12 ASSESSMENT — HOOS S4: HOW SEVERE IS YOUR HIP JOINT STIFFNESS AFTER FIRST WAKENING IN THE MORNING?: SEVERE

## 2021-05-12 ASSESSMENT — ACTIVITIES OF DAILY LIVING (ADL)
ADL_MEAN: 2
ADL_SUM: 34
ADL_SUBSCALE_SCORE: 50

## 2021-05-13 ENCOUNTER — OFFICE VISIT (OUTPATIENT)
Dept: ORTHOPEDICS | Facility: CLINIC | Age: 81
End: 2021-05-13
Payer: MEDICARE

## 2021-05-13 VITALS — WEIGHT: 173 LBS | HEIGHT: 63 IN | BODY MASS INDEX: 30.65 KG/M2

## 2021-05-13 DIAGNOSIS — M70.61 TROCHANTERIC BURSITIS OF RIGHT HIP: Primary | ICD-10-CM

## 2021-05-13 PROCEDURE — 99213 OFFICE O/P EST LOW 20 MIN: CPT | Mod: GC | Performed by: ORTHOPAEDIC SURGERY

## 2021-05-13 ASSESSMENT — MIFFLIN-ST. JEOR: SCORE: 1227.81

## 2021-05-13 NOTE — PROGRESS NOTES
Assessment: This is a 81 year old female with left knee osteoarthritis, right trochanteric bursitis and asymmetric wear of her R CONSTANCE s/p right CONSTANCE in 2009    Plan:    - Imaging was reviewed with the patient.  Discussed that there is evidence of wear on her right total hip arthroplasty.  There is no signs of hardware loosening.  Discussed with the patient that her total hip arthroplasty is likely not the cause of her right lower extremity symptoms.  She has right trochanteric bursitis. She is not tender over her IT band insertion. Injection was offered for her trochanteric bursitis, however the patient did not want to pursue this today.  She wanted to think about this and send a message in my chart if she wants to do it in the future.  She wishes to attend physical therapy.  Order was placed for physical therapy.  With regards to her left knee, the patient has osteoarthritis.  She would like to continue conservative treatment at this time given that she has almost no pain currently and will let us know when it becomes more bothersome.  -Given the wear present on her right CONSTANCE recommendation is to have repeat imaging in 1-2 years to follow progression.    Follow up: as needed following PT for trochanteric bursitis.     Chief Complaint: Consult (follow up right hip replacement back in 2009. patient sent in a [employer] Message prior to the appointment that explained the history since last being seen. she has started some PT which has provided some relief. also has questions about pain medication. )      Physician:  No ref. provider found    HPI: Shyann Samuel is a 81 year old female who presents today for evaluation of right lateral hip and left knee pain.  Patient states she has had intermittent right lateral hip and thigh pain over the last year.  She has done physical therapy for iliotibial band syndrome with no notable significant relief.  Patient denies any radicular symptoms.  She states she has been managing her  pain with hydrocodone.  Regarding her left knee, the patient denies any significant pain.  She states she feels bony crepitus but this is not painful to her at this time.  She does note she has difficulty climbing stairs due to this and is becoming less active however she is able to ambulate with her walker.    Symptom Profile  Location of symptoms:  Right lateral hip, left anterior knee   Onset: approximately 2018  Trend: getting worse right lateral leg, steady to left knee.   Duration of symptoms:3 years   Quality of symptoms: aching, sharp/stabbing  Severity: 5/10   Alleviate: hydrocodone  Exacerbating:  Movement   Previous Treatments: Previous treatments include activity modification, oral pain medication, acupuncture, PT, stretching.     Current Status:  Results of the patient s Hip Disability and Osteoarthritis Outcome Score (HOOS)  are as follows (0-100 scales with 100 being the theoretical best):  Pain:47.5  Symptoms:55  ADLs:50  Sports/Recreation:6.25  Quality of Life:31.25  ([email or web address].nu/)  [facility] Activity Score:3    MEDICAL HISTORY:   Past Medical History:   Diagnosis Date     Anterior corneal dystrophy      Back pain      Cataract      Chronic osteoarthritis      Esotropia      Hyperlipidemia      Hyperlipidemia      Hypertension      Insomnia      Osteoarthritis      Posterior vitreous detachment      Urinary tract infection        Medications:     Current Outpatient Medications:      cyanocolbalamin (VITAMIN  B-12) 1000 MCG tablet, Take 1 tablet by mouth three times a week., Disp: , Rfl:      diclofenac (VOLTAREN) 1 % topical gel, Apply 2 g topically 4 times daily, Disp: 240 g, Rfl: 0     ESTRIOL 0.5MG/GM CREAM, Insert 1 gram vaginally 2 times per week., Disp: 30 g, Rfl: 3     HYDROcodone-acetaminophen (NORCO) 5-325 MG tablet, Take 1 pill twice daily as needed for pain., Disp: 10 tablet, Rfl: 0     lisinopril (ZESTRIL) 5 MG tablet, Take 1 tablet (5 mg) by mouth daily, Disp: 90 tablet, Rfl: 3     NEW  MED, Barry Raya, Disp: , Rfl:      Omega-3 Fatty Acids (OMEGA-3 FISH OIL PO), Take  by mouth., Disp: , Rfl:      simvastatin (ZOCOR) 20 MG tablet, Take 1 tablet (20 mg) by mouth At Bedtime, Disp: 90 tablet, Rfl: 3    Allergies: Benzalkonium chloride and Nitrofurantoin    SURGICAL HISTORY:   Past Surgical History:   Procedure Laterality Date     C TOTAL HIP ARTHROPLASTY Right 10/2009    Replacement of right hip     CATARACT IOL, RT/LT Bilateral ~2005     EYE SURGERY  after 2003,pt unsure    blepharoplasty     HC EXTERNAL LEVATOR RESECTION Bilateral 12/2014     LAMINECTOMY LUMBAR TWO LEVELS  01/24/2019    L1 to L5 laminectomy [facility]      REPAIR PTOSIS Bilateral 10/14/2020    Procedure: Bilateral upper eyelid ptosis repair, bilateral upper eyelid blepharoplasty, bilateral brow pexy;  Surgeon: Espinoza Burns MD;  Location: [facility] OR     TONSILLECTOMY  1946       FAMILY HISTORY:   Family History   Problem Relation Age of Onset     Cerebrovascular Disease Mother         probably consequent to Crohn's disease     Crohn's Disease Mother      Osteoporosis Mother         probably consequent to Crohn's disease     Other Cancer Father         kidney cancer, benign brain tumor     Cancer Other         Father (kidney meningioma), Brother (neuroblastoma)     Other Cancer Brother         adult neuroblastoma (dispute about precise diagnos     Glaucoma No family hx of      Macular Degeneration No family hx of      Melanoma No family hx of      Skin Cancer No family hx of        SOCIAL HISTORY:   Social History     Tobacco Use     Smoking status: Never Smoker     Smokeless tobacco: Never Used   Substance Use Topics     Alcohol use: No       REVIEW OF SYSTEMS:  The comprehensive review of systems from the intake form was reviewed with the patient.  No fever, weight change or fatigue. No dry eyes. No oral ulcers, sore throat or voice change. No palpitations, syncope, angina or edema.  No chest pain, excessive  "sleepiness, shortness of breath or hemoptysis.   No abdominal pain, nausea, vomiting, diarrhea or heartburn.  No skin rash. No focal weakness or numbness. No bleeding or lymphadenopathy. No rhinitis or hives.     Exam:  On physical examination the patient appears the stated age, is in no acute distress, affect is appropriate, and breathing is non-labored.  Vitals are documented in the EMR and have been reviewed:    Ht 1.607 m (5' 3.25\")   Wt 78.5 kg (173 lb)   BMI 30.40 kg/m    5' 3.25\"  Body mass index is 30.4 kg/m .    Rises from chair: slowly with difficulty   Gait: antalgic with a walker    RIGHT hip subjective: locates pain to lateral hip. TTP over greater trochanter. No significant discomfort with IR and ER. No TTP over right IT band insertion.     Left knee:   Appearance: benign  Clinical alignment: varus   Effusion: none   Tenderness to palpation: mild TTP of medial and lateral joint lines    Collateral ligaments: intact  Cruciate ligaments: grossly intact     Distally, the circulatory, motor, and sensation exam is intact with 5/5 EHL, gastroc-soleus, and tibialis anterior.  Sensation to light touch is intact.  Dorsalis pedis and posterior tibialis pulses are palpable.  There are no sores on the feet, no bruising, and no lymphedema.    X-rays:   1. Right mechanical axis angle measures -4 degrees and left measures 9  degrees.  2. Weight bearing axis as noted above.  3. Osteoarthritis of knee most predominantly medial compartment of the  left knee.     4. Total right hip arthroplasty with asymmetric wear, similar to  6/7/2018.    Answers for HPI/ROS submitted by the patient on 5/3/2021   General Symptoms: No  Skin Symptoms: No  HENT Symptoms: No  EYE SYMPTOMS: No  HEART SYMPTOMS: No  LUNG SYMPTOMS: No  INTESTINAL SYMPTOMS: No  URINARY SYMPTOMS: No  GYNECOLOGIC SYMPTOMS: No  BREAST SYMPTOMS: No  SKELETAL SYMPTOMS: Yes  BLOOD SYMPTOMS: No  NERVOUS SYSTEM SYMPTOMS: No  MENTAL HEALTH SYMPTOMS: No  Back pain: " No  Muscle aches: Yes  Neck pain: No  Swollen joints: No  Joint pain: Yes  Bone pain: Yes  Muscle cramps: No  Muscle weakness: No  Joint stiffness: Yes  Bone fracture: No

## 2021-05-13 NOTE — LETTER
5/13/2021         RE: Shyann Samuel  [address]  [place] [address]        Dear Colleague,    Thank you for referring your patient, Shyann Samuel, to the [facility] [place]. Please see a copy of my visit note below.    Assessment: This is a 81 year old female with left knee osteoarthritis, right trochanteric bursitis and asymmetric wear of her R CONSTANCE s/p right CONSTANCE in 2009    Plan:    - Imaging was reviewed with the patient.  Discussed that there is evidence of wear on her right total hip arthroplasty.  There is no signs of hardware loosening.  Discussed with the patient that her total hip arthroplasty is likely not the cause of her right lower extremity symptoms.  She has right trochanteric bursitis. She is not tender over her IT band insertion. Injection was offered for her trochanteric bursitis, however the patient did not want to pursue this today.  She wanted to think about this and send a message in my chart if she wants to do it in the future.  She wishes to attend physical therapy.  Order was placed for physical therapy.  With regards to her left knee, the patient has osteoarthritis.  She would like to continue conservative treatment at this time given that she has almost no pain currently and will let us know when it becomes more bothersome.  -Given the wear present on her right CONSTANCE recommendation is to have repeat imaging in 1-2 years to follow progression.    Follow up: as needed following PT for trochanteric bursitis.     Chief Complaint: Consult (follow up right hip replacement back in 2009. patient sent in a [employer] Message prior to the appointment that explained the history since last being seen. she has started some PT which has provided some relief. also has questions about pain medication. )      Physician:  No ref. provider found    HPI: Shyann Samuel is a 81 year old female who presents today for evaluation of right lateral hip and left knee pain.   Patient states she has had intermittent right lateral hip and thigh pain over the last year.  She has done physical therapy for iliotibial band syndrome with no notable significant relief.  Patient denies any radicular symptoms.  She states she has been managing her pain with hydrocodone.  Regarding her left knee, the patient denies any significant pain.  She states she feels bony crepitus but this is not painful to her at this time.  She does note she has difficulty climbing stairs due to this and is becoming less active however she is able to ambulate with her walker.    Symptom Profile  Location of symptoms:  Right lateral hip, left anterior knee   Onset: approximately 2018  Trend: getting worse right lateral leg, steady to left knee.   Duration of symptoms:3 years   Quality of symptoms: aching, sharp/stabbing  Severity: 5/10   Alleviate: hydrocodone  Exacerbating:  Movement   Previous Treatments: Previous treatments include activity modification, oral pain medication, acupuncture, PT, stretching.     Current Status:  Results of the patient s Hip Disability and Osteoarthritis Outcome Score (HOOS)  are as follows (0-100 scales with 100 being the theoretical best):  Pain:47.5  Symptoms:55  ADLs:50  Sports/Recreation:6.25  Quality of Life:31.25  ([email or web address].[email or web address]/)  [facility] Activity Score:3    MEDICAL HISTORY:   Past Medical History:   Diagnosis Date     Anterior corneal dystrophy      Back pain      Cataract      Chronic osteoarthritis      Esotropia      Hyperlipidemia      Hyperlipidemia      Hypertension      Insomnia      Osteoarthritis      Posterior vitreous detachment      Urinary tract infection        Medications:     Current Outpatient Medications:      cyanocolbalamin (VITAMIN  B-12) 1000 MCG tablet, Take 1 tablet by mouth three times a week., Disp: , Rfl:      diclofenac (VOLTAREN) 1 % topical gel, Apply 2 g topically 4 times daily, Disp: 240 g, Rfl: 0     ESTRIOL 0.5MG/GM CREAM, Insert 1 gram vaginally 2  times per week., Disp: 30 g, Rfl: 3     HYDROcodone-acetaminophen (NORCO) 5-325 MG tablet, Take 1 pill twice daily as needed for pain., Disp: 10 tablet, Rfl: 0     lisinopril (ZESTRIL) 5 MG tablet, Take 1 tablet (5 mg) by mouth daily, Disp: 90 tablet, Rfl: 3     NEW MED, Barry Raya, Disp: , Rfl:      Omega-3 Fatty Acids (OMEGA-3 FISH OIL PO), Take  by mouth., Disp: , Rfl:      simvastatin (ZOCOR) 20 MG tablet, Take 1 tablet (20 mg) by mouth At Bedtime, Disp: 90 tablet, Rfl: 3    Allergies: Benzalkonium chloride and Nitrofurantoin    SURGICAL HISTORY:   Past Surgical History:   Procedure Laterality Date     C TOTAL HIP ARTHROPLASTY Right 10/2009    Replacement of right hip     CATARACT IOL, RT/LT Bilateral ~2005     EYE SURGERY  after 2003,pt unsure    blepharoplasty     HC EXTERNAL LEVATOR RESECTION Bilateral 12/2014     LAMINECTOMY LUMBAR TWO LEVELS  01/24/2019    L1 to L5 laminectomy [facility]      REPAIR PTOSIS Bilateral 10/14/2020    Procedure: Bilateral upper eyelid ptosis repair, bilateral upper eyelid blepharoplasty, bilateral brow pexy;  Surgeon: Espinoza Burns MD;  Location: [facility] OR     TONSILLECTOMY  1946       FAMILY HISTORY:   Family History   Problem Relation Age of Onset     Cerebrovascular Disease Mother         probably consequent to Crohn's disease     Crohn's Disease Mother      Osteoporosis Mother         probably consequent to Crohn's disease     Other Cancer Father         kidney cancer, benign brain tumor     Cancer Other         Father (kidney meningioma), Brother (neuroblastoma)     Other Cancer Brother         adult neuroblastoma (dispute about precise diagnos     Glaucoma No family hx of      Macular Degeneration No family hx of      Melanoma No family hx of      Skin Cancer No family hx of        SOCIAL HISTORY:   Social History     Tobacco Use     Smoking status: Never Smoker     Smokeless tobacco: Never Used   Substance Use Topics     Alcohol use: No       REVIEW OF  "SYSTEMS:  The comprehensive review of systems from the intake form was reviewed with the patient.  No fever, weight change or fatigue. No dry eyes. No oral ulcers, sore throat or voice change. No palpitations, syncope, angina or edema.  No chest pain, excessive sleepiness, shortness of breath or hemoptysis.   No abdominal pain, nausea, vomiting, diarrhea or heartburn.  No skin rash. No focal weakness or numbness. No bleeding or lymphadenopathy. No rhinitis or hives.     Exam:  On physical examination the patient appears the stated age, is in no acute distress, affect is appropriate, and breathing is non-labored.  Vitals are documented in the EMR and have been reviewed:    Ht 1.607 m (5' 3.25\")   Wt 78.5 kg (173 lb)   BMI 30.40 kg/m    5' 3.25\"  Body mass index is 30.4 kg/m .    Rises from chair: slowly with difficulty   Gait: antalgic with a walker    RIGHT hip subjective: locates pain to lateral hip. TTP over greater trochanter. No significant discomfort with IR and ER. No TTP over right IT band insertion.     Left knee:   Appearance: benign  Clinical alignment: varus   Effusion: none   Tenderness to palpation: mild TTP of medial and lateral joint lines    Collateral ligaments: intact  Cruciate ligaments: grossly intact     Distally, the circulatory, motor, and sensation exam is intact with 5/5 EHL, gastroc-soleus, and tibialis anterior.  Sensation to light touch is intact.  Dorsalis pedis and posterior tibialis pulses are palpable.  There are no sores on the feet, no bruising, and no lymphedema.    X-rays:   1. Right mechanical axis angle measures -4 degrees and left measures 9  degrees.  2. Weight bearing axis as noted above.  3. Osteoarthritis of knee most predominantly medial compartment of the  left knee.     4. Total right hip arthroplasty with asymmetric wear, similar to  6/7/2018.    Answers for HPI/ROS submitted by the patient on 5/3/2021   General Symptoms: No  Skin Symptoms: No  HENT Symptoms: No  EYE " SYMPTOMS: No  HEART SYMPTOMS: No  LUNG SYMPTOMS: No  INTESTINAL SYMPTOMS: No  URINARY SYMPTOMS: No  GYNECOLOGIC SYMPTOMS: No  BREAST SYMPTOMS: No  SKELETAL SYMPTOMS: Yes  BLOOD SYMPTOMS: No  NERVOUS SYSTEM SYMPTOMS: No  MENTAL HEALTH SYMPTOMS: No  Back pain: No  Muscle aches: Yes  Neck pain: No  Swollen joints: No  Joint pain: Yes  Bone pain: Yes  Muscle cramps: No  Muscle weakness: No  Joint stiffness: Yes  Bone fracture: No      I have personally examined this patient and have reviewed the clinical presentation and progress note with the resident. I agree with the treatment plan as outlined. The plan was formulated with the resident on the day of the resident's dictation.      Answers for HPI/ROS submitted by the patient on 5/3/2021   General Symptoms: No  Skin Symptoms: No  HENT Symptoms: No  EYE SYMPTOMS: No  HEART SYMPTOMS: No  LUNG SYMPTOMS: No  INTESTINAL SYMPTOMS: No  URINARY SYMPTOMS: No  GYNECOLOGIC SYMPTOMS: No  BREAST SYMPTOMS: No  SKELETAL SYMPTOMS: Yes  BLOOD SYMPTOMS: No  NERVOUS SYSTEM SYMPTOMS: No  MENTAL HEALTH SYMPTOMS: No  Back pain: No  Muscle aches: Yes  Neck pain: No  Swollen joints: No  Joint pain: Yes  Bone pain: Yes  Muscle cramps: No  Muscle weakness: No  Joint stiffness: Yes  Bone fracture: No

## 2021-05-13 NOTE — NURSING NOTE
"Reason For Visit:   Chief Complaint   Patient presents with     Consult     follow up right hip replacement back in 2009. patient sent in a [employer] Message prior to the appointment that explained the history since last being seen. she has started some PT which has provided some relief. also has questions about pain medication.        Ht 1.607 m (5' 3.25\")   Wt 78.5 kg (173 lb)   BMI 30.40 kg/m           Danny Workman ATC  "

## 2021-05-17 ENCOUNTER — THERAPY VISIT (OUTPATIENT)
Dept: PHYSICAL THERAPY | Facility: CLINIC | Age: 81
End: 2021-05-17
Payer: MEDICARE

## 2021-05-17 DIAGNOSIS — M79.651 PAIN OF RIGHT THIGH: ICD-10-CM

## 2021-05-17 DIAGNOSIS — M76.31 ILIOTIBIAL BAND SYNDROME, RIGHT: ICD-10-CM

## 2021-05-17 PROCEDURE — 97110 THERAPEUTIC EXERCISES: CPT | Mod: GP | Performed by: PHYSICAL THERAPIST

## 2021-05-17 PROCEDURE — 97140 MANUAL THERAPY 1/> REGIONS: CPT | Mod: GP | Performed by: PHYSICAL THERAPIST

## 2021-05-17 PROCEDURE — 97530 THERAPEUTIC ACTIVITIES: CPT | Mod: GP | Performed by: PHYSICAL THERAPIST

## 2021-05-18 NOTE — PROGRESS NOTES
I have personally examined this patient and have reviewed the clinical presentation and progress note with the resident. I agree with the treatment plan as outlined. The plan was formulated with the resident on the day of the resident's dictation.      Answers for HPI/ROS submitted by the patient on 5/3/2021   General Symptoms: No  Skin Symptoms: No  HENT Symptoms: No  EYE SYMPTOMS: No  HEART SYMPTOMS: No  LUNG SYMPTOMS: No  INTESTINAL SYMPTOMS: No  URINARY SYMPTOMS: No  GYNECOLOGIC SYMPTOMS: No  BREAST SYMPTOMS: No  SKELETAL SYMPTOMS: Yes  BLOOD SYMPTOMS: No  NERVOUS SYSTEM SYMPTOMS: No  MENTAL HEALTH SYMPTOMS: No  Back pain: No  Muscle aches: Yes  Neck pain: No  Swollen joints: No  Joint pain: Yes  Bone pain: Yes  Muscle cramps: No  Muscle weakness: No  Joint stiffness: Yes  Bone fracture: No

## 2021-05-27 ENCOUNTER — PRE VISIT (OUTPATIENT)
Dept: ORTHOPEDICS | Facility: CLINIC | Age: 81
End: 2021-05-27

## 2021-05-28 ENCOUNTER — THERAPY VISIT (OUTPATIENT)
Dept: PHYSICAL THERAPY | Facility: CLINIC | Age: 81
End: 2021-05-28
Payer: MEDICARE

## 2021-05-28 DIAGNOSIS — M79.651 PAIN OF RIGHT THIGH: ICD-10-CM

## 2021-05-28 DIAGNOSIS — M76.31 ILIOTIBIAL BAND SYNDROME, RIGHT: ICD-10-CM

## 2021-05-28 PROCEDURE — 97110 THERAPEUTIC EXERCISES: CPT | Mod: GP | Performed by: PHYSICAL THERAPIST

## 2021-05-28 PROCEDURE — 97140 MANUAL THERAPY 1/> REGIONS: CPT | Mod: GP | Performed by: PHYSICAL THERAPIST

## 2021-05-28 PROCEDURE — 97530 THERAPEUTIC ACTIVITIES: CPT | Mod: GP | Performed by: PHYSICAL THERAPIST

## 2021-06-04 ENCOUNTER — THERAPY VISIT (OUTPATIENT)
Dept: PHYSICAL THERAPY | Facility: CLINIC | Age: 81
End: 2021-06-04
Payer: MEDICARE

## 2021-06-04 DIAGNOSIS — M76.31 ILIOTIBIAL BAND SYNDROME, RIGHT: ICD-10-CM

## 2021-06-04 DIAGNOSIS — M79.651 PAIN OF RIGHT THIGH: ICD-10-CM

## 2021-06-04 PROCEDURE — 97140 MANUAL THERAPY 1/> REGIONS: CPT | Mod: GP | Performed by: PHYSICAL THERAPIST

## 2021-06-04 PROCEDURE — 97110 THERAPEUTIC EXERCISES: CPT | Mod: GP | Performed by: PHYSICAL THERAPIST

## 2021-06-04 PROCEDURE — 97530 THERAPEUTIC ACTIVITIES: CPT | Mod: GP | Performed by: PHYSICAL THERAPIST

## 2021-06-17 NOTE — PROGRESS NOTES
[facility] Initial Evaluation  Subjective:  The history is provided by the patient. No  was used.   Patient Health History  Shyann Samuel being seen for Exercise regimen post-L1-L5 laminectomy.     Problem began: 1/24/2019.   Problem occurred: This was surgery to relieve long-standing leg pain.   Pain is reported as 2/10 on pain scale.  General health as reported by patient is good.  Pertinent medical history includes: overweight.     Medical allergies: other. Other medical allergies details: Benzalkonium chloride, nitrofurantoin.   Surgeries include:  Orthopedic surgery and other. Other surgery history details: spine surgery.    Current medications:  Anti-inflammatory, high blood pressure medication and other. Other medications details: cholesterol-lowering medication.    Current occupation is retired.   Primary job tasks include:  Other.   Other job/home tasks details: Light housework, cooking, ADL.                Therapist Generated HPI Evaluation  Problem details: Not sleeping well lately and having more pain in back and leg.  Had surgery on 1/24/2019.  Wearing compression stocking left leg.  Was feeling better in February but since limited contact with people since onset of COVID she has had more pain.  Has aching in the low back. Has been going up and down the stairs once a day as an exercise but has left knee pain. Has some left hamstring pain after walking up a steep hill in her yard Hurts with using a sink that is too low.    Interested in progressing exercises.  Trying to walk 15 min in the driveway every other day.  Feels physically tired at the end of 15 minutes..         Type of problem:  Lumbar.    This is a recurrent condition.    Where condition occurred: at home.  Patient reports pain:  Lumbar spine left.  Pain is described as aching and is intermittent.  Pain radiates to:  Gluteals left. Pain is worse during the day.    Associated symptoms:  Loss of  strength. Symptoms are exacerbated by certain positions and walking                                Objective:    Gait:    Assistive Devices:  Cane                 Lumbar/SI Evaluation  ROM:    AROM Lumbar:   Flexion:        Pain left hamstring  Ext:                    Very limited   Side Bend:        Left:     Right:   Rotation:           Left:     Right:   Side Glide:        Left:     Right:           Lumbar Myotomes:    T12-L3 (Hip Flex):  Right: 4    L4 (Ankle DF):  Left:  5-    Right:  5-    S1 (Toe Raise):  Left: 4    Right: 5-  Lumbar DTR's:  not assessed      Cord Signs:  not assessed    Lumbar Dermtomes:  normal                Neural Tension/Mobility:  Lumbar:  Not assessed        Lumbar Palpation:  not assessed      Functional Tests:  Core strength and proprioception lumbar: able to move sit to stand without use of hands.        Lumbar Provocation:  not assessed                                                     General     ROS    Assessment/Plan:    Patient is a 80 year old female with lumbar complaints.    Patient has the following significant findings with corresponding treatment plan.                Diagnosis 1:  Back pain  Pain -  self management, education and home program  Decreased ROM/flexibility - manual therapy, therapeutic exercise and home program  Decreased strength - therapeutic exercise, therapeutic activities and home program  Decreased function - therapeutic activities and home program    Therapy Evaluation Codes:   1) History comprised of:   Personal factors that impact the plan of care:      Time since onset of symptoms.    Comorbidity factors that impact the plan of care are:      Osteoarthritis and Overweight.     Medications impacting care: None.  2) Examination of Body Systems comprised of:   Body structures and functions that impact the plan of care:      Lumbar spine.   Activity limitations that impact the plan of care are:      Bending, Lifting, Squatting/kneeling, Stairs,  Standing and Walking.  3) Clinical presentation characteristics are:   Stable/Uncomplicated.  4) Decision-Making    Low complexity using standardized patient assessment instrument and/or measureable assessment of functional outcome.  Cumulative Therapy Evaluation is: Low complexity.    Previous and current functional limitations:  (See Goal Flow Sheet for this information)    Short term and Long term goals: (See Goal Flow Sheet for this information)     Communication ability:  Patient appears to be able to clearly communicate and understand verbal and written communication and follow directions correctly.  Treatment Explanation - The following has been discussed with the patient:   RX ordered/plan of care  Anticipated outcomes  Possible risks and side effects  This patient would benefit from PT intervention to resume normal activities.   Rehab potential is good.    Frequency:  1 X a month, once daily  Duration:  for 3 months  Discharge Plan:  Achieve all LTG.  Independent in home treatment program.  Reach maximal therapeutic benefit.    Please refer to the daily flowsheet for treatment today, total treatment time and time spent performing 1:1 timed codes.        Home Suture Removal Text: Patient was provided a home suture removal kit and will remove their sutures at home.  If they have any questions or difficulties they will call the office.

## 2021-06-22 ENCOUNTER — THERAPY VISIT (OUTPATIENT)
Dept: PHYSICAL THERAPY | Facility: CLINIC | Age: 81
End: 2021-06-22
Payer: MEDICARE

## 2021-06-22 DIAGNOSIS — M79.651 PAIN OF RIGHT THIGH: ICD-10-CM

## 2021-06-22 DIAGNOSIS — M76.31 ILIOTIBIAL BAND SYNDROME, RIGHT: ICD-10-CM

## 2021-06-22 PROCEDURE — 97110 THERAPEUTIC EXERCISES: CPT | Mod: GP | Performed by: PHYSICAL THERAPIST

## 2021-06-22 PROCEDURE — 97112 NEUROMUSCULAR REEDUCATION: CPT | Mod: GP | Performed by: PHYSICAL THERAPIST

## 2021-06-22 PROCEDURE — 97140 MANUAL THERAPY 1/> REGIONS: CPT | Mod: GP | Performed by: PHYSICAL THERAPIST

## 2021-06-25 DIAGNOSIS — I89.0 LYMPHEDEMA: Primary | ICD-10-CM

## 2021-07-07 ENCOUNTER — THERAPY VISIT (OUTPATIENT)
Dept: PHYSICAL THERAPY | Facility: CLINIC | Age: 81
End: 2021-07-07
Payer: MEDICARE

## 2021-07-07 DIAGNOSIS — M79.651 PAIN OF RIGHT THIGH: ICD-10-CM

## 2021-07-07 DIAGNOSIS — M76.31 ILIOTIBIAL BAND SYNDROME, RIGHT: ICD-10-CM

## 2021-07-07 PROCEDURE — 97112 NEUROMUSCULAR REEDUCATION: CPT | Mod: GP | Performed by: PHYSICAL THERAPIST

## 2021-07-07 PROCEDURE — 97110 THERAPEUTIC EXERCISES: CPT | Mod: GP | Performed by: PHYSICAL THERAPIST

## 2021-07-14 ENCOUNTER — HOSPITAL ENCOUNTER (OUTPATIENT)
Dept: PHYSICAL THERAPY | Facility: CLINIC | Age: 81
Setting detail: THERAPIES SERIES
End: 2021-07-14
Attending: INTERNAL MEDICINE
Payer: MEDICARE

## 2021-07-14 DIAGNOSIS — I89.0 LYMPHEDEMA: ICD-10-CM

## 2021-07-14 PROCEDURE — 97140 MANUAL THERAPY 1/> REGIONS: CPT | Mod: GP | Performed by: PHYSICAL THERAPIST

## 2021-07-14 PROCEDURE — 97162 PT EVAL MOD COMPLEX 30 MIN: CPT | Mod: GP | Performed by: PHYSICAL THERAPIST

## 2021-07-14 NOTE — PROGRESS NOTES
Rehabilitation Services        OUTPATIENT PHYSICAL THERAPY EDEMA EVALUATION  PLAN OF TREATMENT FOR OUTPATIENT REHABILITATION  (COMPLETE FOR INITIAL CLAIMS ONLY)  Patient's Last Name, First Name, ROSE MARIETERESA  JimmieShyann                              Provider s Name:   [facility] Medical Record No.  [id number]     Start of Care Date:  07/14/21   Onset Date:  01/01/21 (worsening over the winter)   Type:  PT   Medical Diagnosis:  Lymphedema   Therapy Diagnosis:  Lymphedema Visits from SOC:  1                                     __________________________________________________________________________________   Plan of Treatment/Functional Goals:    Manual lymph drainage, Gradient compression bandaging, Fit for compression garment, Exercises, Precautions to prevent infection / exacerbation, Education, Manual therapy, ADL training, Skin care / precautions, Home management program development        GOALS  1. Goal description: Pt will have 10% decreased L LE volume to return closer to baseline and make for easier mobility.       Target date: 09/11/21  2. Goal description: Pt will be independent with use of new compression garments to better manage her progressive L LE lymphedema and reduce risk of infection.       Target date: 09/11/21  3. Goal description: Pt will maintain reduction within 3%  of lowest volume to demonstrate independence with  home management reducing risk of exacerbation.       Target date: 11/10/21         Treatment Frequency: 3x/week (for 5 weeks, then 1x/mo for 4-5 months)   Treatment duration: 6months    Carolee Snyder, PT                                    I CERTIFY THE NEED FOR THESE SERVICES FURNISHED UNDER        THIS PLAN OF TREATMENT AND WHILE UNDER MY CARE     (Physician co-signature of this document indicates review and certification of the therapy plan).                    Certification date from: 07/14/21       Certification date to: 10/11/21           Referring physician: Dr. Aleksandra Starks   Initial Assessment  See Epic Evaluation- Start of care: 07/14/21 07/14/21 1300   Quick Adds   Quick Adds Certification   Rehab Discipline   Discipline PT   Type of Visit   Type of visit Initial Edema Evaluation       present No   General Information   Start of care 07/14/21   Referring physician Dr. Aleksandra Starks   Orders Evaluate and treat as indicated   Order date 06/25/21   Medical diagnosis L LE lymphedema   Onset of illness / date of surgery 01/01/17   Edema onset 01/01/21  (worsening over the winter)   Affected body parts RUE   Edema etiology Trauma   Edema etiology comments slipped on the ice and fell on her L knee January 2017. She had some swelling at the time but then was prescribed gabapentin for back pain which exacerbated her swelling.  She was also diagnosed for CVI at one point but then this was later refuted by testing.  Pt had back surgery January of 2019. She feels like she was just getting back to her mobility and exercise when Covid happened.    Pertinent history of current problem (PT: include personal factors and/or comorbidities that impact the POC; OT: include additional occupational profile info) During the pandemic, pt could not get out to exercise and in the winter she couldn't walk out side. 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 It got so bad that she saw a sport medicine MD and saw her [facility] surgeon. She started with PT at [facility]. She reports pain is better but she is still very deconditioned.  She then noticed that the L LE was swelling up more late winter.  All of her MDs and PT said she should come back to edema therapy. Pt is wearing her daytime custom thigh high and using the Tribute at night. She uses her Entre pump 1 hour each day   Surgical / medical history reviewed Yes    Prior level of functional mobility Same as current   Prior treatment Complete decongestive therapy   Patient role / employment history Retired   Psychosocial concerns Impaired coping   Living environment House / townhome   Living environment comments has elevator, can't use stairs due to ITB   Assistive device comments walking stick   Fall Risk Screen   Fall screen completed by PT   Have you fallen 2 or more times in the past year? No   Have you fallen and had an injury in the past year? No   Is patient a fall risk? No   System Outcome Measures   Lymphedema Life Impact Scale (score range 0-72). A higher score indicates greater impairment. 7   Subjective Report   Patient report of symptoms Its more swollen and patient used to be okay on the nights that she washed the Tribute. Now if she doesn't have the garment overnight, she notices this in the morning.     Patient / Family Goals   Patient / family goals statement Decrease swelling as much as able to then manage independently as previous   Pain   Patient currently in pain No   Pain comments ITB pain being addressed in other therapy but much improved   Cognitive Status   Orientation Orientation to person, place and time   Level of consciousness Alert   Follows commands and answers questions 100% of the time   Personal safety and judgement Intact   Memory Intact   Edema Exam / Assessment   Skin condition Pitting;Non-pitting;Intact   Skin condition comments Pt with non-pitting rubbery tissue hyperplasia of the  foot and 1+ pitting with medial pocketing of fluid at thigh, 2+ pitting in the pre-tibial lower leg. Hyperkeratosis of heel    Stemmer sign Positive   Stemmer sign comments L 2nd toe   Girth Measurements   Girth Measurements Refer to separate girth measurement flowsheet   Volume LE   Right LE (mL) 3817.84   Left LE (mL) 5941.24   LE volume comparison LLE volume greater than RLE volume   % difference 55.6   Range of Motion   ROM Other   ROM comments Decreased  knee flexion due to tissue approximation   Strength   Strength Other   Strength comments Some weakness but not formally tested due to being addressed with her ortho PT   Posture   Posture Forward head position   Activities of Daily Living   Activities of Daily Living independent but takes increased time to do everything, about 10minute to don compression stocking   Bed Mobility   Bed mobility independent   Transfers   Transfers independent   Gait / Locomotion   Gait / Locomotion uses walking stick most of the time and currently avoiding stairs   Coordination   Coordination Gross motor coordination appropriate   Muscle Tone   Muscle tone No deficits were identified   Planned Edema Interventions   Planned edema interventions Manual lymph drainage;Gradient compression bandaging;Fit for compression garment;Exercises;Precautions to prevent infection / exacerbation;Education;Manual therapy;ADL training;Skin care / precautions;Home management program development   Clinical Impression   Criteria for skilled therapeutic intervention met Yes   Therapy diagnosis Lymphedema   Influenced by the following impairments / conditions Stage 2   Functional limitations due to impairments / conditions shoe fit at times, mobility impaired but also due to R LE problems, risk of worsening   Clinical Presentation Evolving/Changing   Clinical Presentation Rationale Pt has had almost 30% increase in volume since end of previous treatment with the R LE volume being decreased, risk of worsening   Clinical Decision Making (Complexity) Moderate complexity   Treatment Frequency 3x/week  (for 5 weeks, then 1x/mo for 4-5 months)   Treatment duration 6months   Patient / family and/or staff in agreement with plan of care Yes   Risks and benefits of therapy have been explained Yes   Clinical impression comments Pt was managing swelling well, then less active during pandemic, suffered from R LE pain and swelling has worsened despite very good adherence  to home program. Pt will benefit from intensive treatment to reduce swelling as much as able before getting her next set of garments.    Goal 1   Goal identifier Volume L LE   Goal description Pt will have 10% decreased L LE volume to return closer to baseline and make for easier mobility.   Target date 09/11/21   Goal 2   Goal identifier Compression   Goal description Pt will be independent with use of new compression garments to better manage her progressive L LE lymphedema and reduce risk of infection.   Target date 09/11/21   Goal 3   Goal identifier Maintenance   Goal description Pt will maintain reduction within 3%  of lowest volume to demonstrate independence with  home management reducing risk of exacerbation.   Target date 11/10/21   Total Evaluation Time   PT Emilyal, Moderate Complexity Minutes (81925) 15   Certification   Certification date from 07/14/21   Certification date to 10/11/21   Medical Diagnosis Lymphedema

## 2021-08-04 ENCOUNTER — THERAPY VISIT (OUTPATIENT)
Dept: PHYSICAL THERAPY | Facility: CLINIC | Age: 81
End: 2021-08-04
Payer: MEDICARE

## 2021-08-04 DIAGNOSIS — M76.31 ILIOTIBIAL BAND SYNDROME, RIGHT: ICD-10-CM

## 2021-08-04 DIAGNOSIS — M79.651 PAIN OF RIGHT THIGH: ICD-10-CM

## 2021-08-04 PROCEDURE — 97110 THERAPEUTIC EXERCISES: CPT | Mod: GP | Performed by: PHYSICAL THERAPIST

## 2021-08-04 PROCEDURE — 97112 NEUROMUSCULAR REEDUCATION: CPT | Mod: GP | Performed by: PHYSICAL THERAPIST

## 2021-08-04 PROCEDURE — 97530 THERAPEUTIC ACTIVITIES: CPT | Mod: GP | Performed by: PHYSICAL THERAPIST

## 2021-08-04 NOTE — PROGRESS NOTES
Subjective:  HPI  Physical Exam                    Objective:  System    Physical Exam    General     ROS    Assessment/Plan:    PROGRESS  REPORT    Progress reporting period is from 5/11/21 to 8/4/21.       SUBJECTIVE  Subjective changes noted by patient:  Is making progress. Is now able to shop 20' with walker. She can take a walk with walker for 20' daily. She would like to progress further to walking with cane and possibly w/o cane at least indoors.       Current pain level is 0/10 ITB pain .     Previous pain level was  5/10  .   Changes in function:  Yes (See Goal flowsheet attached for changes in current functional level)  Adverse reaction to treatment or activity: None    OBJECTIVE  Changes noted in objective findings:  Yes, Strength improvements shown with sit to stand w/o hands x 5-10  SLR up to 25-30 reps w/o stopping.  Good gait mechanics and endurance to 20' with walker.  Single leg balance is still < 10 sec B  She is doing a strengthening HEP along with balance drills        ASSESSMENT/PLAN  Updated problem list and treatment plan: Diagnosis 1:  ITB/R hip  Decreased strength - therapeutic exercise, therapeutic activities and home program  Impaired gait - gait training, assistive devices and home program  Decreased function - therapeutic activities and home program  STG/LTGs have been met or progress has been made towards goals:  Yes (See Goal flow sheet completed today.)  Assessment of Progress: The patient's condition is improving.  The patient's condition has potential to improve.  Self Management Plans:  Patient has been instructed in a home treatment program.    hSyann continues to require the following intervention to meet STG and LTG's:  PT    Recommendations:  This patient would benefit from continued therapy.     Frequency:  1 X a month, once daily  Duration:  for 3 months  To progress HEP      Please refer to the daily flowsheet for treatment today, total treatment time and time spent  performing 1:1 timed codes.

## 2021-08-04 NOTE — LETTER
DEPARTMENT OF HEALTH AND HUMAN SERVICES  CENTERS FOR MEDICARE & MEDICAID SERVICES    PLAN/UPDATED PLAN OF PROGRESS FOR OUTPATIENT REHABILITATION     PATIENTS NAME:  Shyann Samuel   : 1940  PROVIDER NUMBER:    [id number]  [id number]   [facility]  MEDICAL RECORD NUMBER: [id number]   START OF CARE DATE:  SOC Date: 21   TYPE:  PT  PRIMARY/TREATMENT DIAGNOSIS: (Pertinent Medical Diagnosis)  Pain of right thigh  Iliotibial band syndrome, right    VISITS FROM START OF CARE:  Rxs Used: 7     Assessment/Plan:    PROGRESS  REPORT    Progress reporting period is from 21 to 21.       SUBJECTIVE  Subjective changes noted by patient:  Is making progress. Is now able to shop 20' with walker. She can take a walk with walker for 20' daily. She would like to progress further to walking with cane and possibly w/o cane at least indoors.       Current pain level is 0/10 ITB pain .     Previous pain level was  5/10  .   Changes in function:  Yes (See Goal flowsheet attached for changes in current functional level)  Adverse reaction to treatment or activity: None    OBJECTIVE  Changes noted in objective findings:  Yes, Strength improvements shown with sit to stand w/o hands x 5-10  SLR up to 25-30 reps w/o stopping.  Good gait mechanics and endurance to 20' with walker.  Single leg balance is still < 10 sec B  She is doing a strengthening HEP along with balance drills        ASSESSMENT/PLAN  Updated problem list and treatment plan: Diagnosis 1:  ITB/R hip  Decreased strength - therapeutic exercise, therapeutic activities and home program  Impaired gait - gait training, assistive devices and home program  Decreased function - therapeutic activities and home program  STG/LTGs have been met or progress has been made towards goals:  Yes (See Goal flow sheet completed today.)  Assessment of Progress: The patient's condition is improving.  The patient's condition  "has potential to improve.  Self Management Plans:  Patient has been instructed in a home treatment program.  Shyann continues to require the following intervention to meet STG and LTG's:  PT  PATIENTS NAME:  Shyann Samuel   : 1940        Recommendations:  This patient would benefit from continued therapy.     Frequency:  1 X a month, once daily  Duration:  for 3 months  To progress HEP            Caregiver Signature/Credentials _____________________________ Date ________       Treating Provider: Arnulfo Presley PT   I have reviewed and certified the need for these services and plan of treatment while under my care.        PHYSICIAN'S SIGNATURE:   _________________________________________  Date___________   Megan Rucker MD    Certification period:  Beginning of Cert date period: 21 to  End of Cert period date: 21     Functional Level Progress Report: Please see attached \"Goal Flow sheet for Functional level.\"    ____X____ Continue Services or       ________ DC Services                Service dates: From  SOC Date: 21 date to present                         "

## 2021-08-10 ENCOUNTER — HOSPITAL ENCOUNTER (OUTPATIENT)
Dept: PHYSICAL THERAPY | Facility: CLINIC | Age: 81
Setting detail: THERAPIES SERIES
End: 2021-08-10
Attending: INTERNAL MEDICINE
Payer: MEDICARE

## 2021-08-10 DIAGNOSIS — I89.0 LYMPHEDEMA OF LEFT LOWER EXTREMITY: ICD-10-CM

## 2021-08-10 DIAGNOSIS — N32.81 OVERACTIVE BLADDER: Primary | ICD-10-CM

## 2021-08-10 PROCEDURE — 97140 MANUAL THERAPY 1/> REGIONS: CPT | Mod: GP | Performed by: PHYSICAL THERAPIST

## 2021-08-12 ENCOUNTER — HOSPITAL ENCOUNTER (OUTPATIENT)
Dept: PHYSICAL THERAPY | Facility: CLINIC | Age: 81
Setting detail: THERAPIES SERIES
End: 2021-08-12
Attending: INTERNAL MEDICINE
Payer: MEDICARE

## 2021-08-12 PROCEDURE — 97140 MANUAL THERAPY 1/> REGIONS: CPT | Mod: GP | Performed by: PHYSICAL THERAPIST

## 2021-08-16 ENCOUNTER — HOSPITAL ENCOUNTER (OUTPATIENT)
Dept: PHYSICAL THERAPY | Facility: CLINIC | Age: 81
Setting detail: THERAPIES SERIES
End: 2021-08-16
Attending: INTERNAL MEDICINE
Payer: MEDICARE

## 2021-08-16 DIAGNOSIS — I89.0 LYMPHEDEMA OF LEFT LOWER EXTREMITY: Primary | ICD-10-CM

## 2021-08-16 PROCEDURE — 97140 MANUAL THERAPY 1/> REGIONS: CPT | Mod: GP | Performed by: PHYSICAL THERAPIST

## 2021-08-18 ENCOUNTER — HOSPITAL ENCOUNTER (OUTPATIENT)
Dept: PHYSICAL THERAPY | Facility: CLINIC | Age: 81
Setting detail: THERAPIES SERIES
End: 2021-08-18
Attending: INTERNAL MEDICINE
Payer: MEDICARE

## 2021-08-20 ENCOUNTER — HOSPITAL ENCOUNTER (OUTPATIENT)
Dept: PHYSICAL THERAPY | Facility: CLINIC | Age: 81
Setting detail: THERAPIES SERIES
End: 2021-08-20
Attending: INTERNAL MEDICINE
Payer: MEDICARE

## 2021-08-20 PROCEDURE — 97140 MANUAL THERAPY 1/> REGIONS: CPT | Mod: GP

## 2021-08-23 ENCOUNTER — HOSPITAL ENCOUNTER (OUTPATIENT)
Dept: PHYSICAL THERAPY | Facility: CLINIC | Age: 81
Setting detail: THERAPIES SERIES
End: 2021-08-23
Attending: INTERNAL MEDICINE
Payer: MEDICARE

## 2021-08-23 PROCEDURE — 97535 SELF CARE MNGMENT TRAINING: CPT | Mod: GP | Performed by: PHYSICAL THERAPIST

## 2021-08-23 PROCEDURE — 97140 MANUAL THERAPY 1/> REGIONS: CPT | Mod: GP | Performed by: PHYSICAL THERAPIST

## 2021-08-25 ENCOUNTER — HOSPITAL ENCOUNTER (OUTPATIENT)
Dept: PHYSICAL THERAPY | Facility: CLINIC | Age: 81
Setting detail: THERAPIES SERIES
End: 2021-08-25
Attending: INTERNAL MEDICINE
Payer: MEDICARE

## 2021-08-25 PROCEDURE — 97140 MANUAL THERAPY 1/> REGIONS: CPT | Mod: GP | Performed by: PHYSICAL THERAPIST

## 2021-08-27 ENCOUNTER — HOSPITAL ENCOUNTER (OUTPATIENT)
Dept: PHYSICAL THERAPY | Facility: CLINIC | Age: 81
Setting detail: THERAPIES SERIES
End: 2021-08-27
Attending: INTERNAL MEDICINE
Payer: MEDICARE

## 2021-08-27 PROCEDURE — 97140 MANUAL THERAPY 1/> REGIONS: CPT | Mod: GP

## 2021-08-30 ENCOUNTER — HOSPITAL ENCOUNTER (OUTPATIENT)
Dept: PHYSICAL THERAPY | Facility: CLINIC | Age: 81
Setting detail: THERAPIES SERIES
End: 2021-08-30
Attending: INTERNAL MEDICINE
Payer: MEDICARE

## 2021-08-30 PROCEDURE — 97140 MANUAL THERAPY 1/> REGIONS: CPT | Mod: GP | Performed by: PHYSICAL THERAPIST

## 2021-08-30 NOTE — PROGRESS NOTES
Outpatient Physical Therapy Progress Note     Patient: Shyann Samuel  : 1940    Beginning/End Dates of Reporting Period:  21 to 21    Referring Provider: Dr. Aleksandra Starks MD    Therapy Diagnosis: Lymphedema of L LE     Client Self Report: Pt re-wrapped on .     Objective Measurements:  Objective Measure: edema  Details: hyperplasia of ankle area still full, no pitting.  Lower leg pitting 2+ firm.    Objective Measure: L LE volume  Details: 5679.95ml, about -4% from evaluation    Outcome Measures (most recent score):  Lymphedema Life Impact Scale (score range 0-72). A higher score indicates greater impairment.: 8    Goals:  Goal Identifier Volume L LE   Goal Description Pt will have 10% decreased L LE volume to return closer to baseline and make for easier mobility.   Target Date 21   Date Met      Progress (detail required for progress note):     Goal Identifier Compression   Goal Description Pt will be independent with use of new compression garments to better manage her progressive L LE lymphedema and reduce risk of infection.   Target Date 21   Date Met      Progress (detail required for progress note):     Goal Identifier Maintenance   Goal Description Pt will maintain reduction within 3%  of lowest volume to demonstrate independence with  home management reducing risk of exacerbation.   Target Date 11/10/21   Date Met      Progress (detail required for progress note):     Plan:  Continue therapy per current plan of care. Pt is compliant with intensive therapy program.  She has been fitting but awaiting delivery of CCL3 Elvarex Soft stocking.     Discharge:  No

## 2021-08-31 ENCOUNTER — DOCUMENTATION ONLY (OUTPATIENT)
Dept: SLEEP MEDICINE | Facility: CLINIC | Age: 81
End: 2021-08-31
Payer: MEDICARE

## 2021-09-01 ENCOUNTER — HOSPITAL ENCOUNTER (OUTPATIENT)
Dept: PHYSICAL THERAPY | Facility: CLINIC | Age: 81
Setting detail: THERAPIES SERIES
End: 2021-09-01
Attending: INTERNAL MEDICINE
Payer: MEDICARE

## 2021-09-01 PROCEDURE — 97140 MANUAL THERAPY 1/> REGIONS: CPT | Mod: GP | Performed by: PHYSICAL THERAPIST

## 2021-09-03 ENCOUNTER — HOSPITAL ENCOUNTER (OUTPATIENT)
Dept: PHYSICAL THERAPY | Facility: CLINIC | Age: 81
Setting detail: THERAPIES SERIES
End: 2021-09-03
Attending: INTERNAL MEDICINE
Payer: MEDICARE

## 2021-09-03 PROCEDURE — 97140 MANUAL THERAPY 1/> REGIONS: CPT | Mod: GP

## 2021-09-05 ENCOUNTER — HEALTH MAINTENANCE LETTER (OUTPATIENT)
Age: 81
End: 2021-09-05

## 2021-09-20 ENCOUNTER — TELEPHONE (OUTPATIENT)
Dept: FAMILY MEDICINE | Facility: CLINIC | Age: 81
End: 2021-09-20

## 2021-09-20 ENCOUNTER — HOSPITAL ENCOUNTER (OUTPATIENT)
Dept: PHYSICAL THERAPY | Facility: CLINIC | Age: 81
Setting detail: THERAPIES SERIES
End: 2021-09-20
Attending: INTERNAL MEDICINE
Payer: MEDICARE

## 2021-09-20 PROCEDURE — 97535 SELF CARE MNGMENT TRAINING: CPT | Mod: GP | Performed by: PHYSICAL THERAPIST

## 2021-09-20 PROCEDURE — 97140 MANUAL THERAPY 1/> REGIONS: CPT | Mod: GP,KX | Performed by: PHYSICAL THERAPIST

## 2021-09-20 NOTE — PROGRESS NOTES
OUTPATIENT PHYSICAL THERAPY  PLAN OF TREATMENT FOR OUTPATIENT REHABILITATION AND PROGRESS NOTE           Patient's Last Name, First Name, Shyann Carballo                              Provider s Name:   [facility] Medical Record No.  [id number]     Start of Care Date:  07/14/21   Onset Date:  01/01/21   Type:  PT   Medical Diagnosis:  Lymphedema   Therapy Diagnosis:  Lymphedema Visits from SOC:  13                                     __________________________________________________________________________________   Plan of Treatment/Functional Goals:    Manual lymph drainage, Gradient compression bandaging, Fit for compression garment, Exercises, Precautions to prevent infection / exacerbation, Education, Manual therapy, ADL training, Skin care / precautions, Home management program development        GOALS  1. Goal description: Pt will have 10% decreased L LE volume to return closer to baseline and make for easier mobility.       Target date: 09/11/21  2. Goal description: Pt will be independent with use of new compression garments to better manage her progressive L LE lymphedema and reduce risk of infection.       Target date: 09/11/21  3. Goal description: Pt will maintain reduction with volume below 5800mL to demonstrate independence with  home management reducing risk of exacerbation.       Target date: 11/10/21      Carolee Snyder, PT                                    I CERTIFY THE NEED FOR THESE SERVICES FURNISHED UNDER        THIS PLAN OF TREATMENT AND WHILE UNDER MY CARE     (Physician co-signature of this document indicates review and certification of the therapy plan).                   Certification date from: 10/12/21       Certification date to: 12/31/21           Referring physician: Dr. Aleksandra Starks   Initial Assessment  See Epic Evaluation- Start of care: 07/14/21

## 2021-09-21 ENCOUNTER — DOCUMENTATION ONLY (OUTPATIENT)
Dept: SLEEP MEDICINE | Facility: CLINIC | Age: 81
End: 2021-09-21
Payer: MEDICARE

## 2021-09-21 NOTE — TELEPHONE ENCOUNTER
Called Shyann to inquire about the call for a prior authorization for a vaccine.  Shyann is uncertain what that call was about and so I explained we'll discuss any prior authorizations when she is here to see Dr. Starks.  Shaina Conroy RN, BSN  [facility]  09/21/21  1:30 PM

## 2021-09-28 ASSESSMENT — ANXIETY QUESTIONNAIRES
7. FEELING AFRAID AS IF SOMETHING AWFUL MIGHT HAPPEN: NOT AT ALL
GAD7 TOTAL SCORE: 1
4. TROUBLE RELAXING: NOT AT ALL
2. NOT BEING ABLE TO STOP OR CONTROL WORRYING: NOT AT ALL
3. WORRYING TOO MUCH ABOUT DIFFERENT THINGS: SEVERAL DAYS
5. BEING SO RESTLESS THAT IT IS HARD TO SIT STILL: NOT AT ALL
7. FEELING AFRAID AS IF SOMETHING AWFUL MIGHT HAPPEN: NOT AT ALL
GAD7 TOTAL SCORE: 1
1. FEELING NERVOUS, ANXIOUS, OR ON EDGE: NOT AT ALL
6. BECOMING EASILY ANNOYED OR IRRITABLE: NOT AT ALL
8. IF YOU CHECKED OFF ANY PROBLEMS, HOW DIFFICULT HAVE THESE MADE IT FOR YOU TO DO YOUR WORK, TAKE CARE OF THINGS AT HOME, OR GET ALONG WITH OTHER PEOPLE?: NOT DIFFICULT AT ALL

## 2021-09-28 ASSESSMENT — SLEEP AND FATIGUE QUESTIONNAIRES
HOW LIKELY ARE YOU TO NOD OFF OR FALL ASLEEP WHILE SITTING AND TALKING TO SOMEONE: WOULD NEVER DOZE
HOW LIKELY ARE YOU TO NOD OFF OR FALL ASLEEP WHILE SITTING AND READING: HIGH CHANCE OF DOZING
HOW LIKELY ARE YOU TO NOD OFF OR FALL ASLEEP WHILE LYING DOWN TO REST IN THE AFTERNOON WHEN CIRCUMSTANCES PERMIT: HIGH CHANCE OF DOZING
HOW LIKELY ARE YOU TO NOD OFF OR FALL ASLEEP WHILE SITTING INACTIVE IN A PUBLIC PLACE: SLIGHT CHANCE OF DOZING
HOW LIKELY ARE YOU TO NOD OFF OR FALL ASLEEP IN A CAR, WHILE STOPPED FOR A FEW MINUTES IN TRAFFIC: WOULD NEVER DOZE
HOW LIKELY ARE YOU TO NOD OFF OR FALL ASLEEP WHEN YOU ARE A PASSENGER IN A CAR FOR AN HOUR WITHOUT A BREAK: SLIGHT CHANCE OF DOZING
HOW LIKELY ARE YOU TO NOD OFF OR FALL ASLEEP WHILE SITTING QUIETLY AFTER LUNCH WITHOUT ALCOHOL: SLIGHT CHANCE OF DOZING
HOW LIKELY ARE YOU TO NOD OFF OR FALL ASLEEP WHILE WATCHING TV: HIGH CHANCE OF DOZING

## 2021-09-28 ASSESSMENT — PATIENT HEALTH QUESTIONNAIRE - PHQ9: SUM OF ALL RESPONSES TO PHQ QUESTIONS 1-9: 7

## 2021-09-29 ASSESSMENT — PATIENT HEALTH QUESTIONNAIRE - PHQ9: SUM OF ALL RESPONSES TO PHQ QUESTIONS 1-9: 7

## 2021-09-29 ASSESSMENT — ANXIETY QUESTIONNAIRES: GAD7 TOTAL SCORE: 1

## 2021-10-01 ENCOUNTER — OFFICE VISIT (OUTPATIENT)
Dept: FAMILY MEDICINE | Facility: CLINIC | Age: 81
End: 2021-10-01
Payer: MEDICARE

## 2021-10-01 VITALS
WEIGHT: 170 LBS | SYSTOLIC BLOOD PRESSURE: 126 MMHG | TEMPERATURE: 97 F | DIASTOLIC BLOOD PRESSURE: 79 MMHG | HEIGHT: 63 IN | OXYGEN SATURATION: 96 % | HEART RATE: 104 BPM | BODY MASS INDEX: 30.12 KG/M2

## 2021-10-01 DIAGNOSIS — R53.83 OTHER FATIGUE: ICD-10-CM

## 2021-10-01 DIAGNOSIS — E78.00 PURE HYPERCHOLESTEROLEMIA: ICD-10-CM

## 2021-10-01 DIAGNOSIS — I10 PRIMARY HYPERTENSION: ICD-10-CM

## 2021-10-01 DIAGNOSIS — G47.00 INSOMNIA, UNSPECIFIED TYPE: ICD-10-CM

## 2021-10-01 DIAGNOSIS — Z23 INFLUENZA VACCINE NEEDED: ICD-10-CM

## 2021-10-01 DIAGNOSIS — R39.9 URINARY SYMPTOM OR SIGN: Primary | ICD-10-CM

## 2021-10-01 LAB
ALBUMIN SERPL-MCNC: 3.3 G/DL (ref 3.4–5)
ALBUMIN UR-MCNC: 20 MG/DL
ALP SERPL-CCNC: 67 U/L (ref 40–150)
ALT SERPL W P-5'-P-CCNC: 21 U/L (ref 0–50)
ANION GAP SERPL CALCULATED.3IONS-SCNC: 3 MMOL/L (ref 3–14)
APPEARANCE UR: ABNORMAL
AST SERPL W P-5'-P-CCNC: 11 U/L (ref 0–45)
BACTERIA #/AREA URNS HPF: ABNORMAL /HPF
BILIRUB SERPL-MCNC: 0.5 MG/DL (ref 0.2–1.3)
BILIRUB UR QL STRIP: NEGATIVE
BUN SERPL-MCNC: 15 MG/DL (ref 7–30)
CALCIUM SERPL-MCNC: 9 MG/DL (ref 8.5–10.1)
CHLORIDE BLD-SCNC: 107 MMOL/L (ref 94–109)
CHOLEST SERPL-MCNC: 159 MG/DL
CO2 SERPL-SCNC: 26 MMOL/L (ref 20–32)
COLOR UR AUTO: ABNORMAL
CREAT SERPL-MCNC: 0.68 MG/DL (ref 0.52–1.04)
ERYTHROCYTE [DISTWIDTH] IN BLOOD BY AUTOMATED COUNT: 13.6 % (ref 10–15)
FASTING STATUS PATIENT QL REPORTED: NO
GFR SERPL CREATININE-BSD FRML MDRD: 83 ML/MIN/1.73M2
GLUCOSE BLD-MCNC: 101 MG/DL (ref 70–99)
GLUCOSE UR STRIP-MCNC: NEGATIVE MG/DL
HBA1C MFR BLD: 5.7 % (ref 0–5.6)
HCT VFR BLD AUTO: 45 % (ref 35–47)
HDLC SERPL-MCNC: 57 MG/DL
HGB BLD-MCNC: 14.5 G/DL (ref 11.7–15.7)
HGB UR QL STRIP: ABNORMAL
KETONES UR STRIP-MCNC: NEGATIVE MG/DL
LDLC SERPL CALC-MCNC: 59 MG/DL
LEUKOCYTE ESTERASE UR QL STRIP: ABNORMAL
MCH RBC QN AUTO: 29.3 PG (ref 26.5–33)
MCHC RBC AUTO-ENTMCNC: 32.2 G/DL (ref 31.5–36.5)
MCV RBC AUTO: 91 FL (ref 78–100)
MUCOUS THREADS #/AREA URNS LPF: PRESENT /LPF
NITRATE UR QL: NEGATIVE
NONHDLC SERPL-MCNC: 102 MG/DL
PH UR STRIP: 5.5 [PH] (ref 5–7)
PLATELET # BLD AUTO: 248 10E3/UL (ref 150–450)
POTASSIUM BLD-SCNC: 4.8 MMOL/L (ref 3.4–5.3)
PROT SERPL-MCNC: 6.8 G/DL (ref 6.8–8.8)
RBC # BLD AUTO: 4.95 10E6/UL (ref 3.8–5.2)
RBC URINE: 28 /HPF
SODIUM SERPL-SCNC: 136 MMOL/L (ref 133–144)
SP GR UR STRIP: 1.02 (ref 1–1.03)
SQUAMOUS EPITHELIAL: 1 /HPF
TRANSITIONAL EPI: <1 /HPF
TRIGL SERPL-MCNC: 216 MG/DL
TSH SERPL DL<=0.005 MIU/L-ACNC: 0.62 MU/L (ref 0.4–4)
UROBILINOGEN UR STRIP-MCNC: NORMAL MG/DL
WBC # BLD AUTO: 12.1 10E3/UL (ref 4–11)
WBC CLUMPS #/AREA URNS HPF: PRESENT /HPF
WBC URINE: >182 /HPF

## 2021-10-01 PROCEDURE — 87086 URINE CULTURE/COLONY COUNT: CPT | Performed by: INTERNAL MEDICINE

## 2021-10-01 PROCEDURE — 80061 LIPID PANEL: CPT | Performed by: INTERNAL MEDICINE

## 2021-10-01 PROCEDURE — 80053 COMPREHEN METABOLIC PANEL: CPT | Performed by: INTERNAL MEDICINE

## 2021-10-01 PROCEDURE — 84443 ASSAY THYROID STIM HORMONE: CPT | Performed by: INTERNAL MEDICINE

## 2021-10-01 PROCEDURE — 81001 URINALYSIS AUTO W/SCOPE: CPT | Performed by: INTERNAL MEDICINE

## 2021-10-01 RX ORDER — CIPROFLOXACIN 500 MG/1
500 TABLET, FILM COATED ORAL 2 TIMES DAILY
Qty: 10 TABLET | Refills: 0 | Status: SHIPPED | OUTPATIENT
Start: 2021-10-01 | End: 2021-10-06

## 2021-10-01 ASSESSMENT — MIFFLIN-ST. JEOR: SCORE: 1214.49

## 2021-10-01 NOTE — PROGRESS NOTES
"Shyann Samuel is a 81 year old female with hx of osteoarthritis, recurrent UTIs, hx of benign thyroid nodules, right sided hip pain, improving, insomnia and lower extremity edema. She is here for the following issues:    Urinary symptoms  Shyann presents with a 3 day history of urinary urgency, vaginal discomfort \"that isn't quite burning\", and \"cramping feeling when I finish urinating\". No blood, no fevers.     Shyann has rx for topical estrogen, but has not been using this. Reports having nocturia, 3-4 times a night. Does not need to wear pad during the day.     Right hip pain / IT band  Shyann was diagnosed with right hip pain, bursitis and IT band syndrome in May 2021. She has been faithfully going to PT and doing home exercises. She declined injection for the bursitis. Reports she is able to walk about 15 min at a time. Feels a bit weak. Is s/p right hip arthroplasty.  She has significant arthritis in her left knee. Can mange for now, has an elevator in her home, no need to climb stairs.     Insomnia  Shyann reports longstanding problems with sleep. She has an appt next week. She reports sleep problems began back in 2002 after spouse was hospitalized and needed care in the middle of the night. She took benadryl for many years.   Reports starting a bedtime routine at 8:45pm and usually lies down to sleep about 10:45pm. Easily falls asleep but awakens in the middle of the night. She sometimes sleeps an extra 2 hr in the morning and acknowledges this is not wise to do. At one point a sleep specialist told her sleep about 6 hr a night. She had hoped to pursue cognitive behavioral therapy but at her last sleep evaluation, she felt the therapist was focusing more on ruling out sleep apnea and RLS.  Shyann naps 30-60 min in the middle of the day.     HTN  Shyann takes lisinopril 5mg daily for treatment of hypertension. She is due for labs today and has another appointment next week for medication refills. She has a " home BP monitor that is reliable.    BP Readings from Last 3 Encounters:   10/01/21 126/79   05/06/21 137/83   04/12/21 126/84     Hyperlipidemia  Shyann has hyperlipidemia. Is currently taking simvastatin 20mg daily. No history of coronary artery disease or stroke. She is due for labs today and will return next week to discuss results and obtain medication refills.     Fatigue  Overall, Shyann reports fatigued. Is interested in a general evaluation to look for underlying cause.     PHQ 12/23/2020 9/28/2021 9/28/2021   PHQ-9 Total Score 5 7 7   Q9: Thoughts of better off dead/self-harm past 2 weeks Not at all Not at all Not at all     JAISON-7 SCORE 5/3/2021 9/28/2021 9/28/2021   Total Score 2 (minimal anxiety) - 1 (minimal anxiety)   Total Score 2 1 1         Patient Active Problem List   Diagnosis     Chronic bilateral low back pain with sciatica     Status post hip replacement     Hyperlipidemia     Anterior corneal dystrophy     Hypertension     Insomnia     Osteoarthritis     Cataract     Dermatitis medicamentosa     Esotropia     Macular puckering     Nontoxic multinodular goiter     Posterior vitreous detachment     Recurrent UTI     Other symptoms involving nervous and musculoskeletal systems(781.99)     Atrophic vaginitis     Overactive bladder     Back pain     Health Care Home     Lower extremity edema     Spinal stenosis of lumbar region with neurogenic claudication     Brow ptosis, bilateral     Dermatochalasis of both upper eyelids     Ptosis of eyelid, bilateral     Pain in joint, pelvic region and thigh, unspecified laterality     Pain of right thigh     Iliotibial band syndrome, right       Current Outpatient Medications   Medication Sig Dispense Refill     cyanocolbalamin (VITAMIN  B-12) 1000 MCG tablet Take 1 tablet by mouth three times a week.       ESTRIOL 0.5MG/GM CREAM Insert 1 gram vaginally 2 times per week. 30 g 3     lisinopril (ZESTRIL) 5 MG tablet Take 1 tablet (5 mg) by mouth daily 90  "tablet 3     NEW MED Barry Raya       Omega-3 Fatty Acids (OMEGA-3 FISH OIL PO) Take  by mouth.       simvastatin (ZOCOR) 20 MG tablet Take 1 tablet (20 mg) by mouth At Bedtime 90 tablet 3       Allergies   Allergen Reactions     Benzalkonium Chloride      Eye irritation     Nitrofurantoin Other (See Comments)     Causes hast heart rate.  Causes fast heart rate.        EXAM  /79   Pulse 104   Temp 97  F (36.1  C)   Ht 1.607 m (5' 3.27\")   Wt 77.1 kg (170 lb)   SpO2 96%   BMI 29.86 kg/m    Gen: Alert, pleasant, NAD  COR: S1,S2, no murmur  Lungs: CTA bilaterally, no rhonchi, wheezes or rales  Back: no CVAT      Assessment:  (R39.9) Urinary symptom or sign  (primary encounter diagnosis)  Comment: 3 days of clinical symptoms, suspicious for UTI  Plan: Routine UA with micro reflex to culture, Urine         Culture Aerobic Bacterial, ciprofloxacin         (CIPRO) 500 MG tablet, Urine Culture        Await culture, treat with Ciprofloxacin BID x 5 days.     (E78.00) Pure hypercholesterolemia  Comment: currently on Simvastatin 20mg daily, LDL in target range  Plan: Lipid Profile, Comprehensive metabolic panel         Recent Labs   Lab Test 10/01/21  1218 09/18/20  0952 07/03/19  1049 07/03/19  1049   CHOL 159 174.0   < > 180.0   HDL 57 60.0   < > 62.0   LDL 59 75.0   < > 82.0   TRIG 216* 199.0*   < > 178.0*   CHOLHDLRATIO  --  2.9  --  2.9    < > = values in this interval not displayed.   Will discuss lab results next visit and refill medication.    (I10) Primary hypertension  Comment: blood pressure in target range  Plan: Comprehensive metabolic panel       Shyann will return next week and will refill medication at that time.    (R53.83) Other fatigue  Comment: Generalized fatigue, reports mood is stable, has insomnia and wishes to pursue CBT. Looking for reversible causes of fatigue  Plan: CBC with Platelets, TSH with free T4 reflex,         Hemoglobin A1c        Await lab results, discuss at next clinic " visit    (G47.00) Insomnia, unspecified type  Comment: longstanding issue with disturbed sleep  Plan: she will meet with sleep specialist next week. She will inquire about CBT. Discussed role of sleep provider to diagnose sleep issue and recommend targeted therapies.   Will discuss outcome of sleep visit next week with Shyann.    (Z23) Influenza vaccine needed  Comment: per Shyann's request  Plan: INFLUENZA, QUAD, HIGH DOSE, PF, 65YR + (FLUZONE        HD)        Given today    32 minutes spend on the date of this encounter doing chart review, history and exam, documentation and further activities as noted above.       Aleksandra Starks MD  Internal Medicine/Pediatrics

## 2021-10-01 NOTE — NURSING NOTE
"81 year old  Chief Complaint   Patient presents with     Urinary Problem     Sx started on Tuesday. urgency, cramping and chills when using the bathroom.       Blood pressure 126/79, pulse 104, temperature 97  F (36.1  C), height 1.607 m (5' 3.27\"), weight 77.1 kg (170 lb), SpO2 96 %, not currently breastfeeding. Body mass index is 29.86 kg/m .  Patient Active Problem List   Diagnosis     Chronic bilateral low back pain with sciatica     Status post hip replacement     Hyperlipidemia     Anterior corneal dystrophy     Hypertension     Insomnia     Osteoarthritis     Cataract     Dermatitis medicamentosa     Esotropia     Macular puckering     Nontoxic multinodular goiter     Posterior vitreous detachment     Recurrent UTI     Other symptoms involving nervous and musculoskeletal systems(781.99)     Atrophic vaginitis     Overactive bladder     Back pain     Health Care Home     Lower extremity edema     Spinal stenosis of lumbar region with neurogenic claudication     Brow ptosis, bilateral     Dermatochalasis of both upper eyelids     Ptosis of eyelid, bilateral     Pain in joint, pelvic region and thigh, unspecified laterality     Pain of right thigh     Iliotibial band syndrome, right       Wt Readings from Last 2 Encounters:   10/01/21 77.1 kg (170 lb)   05/13/21 78.5 kg (173 lb)     BP Readings from Last 3 Encounters:   10/01/21 126/79   05/06/21 137/83   04/12/21 126/84         Current Outpatient Medications   Medication     cyanocolbalamin (VITAMIN  B-12) 1000 MCG tablet     ESTRIOL 0.5MG/GM CREAM     lisinopril (ZESTRIL) 5 MG tablet     NEW MED     Omega-3 Fatty Acids (OMEGA-3 FISH OIL PO)     simvastatin (ZOCOR) 20 MG tablet     No current facility-administered medications for this visit.       Social History     Tobacco Use     Smoking status: Never Smoker     Smokeless tobacco: Never Used   Substance Use Topics     Alcohol use: No     Drug use: No       Health Maintenance Due   Topic Date Due     DEXA  " Never done     URINE DRUG SCREEN  Never done     ZOSTER IMMUNIZATION (1 of 2) Never done     MEDICARE ANNUAL WELLNESS VISIT  Never done     INFLUENZA VACCINE (1) 09/01/2021       No results found for: PAP      October 1, 2021 11:25 AM

## 2021-10-03 LAB — BACTERIA UR CULT: ABNORMAL

## 2021-10-04 PROBLEM — R45.86 MOOD CHANGE: Status: ACTIVE | Noted: 2021-10-04

## 2021-10-04 ASSESSMENT — ENCOUNTER SYMPTOMS
HALLUCINATIONS: 0
FATIGUE: 1
FEVER: 0
DECREASED APPETITE: 0
FLANK PAIN: 0
CHILLS: 0
INCREASED ENERGY: 1
WEIGHT LOSS: 0
DYSURIA: 0
POLYDIPSIA: 0
HEMATURIA: 0
WEIGHT GAIN: 0
ALTERED TEMPERATURE REGULATION: 0
DIFFICULTY URINATING: 0
POLYPHAGIA: 0
NIGHT SWEATS: 0

## 2021-10-04 ASSESSMENT — ANXIETY QUESTIONNAIRES: GAD7 TOTAL SCORE: 1

## 2021-10-04 ASSESSMENT — PATIENT HEALTH QUESTIONNAIRE - PHQ9
10. IF YOU CHECKED OFF ANY PROBLEMS, HOW DIFFICULT HAVE THESE PROBLEMS MADE IT FOR YOU TO DO YOUR WORK, TAKE CARE OF THINGS AT HOME, OR GET ALONG WITH OTHER PEOPLE: SOMEWHAT DIFFICULT
SUM OF ALL RESPONSES TO PHQ QUESTIONS 1-9: 7

## 2021-10-04 NOTE — PATIENT INSTRUCTIONS
Recommendations:  1.  Keep a consistent sleep schedule of 11 PM-6:30 AM    2.  Avoid clock watching in the middle of the night    3.  If you cannot fall back to sleep in the middle of the night, sit up in bed or get out of bed and read to you feel sleepy again.  Do not go up and down stairs in the middle of the night.    4.  Avoid daytime napping.

## 2021-10-04 NOTE — PROGRESS NOTES
"Shyann Samuel is a 81 year old female being evaluated via a billable telephone visit.     \"This telephone visit will be conducted via a call between you and your physician/provider. We have found that certain health care needs can be provided without the need for an in-person visit or physical exam.  This service lets us provide the care you need with a telephone conversation.  If a prescription is necessary we can send it directly to your pharmacy.  If lab work is needed we can place an order for that and you can then stop by our lab to have the test done at a later time.\"    Telephone visits are billed at different rates depending on your insurance coverage.  Please reach out to your insurance provider with any questions.    Patient has given verbal consent for  a Telephone visit? Yes    What telephone number would you like your provider to contact at at:  [phone]    How would you like to obtain your AVS? Michelle Cardozo MA    Telephone Visit Details:     Telephone Visit Start Time: 2:34 PM    Telephone Visit End Time:3:08 PM      Answers for HPI/ROS submitted by the patient on 10/4/2021  If you checked off any problems, how difficult have these problems made it for you to do your work, take care of things at home, or get along with other people?: Somewhat difficult  PHQ9 TOTAL SCORE: 7  JAISON 7 TOTAL SCORE: 1  General Symptoms: Yes  Skin Symptoms: No  HENT Symptoms: No  EYE SYMPTOMS: No  HEART SYMPTOMS: No  LUNG SYMPTOMS: No  INTESTINAL SYMPTOMS: No  URINARY SYMPTOMS: Yes  GYNECOLOGIC SYMPTOMS: No  BREAST SYMPTOMS: No  SKELETAL SYMPTOMS: No  BLOOD SYMPTOMS: No  NERVOUS SYSTEM SYMPTOMS: No  MENTAL HEALTH SYMPTOMS: No  Fever: No  Loss of appetite: No  Weight loss: No  Weight gain: No  Fatigue: Yes  Night sweats: No  Chills: No  Increased stress: No  Excessive hunger: No  Excessive thirst: No  Feeling hot or cold when others believe the temperature is normal: No  Loss of height: No  Post-operative " complications: No  Surgical site pain: No  Hallucinations: No  Change in or Loss of Energy: Yes  Hyperactivity: No  Confusion: No  Trouble holding urine or incontinence: No  Pain or burning: No  Trouble starting or stopping: No  Increased frequency of urination: No  Blood in urine: No  Decreased frequency of urination: No  Frequent nighttime urination: Yes  Flank pain: No  Difficulty emptying bladder: No    .  SLEEP MEDICINE CONSULTATION  Sleep Psychology    Name: Shyann Samuel MRN# [id number]   Age: 81 year old YOB: 1940     Date of Consultation: Oct 5, 2021  Consultation is requested by: No referring provider defined for this encounter.  Primary care provider: Aleksandra Starks    Reason for Sleep Consultation     Shyann Samuel is a 81 year old female seen today for a behavioral sleep medicine consultation because of insomnia    Assessment and Plan     Sleep Diagnoses/Recommendations:    Chronic insomnia    Shyann Samuel was seen for sleep psychology consultation and cognitive behavioral therapy for insomnia.  She had a previous brief course of cognitive behavioral therapy for insomnia with my colleague Dr. Jose Sainz MD in 2016, consisting primarily of sleep restriction therapy.  She returns for review and consideration of additional cognitive behavioral strategies and refresher.    In the recent years, patient sleep schedule has become more variable and she has increased time in bed resulting in increased sleep fragmentation.  We will reduce average time in bed from 8.5-9 hours to 7.5 hours and implement stimulus control.  We discussed the importance of not going up and down stairs and remaining safe in the middle of the night if she does get out of bed.  We also discussed the option of sitting up in bed and reading if she cannot return back to sleep.  She agreed to a sleep schedule of 11 PM-6:30 AM.  She also will resume using her 10,000 Lux bright light box in the morning for 30 minutes  upon awakening.    Summary Counseling:      Shyann was provided information about the pathophysiology of insomnia, psychophysiological factors contributing to the onset and maintenance of insomnia and how co-occurring medical conditions and intrinsic sleep disorders can affect sleep.  Treatment options were discussed including component of cognitive-behavioral therapy for insomnia as applicable to patient specific sleep concerns and symptoms. The benefits and potential early side effects of treatment including increased daytime sleepiness were discussed.     Patient was advised to consult with their prescribing provider around use of or changes to prescription sleep medication.  Patient was counseled on the importance of avoiding driving if drowsy.    See patient instructions for initial treatment recommendations and behavioral sleep plan.    Follow-up: 4 weeks     History of Present Sleep Complaint     Shyann Samuel is a 81 year old year old female with a relevant medical history of  HTN, hyperlipidemia who presents with a longstanding history of insomnia.  She implemented CBT techniques after seeing my colleague Dr. Jose Sainz in October 2016 and reports sleep restriction worked well. She saw Dr. Contreras in March 2021 and wa referred for a course of CBT-I.    Patient reports she has more recently awakened with active mind.    Her sleep routine before bed includes managing lymphedema .and then listens to the radio.  She usually falls asleep without any trouble.     She usually gets into bed between 10:30 PM - 10:45 PM.  She tries to ged up by 7:30 AM but may stay in bed later because she has fragmented sleep.    Sleep/Wake Pattern:    Shift Work - No  Source of Sleep Estimates:  verbal self-report      Naps: occasional, inadvertent naps     Sleep Hygiene:    Behavior affecting Sleep:  use computers or electronics within an hour before bedtime, extend time in bed longer if after poor night of sleep    Environment:   Bedroom is quiet, comfortable and dark, Sleeps alone    Substance Use:  Caffeine: 1-2 beverages, does not drink caffeine within 6 hours of bedtime      Alcohol: None reported      Nicotine: None      Recreational/Illicit Drugs: None reported    Previous Sleep Studies/Consultations:    No previous sleep studies, previous consultation with Jose Sainz MD in 2016 and.Dr. Lucina Teran in 2021    Screening     EPWORTH SLEEPINESS SCALE    Sitting and reading 3   Watching TV 3   Sitting, inactive in a public place (theatre or mtg.) 1    As a passenger in a car 1   Lying down to rest in the afternoon when circumstance permit 3   Sitting and talking to someone 0   Sitting quietly after lunch without alcohol 1   In a car, while stopped for a few minutes in traffic 0   TOTAL SCORE (nl <11) 12     INSOMNIA SEVERITY INDEX     Difficulty falling asleep 2   Difficult staying asleep 3   Problems waking up to early 2   How SATISFIED/DISSATISFIED are you with your CURRENT sleep pattern? 4   How NOTICEABLE to others do you think your sleep pattern is in terms of your quality of life? 1   How WORRIED/DISTRESSED are you about your current sleep pattern? 3   To what extent do you consider your sleep problem to INTERFERE with your daily fuctioning(e.g. daytime fatigue, mood, ability to function at work/daily chores, concentration, mood,etc.) CURRENTLY? 3   INSOMNIA SEVERITY INDEX TOTAL SCORE 18   Absence of insomnia (0-7); sub-threshold insomnia (8-14); moderate insomnia (15-21); and severe insomnia (22-28)      STOP-Bang TOAN Risk Score: 1-8  Low Risk:  0-2  Intermediate Risk:  3-4  High Risk:  5-8      Vitals     There were no vitals taken for this visit.     Medical History     Patient Active Problem List   Diagnosis     Chronic bilateral low back pain with sciatica     Status post hip replacement     Hyperlipidemia     Anterior corneal dystrophy     Hypertension     Insomnia     Osteoarthritis     Cataract      Dermatitis medicamentosa     Esotropia     Macular puckering     Nontoxic multinodular goiter     Posterior vitreous detachment     Recurrent UTI     Other symptoms involving nervous and musculoskeletal systems(781.99)     Atrophic vaginitis     Overactive bladder     Back pain     Health Care Home     Lower extremity edema     Spinal stenosis of lumbar region with neurogenic claudication     Brow ptosis, bilateral     Dermatochalasis of both upper eyelids     Ptosis of eyelid, bilateral     Pain in joint, pelvic region and thigh, unspecified laterality     Pain of right thigh     Iliotibial band syndrome, right     Mood change        Current Outpatient Medications   Medication Sig Dispense Refill     ciprofloxacin (CIPRO) 500 MG tablet Take 1 tablet (500 mg) by mouth 2 times daily for 5 days 10 tablet 0     cyanocolbalamin (VITAMIN  B-12) 1000 MCG tablet Take 1 tablet by mouth three times a week.       ESTRIOL 0.5MG/GM CREAM Insert 1 gram vaginally 2 times per week. 30 g 3     lisinopril (ZESTRIL) 5 MG tablet Take 1 tablet (5 mg) by mouth daily 90 tablet 3     NEW MED Barry Raya       Omega-3 Fatty Acids (OMEGA-3 FISH OIL PO) Take  by mouth.       simvastatin (ZOCOR) 20 MG tablet Take 1 tablet (20 mg) by mouth At Bedtime 90 tablet 3       Past Surgical History:   Procedure Laterality Date     C TOTAL HIP ARTHROPLASTY Right 10/2009    Replacement of right hip     CATARACT IOL, RT/LT Bilateral ~2005     EYE SURGERY  after 2003,pt unsure    blepharoplasty     HC EXTERNAL LEVATOR RESECTION Bilateral 12/2014     LAMINECTOMY LUMBAR TWO LEVELS  01/24/2019    L1 to L5 laminectomy [facility]      REPAIR PTOSIS Bilateral 10/14/2020    Procedure: Bilateral upper eyelid ptosis repair, bilateral upper eyelid blepharoplasty, bilateral brow pexy;  Surgeon: Espinoza Burns MD;  Location: [facility] OR     TONSILLECTOMY  1946          Allergies   Allergen Reactions     Benzalkonium Chloride      Eye irritation      Nitrofurantoin Other (See Comments)     Causes hast heart rate.  Causes fast heart rate.       Mental Health History     Prior Mental Health Diagnosis: None reported    Current Mental Health Treatment: None reported    Prior Chemical Dependency Treatment:  None reported    Family History     Family History   Problem Relation Age of Onset     Cerebrovascular Disease Mother         probably consequent to Crohn's disease     Crohn's Disease Mother      Osteoporosis Mother         probably consequent to Crohn's disease     Other Cancer Father         kidney cancer, benign brain tumor     Cancer Other         Father (kidney meningioma), Brother (neuroblastoma)     Other Cancer Brother         adult neuroblastoma (dispute about precise diagnos     Glaucoma No family hx of      Macular Degeneration No family hx of      Melanoma No family hx of      Skin Cancer No family hx of        Family History of Sleep Disorders: None reported    Social History     Social History     Socioeconomic History     Marital status:      Spouse name: None     Number of children: None     Years of education: None     Highest education level: None   Occupational History     None   Tobacco Use     Smoking status: Never Smoker     Smokeless tobacco: Never Used   Substance and Sexual Activity     Alcohol use: No     Drug use: No     Sexual activity: Not Currently   Other Topics Concern     Parent/sibling w/ CABG, MI or angioplasty before 65F 55M? Not Asked   Social History Narrative     None     Social Determinants of Health     Financial Resource Strain:      Difficulty of Paying Living Expenses:    Food Insecurity:      Worried About Running Out of Food in the Last Year:      Ran Out of Food in the Last Year:    Transportation Needs:      Lack of Transportation (Medical):      Lack of Transportation (Non-Medical):    Physical Activity:      Days of Exercise per Week:      Minutes of Exercise per Session:    Stress:      Feeling of Stress :     Social Connections:      Frequency of Communication with Friends and Family:      Frequency of Social Gatherings with Friends and Family:      Attends Voodoo Services:      Active Member of Clubs or Organizations:      Attends Club or Organization Meetings:      Marital Status:    Intimate Partner Violence:      Fear of Current or Ex-Partner:      Emotionally Abused:      Physically Abused:      Sexually Abused:        Lives alone, retired     Mental Status Examination     Shyann presented as oriented X3 with speech and language intact.  The patient was cooperative throughout the evaluation with no signs of hallucinations, delusions, loosening of associations or other thought disturbance.  Mood was normal Affect was congruent with mood. Insight and judgement were intact.  Memory appeared intact for recent and remote elements.  There was no report of suicidal ideation, intention or plan. Attention and concentration were within normal.      Copy:   Aleksandra Starks               No referring provider defined for this encounter.    Hai Teresa PsyD, LINUS, [facility]  Diplomate, Behavioral Sleep Medicine  [facility]    Note: This dictation was created using voice recognition software. This document may contain an error not identified before finalizing the document. If the error changes the accuracy of the document, I would appreciate it being brought to my attention.

## 2021-10-05 ENCOUNTER — VIRTUAL VISIT (OUTPATIENT)
Dept: SLEEP MEDICINE | Facility: CLINIC | Age: 81
End: 2021-10-05
Payer: MEDICARE

## 2021-10-05 DIAGNOSIS — F51.04 CHRONIC INSOMNIA: Primary | ICD-10-CM

## 2021-10-05 PROCEDURE — 90791 PSYCH DIAGNOSTIC EVALUATION: CPT | Mod: 95 | Performed by: PSYCHOLOGIST

## 2021-10-06 ENCOUNTER — OFFICE VISIT (OUTPATIENT)
Dept: FAMILY MEDICINE | Facility: CLINIC | Age: 81
End: 2021-10-06
Payer: MEDICARE

## 2021-10-06 VITALS
SYSTOLIC BLOOD PRESSURE: 133 MMHG | HEIGHT: 63 IN | OXYGEN SATURATION: 96 % | WEIGHT: 170 LBS | RESPIRATION RATE: 15 BRPM | TEMPERATURE: 96.9 F | BODY MASS INDEX: 30.12 KG/M2 | DIASTOLIC BLOOD PRESSURE: 82 MMHG | HEART RATE: 100 BPM

## 2021-10-06 DIAGNOSIS — I10 ESSENTIAL HYPERTENSION: Primary | ICD-10-CM

## 2021-10-06 DIAGNOSIS — E78.5 HYPERLIPIDEMIA, UNSPECIFIED HYPERLIPIDEMIA TYPE: ICD-10-CM

## 2021-10-06 DIAGNOSIS — R53.83 OTHER FATIGUE: ICD-10-CM

## 2021-10-06 RX ORDER — LISINOPRIL 5 MG/1
5 TABLET ORAL DAILY
Qty: 90 TABLET | Refills: 3 | Status: SHIPPED | OUTPATIENT
Start: 2021-10-06 | End: 2022-10-05

## 2021-10-06 RX ORDER — SIMVASTATIN 20 MG
20 TABLET ORAL AT BEDTIME
Qty: 90 TABLET | Refills: 3 | Status: SHIPPED | OUTPATIENT
Start: 2021-10-06 | End: 2022-10-05

## 2021-10-06 ASSESSMENT — MIFFLIN-ST. JEOR: SCORE: 1214.52

## 2021-10-06 NOTE — NURSING NOTE
"81 year old  Chief Complaint   Patient presents with     Recheck Medication     check in on medications, check labs      Fatigue     ongoing fatigue issues       Blood pressure 133/82, pulse 100, temperature 96.9  F (36.1  C), temperature source Skin, resp. rate 15, height 1.607 m (5' 3.27\"), weight 77.1 kg (170 lb), SpO2 96 %, not currently breastfeeding. Body mass index is 29.86 kg/m .  Patient Active Problem List   Diagnosis     Chronic bilateral low back pain with sciatica     Status post hip replacement     Hyperlipidemia     Anterior corneal dystrophy     Hypertension     Insomnia     Osteoarthritis     Cataract     Dermatitis medicamentosa     Esotropia     Macular puckering     Nontoxic multinodular goiter     Posterior vitreous detachment     Recurrent UTI     Other symptoms involving nervous and musculoskeletal systems(781.99)     Atrophic vaginitis     Overactive bladder     Back pain     Health Care Home     Lower extremity edema     Spinal stenosis of lumbar region with neurogenic claudication     Brow ptosis, bilateral     Dermatochalasis of both upper eyelids     Ptosis of eyelid, bilateral     Pain in joint, pelvic region and thigh, unspecified laterality     Pain of right thigh     Iliotibial band syndrome, right     Mood change       Wt Readings from Last 2 Encounters:   10/06/21 77.1 kg (170 lb)   10/01/21 77.1 kg (170 lb)     BP Readings from Last 3 Encounters:   10/06/21 133/82   10/01/21 126/79   05/06/21 137/83         Current Outpatient Medications   Medication     ciprofloxacin (CIPRO) 500 MG tablet     cyanocolbalamin (VITAMIN  B-12) 1000 MCG tablet     ESTRIOL 0.5MG/GM CREAM     lisinopril (ZESTRIL) 5 MG tablet     NEW MED     Omega-3 Fatty Acids (OMEGA-3 FISH OIL PO)     simvastatin (ZOCOR) 20 MG tablet     No current facility-administered medications for this visit.       Social History     Tobacco Use     Smoking status: Never Smoker     Smokeless tobacco: Never Used   Substance Use " Topics     Alcohol use: No     Drug use: No       Health Maintenance Due   Topic Date Due     DEXA  Never done     URINE DRUG SCREEN  Never done     ZOSTER IMMUNIZATION (1 of 2) Never done     MEDICARE ANNUAL WELLNESS VISIT  Never done       No results found for: PAP      October 6, 2021 10:13 AM

## 2021-10-06 NOTE — PROGRESS NOTES
"Shyann Samuel is a 81 year old female with hx of osteoarthritis, recurrent UTIs, hx of benign thyroid nodules, right sided hip pain, improving, insomnia and lower extremity edema. She is here for the following issues:    Insomnia  At our last visit on 10/1/21, Shyann reported longstanding problems with sleep that began back in 2002 after spouse was hospitalized and needed care in the middle of the night. She took benadryl for many years. Reported a consistent bedtime but variable wake up times and some daytime napping.   She had hoped to pursue cognitive behavioral therapy but at her last sleep evaluation, she felt the therapist was focusing more on ruling out sleep apnea and RLS. Shyann naps 30-60 min in the middle of the day.     Shyann met with Dr. Teresa in sleep medicine yesterday, and found it  \"very helpful\". It was recommended that she reduce her average time in bed from 8.5-9 hours to 7.5 hours and implement stimulus control. She will try to implement a sleep schedule of 11 PM-6:30 AM. If she is up for a period of time during the night, she should read quietly, avoid other stimulating tasks, and still wake up at her regular time in the morning. She should avoid daytime napping.     Shyann was also told to resume using her 10,000 Lux bright light box in the morning for 30 minutes upon awakening. She did this today, which made her feel a little \"queasy\". She will follow up with sleep medicine in one month.       HTN  Shyann takes lisinopril 5mg daily for treatment of hypertension. She had her labs updated on 10/1/21 and is here today for medication refills. She has a home BP monitor that is reliable.     BP Readings from Last 3 Encounters:   10/06/21 133/82   10/01/21 126/79   05/06/21 137/83       Hyperlipidemia  Shyann has hyperlipidemia and is currently taking simvastatin 20mg daily. No history of coronary artery disease or stroke. She had labs completed on 10/1/21 and we reviewed these today. She is due " "for medication refills.     Recent Labs   Lab Test 10/01/21  1218 09/18/20  0952 07/03/19  1049 07/03/19  1049   CHOL 159 174.0   < > 180.0   HDL 57 60.0   < > 62.0   LDL 59 75.0   < > 82.0   TRIG 216* 199.0*   < > 178.0*   CHOLHDLRATIO  --  2.9  --  2.9    < > = values in this interval not displayed.        Fatigue  Shyann reported having overall fatigue at our last visit on 10/1/21. Labs were completed at that time. CBC with Platelets revealed a slightly elevated WBC at 12.1, all else was normal. TSH was on the lower end of normal at 0.62, decreased from 4/21/15. A1c was 5.7%, which we discussed was close to prediabetic range. Shyann is hoping to cook more regularly for herself once she is not so fatigued.    Urinary symptoms  I last saw Shyann on 10/1/21, at which time she presented with a 3 day history of urinary urgency, vaginal discomfort \"that isn't quite burning\", and \"cramping feeling when I finish urinating\". She was diagnosed with UTI and given a course of Ciprofloxacin. She is feeling much better today. She has resumed using topical estrogen.     Right hip pain / IT band  Shyann was diagnosed with right hip pain, bursitis and IT band syndrome in May 2021. Is s/p right hip arthroplasty. She has been faithfully going to PT and doing home exercises. She declined injection for the bursitis at our 10/1/21 visit. She has significant arthritis in her left knee. Can manage for now, has an elevator in her home, no need to climb stairs.     She fell once many years ago while walking down the stairs with the lights off at night. She has only had a two falls in the past five years since that fall, both of which were from slipping on ice outside. She does well on even surfaces and uses a cane.        Patient Active Problem List   Diagnosis     Chronic bilateral low back pain with sciatica     Status post hip replacement     Hyperlipidemia     Anterior corneal dystrophy     Hypertension     Insomnia     Osteoarthritis " "    Cataract     Dermatitis medicamentosa     Esotropia     Macular puckering     Nontoxic multinodular goiter     Posterior vitreous detachment     Recurrent UTI     Other symptoms involving nervous and musculoskeletal systems(781.99)     Atrophic vaginitis     Overactive bladder     Back pain     Health Care Home     Lower extremity edema     Spinal stenosis of lumbar region with neurogenic claudication     Brow ptosis, bilateral     Dermatochalasis of both upper eyelids     Ptosis of eyelid, bilateral     Pain in joint, pelvic region and thigh, unspecified laterality     Pain of right thigh     Iliotibial band syndrome, right     Mood change       Current Outpatient Medications   Medication Sig Dispense Refill     ciprofloxacin (CIPRO) 500 MG tablet Take 1 tablet (500 mg) by mouth 2 times daily for 5 days 10 tablet 0     cyanocolbalamin (VITAMIN  B-12) 1000 MCG tablet Take 1 tablet by mouth three times a week.       ESTRIOL 0.5MG/GM CREAM Insert 1 gram vaginally 2 times per week. 30 g 3     lisinopril (ZESTRIL) 5 MG tablet Take 1 tablet (5 mg) by mouth daily 90 tablet 3     NEW MED Barry Raya       Omega-3 Fatty Acids (OMEGA-3 FISH OIL PO) Take  by mouth.       simvastatin (ZOCOR) 20 MG tablet Take 1 tablet (20 mg) by mouth At Bedtime 90 tablet 3       Allergies   Allergen Reactions     Benzalkonium Chloride      Eye irritation     Nitrofurantoin Other (See Comments)     Causes hast heart rate.  Causes fast heart rate.        EXAM  /82 (BP Location: Right arm, Patient Position: Sitting, Cuff Size: Adult Regular)   Pulse 100   Temp 96.9  F (36.1  C) (Skin)   Resp 15   Ht 1.607 m (5' 3.27\")   Wt 77.1 kg (170 lb)   SpO2 96%   BMI 29.86 kg/m    Gen: Alert, pleasant, NAD  Remainder of physical exam deferred.     Assessment:  (I10) Essential hypertension  (primary encounter diagnosis)  Comment: BP in target range, labs look good  Plan: lisinopril (ZESTRIL) 5 MG tablet        Refilled x 1 " year.    (E78.5) Hyperlipidemia, unspecified hyperlipidemia type  Comment: 10/1/21 lipid panel showed elevated triglycerides, taking fish oil supplement  Plan: simvastatin (ZOCOR) 20 MG tablet        Refilled x 1 year. Discussed dietary changes that can help lower triglycerides, such as switching to whole grains.     (R53.83) Other fatigue  Comment: recent labs fine, no underlying cause. She has chronic insomnia and saw a sleep specialist yesterday.  Plan: Encouraged implementing and being consistent with recommendations from sleep medicine. Schedule one month follow-up appointment with Dr. Teresa.     RTC in 6 months or sooner if other concerns arise.     37 minutes spend on the date of this encounter doing chart review, history and exam, documentation and further activities as noted above.       Aleksandra Starks MD  Internal Medicine/Pediatrics      I, Ana Gold, am serving as a scribe to document services personally performed by Dr. Aleksandra Starks, based on data collection and the provider's statements to me. Dr. Starks has reviewed, edited, and approved the above note.

## 2021-10-07 LAB — BACTERIA UR CULT: ABNORMAL

## 2021-10-25 ENCOUNTER — THERAPY VISIT (OUTPATIENT)
Dept: PHYSICAL THERAPY | Facility: CLINIC | Age: 81
End: 2021-10-25
Payer: MEDICARE

## 2021-10-25 DIAGNOSIS — M79.651 PAIN OF RIGHT THIGH: ICD-10-CM

## 2021-10-25 DIAGNOSIS — M76.31 ILIOTIBIAL BAND SYNDROME, RIGHT: ICD-10-CM

## 2021-10-25 PROCEDURE — 97112 NEUROMUSCULAR REEDUCATION: CPT | Mod: GP | Performed by: PHYSICAL THERAPIST

## 2021-10-25 PROCEDURE — 97530 THERAPEUTIC ACTIVITIES: CPT | Mod: GP | Performed by: PHYSICAL THERAPIST

## 2021-10-25 PROCEDURE — 97110 THERAPEUTIC EXERCISES: CPT | Mod: GP | Performed by: PHYSICAL THERAPIST

## 2021-10-25 NOTE — PROGRESS NOTES
Subjective:  HPI  Physical Exam                    Objective:  System    Physical Exam    General     ROS    Assessment/Plan:    DISCHARGE REPORT    Progress reporting period is from 8/4/21 to 10/25/21.       SUBJECTIVE  Subjective changes noted by patient:  Feels like she is slightly better. Is walking longer each day and now can walk indoors for 15' w/o assistive device.         Current pain level is 0/10  .     Previous pain level was  0/10  .   Changes in function:  Yes (See Goal flowsheet attached for changes in current functional level)  Adverse reaction to treatment or activity: None    OBJECTIVE  Changes noted in objective findings:  Yes, Walking 15' indoors w/o cane or walker  Walking outdoor with wheeled walker 15-20'  Independent HEP        ASSESSMENT/PLAN  Updated problem list and treatment plan: Diagnosis 1:  ITB lateral thigh pain  Decreased strength - therapeutic exercise, therapeutic activities and home program  Decreased function - therapeutic activities  STG/LTGs have been met or progress has been made towards goals:  Yes (See Goal flow sheet completed today.)  Assessment of Progress: The patient's condition is improving.  Self Management Plans:  Patient is independent in a home treatment program.    Shyann continues to require the following intervention to meet STG and LTG's:  PT intervention is no longer required to meet STG/LTG.    Recommendations:  This patient is ready to be discharged from therapy and continue their home treatment program.    Please refer to the daily flowsheet for treatment today, total treatment time and time spent performing 1:1 timed codes.

## 2021-10-25 NOTE — LETTER
DEPARTMENT OF HEALTH AND HUMAN SERVICES  [facility]    PLAN/UPDATED PLAN OF PROGRESS FOR OUTPATIENT REHABILITATION        PATIENTS NAME:  Shyann Samuel     : 1940    PROVIDER NUMBER:    [id number]    [id number]    PROVIDER NAME: [facility]    MEDICAL RECORD NUMBER: [id number]     START OF CARE DATE:  SOC Date: 21   TYPE:  PT    PRIMARY/TREATMENT DIAGNOSIS: (Pertinent Medical Diagnosis)     Pain of right thigh  Iliotibial band syndrome, right    VISITS FROM START OF CARE:  Rxs Used: 8     DISCHARGE REPORT    Progress reporting period is from 21 to 10/25/21.       SUBJECTIVE  Subjective changes noted by patient:  Feels like she is slightly better. Is walking longer each day and now can walk indoors for 15' w/o assistive device.         Current pain level is 0/10  .     Previous pain level was  0/10  .   Changes in function:  Yes (See Goal flowsheet attached for changes in current functional level)  Adverse reaction to treatment or activity: None    OBJECTIVE  Changes noted in objective findings:  Yes, Walking 15' indoors w/o cane or walker  Walking outdoor with wheeled walker 15-20'  Independent HEP      ASSESSMENT/PLAN  Updated problem list and treatment plan: Diagnosis 1:  ITB lateral thigh pain  Decreased strength - therapeutic exercise, therapeutic activities and home program  Decreased function - therapeutic activities  STG/LTGs have been met or progress has been made towards goals:  Yes (See Goal flow sheet completed today.)  Assessment of Progress: The patient's condition is improving.  Self Management Plans:  Patient is independent in a home treatment program.    Shyann continues to require the following intervention to meet STG and LTG's:  PT PATIENTS NAME:  Shyann Samuel   : 1940    intervention is no longer required to meet STG/LTG.    Recommendations:  This patient is ready to be discharged from therapy  "and continue their home treatment program.    Caregiver Signature/Credentials _____________________________ Date ________       Treating Provider: Arnulfo Presley PT   I have reviewed and certified the need for these services and plan of treatment while under my care.        PHYSICIAN'S SIGNATURE:   _________________________________________  Date___________   Naya Rucker MD    Certification period:  Beginning of Cert date period: 08/09/21 to  End of Cert period date: 11/01/21     Functional Level Progress Report: Please see attached \"Goal Flow sheet for Functional level.\"    ____X____ Continue Services or       ________ DC Services                Service dates: From  SOC Date: 05/11/21 date to present                         "

## 2021-11-18 ASSESSMENT — SLEEP AND FATIGUE QUESTIONNAIRES
HOW LIKELY ARE YOU TO NOD OFF OR FALL ASLEEP WHEN YOU ARE A PASSENGER IN A CAR FOR AN HOUR WITHOUT A BREAK: SLIGHT CHANCE OF DOZING
HOW LIKELY ARE YOU TO NOD OFF OR FALL ASLEEP WHILE SITTING QUIETLY AFTER LUNCH WITHOUT ALCOHOL: SLIGHT CHANCE OF DOZING
HOW LIKELY ARE YOU TO NOD OFF OR FALL ASLEEP WHILE WATCHING TV: HIGH CHANCE OF DOZING
HOW LIKELY ARE YOU TO NOD OFF OR FALL ASLEEP WHILE SITTING AND READING: HIGH CHANCE OF DOZING
HOW LIKELY ARE YOU TO NOD OFF OR FALL ASLEEP WHILE LYING DOWN TO REST IN THE AFTERNOON WHEN CIRCUMSTANCES PERMIT: HIGH CHANCE OF DOZING
HOW LIKELY ARE YOU TO NOD OFF OR FALL ASLEEP WHILE SITTING AND TALKING TO SOMEONE: WOULD NEVER DOZE
HOW LIKELY ARE YOU TO NOD OFF OR FALL ASLEEP WHILE SITTING INACTIVE IN A PUBLIC PLACE: SLIGHT CHANCE OF DOZING
HOW LIKELY ARE YOU TO NOD OFF OR FALL ASLEEP IN A CAR, WHILE STOPPED FOR A FEW MINUTES IN TRAFFIC: WOULD NEVER DOZE

## 2021-11-22 NOTE — PROGRESS NOTES
Are you currently in the state of [place]: Yes   Shyann is a 81 year old who is being evaluated via a billable telephone visit.      How would you like to obtain your AVS? Michelle Sorto anyone else be joining your telephone visit? No  Steffany Padilla MA    Start Time: 4:34 PM  Visit Details    Type of service:  Telephone visit     End Time:4:55 PM    Originating Location (pt. Location): Home phone    Distant Location (provider location):  [facility] SLEEP [facility]     Platform used: Telephone    SLEEP MEDICINE VIRTUAL TELEPHONEmFOLLOW-UP VISIT  Sleep Psychology    Patient Name: Shyann Samuel  MRN:  [id number]  Date of Service: Nov 23, 2021       Subjective Report     Shyann Samuel  returns for a telehealth telephone visit to discuss progress in implementing behavioral strategies for the management of insomnia.  Patient consent for initiation of video visit was obtained and documented prior to initiation of visit.     Shyann reports her sleep has improved somewhat.  She has made changes to help her get up consistently.  However she notices morning sleepiness.    She is getting about 7.5 hours of sleep at this time.  However she has to get up 2-3 times a night due to overactive bladder.  She has tried medications to reduce urinary urgency at night but they produced morning grogginess.    Going to bed about 10:45-11 PM and sets alarm for 6:30 AM.         .     Sleep Data:     Source of Sleep Estimates:  verbal self-report    Average Time in Bed: 7.8 hours  Average Total Sleep Time: 7.5 hrs  Sleep Efficiency: Greater than 85%    EPWORTH SLEEPINESS SCALE    Sitting and reading 3   Watching TV 3   Sitting, inactive in a public place (theatre or mtg.) 1    As a passenger in a car 1   Lying down to rest in the afternoon when circumstance permit 3   Sitting and talking to someone 0   Sitting quietly after lunch without alcohol 1   In a car, while stopped for a few minutes in traffic 0   TOTAL SCORE (nl <11) 12      INSOMNIA SEVERITY INDEX     Difficulty falling asleep 0   Difficult staying asleep 2   Problems waking up to early 0   How SATISFIED/DISSATISFIED are you with your CURRENT sleep pattern? 3   How NOTICEABLE to others do you think your sleep pattern is in terms of your quality of life? 1   How WORRIED/DISTRESSED are you about your current sleep pattern? 2   To what extent do you consider your sleep problem to INTERFERE with your daily fuctioning(e.g. daytime fatigue, mood, ability to function at work/daily chores, concentration, mood,etc.) CURRENTLY? 2   INSOMNIA SEVERITY INDEX TOTAL SCORE 10    Absence of insomnia (0-7); sub-threshold insomnia (8-14); moderate insomnia (15-21); and severe insomnia (22-28)       Interventions     Strategies and recommendations including implementation and maintenance of of stimulus control were discussed today.       Vital Signs     There were no vitals taken for this visit.     Mental Status     Orientation:  X3  Mood:  normal  Affect:  Congruent with mood  Speech/Language:  Normal  Thought Process: Intact  Associations:  Normal  Thought Content: Normal  Patient does not report any suicidal ideation, intention or plan.    Diagnostic Impressions and Plan     Chronic insomnia    Patient is reporting significant improvement in sleep quality and recent week or so.  She attributes that to consistently following plan, particularly adherence to a consistent wake time.  Insomnia is now in the subclinical range.    Plan:  Continue with current sleep schedule implant.  Follow-up in 2months for maintenance and adherence    Follow-up: 2 months      Hai Teresa PsyD, LINUS, [facility]  Diplomate, Behavioral Sleep Medicine  [facility]      Note: This dictation was created using voice recognition software. This document may contain an error not identified before finalizing the document. If the error changes the accuracy of the document, I would appreciate it being  brought to my attention.

## 2021-11-23 ENCOUNTER — VIRTUAL VISIT (OUTPATIENT)
Dept: SLEEP MEDICINE | Facility: CLINIC | Age: 81
End: 2021-11-23
Payer: MEDICARE

## 2021-11-23 DIAGNOSIS — F51.04 CHRONIC INSOMNIA: Primary | ICD-10-CM

## 2021-11-23 PROCEDURE — 90832 PSYTX W PT 30 MINUTES: CPT | Mod: 95 | Performed by: PSYCHOLOGIST

## 2022-03-11 ENCOUNTER — MYC MEDICAL ADVICE (OUTPATIENT)
Dept: FAMILY MEDICINE | Facility: CLINIC | Age: 82
End: 2022-03-11
Payer: MEDICARE

## 2022-03-12 DIAGNOSIS — M25.551 HIP PAIN, RIGHT: Primary | ICD-10-CM

## 2022-03-16 ENCOUNTER — THERAPY VISIT (OUTPATIENT)
Dept: PHYSICAL THERAPY | Facility: CLINIC | Age: 82
End: 2022-03-16
Payer: MEDICARE

## 2022-03-16 DIAGNOSIS — M79.652 PAIN OF LEFT THIGH: ICD-10-CM

## 2022-03-16 PROCEDURE — 97161 PT EVAL LOW COMPLEX 20 MIN: CPT | Mod: GP | Performed by: PHYSICAL THERAPIST

## 2022-03-16 PROCEDURE — 97140 MANUAL THERAPY 1/> REGIONS: CPT | Mod: GP | Performed by: PHYSICAL THERAPIST

## 2022-03-16 PROCEDURE — 97110 THERAPEUTIC EXERCISES: CPT | Mod: GP | Performed by: PHYSICAL THERAPIST

## 2022-03-16 ASSESSMENT — ACTIVITIES OF DAILY LIVING (ADL)
WALKING_INITIALLY: SLIGHT DIFFICULTY
WALKING_DOWN_STEEP_HILLS: EXTREME DIFFICULTY
GOING_UP_1_FLIGHT_OF_STAIRS: MODERATE DIFFICULTY
LIGHT_TO_MODERATE_WORK: SLIGHT DIFFICULTY
STANDING_FOR_15_MINUTES: MODERATE DIFFICULTY
HOS_ADL_ITEM_SCORE_TOTAL: 33
HOS_ADL_HIGHEST_POTENTIAL_SCORE: 60
GETTING_INTO_AND_OUT_OF_AN_AVERAGE_CAR: SLIGHT DIFFICULTY
TWISTING/PIVOTING_ON_INVOLVED_LEG: MODERATE DIFFICULTY
GOING_DOWN_1_FLIGHT_OF_STAIRS: MODERATE DIFFICULTY
WALKING_APPROXIMATELY_10_MINUTES: SLIGHT DIFFICULTY
PUTTING_ON_SOCKS_AND_SHOES: NO DIFFICULTY AT ALL
DEEP_SQUATTING: UNABLE TO DO
WALKING_UP_STEEP_HILLS: EXTREME DIFFICULTY
HOS_ADL_SCORE(%): 55
WALKING_15_MINUTES_OR_GREATER: MODERATE DIFFICULTY
HOS_ADL_COUNT: 15
HOW_WOULD_YOU_RATE_YOUR_CURRENT_LEVEL_OF_FUNCTION_DURING_YOUR_USUAL_ACTIVITIES_OF_DAILY_LIVING_FROM_0_TO_100_WITH_100_BEING_YOUR_LEVEL_OF_FUNCTION_PRIOR_TO_YOUR_HIP_PROBLEM_AND_0_BEING_THE_INABILITY_TO_PERFORM_ANY_OF_YOUR_USUAL_DAILY_ACTIVITIES?: 75
ROLLING_OVER_IN_BED: NO DIFFICULTY AT ALL
SITTING_FOR_15_MINUTES: NO DIFFICULTY AT ALL
HEAVY_WORK: EXTREME DIFFICULTY
STEPPING_UP_AND_DOWN_CURBS: NO DIFFICULTY AT ALL

## 2022-03-16 NOTE — PROGRESS NOTES
[facility]    OUTPATIENT Physical Therapy ORTHOPEDIC EVALUATION  PLAN OF TREATMENT FOR OUTPATIENT REHABILITATION  (COMPLETE FOR INITIAL CLAIMS ONLY)  Patient's Last Name, First Name, M.I.  YOB: 1940  Shyann Samuel       Provider s Name:  [facility]   Medical Record No.  [id number]   Start of Care Date:  03/16/22   Onset Date:   06/01/21   Type:     _X__PT   ___OT Medical Diagnosis:    Encounter Diagnosis   Name Primary?     Pain of left thigh         Treatment Diagnosis:  L ITB/lateral HS pain        Goals:     03/16/22 0500   Goal #1   Goal #1 reading/computer work   Previous Functional Level No restrictions   Current Functional Level Minutes patient can work on a computer   Performance level 15' due to pain L thigh > 5/10   STG Target Performance Minutes patient will be able to work on a computer   Performance level 30' with pain < 5/10   Rationale to perform work related job duties;to perform home related tasks ;to return to normal leisure activities   Due date 04/13/22   LTG Target Performance Hours patient will be able to work on a computer   Performance Level 1-2 w/o pain L thigh   Rationale to perform work related job duties;to perform home related tasks;to return to normal leisure activities   Due date 06/08/22       Therapy Frequency:  1x week  Predicted Duration of Therapy Intervention:  12 weeks    Prasanth Presley, PT                 I CERTIFY THE NEED FOR THESE SERVICES FURNISHED UNDER        THIS PLAN OF TREATMENT AND WHILE UNDER MY CARE     (Physician attestation of this document indicates review and certification of the therapy plan).                       Certification Date From:  03/16/22   Certification Date To:  06/13/22    Referring Provider:  Aleksandra Starks    Initial Assessment        See Epic Evaluation SOC Date: 03/16/22

## 2022-03-16 NOTE — PROGRESS NOTES
Physical Therapy Initial Evaluation  Subjective:  The history is provided by the patient. No  was used.   Patient Health History  Shyann Samuel being seen for Continuation of counseling/therapy for joint pain.     Problem began: 6/1/2021.   Problem occurred: Initial diagnosis of Iliotibial band syndrome   Pain is reported as 1/10 on pain scale.  General health as reported by patient is good.  Pertinent medical history includes: high blood pressure, overweight, osteoarthritis and other. Other medical history details: lymphedema of left leg.      Other medical allergies details: nitrofurantoin, benzalkonium chloride.   Surgeries include:  Orthopedic surgery and other. Other surgery history details: ophthalmic surgery.    Current medications:  Anti-inflammatory, high blood pressure medication and other. Other medications details: Statin.    Current occupation is Retired.                     Therapist Generated HPI Evaluation  Problem details: Started with R ITB syndrome back in 6/1/21. Was helped by PT and home plan. She was seen by MD for PT orders for New pain on L side that is very similar to R side pain. She has had a lot of stress and this has caused less sleep. She has noticed increased pain when sitting at computer to do work. Pain was worse, but has improved by doing same exercises she did for R thigh.         Affected Side: L lateral posterior thigh.    This is a recurrent condition.  Condition occurred with:  Insidious onset.  Where condition occurred: for unknown reasons.  Patient reports pain:  IT band, lateral and posterior.  and is intermittent.  Pain radiates to:  No radiation. Pain is worse during the day.  Since onset symptoms are gradually improving.  Associated symptoms:  Edema and loss of motion/stiffness. Symptoms are exacerbated by certain positions and sitting  and relieved by nothing.    Previous treatment includes physical therapy. There was significant improvement following  previous treatment.  Barriers include:  None as reported by patient.                        Objective:    Gait:    Gait Type:  Normal   Weight Bearing Status:  WBAT   Assistive Devices:  Walker                                                   Hip Evaluation  HIP AROM:  AROM:    Left Hip:     Normal    Right Hip:   Normal                  Hip PROM:  Hip PROM:  Left Hip:    Normal  Right Hip:  Normal                          Hip Strength:  Hip Strength:   Left:     Right:  Normal: Abd and ER 4+/5 L.                            Hip Special Testing:   Special tests hip not assessed: Tight HS L > R.  Left hip positive for the following special tests:  Keyona's  Left hip negative for the following special tests:  Piriformis; Gian or Fadir/Labrum   Right hip positive for the following special tests:  Keyona'sRight hip negative for the following special tests:  Piriformis; Gian or Fadir/Labrum    Hip Palpation:    Left hip tenderness present at:   IT Band    Functional Testing:  not assessed                 Knee Evaluation:        Palpation:    Left knee tenderness present at:  Biceps Femoral              General     ROS    Assessment/Plan:    Patient is a 81 year old female with L lateral posterior thigh pain complaints.    Patient has the following significant findings with corresponding treatment plan.                Diagnosis 1:  L posterior lateral thigh pain  Pain -  manual therapy, self management, education, directional preference exercise and home program  Decreased ROM/flexibility - manual therapy and therapeutic exercise  Decreased strength - therapeutic exercise and therapeutic activities  Impaired muscle performance - neuro re-education  Decreased function - therapeutic activities    Therapy Evaluation Codes:   1) History comprised of:   Personal factors that impact the plan of care:      Age.    Comorbidity factors that impact the plan of care are:      None.     Medications impacting care: None.  2) Examination  of Body Systems comprised of:   Body structures and functions that impact the plan of care:      Hip.   Activity limitations that impact the plan of care are:      Reading/Computer work and Sitting.  3) Clinical presentation characteristics are:   Stable/Uncomplicated.  4) Decision-Making    Low complexity using standardized patient assessment instrument and/or measureable assessment of functional outcome.  Cumulative Therapy Evaluation is: Low complexity.    Previous and current functional limitations:  (See Goal Flow Sheet for this information)    Short term and Long term goals: (See Goal Flow Sheet for this information)     Communication ability:  Patient appears to be able to clearly communicate and understand verbal and written communication and follow directions correctly.  Treatment Explanation - The following has been discussed with the patient:   RX ordered/plan of care  Anticipated outcomes  Possible risks and side effects  This patient would benefit from PT intervention to resume normal activities.   Rehab potential is excellent.    Frequency:  1 X week, once daily  Duration:  for 12 weeks  Discharge Plan:  Achieve all LTG.  Independent in home treatment program.  Reach maximal therapeutic benefit.    Please refer to the daily flowsheet for treatment today, total treatment time and time spent performing 1:1 timed codes.

## 2022-03-23 ENCOUNTER — THERAPY VISIT (OUTPATIENT)
Dept: PHYSICAL THERAPY | Facility: CLINIC | Age: 82
End: 2022-03-23
Payer: MEDICARE

## 2022-03-23 DIAGNOSIS — M79.652 PAIN OF LEFT THIGH: ICD-10-CM

## 2022-03-23 PROCEDURE — 97140 MANUAL THERAPY 1/> REGIONS: CPT | Mod: GP | Performed by: PHYSICAL THERAPIST

## 2022-03-23 PROCEDURE — 97110 THERAPEUTIC EXERCISES: CPT | Mod: GP | Performed by: PHYSICAL THERAPIST

## 2022-03-30 ENCOUNTER — THERAPY VISIT (OUTPATIENT)
Dept: PHYSICAL THERAPY | Facility: CLINIC | Age: 82
End: 2022-03-30
Payer: MEDICARE

## 2022-03-30 DIAGNOSIS — M79.652 PAIN OF LEFT THIGH: ICD-10-CM

## 2022-03-30 PROCEDURE — 97110 THERAPEUTIC EXERCISES: CPT | Mod: GP | Performed by: PHYSICAL THERAPIST

## 2022-03-30 PROCEDURE — 97140 MANUAL THERAPY 1/> REGIONS: CPT | Mod: GP | Performed by: PHYSICAL THERAPIST

## 2022-04-13 ENCOUNTER — THERAPY VISIT (OUTPATIENT)
Dept: PHYSICAL THERAPY | Facility: CLINIC | Age: 82
End: 2022-04-13
Payer: MEDICARE

## 2022-04-13 DIAGNOSIS — M79.652 PAIN OF LEFT THIGH: Primary | ICD-10-CM

## 2022-04-13 PROCEDURE — 97140 MANUAL THERAPY 1/> REGIONS: CPT | Mod: GP | Performed by: PHYSICAL THERAPIST

## 2022-04-13 PROCEDURE — 97110 THERAPEUTIC EXERCISES: CPT | Mod: GP | Performed by: PHYSICAL THERAPIST

## 2022-04-13 PROCEDURE — 97112 NEUROMUSCULAR REEDUCATION: CPT | Mod: GP | Performed by: PHYSICAL THERAPIST

## 2022-04-17 ENCOUNTER — HEALTH MAINTENANCE LETTER (OUTPATIENT)
Age: 82
End: 2022-04-17

## 2022-05-25 ENCOUNTER — THERAPY VISIT (OUTPATIENT)
Dept: PHYSICAL THERAPY | Facility: CLINIC | Age: 82
End: 2022-05-25
Payer: MEDICARE

## 2022-05-25 DIAGNOSIS — M79.652 PAIN OF LEFT THIGH: Primary | ICD-10-CM

## 2022-05-25 PROCEDURE — 97110 THERAPEUTIC EXERCISES: CPT | Mod: GP | Performed by: PHYSICAL THERAPIST

## 2022-05-25 PROCEDURE — 97112 NEUROMUSCULAR REEDUCATION: CPT | Mod: GP | Performed by: PHYSICAL THERAPIST

## 2022-05-25 ASSESSMENT — ACTIVITIES OF DAILY LIVING (ADL)
ROLLING_OVER_IN_BED: NO DIFFICULTY AT ALL
WALKING_UP_STEEP_HILLS: EXTREME DIFFICULTY
HOS_ADL_ITEM_SCORE_TOTAL: 36
HOS_ADL_COUNT: 16
WALKING_INITIALLY: SLIGHT DIFFICULTY
GOING_UP_1_FLIGHT_OF_STAIRS: MODERATE DIFFICULTY
HOW_WOULD_YOU_RATE_YOUR_CURRENT_LEVEL_OF_FUNCTION_DURING_YOUR_USUAL_ACTIVITIES_OF_DAILY_LIVING_FROM_0_TO_100_WITH_100_BEING_YOUR_LEVEL_OF_FUNCTION_PRIOR_TO_YOUR_HIP_PROBLEM_AND_0_BEING_THE_INABILITY_TO_PERFORM_ANY_OF_YOUR_USUAL_DAILY_ACTIVITIES?: 80
HOS_ADL_SCORE(%): 56.25
WALKING_15_MINUTES_OR_GREATER: MODERATE DIFFICULTY
TWISTING/PIVOTING_ON_INVOLVED_LEG: SLIGHT DIFFICULTY
WALKING_DOWN_STEEP_HILLS: EXTREME DIFFICULTY
STANDING_FOR_15_MINUTES: MODERATE DIFFICULTY
STEPPING_UP_AND_DOWN_CURBS: NO DIFFICULTY AT ALL
SITTING_FOR_15_MINUTES: NO DIFFICULTY AT ALL
GOING_DOWN_1_FLIGHT_OF_STAIRS: MODERATE DIFFICULTY
WALKING_APPROXIMATELY_10_MINUTES: SLIGHT DIFFICULTY
LIGHT_TO_MODERATE_WORK: SLIGHT DIFFICULTY
HEAVY_WORK: MODERATE DIFFICULTY
HOS_ADL_HIGHEST_POTENTIAL_SCORE: 64
GETTING_INTO_AND_OUT_OF_A_BATHTUB: EXTREME DIFFICULTY
GETTING_INTO_AND_OUT_OF_AN_AVERAGE_CAR: SLIGHT DIFFICULTY
PUTTING_ON_SOCKS_AND_SHOES: NO DIFFICULTY AT ALL
DEEP_SQUATTING: UNABLE TO DO

## 2022-05-25 NOTE — PROGRESS NOTES
Subjective:  HPI  Physical Exam                    Objective:  System    Physical Exam    General     ROS    Assessment/Plan:    PROGRESS  REPORT    Progress reporting period is from 3/25/22 to 5/25/22.       SUBJECTIVE  Subjective changes noted by patient:  Still taking Naproxine 2x day. Not much change in leg. We have decided to change seat for computer use.       Current pain level is 0/10 but pain comes and goes .     Previous pain level was  6/10  .   Changes in function:  Yes, Modest  Adverse reaction to treatment or activity: None    OBJECTIVE  Changes noted in objective findings:  Yes, Management tools with modified seating and exercises for home plan  AROM and strength normal, but swelling in that limb is significant        ASSESSMENT/PLAN  Updated problem list and treatment plan: Diagnosis 1:  HIp and leg  Pain -  education and home program  Decreased function - therapeutic activities  STG/LTGs have been met or progress has been made towards goals:  Yes (See Goal flow sheet completed today.)  Assessment of Progress: The patient is no longer making progress in all 3 of the following areas: subjectively, objectively and functionally.  Self Management Plans:  Patient is independent in a home treatment program.    Shyann continues to require the following intervention to meet STG and LTG's:  PT intervention is no longer required to meet STG/LTG.    Recommendations:  This patient is ready to be discharged from therapy and continue their home treatment program.    Please refer to the daily flowsheet for treatment today, total treatment time and time spent performing 1:1 timed codes.

## 2022-06-24 NOTE — PROGRESS NOTES
[facility]    Outpatient Physical Therapy Discharge Note  Patient: Shyann Samuel  : 1940    Beginning/End Dates of Reporting Period:  21 to 22    Referring Provider: Dr. Starks    Therapy Diagnosis: Lymphedema L LE     Client Self Report: Pt got her new stocking. She is able to don herself    Objective Measurements:  Objective Measure: edema  Details: Lower leg is softer. She does have some pink areas on shin, seem to be diagonal lines from Tribute foam pattern  Objective Measure: L LE volume  Details: 5964.53mL, increased from start of care <.5%     Goals:  Goal Identifier Volume L LE-Discontinued   Goal Description Pt will have 10% decreased L LE volume to return closer to baseline and make for easier mobility.   Target Date 21   Date Met      Progress (detail required for progress note): not met, -8.1% 21 but then refill in garments on 21     Goal Identifier Compression   Goal Description Pt will be independent with use of new compression garments to better manage her progressive L LE lymphedema and reduce risk of infection.   Target Date 21   Date Met  21 (unable to assess within target date)   Progress (detail required for progress note):       Goal Identifier Maintenance-updated   Goal Description Pt will maintain reduction with volume below 5800mL to demonstrate independence with  home management reducing risk of exacerbation.   Target Date 11/10/21   Date Met      Progress (detail required for progress note): Initial goal was to be within 3% of lowest reduction, goal changed       Plan:  Discharge from therapy. Pt seen for 13 visits since start of care on 21.  She has not been seen since 21.  Pt would need new order to return to therapy as her referral has .    Discharge:    Reason for Discharge: Patient has failed to schedule further  appointments.    Equipment Issued: pt did get new custom thigh high stocking    Discharge Plan: Patient to continue home program.

## 2022-08-25 DIAGNOSIS — I10 PRIMARY HYPERTENSION: Primary | ICD-10-CM

## 2022-08-25 DIAGNOSIS — E78.5 HYPERLIPIDEMIA, UNSPECIFIED HYPERLIPIDEMIA TYPE: ICD-10-CM

## 2022-08-25 NOTE — PROGRESS NOTES
Orders placed for BMP and lipid profile for med refills of lisinopril and simvastatin. Pt will be due for refill in about 2 months and will schedule a future, unrelated appointment for other concerns and have labs done at that time.    Mango ROMERO, RN  08/25/22 4:39 PM

## 2022-08-26 ENCOUNTER — TELEPHONE (OUTPATIENT)
Dept: FAMILY MEDICINE | Facility: CLINIC | Age: 82
End: 2022-08-26

## 2022-08-26 NOTE — TELEPHONE ENCOUNTER
----- Message from Aleksandra Starks MD sent at 8/25/2022  9:58 PM CDT -----  Regarding: needs clinic appt  Please call Shyann to help arrange an appt with me for hyperlipidemia and hypertension in the next month..  No need for her to fast.    If she has additional concerns, extend appt to 40 min.    Thanks    Aleksandra    
Called patient and left them a voice mail  
No Exposure

## 2022-08-31 ENCOUNTER — OFFICE VISIT (OUTPATIENT)
Dept: FAMILY MEDICINE | Facility: CLINIC | Age: 82
End: 2022-08-31
Payer: MEDICARE

## 2022-08-31 VITALS
HEIGHT: 63 IN | OXYGEN SATURATION: 96 % | HEART RATE: 101 BPM | SYSTOLIC BLOOD PRESSURE: 120 MMHG | BODY MASS INDEX: 31.01 KG/M2 | WEIGHT: 175 LBS | DIASTOLIC BLOOD PRESSURE: 77 MMHG | TEMPERATURE: 97.4 F

## 2022-08-31 DIAGNOSIS — L60.0 INGROWN NAIL: ICD-10-CM

## 2022-08-31 DIAGNOSIS — I89.0 LYMPHEDEMA: ICD-10-CM

## 2022-08-31 DIAGNOSIS — Z72.821 POOR SLEEP HYGIENE: ICD-10-CM

## 2022-08-31 DIAGNOSIS — I10 ESSENTIAL HYPERTENSION: Primary | ICD-10-CM

## 2022-08-31 DIAGNOSIS — M79.651 PAIN IN BOTH THIGHS: ICD-10-CM

## 2022-08-31 DIAGNOSIS — M79.652 PAIN IN BOTH THIGHS: ICD-10-CM

## 2022-08-31 DIAGNOSIS — E78.5 HYPERLIPIDEMIA, UNSPECIFIED HYPERLIPIDEMIA TYPE: ICD-10-CM

## 2022-08-31 LAB
CHOLEST SERPL-MCNC: 175 MG/DL
HDLC SERPL-MCNC: 43 MG/DL
LDLC SERPL CALC-MCNC: 78 MG/DL
NONHDLC SERPL-MCNC: 132 MG/DL
TRIGL SERPL-MCNC: 268 MG/DL

## 2022-08-31 PROCEDURE — 82040 ASSAY OF SERUM ALBUMIN: CPT | Performed by: INTERNAL MEDICINE

## 2022-08-31 PROCEDURE — 80061 LIPID PANEL: CPT | Performed by: INTERNAL MEDICINE

## 2022-08-31 PROCEDURE — 80053 COMPREHEN METABOLIC PANEL: CPT | Performed by: INTERNAL MEDICINE

## 2022-08-31 RX ORDER — SIMVASTATIN 20 MG
20 TABLET ORAL AT BEDTIME
Qty: 90 TABLET | Refills: 3 | Status: CANCELLED | OUTPATIENT
Start: 2022-08-31

## 2022-08-31 RX ORDER — COVID-19 ANTIGEN TEST
220 KIT MISCELLANEOUS 2 TIMES DAILY WITH MEALS
COMMUNITY
Start: 2021-10-01 | End: 2022-10-21

## 2022-08-31 RX ORDER — LISINOPRIL 5 MG/1
5 TABLET ORAL DAILY
Qty: 90 TABLET | Refills: 3 | Status: CANCELLED | OUTPATIENT
Start: 2022-08-31

## 2022-08-31 NOTE — PROGRESS NOTES
"Shyann Samuel is a 82 year old female here for the following issues:    Essential hypertension  Shyann takes lisinopril 5 mg daily to treat HTN. She is here for medication refills and labs today. Denies chest pain. Blood pressure is in good range. No chest pain or palpitations reported.     BP Readings from Last 3 Encounters:   08/31/22 120/77   10/06/21 133/82   10/01/21 126/79     Hyperlipidemia, unspecified hyperlipidemia type  Shyann has a history of hyperlipidemia and takes simvastatin 20 mg daily. She has eaten today. She is here for labs and refills. No hx of ASCVD or stroke. Nonsmoker.     Recent Labs   Lab Test 10/01/21  1218 09/18/20  0952 07/03/19  1049   CHOL 159 174.0 180.0   HDL 57 60.0 62.0   LDL 59 75.0 82.0   TRIG 216* 199.0* 178.0*   CHOLHDLRATIO  --  2.9 2.9     Insomnia  At our last viist, Shyann reported she had met with sleep medicine provider to address her longstanding history of insomnia. She was given several pieces of advice by the sleep medicine clinic to help improve her sleep hygiene.    Today, she notes her sleep has continued to improve. She has had \"some good nights and some bad nights\".    Posterior Thigh Pain  Siri has seen Dr. Mccloud in the past for anterior thigh aches while walking on 4/12/21. She was encouraged to follow up with physical therapy, use a walker and follow up with the lymphedema clinic for reassessment. Today, she notes she had been in PT, but has had some difficulty reaching out to them. She notes her pain in the posterior thighs, which worsens with prolonged sitting.  She notes her pain is a \"quality of life\" concern, as it has started affecting her ability to complete daily tasks. She tried ibuprofen with minimal to no improvement. She has started taking naproxen for her pain. She believes her new compression stockings have caused this to worsen. She has concerns this could be sciatica. She has some left leg numbness. Currently denies any back " "pain.    Lymphedema  Siri has a history of lymphedema of her left leg. She wears a compression stocking on the left leg. She has not followed up with the lymphedema clinic recently, and notes she was told to make appointments for lymphedema massage externally.    Ingrown Nail  Siri has an ingrown nail on the left foot. She believes this has been caused by her pressure stockings. She's concerned that the toenails appear \"deformed\". She would like to see a podiatrist.         Patient Active Problem List   Diagnosis     Chronic bilateral low back pain with sciatica     Status post hip replacement     Hyperlipidemia     Anterior corneal dystrophy     Hypertension     Insomnia     Osteoarthritis     Cataract     Dermatitis medicamentosa     Esotropia     Macular puckering     Nontoxic multinodular goiter     Posterior vitreous detachment     Recurrent UTI     Other symptoms involving nervous and musculoskeletal systems(921.99)     Atrophic vaginitis     Overactive bladder     Back pain     Health Care Home     Lower extremity edema     Spinal stenosis of lumbar region with neurogenic claudication     Brow ptosis, bilateral     Dermatochalasis of both upper eyelids     Ptosis of eyelid, bilateral     Pain in joint, pelvic region and thigh, unspecified laterality     Pain of right thigh     Iliotibial band syndrome, right     Mood change     Pain of left thigh       Current Outpatient Medications   Medication Sig Dispense Refill     calcium carbonate 600 mg-vitamin D 400 units (CALCIUM CARB-CHOLECALCIFEROL) 600-400 MG-UNIT per tablet Take 1 tablet by mouth       cyanocolbalamin (VITAMIN  B-12) 1000 MCG tablet Take 1 tablet by mouth three times a week.       lisinopril (ZESTRIL) 5 MG tablet Take 1 tablet (5 mg) by mouth daily 90 tablet 3     naproxen sodium 220 MG capsule Take 220 mg by mouth 2 times daily (with meals)       NEW MED Barry Raya       Omega-3 Fatty Acids (OMEGA-3 FISH OIL PO) Take  by mouth.       " "simvastatin (ZOCOR) 20 MG tablet Take 1 tablet (20 mg) by mouth At Bedtime 90 tablet 3       Allergies   Allergen Reactions     Benzalkonium Chloride      Eye irritation     Nitrofurantoin Other (See Comments)     Causes hast heart rate.  Causes fast heart rate.        EXAM  /77   Pulse 101   Temp 97.4  F (36.3  C)   Ht 1.607 m (5' 3.27\")   Wt 79.4 kg (175 lb)   SpO2 96%   BMI 30.74 kg/m    Gen: Alert, pleasant, NAD  Lower extremities: asymmetry, left leg with edema>R. Left leg with compression stocking in place      Assessment:  (I10) Essential hypertension  (primary encounter diagnosis)  Comment: BP in target range  Plan: Comprehensive metabolic panel        Labs obtained, will plan to continue lisinopril daily    (E78.5) Hyperlipidemia, unspecified hyperlipidemia type  Comment: not fasting, on simvastatin. Total and LDL cholesterol in target range  Recent Labs   Lab Test 08/31/22  1618 10/01/21  1218 09/18/20  0952 07/03/19  1049   CHOL 175 159 174.0 180.0   HDL 43* 57 60.0 62.0   LDL 78 59 75.0 82.0   TRIG 268* 216* 199.0* 178.0*   CHOLHDLRATIO  --   --  2.9 2.9   Plan: Comprehensive metabolic panel, Lipid Profile        Labs obtained today.    (I89.0) Lymphedema  Comment: long standing history of left sided leg swelling, which occurred after a fall.  Plan: Lymphedema Therapy Referral        Encouraged to reestablish care with lymphedema clinic. Referral is given    (M79.651,  M79.652) Pain in both thighs  Comment: Continued pain of the posterior thigh  Plan: Encouraged follow up with Dr. Mccloud for further evaluation/ treatment plan    (Z72.821) Poor sleep hygiene  Comment: Improving with improved sleep hygiene after appointment with sleep medicine  Plan: Encouraged to continue addressing sleep hygiene    (L60.0) Ingrown nail  Comment: left side  Plan: Orthopedic  Referral        Referred to podiatry for evaluation and treatment.    28 minutes spend on the date of this encounter doing " chart review, history and exam, documentation and further activities as noted above.       Aleksandra Starks MD  Internal Medicine/Pediatrics      I, Suresh Cordova, am serving as a scribe to document services personally performed by Dr. Aleksandra Starks, based on data collection and the provider's statements to me. Dr. Starks has reviewed, edited, and approved the above note.

## 2022-08-31 NOTE — PATIENT INSTRUCTIONS
Schedule with Dr. Mccloud to address posterior thigh pain  [facility]

## 2022-09-02 LAB
ALBUMIN SERPL BCG-MCNC: 4.1 G/DL (ref 3.5–5.2)
ALP SERPL-CCNC: 52 U/L (ref 35–104)
ALT SERPL W P-5'-P-CCNC: 12 U/L (ref 10–35)
ANION GAP SERPL CALCULATED.3IONS-SCNC: 8 MMOL/L (ref 7–15)
AST SERPL W P-5'-P-CCNC: 20 U/L (ref 10–35)
BILIRUB SERPL-MCNC: 0.2 MG/DL
BUN SERPL-MCNC: 24.1 MG/DL (ref 8–23)
CALCIUM SERPL-MCNC: 9.4 MG/DL (ref 8.8–10.2)
CHLORIDE SERPL-SCNC: 105 MMOL/L (ref 98–107)
CREAT SERPL-MCNC: 0.62 MG/DL (ref 0.51–0.95)
DEPRECATED HCO3 PLAS-SCNC: 24 MMOL/L (ref 22–29)
GFR SERPL CREATININE-BSD FRML MDRD: 89 ML/MIN/1.73M2
GLUCOSE SERPL-MCNC: 90 MG/DL (ref 70–99)
POTASSIUM SERPL-SCNC: 4.4 MMOL/L (ref 3.4–5.3)
PROT SERPL-MCNC: 6.8 G/DL (ref 6.4–8.3)
SODIUM SERPL-SCNC: 137 MMOL/L (ref 136–145)

## 2022-09-03 RX ORDER — NAPROXEN SODIUM 220 MG
TABLET ORAL
Qty: 180 TABLET | Refills: 3 | COMMUNITY
Start: 2022-09-03 | End: 2024-01-01

## 2022-09-07 ENCOUNTER — OFFICE VISIT (OUTPATIENT)
Dept: FAMILY MEDICINE | Facility: CLINIC | Age: 82
End: 2022-09-07
Payer: MEDICARE

## 2022-09-07 VITALS
RESPIRATION RATE: 12 BRPM | DIASTOLIC BLOOD PRESSURE: 74 MMHG | SYSTOLIC BLOOD PRESSURE: 119 MMHG | HEART RATE: 85 BPM | OXYGEN SATURATION: 98 % | TEMPERATURE: 98 F

## 2022-09-07 DIAGNOSIS — M76.892 HAMSTRING TENDONITIS OF LEFT THIGH: Primary | ICD-10-CM

## 2022-09-07 DIAGNOSIS — M25.552 HIP PAIN, LEFT: ICD-10-CM

## 2022-09-07 DIAGNOSIS — I89.0 LYMPHEDEMA: ICD-10-CM

## 2022-09-07 ASSESSMENT — PAIN SCALES - GENERAL: PAINLEVEL: MILD PAIN (3)

## 2022-09-07 NOTE — PROGRESS NOTES
"Medical assistant intake:  Shyann Samuel is a 82 year old female who presents to [facility] today for Musculoskeletal Problem (Complains of having pain of on the back of her thighs and legs when she sits too long. Pain is about a 3. )        ASSESSMENT/PLAN:    1. LEFT proximal hamstring tendinopathy in an 83 yo with LEFT leg and thigh lymphedema.    - Suggested a Lymphedema specialty clinic and in fact, Shyann has an appt with them in about 10 days. I printed out the referral for Shyann.   - Discussed referral for a new X-ray of the LEFT hip and Shyann agreed. Referral written. Call [phone] or [phone] to schedule imaging tests at the [facility].   - Discussed that if her LEFT knee becomes more bothersome, she may return to see me for a knee cortisone injection      --Prasanth Mccloud MD      SUBJECTIVE:     I have seen Shyann on only 1 occasion, that was in April 2021:   At that time, the Assessment and Plan were:    Assessment-Shyann is an 80 yo with anterior thigh aches when she starts walking. Likely a combination of some thigh weakness along with biomechanical stress on RIGHT inner thigh given asymmetry of LEFT leg with varus alignment and RIGHT leg with normal alignment. Also with lymphedema in LEFT leg, chronic.  Plan-   - Suggested returning to physical therapy. Ask for Kat Wayne if available.   - Also, suggested obtaining a light walker, e.g. \"Able Life Space Saver Walker, Lightweight Folding Mobility Rolling Walker for Seniors and Adults, 6-inch Wheels and Ski Glides, [place]\"  - Call the Lymphedema clinic for reassessment of LEFT lower leg.   - Also, call for an appt with Prerna Snell MD as you said that he wanted to see you for follow up given the replacement was so many years ago.\"    She went to see Jed Lozano in Physical therapy and did well.  However now she is with pain in the left buttocks and proximal hamstring region    Pains are generally " worse when sitting at computer and then pain resolves when she'd lie down, but when she'd lie down, she'd fall asleep.     Shyann has LEFT leg lymphedema and was prescribed a higher compression stocking and believes that the higher pressure may a precipitant of the LEFT buttocks pain.     Review Of Systems:    Has otherwise been in usual state of health, e.g.   Cardiovascular: negative  Respiratory: No shortness of breath, dyspnea on exertion, cough, or hemoptysis  Gastrointestinal: negative  Genitourinary: negative    Problem list per EMR:  Patient Active Problem List   Diagnosis     Chronic bilateral low back pain with sciatica     Status post hip replacement     Hyperlipidemia     Anterior corneal dystrophy     Hypertension     Insomnia     Osteoarthritis     Cataract     Dermatitis medicamentosa     Esotropia     Macular puckering     Nontoxic multinodular goiter     Posterior vitreous detachment     Recurrent UTI     Other symptoms involving nervous and musculoskeletal systems(781.99)     Atrophic vaginitis     Overactive bladder     Back pain     Health Care Home     Lower extremity edema     Spinal stenosis of lumbar region with neurogenic claudication     Brow ptosis, bilateral     Dermatochalasis of both upper eyelids     Ptosis of eyelid, bilateral     Pain in joint, pelvic region and thigh, unspecified laterality     Pain of right thigh     Iliotibial band syndrome, right     Mood change     Pain of left thigh       Current Outpatient Medications   Medication Sig Dispense Refill     calcium carbonate 600 mg-vitamin D 400 units (CALCIUM CARB-CHOLECALCIFEROL) 600-400 MG-UNIT per tablet Take 1 tablet by mouth 2 times daily 180 tablet 3     cyanocolbalamin (VITAMIN  B-12) 1000 MCG tablet Take 1 tablet by mouth three times a week.       lisinopril (ZESTRIL) 5 MG tablet Take 1 tablet (5 mg) by mouth daily 90 tablet 3     naproxen sodium (ANAPROX) 220 MG tablet Take one tablet once or twice daily 180 tablet 3      naproxen sodium 220 MG capsule Take 220 mg by mouth 2 times daily (with meals)       NEW MED Barry Raya       Omega-3 Fatty Acids (OMEGA-3 FISH OIL PO) Take  by mouth.       simvastatin (ZOCOR) 20 MG tablet Take 1 tablet (20 mg) by mouth At Bedtime 90 tablet 3       Allergies   Allergen Reactions     Benzalkonium Chloride      Eye irritation     Nitrofurantoin Other (See Comments)     Causes hast heart rate.  Causes fast heart rate.        Social:   Lives near the [employer].  Drives to clinic visits.      OBJECTIVE    Vitals: /74 (BP Location: Left arm, Patient Position: Chair, Cuff Size: Adult Large)   Pulse 85   Temp 98  F (36.7  C)   Resp 12   SpO2 98%   BMI= There is no height or weight on file to calculate BMI.  She appears well and in no distress.  She has a large compression stocking that goes up to her thigh on the left leg as the left leg has notable lymphedema throughout.  The right leg does not appear to have any swelling or lymphedema.  She is able to rise from a seated position without difficulty and can do repeated heel raises without any signs of weakness.  She is able to get herself up onto the examination table and extend her left hip with a negative straight leg raise.    Area of maximal discomfort is in the proximal hamstring on the left leg.  Her strength appears intact in the hamstring.  The hip has slightly limited range of motion but without any groin pain.    X-rays from 2021 are reviewed showing arthritis of the left hip and the left knee.  She also has a total hip replacement on the right side.    SEE TOP OF NOTE FOR ASSESSMENT AND PLAN    --Prasanth Mccloud MD  [facility], Department of Family Medicine and Community Health

## 2022-09-07 NOTE — PATIENT INSTRUCTIONS
ASSESSMENT/PLAN:    LEFT proximal hamstring tendinopathy in an 81 yo with LEFT leg and thigh lymphedema.    - Suggested a Lymphedema specialty clinic and in fact, Shyann has an appt with them in about 10 days. I printed out the referral for Shyann.   - Discussed referral for a new X-ray of the LEFT hip and Shyann agreed. Referral written. Call [phone] or [phone] to schedule imaging tests at the [facility].   - Discussed that if her LEFT knee becomes more bothersome, she may return to see me for a knee cortisone injection      --Prasanth Mccloud MD

## 2022-09-07 NOTE — NURSING NOTE
82 year old  Chief Complaint   Patient presents with     Musculoskeletal Problem     Complains of having pain of on the back of her thighs and legs when she sits too long. Pain is about a 3.        Blood pressure 119/74, pulse 85, temperature 98  F (36.7  C), resp. rate 12, SpO2 98 %, not currently breastfeeding. There is no height or weight on file to calculate BMI.  Patient Active Problem List   Diagnosis     Chronic bilateral low back pain with sciatica     Status post hip replacement     Hyperlipidemia     Anterior corneal dystrophy     Hypertension     Insomnia     Osteoarthritis     Cataract     Dermatitis medicamentosa     Esotropia     Macular puckering     Nontoxic multinodular goiter     Posterior vitreous detachment     Recurrent UTI     Other symptoms involving nervous and musculoskeletal systems(781.99)     Atrophic vaginitis     Overactive bladder     Back pain     Health Care Home     Lower extremity edema     Spinal stenosis of lumbar region with neurogenic claudication     Brow ptosis, bilateral     Dermatochalasis of both upper eyelids     Ptosis of eyelid, bilateral     Pain in joint, pelvic region and thigh, unspecified laterality     Pain of right thigh     Iliotibial band syndrome, right     Mood change     Pain of left thigh       Wt Readings from Last 2 Encounters:   08/31/22 79.4 kg (175 lb)   10/06/21 77.1 kg (170 lb)     BP Readings from Last 3 Encounters:   09/07/22 119/74   08/31/22 120/77   10/06/21 133/82         Current Outpatient Medications   Medication     calcium carbonate 600 mg-vitamin D 400 units (CALCIUM CARB-CHOLECALCIFEROL) 600-400 MG-UNIT per tablet     cyanocolbalamin (VITAMIN  B-12) 1000 MCG tablet     lisinopril (ZESTRIL) 5 MG tablet     naproxen sodium (ANAPROX) 220 MG tablet     naproxen sodium 220 MG capsule     NEW MED     Omega-3 Fatty Acids (OMEGA-3 FISH OIL PO)     simvastatin (ZOCOR) 20 MG tablet     No current facility-administered medications for this visit.        Social History     Tobacco Use     Smoking status: Never Smoker     Smokeless tobacco: Never Used   Substance Use Topics     Alcohol use: No     Drug use: No       Health Maintenance Due   Topic Date Due     DEXA  Never done     URINE DRUG SCREEN  Never done     ZOSTER IMMUNIZATION (1 of 2) Never done     MEDICARE ANNUAL WELLNESS VISIT  Never done     FALL RISK ASSESSMENT  12/29/2021     COVID-19 Vaccine (3 - Booster for Gio series) 04/08/2022     INFLUENZA VACCINE (1) 09/01/2022       No results found for: MILEY Vazquez CMA, CMA  September 7, 2022 1:16 PM

## 2022-09-19 ENCOUNTER — HOSPITAL ENCOUNTER (OUTPATIENT)
Dept: PHYSICAL THERAPY | Facility: CLINIC | Age: 82
Setting detail: THERAPIES SERIES
Discharge: HOME | End: 2022-09-19
Attending: INTERNAL MEDICINE
Payer: MEDICARE

## 2022-09-19 DIAGNOSIS — I89.0 LYMPHEDEMA: ICD-10-CM

## 2022-09-19 PROCEDURE — 97535 SELF CARE MNGMENT TRAINING: CPT | Mod: GP | Performed by: PHYSICAL THERAPIST

## 2022-09-19 PROCEDURE — 97161 PT EVAL LOW COMPLEX 20 MIN: CPT | Mod: GP | Performed by: PHYSICAL THERAPIST

## 2022-09-20 NOTE — PROGRESS NOTES
[facility]          OUTPATIENT PHYSICAL THERAPY EDEMA EVALUATION  PLAN OF TREATMENT FOR OUTPATIENT REHABILITATION  (COMPLETE FOR INITIAL CLAIMS ONLY)  Patient's Last Name, First Name, Shyann Carballo                              Provider s Name:   [facility] Medical Record No.  [id number]     Start of Care Date:  09/19/22   Onset Date:  01/01/17   Type:  PT   Medical Diagnosis:  (P) Lymphedema   Therapy Diagnosis:  (P) Lymphedema Visits from SOC:  1                                     __________________________________________________________________________________   Plan of Treatment/Functional Goals:    (P) Fit for compression garment, Gradient compression bandaging, Manual lymph drainage, Home management program development, ADL training        GOALS  1. Goal description: (P) Pt will use home management strategies increase her sitting tolerance to 60minutes for improved quality of life.       Target date: (P) 12/17/22  2. Goal description: (P) Pt will use least restrictive daytime stocking to maintain stable measurements with no greater than 3% increased volume to prevent progression or exacerbation of lymphedema.       Target date: (P) 03/17/23             Treatment Frequency: (P) Other (see comments)   Treatment duration: (P) 1x/mo for 6 months    Carolee Snyder, PT                                  I CERTIFY THE NEED FOR THESE SERVICES FURNISHED UNDER THIS PLAN OF TREATMENT AND WHILE UNDER MY CARE     (Physician signature in associated progress note indicates review and certification of the therapy plan)                Certification date from: (P) 09/19/22       Certification date to: (P) 12/17/22           Referring physician: Dr. Aleksandra Starks   Initial Assessment  See Epic Evaluation- Start of care: 09/19/22                                                                                                   09/19/22 1400   Quick Adds   Quick Adds Certification   Rehab Discipline   Discipline PT   Type of Visit   Type of visit Initial Edema Evaluation       present No   General Information   Start of care 09/19/22   Referring physician Dr. Aleksandra Starks   Orders Evaluate and treat as indicated   Order date 08/31/22   Medical diagnosis Lymphedema   Onset of illness / date of surgery 01/01/22   Edema onset 01/01/17   Affected body parts LLE   Edema etiology Trauma   Edema etiology comments Pt initially with edema following at fall in January 2017. She used an over the counter thigh high for management.  She was experiencing back pin in 2018 and mid-2018 she started to use gabapentin which caused swelling to increase and her L LE has not been the same since.   Pertinent history of current problem (PT: include personal factors and/or comorbidities that impact the POC; OT: include additional occupational profile info) Pt reports that she was doing okay, stable.  She has had history of L LE lymphedema and R CONSTANCE and ITB syndrome. Pt's edema had gotten worse and during last episode she increased her compression to CCL3 custom thigh high. Pt developed pain sometime ago (we'll say start of year) and tried PT but her pain continued when sitting and at night when trying to sleep. She got a new computer chair which did not help.  She was given dx of tendinopathy but points to pain in her hamstring muscle belly.  Pt also with toenail issues, 4th nail is growing sidesways.  Pt presents for recommendations on garments, she is also going to see podiatry. Pt wears her custom CCL3 stocking or RTW CCL2 daily. She uses her Entre pump daily and uses her Tribute overnight, wrapping over the Tribute some nights but not applying GCB at all.   Surgical / medical history reviewed Yes   Prior level of functional mobility Pt has been using cane outside of the home for quite a few years since hip  surgeyr   Prior treatment Complete decongestive therapy;Compression pump;Compression garments;Elevation   Patient role / employment history Retired   Psychosocial concerns Impaired coping;Impaired sleep  (Pain)   Living environment [facility]   Current assistive devices Standard cane   Assistive device comments more like a walking pole   Fall Risk Screen   Fall screen completed by PT   Have you fallen 2 or more times in the past year? No   Have you fallen and had an injury in the past year? No   Is patient a fall risk? No   Fall screen comments Had 1 fall, did a stupid thing but won't do that again.   System Outcome Measures   Outcome Measures Lymphedema   Lymphedema Life Impact Scale (score range 0-72). A higher score indicates greater impairment. 15   Subjective Report   Patient report of symptoms thigh pain and toe issues are concern   Patient / Family Goals   Patient / family goals statement Recommendations on compression   Pain   Patient currently in pain Yes   Pain location L posterior thigh, later in day she get L ankle and heel pain.   Pain description Sharp;Ache;Deep  (Thigh pain is deep ache, ankle gets stabbing pain later in day)   Pain comments Has had PT, may go to pain clinic.  She has constant pain but does improve with lying down.   Cognitive Status   Orientation Orientation to person, place and time   Level of consciousness Alert   Follows commands and answers questions 100% of the time   Personal safety and judgement Intact   Memory Intact   Edema Exam / Assessment   Skin condition Non-pitting;Dryness;Intact   Skin condition comments L LE with hyperplasia from toes to upper thigh, no pitting but able to mobilize fluid from dorsum of foot with pressure.   Stemmer sign Positive   Stemmer sign comments L foot   Girth Measurements   Girth Measurements Refer to separate girth measurement flowsheet   Volume LE   Right LE (mL) 3861.74   Left LE (mL) 5607.86  (stable, less than discharge volume  9/2021 by about 6%)   LE volume comparison LLE volume greater than RLE volume   Range of Motion   ROM comments Improved from last episode, pt able to flex hip and knee to cross L foot over R knee   Strength   Strength comments no sig change, demonstrates heel and toe raises with UE support and standing from chair without arms, though usually uses arms for ease   Posture   Posture Forward head position   Activities of Daily Living   Activities of Daily Living independent   Bed Mobility   Bed mobility independent   Transfers   Transfers independent   Gait / Locomotion   Gait / Locomotion independent but uses walking pole/cane when outside of home   Sensory   Sensory perception comments intact   Coordination   Coordination Gross motor coordination appropriate   Muscle Tone   Muscle tone No deficits were identified   Planned Edema Interventions   Planned edema interventions Fit for compression garment;Gradient compression bandaging;Manual lymph drainage;Home management program development;ADL training   Clinical Impression   Criteria for skilled therapeutic intervention met Yes   Therapy diagnosis Lymphedema   Influenced by the following impairments / conditions Stage 2   Functional limitations due to impairments / conditions Pt having pain in thigh and toes, risk of worsening edema, fibrosis and infection   Clinical Presentation Evolving/Changing   Clinical Presentation Rationale Pt with new pain over the past year, new toe problems, concern that garments/edema management is contributing to new symptoms   Clinical Decision Making (Complexity) Low complexity   Treatment Frequency Other (see comments)   Treatment duration 1x/mo for 6 months   Patient / family and/or staff in agreement with plan of care Yes   Risks and benefits of therapy have been explained Yes   Clinical impression comments Pt with pain with longer periods of sitting.  Wonders if the tight stocking is the culprit, seems more like positional/mechanical  possibly from spinal position. Pt is going to trial different compression, CLT to help monitor and advice on changes to home program to account for decreased compression.   Education Assessment   Preferred learning style Listening;Reading;Demonstration;Pictures / video   Barriers to learning No barriers   Goals   Edema Eval Goals 1;2   Goal 1   Goal identifier Home Management   Goal description Pt will use home management strategies increase her sitting tolerance to 60minutes for improved quality of life.   Target date 12/17/22   Goal 2   Goal identifier Compression   Goal description Pt will use least restrictive daytime stocking to maintain stable measurements with no greater than 3% increased volume to prevent progression or exacerbation of lymphedema.   Target date 03/17/23   Total Evaluation Time   PT Vy Low Complexity Minutes (60525) 20   Certification   Certification date from 09/19/22   Certification date to 12/17/22   Medical Diagnosis Lymphedema

## 2022-10-05 ENCOUNTER — MYC MEDICAL ADVICE (OUTPATIENT)
Dept: FAMILY MEDICINE | Facility: CLINIC | Age: 82
End: 2022-10-05

## 2022-10-05 DIAGNOSIS — E78.5 HYPERLIPIDEMIA, UNSPECIFIED HYPERLIPIDEMIA TYPE: ICD-10-CM

## 2022-10-05 DIAGNOSIS — I10 ESSENTIAL HYPERTENSION: ICD-10-CM

## 2022-10-05 RX ORDER — SIMVASTATIN 20 MG
20 TABLET ORAL AT BEDTIME
Qty: 90 TABLET | Refills: 3 | Status: SHIPPED | OUTPATIENT
Start: 2022-10-05 | End: 2023-03-09

## 2022-10-05 RX ORDER — LISINOPRIL 5 MG/1
5 TABLET ORAL DAILY
Qty: 90 TABLET | Refills: 1 | Status: SHIPPED | OUTPATIENT
Start: 2022-10-05 | End: 2023-03-09

## 2022-10-05 NOTE — TELEPHONE ENCOUNTER
Simvastatin (Zocor) 20 mg + Lisinopril (Zestril) 5 mg    Last Office Visit: 9/7/22  [facility] Appointments: nONE  Medication last refilled:     10/6/21 #90 with 3 refill(s)-Simvastatin  10/6/21 #90 with 3 refill(s)-Lisinopril    Vital Signs 10/6/2021 8/31/2022 9/7/2022   Systolic 133 120 119   Diastolic 82 77 74     Required labs per protocol:    LAB REF RANGE 10/1/21 8/31/22   Creatinine 0.8-1.25 mg/dL 0.68 0.62   Potassium 3.4-5.3 mmol/L 4.8 4.4   LDL < 100 mg/dL 59 78     Prescription approved per [facility] Refill Protocol.    Shaina Conroy, PATN, RN, CCM

## 2022-10-19 ENCOUNTER — ANCILLARY PROCEDURE (OUTPATIENT)
Dept: GENERAL RADIOLOGY | Facility: CLINIC | Age: 82
End: 2022-10-19
Attending: FAMILY MEDICINE
Payer: MEDICARE

## 2022-10-19 DIAGNOSIS — M25.552 HIP PAIN, LEFT: ICD-10-CM

## 2022-10-19 PROCEDURE — 73502 X-RAY EXAM HIP UNI 2-3 VIEWS: CPT | Mod: GC | Performed by: RADIOLOGY

## 2022-10-21 ENCOUNTER — DOCUMENTATION ONLY (OUTPATIENT)
Dept: OTHER | Facility: CLINIC | Age: 82
End: 2022-10-21

## 2022-10-21 ENCOUNTER — TELEPHONE (OUTPATIENT)
Dept: PODIATRY | Facility: CLINIC | Age: 82
End: 2022-10-21

## 2022-10-21 ENCOUNTER — OFFICE VISIT (OUTPATIENT)
Dept: PODIATRY | Facility: CLINIC | Age: 82
End: 2022-10-21
Attending: INTERNAL MEDICINE
Payer: MEDICARE

## 2022-10-21 VITALS
SYSTOLIC BLOOD PRESSURE: 148 MMHG | HEART RATE: 93 BPM | BODY MASS INDEX: 31.01 KG/M2 | DIASTOLIC BLOOD PRESSURE: 92 MMHG | WEIGHT: 175 LBS | HEIGHT: 63 IN

## 2022-10-21 DIAGNOSIS — L60.0 INGROWN NAIL: ICD-10-CM

## 2022-10-21 PROCEDURE — 99203 OFFICE O/P NEW LOW 30 MIN: CPT | Performed by: PODIATRIST

## 2022-10-21 NOTE — PROGRESS NOTES
"Assessment:      ICD-10-CM    1. Ingrown nail  L60.0 Orthopedic  Referral    left side             Plan:    Pt was scheduled as ingrown nail.  Provider attempted to reschedule because permanent removal not an option for this patient.      Patient came in anyways to obtain advice on the nails \"growing sideways\"    Foot care nurse - trim nails    PCP for swelling & compression stockings        Quyen Mcneal DPM                              Chief Complaint:     Patient presents with:  Consult: Concerned about toenail growth       HPI:  Shyann Samuel is a 82 year old year old female who presents for foot concern.      Past Medical & Surgical History:  Past Medical History:   Diagnosis Date     Anterior corneal dystrophy      Back pain      Cataract      Chronic osteoarthritis      Esotropia      Hyperlipidemia      Hyperlipidemia      Hypertension      Insomnia      Osteoarthritis      Posterior vitreous detachment      Urinary tract infection       Past Surgical History:   Procedure Laterality Date     CATARACT IOL, RT/LT Bilateral ~     EYE SURGERY  after ,pt unsure    blepharoplasty     HC EXTERNAL LEVATOR RESECTION Bilateral 2014     LAMINECTOMY LUMBAR TWO LEVELS  2019    L1 to L5 laminectomy [facility]      REPAIR PTOSIS Bilateral 10/14/2020    Procedure: Bilateral upper eyelid ptosis repair, bilateral upper eyelid blepharoplasty, bilateral brow pexy;  Surgeon: Espinoza Burns MD;  Location: [facility] OR     TONSILLECTOMY  1945     [facility] TOTAL HIP ARTHROPLASTY Right 10/2009    Replacement of right hip      Family History   Problem Relation Age of Onset     Cerebrovascular Disease Mother         probably consequent to Crohn's disease  87     Crohn's Disease Mother      Osteoporosis Mother         probably consequent to Crohn's disease     Other Cancer Father         kidney cancer, benign brain tumor  86     Other Cancer Brother         adult neuroblastoma (dispute about " precise diagnos     Cancer Other         Father (kidney meningioma), Brother (neuroblastoma)     Glaucoma No family hx of      Macular Degeneration No family hx of      Melanoma No family hx of      Skin Cancer No family hx of         Social History:  ?  History   Smoking Status     Never   Smokeless Tobacco     Never     History   Drug Use No     Social History    Substance and Sexual Activity      Alcohol use: No      Allergies:  ?   Allergies   Allergen Reactions     Benzalkonium Chloride      Eye irritation     Nitrofurantoin Other (See Comments)     Causes hast heart rate.  Causes fast heart rate.        Medications:    Current Outpatient Medications   Medication     calcium carbonate 600 mg-vitamin D 400 units (CALCIUM CARB-CHOLECALCIFEROL) 600-400 MG-UNIT per tablet     cyanocolbalamin (VITAMIN  B-12) 1000 MCG tablet     lisinopril (ZESTRIL) 5 MG tablet     naproxen sodium (ANAPROX) 220 MG tablet     NEW MED     Omega-3 Fatty Acids (OMEGA-3 FISH OIL PO)     simvastatin (ZOCOR) 20 MG tablet     No current facility-administered medications for this visit.       Physical Exam:    Vitals:  [unfilled]  General:  WD/WN, in NAD.  A&O x3.  Dermatologic:  Skin is intact, open lesions absent.   Skin texture, turgor is abnormal, varicosities.  Vascular:  Pulses not palpable bilateral.  Generalized edema- pitting bilateral.  Neurologic:    Gross sensation normal.  Gait and balance abnormal, poor balance.  Musculoskeletal:  aROM digits, ankle intact.  Muscle strength intact to foot & ankle.  Deformity present- hallux valgus

## 2022-10-21 NOTE — PATIENT INSTRUCTIONS
PATIENT INSTRUCTIONS - Podiatry / Foot & Ankle Surgery        PCP for swelling and compression stockings      Here is a list of routine foot care resources, which includes toenail trimming and callus/corn management.     This is not a referral. It is your responsibility to contact the organization and your insurance to confirm cost and coverage.      ROUTINE FOOT CARE (NAIL TRIMMING / CALLUSES)      [facility] (in home)  [phone]   [facility] (out of pocket)  [phone]  Multiple locations   [facility]  [phone] [facility]  [phone]  Multiple locations   [facility]  [phone]  [facility]  [phone]  Multiple locations   [facility]  [phone]  Multiple locations [facility]   [phone]   Multiple locations

## 2022-10-21 NOTE — LETTER
"    10/21/2022         RE: Shyann Samuel  [address]  [place] [address]        Dear Colleague,    Thank you for referring your patient, Shyann Samuel, to the [facility]. Please see a copy of my visit note below.    Assessment:      ICD-10-CM    1. Ingrown nail  L60.0 Orthopedic  Referral    left side             Plan:    Pt was scheduled as ingrown nail.  Provider attempted to reschedule because permanent removal not an option for this patient.      Patient came in anyways to obtain advice on the nails \"growing sideways\"    Foot care nurse - trim nails    PCP for swelling & compression stockings        Quyen Mcneal DPM                              Chief Complaint:     Patient presents with:  Consult: Concerned about toenail growth       HPI:  Shyann Samuel is a 82 year old year old female who presents for foot concern.      Past Medical & Surgical History:  Past Medical History:   Diagnosis Date     Anterior corneal dystrophy      Back pain      Cataract      Chronic osteoarthritis      Esotropia      Hyperlipidemia      Hyperlipidemia      Hypertension      Insomnia      Osteoarthritis      Posterior vitreous detachment      Urinary tract infection       Past Surgical History:   Procedure Laterality Date     CATARACT IOL, RT/LT Bilateral ~2005     EYE SURGERY  after 2003,pt unsure    blepharoplasty     HC EXTERNAL LEVATOR RESECTION Bilateral 12/2014     LAMINECTOMY LUMBAR TWO LEVELS  01/24/2019    L1 to L5 laminectomy [facility]      REPAIR PTOSIS Bilateral 10/14/2020    Procedure: Bilateral upper eyelid ptosis repair, bilateral upper eyelid blepharoplasty, bilateral brow pexy;  Surgeon: Espinoza Burns MD;  Location: [facility] OR     TONSILLECTOMY  1945     [facility] TOTAL HIP ARTHROPLASTY Right 10/2009    Replacement of right hip      Family History   Problem Relation Age of Onset     Cerebrovascular Disease Mother         probably consequent to Crohn's " disease  87     Crohn's Disease Mother      Osteoporosis Mother         probably consequent to Crohn's disease     Other Cancer Father         kidney cancer, benign brain tumor  86     Other Cancer Brother         adult neuroblastoma (dispute about precise diagnos     Cancer Other         Father (kidney meningioma), Brother (neuroblastoma)     Glaucoma No family hx of      Macular Degeneration No family hx of      Melanoma No family hx of      Skin Cancer No family hx of         Social History:  ?  History   Smoking Status     Never   Smokeless Tobacco     Never     History   Drug Use No     Social History    Substance and Sexual Activity      Alcohol use: No      Allergies:  ?   Allergies   Allergen Reactions     Benzalkonium Chloride      Eye irritation     Nitrofurantoin Other (See Comments)     Causes hast heart rate.  Causes fast heart rate.        Medications:    Current Outpatient Medications   Medication     calcium carbonate 600 mg-vitamin D 400 units (CALCIUM CARB-CHOLECALCIFEROL) 600-400 MG-UNIT per tablet     cyanocolbalamin (VITAMIN  B-12) 1000 MCG tablet     lisinopril (ZESTRIL) 5 MG tablet     naproxen sodium (ANAPROX) 220 MG tablet     NEW MED     Omega-3 Fatty Acids (OMEGA-3 FISH OIL PO)     simvastatin (ZOCOR) 20 MG tablet     No current facility-administered medications for this visit.       Physical Exam:    Vitals:  [unfilled]  General:  WD/WN, in NAD.  A&O x3.  Dermatologic:  Skin is intact, open lesions absent.   Skin texture, turgor is abnormal, varicosities.  Vascular:  Pulses not palpable bilateral.  Generalized edema- pitting bilateral.  Neurologic:    Gross sensation normal.  Gait and balance abnormal, poor balance.  Musculoskeletal:  aROM digits, ankle intact.  Muscle strength intact to foot & ankle.  Deformity present- hallux valgus              Again, thank you for allowing me to participate in the care of your patient.        Sincerely,        Quyen Mcneal DPM

## 2022-10-21 NOTE — TELEPHONE ENCOUNTER
Spoke to patient discussed clarification on her appointment today. Wanted to clarify if she was coming in for a toenail removal or nail trim. She just wants to discuss her nail growth and see what she can do for further treatment options.     Phone Number patient can be reached at:  Cell number on file:    Telephone Information:   Mobile [phone]       Best Time:  any    Can we leave a detailed message on this number:  YES     Roxanne Buitrago MA on 10/21/2022 at 8:54 AM

## 2022-11-15 NOTE — OP NOTE
PREOPERATIVE DIAGNOSES:   1. Bilateral upper eyelid dermatochalasis.   2. Bilateral upper eyelid ptosis.   3. Bilateral brow ptosis.  POSTOPERATIVE DIAGNOSES:   1. Bilateral upper eyelid dermatochalasis.   2. Bilateral upper eyelid ptosis.   3. Bilateral brow ptosis.  PROCEDURES:  1. Bilateral upper eyelid ptosis repair by external levator resection.  2. Bilateral upper eyelid blepharoplasty.  3. Bilateral brow ptosis repair.  SURGEON: Espinoza Burns MD.  ASSISTANT: Chao Miles MD  ANESTHESIA: Monitored with local infiltration of 1% lidocaine with epinephrine [id number].   COMPLICATIONS: None.   ESTIMATED BLOOD LOSS: Less than 5 mL.   HISTORY: Shyann Samuel presented with upper eyelid drooping secondary to dermatochalasis and ptosis of the upper eyelids leading to blockage of the superior visual field and interfering with activities of daily living. After the risks, benefits and alternatives to the proposed procedure were explained, informed consent was obtained.   PROCEDURE: The patient was brought to the operating room and placed supine on the operating table. IV sedation was given. Each upper lid crease and the excess upper eyelid skin  was marked in a blepharoplasty ellipse with a marking pen.  These areas were all infiltrated with local anesthetic and  was prepped and draped in the typical sterile fashion for oculoplastic surgery. Attention was directed to the left side. The skin was incised following the marked lines. The skin flap was excised with a high temperature cautery. Hemostasis was obtained with high temperature cautery. The orbicularis and orbital septum were opened horizontally and levator aponeurosis identified and dissected from the superior tarsal border. A strip of pretarsal orbicularis was removed. A 5-0 Mersilene suture was placed in a horizontal mattress fashion taking a partial thickness bite of the superior tarsal border, bringing each end of the double armed suture underneath the  levator aponeurosis and tied in a temporary fashion. Attention was directed to the right side where the same procedure was performed. The sutures were adjusted for symmetry then tied in a permanent fashion. Attention was directed to the left side.  Dissection was carried in the suborbicularis plane over the orbital rim superolaterally.  A 4-0 PDS suture was passed through the marking at the inferior brow hairs.  This suture was then passed through the frontal bone periosteum 1 cm above the orbital rim.  This suture was then passed back through the orbicularis in line with the marking stitch which was pulled through and tied in a permanent fashion.  Attention was directed to the opposite side where the same procedures were performed. Each skin incision was closed with a running 6-0 plain gut suture. Ophthalmic antibiotic ointment was applied to the incisions and into both eyes. The patient tolerated the procedure well and left the operating room in stable condition.       COSTA QUICK MD    Birth Control Pills Pregnancy And Lactation Text: This medication should be avoided if pregnant and for the first 30 days post-partum.

## 2023-01-01 ENCOUNTER — OFFICE VISIT (OUTPATIENT)
Dept: ORTHOPEDICS | Facility: CLINIC | Age: 83
End: 2023-01-01
Payer: MEDICARE

## 2023-01-01 ENCOUNTER — OFFICE VISIT (OUTPATIENT)
Dept: FAMILY MEDICINE | Facility: CLINIC | Age: 83
End: 2023-01-01
Payer: MEDICARE

## 2023-01-01 ENCOUNTER — THERAPY VISIT (OUTPATIENT)
Dept: PHYSICAL THERAPY | Facility: CLINIC | Age: 83
End: 2023-01-01
Payer: MEDICARE

## 2023-01-01 ENCOUNTER — TELEPHONE (OUTPATIENT)
Dept: ORTHOPEDICS | Facility: CLINIC | Age: 83
End: 2023-01-01
Payer: MEDICARE

## 2023-01-01 VITALS
SYSTOLIC BLOOD PRESSURE: 129 MMHG | BODY MASS INDEX: 30.83 KG/M2 | HEART RATE: 80 BPM | RESPIRATION RATE: 15 BRPM | HEIGHT: 63 IN | WEIGHT: 174 LBS | TEMPERATURE: 97.6 F | OXYGEN SATURATION: 99 % | DIASTOLIC BLOOD PRESSURE: 82 MMHG

## 2023-01-01 DIAGNOSIS — Z01.818 PRE-OPERATIVE GENERAL PHYSICAL EXAMINATION: Primary | ICD-10-CM

## 2023-01-01 DIAGNOSIS — M17.12 PRIMARY OSTEOARTHRITIS OF LEFT KNEE: ICD-10-CM

## 2023-01-01 DIAGNOSIS — M17.12 PRIMARY OSTEOARTHRITIS OF LEFT KNEE: Primary | ICD-10-CM

## 2023-01-01 DIAGNOSIS — M21.962 ACQUIRED LEFT CALF ASYMMETRY: ICD-10-CM

## 2023-01-01 DIAGNOSIS — I10 ESSENTIAL HYPERTENSION: ICD-10-CM

## 2023-01-01 DIAGNOSIS — R73.03 PREDIABETES: ICD-10-CM

## 2023-01-01 LAB
ANION GAP SERPL CALCULATED.3IONS-SCNC: 10 MMOL/L (ref 7–15)
BUN SERPL-MCNC: 20.1 MG/DL (ref 8–23)
CALCIUM SERPL-MCNC: 9.1 MG/DL (ref 8.8–10.2)
CHLORIDE SERPL-SCNC: 107 MMOL/L (ref 98–107)
CREAT SERPL-MCNC: 0.58 MG/DL (ref 0.51–0.95)
DEPRECATED HCO3 PLAS-SCNC: 23 MMOL/L (ref 22–29)
EGFRCR SERPLBLD CKD-EPI 2021: 90 ML/MIN/1.73M2
ERYTHROCYTE [DISTWIDTH] IN BLOOD BY AUTOMATED COUNT: 13.3 % (ref 10–15)
GLUCOSE SERPL-MCNC: 105 MG/DL (ref 70–99)
HBA1C MFR BLD: 6.2 %
HCT VFR BLD AUTO: 46.6 % (ref 35–47)
HGB BLD-MCNC: 14.7 G/DL (ref 11.7–15.7)
MCH RBC QN AUTO: 29.5 PG (ref 26.5–33)
MCHC RBC AUTO-ENTMCNC: 31.5 G/DL (ref 31.5–36.5)
MCV RBC AUTO: 93 FL (ref 78–100)
PLATELET # BLD AUTO: 228 10E3/UL (ref 150–450)
POTASSIUM SERPL-SCNC: 4.4 MMOL/L (ref 3.4–5.3)
RBC # BLD AUTO: 4.99 10E6/UL (ref 3.8–5.2)
SODIUM SERPL-SCNC: 140 MMOL/L (ref 135–145)
WBC # BLD AUTO: 8.8 10E3/UL (ref 4–11)

## 2023-01-01 PROCEDURE — 83036 HEMOGLOBIN GLYCOSYLATED A1C: CPT | Mod: ORL | Performed by: INTERNAL MEDICINE

## 2023-01-01 PROCEDURE — 97161 PT EVAL LOW COMPLEX 20 MIN: CPT | Mod: GP | Performed by: PHYSICAL THERAPIST

## 2023-01-01 PROCEDURE — 97110 THERAPEUTIC EXERCISES: CPT | Mod: GP | Performed by: PHYSICAL THERAPIST

## 2023-01-01 PROCEDURE — 99214 OFFICE O/P EST MOD 30 MIN: CPT | Performed by: ORTHOPAEDIC SURGERY

## 2023-01-01 PROCEDURE — 80048 BASIC METABOLIC PNL TOTAL CA: CPT | Mod: ORL | Performed by: INTERNAL MEDICINE

## 2023-01-01 ASSESSMENT — ACTIVITIES OF DAILY LIVING (ADL)
AS_A_RESULT_OF_YOUR_KNEE_INJURY,_HOW_WOULD_YOU_RATE_YOUR_CURRENT_LEVEL_OF_DAILY_ACTIVITY?: ABNORMAL
WALK: ACTIVITY IS SOMEWHAT DIFFICULT
SIT WITH YOUR KNEE BENT: ACTIVITY IS NOT DIFFICULT
LIMPING: I DO NOT HAVE THE SYMPTOM
RAW_SCORE: 42
STIFFNESS: I DO NOT HAVE THE SYMPTOM
GIVING WAY, BUCKLING OR SHIFTING OF KNEE: I DO NOT HAVE THE SYMPTOM
SWELLING: I DO NOT HAVE THE SYMPTOM
SQUAT: I AM UNABLE TO DO THE ACTIVITY
GO DOWN STAIRS: ACTIVITY IS VERY DIFFICULT
RISE FROM A CHAIR: ACTIVITY IS MINIMALLY DIFFICULT
KNEE_ACTIVITY_OF_DAILY_LIVING_SUM: 42
WEAKNESS: I HAVE THE SYMPTOM BUT IT DOES NOT AFFECT MY ACTIVITY
GO UP STAIRS: ACTIVITY IS VERY DIFFICULT
KNEE_ACTIVITY_OF_DAILY_LIVING_SCORE: 60
KNEEL ON THE FRONT OF YOUR KNEE: I AM UNABLE TO DO THE ACTIVITY
HOW_WOULD_YOU_RATE_THE_OVERALL_FUNCTION_OF_YOUR_KNEE_DURING_YOUR_USUAL_DAILY_ACTIVITIES?: ABNORMAL
PAIN: THE SYMPTOM AFFECTS MY ACTIVITY SEVERELY
HOW_WOULD_YOU_RATE_THE_CURRENT_FUNCTION_OF_YOUR_KNEE_DURING_YOUR_USUAL_DAILY_ACTIVITIES_ON_A_SCALE_FROM_0_TO_100_WITH_100_BEING_YOUR_LEVEL_OF_KNEE_FUNCTION_PRIOR_TO_YOUR_INJURY_AND_0_BEING_THE_INABILITY_TO_PERFORM_ANY_OF_YOUR_USUAL_DAILY_ACTIVITIES?: 50
STAND: ACTIVITY IS SOMEWHAT DIFFICULT

## 2023-01-01 ASSESSMENT — ENCOUNTER SYMPTOMS
MUSCLE CRAMPS: 0
NECK PAIN: 0
ARTHRALGIAS: 1
MYALGIAS: 0
BACK PAIN: 0
STIFFNESS: 0
JOINT SWELLING: 0
MUSCLE WEAKNESS: 0

## 2023-01-25 NOTE — PROGRESS NOTES
[facility]    OUTPATIENT PHYSICAL THERAPY  PLAN OF TREATMENT FOR OUTPATIENT REHABILITATION AND PROGRESS NOTE    Patient's Last Name, First Name, Shyann Carballo                              Provider s Name:   [facility] Medical Record No.  [id number]     Start of Care Date:  09/19/22   Onset Date:  01/01/17   Type:  PT   Medical Diagnosis:  Lymphedema   Therapy Diagnosis:  Lymphedema Visits from SOC:  1                                     __________________________________________________________________________________   Plan of Treatment/Functional Goals:    Fit for compression garment, Gradient compression bandaging, Manual lymph drainage, Home management program development, ADL training        GOALS  1. Goal description: Pt will use home management strategies increase her sitting tolerance to 60minutes for improved quality of life.       Target date: 12/17/22- Not able to assess  2. Goal description: Pt will use least restrictive daytime stocking to maintain stable measurements with no greater than 3% increased volume to prevent progression or exacerbation of lymphedema.       Target date: 03/17/23    Beginning/End Dates of Progress Note Reporting Period:  9/19/22 to 12/17/22    Progress Toward Goals:   Not assessed this period. Pt has not returned since evaluation    Client Self (Subjective) Report for Progress Note Reporting Period: wants recs for compression, wondering if L LE swelling will increase in CCL2 stocking            Treatment Frequency: Other (see comments)   Treatment duration: 1x/mo for 6 months    Carolee Snyder, PT                                    I CERTIFY THE NEED FOR THESE SERVICES FURNISHED UNDER        THIS PLAN OF TREATMENT AND WHILE UNDER MY CARE     (Physician co-signature of this document indicates review and certification of the therapy plan).                    Certification date from: 12/18/22       Certification date to: 3/17/23           Referring physician: Dr. Aleksandra Starks

## 2023-02-12 ENCOUNTER — MYC MEDICAL ADVICE (OUTPATIENT)
Dept: FAMILY MEDICINE | Facility: CLINIC | Age: 83
End: 2023-02-12

## 2023-02-12 DIAGNOSIS — L60.8 TOENAIL DEFORMITY: Primary | ICD-10-CM

## 2023-02-24 DIAGNOSIS — M25.562 LEFT KNEE PAIN: Primary | ICD-10-CM

## 2023-02-24 ASSESSMENT — KOOS JR
GOING UP OR DOWN STAIRS: MODERATE
TWISING OR PIVOTING ON KNEE: SEVERE
STRAIGHTENING KNEE FULLY: MILD
KOOS JR SCORING: 70.7

## 2023-03-02 ENCOUNTER — OFFICE VISIT (OUTPATIENT)
Dept: ORTHOPEDICS | Facility: CLINIC | Age: 83
End: 2023-03-02
Payer: MEDICARE

## 2023-03-02 ENCOUNTER — ANCILLARY PROCEDURE (OUTPATIENT)
Dept: GENERAL RADIOLOGY | Facility: CLINIC | Age: 83
End: 2023-03-02
Attending: ORTHOPAEDIC SURGERY
Payer: MEDICARE

## 2023-03-02 DIAGNOSIS — M25.562 LEFT KNEE PAIN: ICD-10-CM

## 2023-03-02 DIAGNOSIS — M17.12 PRIMARY OSTEOARTHRITIS OF LEFT KNEE: Primary | ICD-10-CM

## 2023-03-02 PROCEDURE — 20610 DRAIN/INJ JOINT/BURSA W/O US: CPT | Mod: LT | Performed by: ORTHOPAEDIC SURGERY

## 2023-03-02 PROCEDURE — 73562 X-RAY EXAM OF KNEE 3: CPT | Mod: LT | Performed by: RADIOLOGY

## 2023-03-02 PROCEDURE — 99213 OFFICE O/P EST LOW 20 MIN: CPT | Mod: 25 | Performed by: ORTHOPAEDIC SURGERY

## 2023-03-02 RX ADMIN — TRIAMCINOLONE ACETONIDE 40 MG: 40 INJECTION, SUSPENSION INTRA-ARTICULAR; INTRAMUSCULAR at 14:48

## 2023-03-02 RX ADMIN — LIDOCAINE HYDROCHLORIDE 7 ML: 10 INJECTION, SOLUTION EPIDURAL; INFILTRATION; INTRACAUDAL; PERINEURAL at 14:48

## 2023-03-02 NOTE — LETTER
3/2/2023         RE: Shyann Samuel  [address]  [place] [address]        Dear Colleague,    Thank you for referring your patient, Shyann Samuel, to the [facility] [place]. Please see a copy of my visit note below.    .Chief Complaint: No chief complaint on file.       HPI: Shyann Samuel returns today in follow-up for her left knee. Known severe left knee osteoarthritis. Has developed and has had a work-up for severe unilateral, ipsilateral lyphedema. Current management is a compression stocking. Reports good relief from last injection and would like this repeated.     Status post hip replacement    Hyperlipidemia    Anterior corneal dystrophy    Hypertension    Insomnia    Cataract    Dermatitis medicamentosa    Esotropia    Macular puckering    Nontoxic multinodular goiter    Posterior vitreous detachment    Recurrent UTI    Other symptoms involving nervous and musculoskeletal systems(781.99)    Atrophic vaginitis    Overactive bladder    Health Care Home    Brow ptosis, bilateral    Dermatochalasis of both upper eyelids    Ptosis of eyelid, bilateral    Mood change    Lymphedema            Medications and allergies are documented in the EMR and have been reviewed.    Current Outpatient Medications:      calcium carbonate 600 mg-vitamin D 400 units (CALCIUM CARB-CHOLECALCIFEROL) 600-400 MG-UNIT per tablet, Take 1 tablet by mouth 2 times daily, Disp: 180 tablet, Rfl: 3     cyanocolbalamin (VITAMIN  B-12) 1000 MCG tablet, Take 1 tablet by mouth three times a week., Disp: , Rfl:      lisinopril (ZESTRIL) 5 MG tablet, Take 1 tablet (5 mg) by mouth daily, Disp: 90 tablet, Rfl: 1     naproxen sodium (ANAPROX) 220 MG tablet, Take one tablet once or twice daily, Disp: 180 tablet, Rfl: 3     NEW Barry CAMPOS, Disp: , Rfl:      Omega-3 Fatty Acids (OMEGA-3 FISH OIL PO), Take  by mouth., Disp: , Rfl:      simvastatin (ZOCOR) 20 MG tablet, Take 1 tablet (20 mg) by mouth At  Bedtime, Disp: 90 tablet, Rfl: 3  Allergies: Benzalkonium chloride and Nitrofurantoin    Physical Exam:  On physical examination the patient appears the stated age, is in no acute distress, affect is appropriate, and breathing is non-labored.  There were no vitals taken for this visit.  There is no height or weight on file to calculate BMI.  Gait:     Left Knee  Appearance: benign  Clinical alignment: mild varus   Effusion: no  Tenderness to palpation: medial joint line more than lateral  Extension: 3  Flexion:  110  Collateral ligaments: intact  Cruciate ligaments: grossly intact     Distally, the circulatory, motor, and sensation exam is intact with 5/5 EHL, gastroc-soleus, and tibialis anterior.  Sensation to light touch is intact.  Dorsalis pedis and posterior tibialis pulses are palpable.  There are no sores on the feet, no bruising. Has developed severe unilateral lymphedema about two years ago and today has intact skin with pitting to the knee joint line.     X-rays:    I reviewed the x-rays dated today.  Previous films reviewed.    Findings:  advanced left knee osteoarthritis with progression of medial changes with bone loss.     Assessment: advanced left knee OA. Discussed recommend a repeat x-ray sometime this year to make use we don't have progressive bone loss. Appropriate for a steroid injection.   Plan: left knee intra-articular steroid injection.    Procedure Note:    After informed consent was obtained the patient's right knee was injected with 5 cc of lidocaine and 40 mg of kenolog using a superior-medial approach.  Sterile technique was used.  The patient tolerated the procedure well.      No ref. provider found      Large Joint Injection/Arthocentesis: L knee joint    Date/Time: 3/2/2023 2:48 PM  Performed by: Christopher Snell MD  Authorized by: Christopher Snell MD     Indications:  Osteoarthritis  Needle Size:  22 G  Guidance: landmark guided    Location:  Knee      Medications:  40 mg  triamcinolone 40 MG/ML; 7 mL lidocaine (PF) 1 %  Outcome:  Tolerated well, no immediate complications  Procedure discussed: discussed risks, benefits, and alternatives    Prep: patient was prepped and draped in usual sterile fashion          [facility] [address] [address] [phone]  Dept: [phone]  ______________________________________________________________________________    Patient: Shyann Samuel   : 1940   MRN: [id number]   2023    INVASIVE PROCEDURE SAFETY CHECKLIST    Date: 3/2/2023   Procedure:Left knee joint injection  Patient Name: Shyann Samuel  MRN: [id number]  YOB: 1940    Action: Complete sections as appropriate. Any discrepancy results in a HARD COPY until resolved.     PRE PROCEDURE:  Patient ID verified with 2 identifiers (name and  or MRN): Yes  Procedure and site verified with patient/designee (when able): Yes  Accurate consent documentation in medical record: Yes  H&P (or appropriate assessment) documented in medical record: Yes  H&P must be up to 20 days prior to procedure and updates within 24 hours of procedure as applicable: Yes  Relevant diagnostic and radiology test results appropriately labeled and displayed as applicable: NA  Procedure site(s) marked with provider initials: NA    TIMEOUT:  Time-Out performed immediately prior to starting procedure, including verbal and active participation of all team members addressing the following:NA  * Correct patient identify  * Confirmed that the correct side and site are marked  * An accurate procedure consent form  * Agreement on the procedure to be done  * Correct patient position  * Relevant images and results are properly labeled and appropriately displayed  * The need to administer antibiotics or fluids for irrigation purposes during the procedure as applicable   * Safety precautions based on patient history or medication  use    DURING PROCEDURE: Verification of correct person, site, and procedures any time the responsibility for care of the patient is transferred to another member of the care team.       The following medications were given:         Prior to injection, verified patient identity using patient's name and date of birth.  Due to injection administration, patient instructed to remain in clinic for 15 minutes  afterwards, and to report any adverse reaction to me immediately.    Joint injection was performed.    Medication Name: Lidocaine NDC 35735-328-90  Drug Amount Wasted:  Yes: 13 mg/ml   Vial/Syringe: Single dose vial  Expiration Date:  10/1/2024    Medication Name Kenalog NDC [address]-4    Scribed by Raquel Lemos ATC for Dr. Snell on March 2, 2023 at 2:15pm based on the provider's statements to me.     KRIS Ferguson MD

## 2023-03-02 NOTE — PROGRESS NOTES
Large Joint Injection/Arthocentesis: L knee joint    Date/Time: 3/2/2023 2:48 PM  Performed by: Christopher Snell MD  Authorized by: Christopher Snell MD     Indications:  Osteoarthritis  Needle Size:  22 G  Guidance: landmark guided    Location:  Knee      Medications:  40 mg triamcinolone 40 MG/ML; 7 mL lidocaine (PF) 1 %  Outcome:  Tolerated well, no immediate complications  Procedure discussed: discussed risks, benefits, and alternatives    Prep: patient was prepped and draped in usual sterile fashion          [facility]TH[facility] [address] [address] [phone]  Dept: [phone]  ______________________________________________________________________________    Patient: Shyann Samuel   : 1940   MRN: [id number]   2023    INVASIVE PROCEDURE SAFETY CHECKLIST    Date: 3/2/2023   Procedure:Left knee joint injection  Patient Name: Shyann Samuel  MRN: [id number]  YOB: 1940    Action: Complete sections as appropriate. Any discrepancy results in a HARD COPY until resolved.     PRE PROCEDURE:  Patient ID verified with 2 identifiers (name and  or MRN): Yes  Procedure and site verified with patient/designee (when able): Yes  Accurate consent documentation in medical record: Yes  H&P (or appropriate assessment) documented in medical record: Yes  H&P must be up to 20 days prior to procedure and updates within 24 hours of procedure as applicable: Yes  Relevant diagnostic and radiology test results appropriately labeled and displayed as applicable: NA  Procedure site(s) marked with provider initials: NA    TIMEOUT:  Time-Out performed immediately prior to starting procedure, including verbal and active participation of all team members addressing the following:NA  * Correct patient identify  * Confirmed that the correct side and site are marked  * An accurate procedure consent form  * Agreement on the procedure to be  done  * Correct patient position  * Relevant images and results are properly labeled and appropriately displayed  * The need to administer antibiotics or fluids for irrigation purposes during the procedure as applicable   * Safety precautions based on patient history or medication use    DURING PROCEDURE: Verification of correct person, site, and procedures any time the responsibility for care of the patient is transferred to another member of the care team.       The following medications were given:         Prior to injection, verified patient identity using patient's name and date of birth.  Due to injection administration, patient instructed to remain in clinic for 15 minutes  afterwards, and to report any adverse reaction to me immediately.    Joint injection was performed.    Medication Name: Lidocaine NDC 72310-054-92  Drug Amount Wasted:  Yes: 13 mg/ml   Vial/Syringe: Single dose vial  Expiration Date:  10/1/2024    Medication Name Kenalog NDC [address]-9    Scribed by Raquel Lemos ATC for Dr. Snell on March 2, 2023 at 2:15pm based on the provider's statements to me.     Raquel Lemos ATC

## 2023-03-06 RX ORDER — LIDOCAINE HYDROCHLORIDE 10 MG/ML
7 INJECTION, SOLUTION EPIDURAL; INFILTRATION; INTRACAUDAL; PERINEURAL
Status: SHIPPED | OUTPATIENT
Start: 2023-03-02

## 2023-03-06 RX ORDER — TRIAMCINOLONE ACETONIDE 40 MG/ML
40 INJECTION, SUSPENSION INTRA-ARTICULAR; INTRAMUSCULAR
Status: SHIPPED | OUTPATIENT
Start: 2023-03-02

## 2023-03-08 ENCOUNTER — OFFICE VISIT (OUTPATIENT)
Dept: PODIATRY | Facility: CLINIC | Age: 83
End: 2023-03-08
Attending: INTERNAL MEDICINE
Payer: MEDICARE

## 2023-03-08 VITALS
WEIGHT: 175 LBS | SYSTOLIC BLOOD PRESSURE: 138 MMHG | HEIGHT: 63 IN | DIASTOLIC BLOOD PRESSURE: 78 MMHG | BODY MASS INDEX: 31.01 KG/M2

## 2023-03-08 DIAGNOSIS — L60.8 TOENAIL DEFORMITY: Primary | ICD-10-CM

## 2023-03-08 DIAGNOSIS — M20.62 TOE DEFORMITY, LEFT: ICD-10-CM

## 2023-03-08 DIAGNOSIS — M79.675 TOE PAIN, LEFT: ICD-10-CM

## 2023-03-08 PROCEDURE — 99213 OFFICE O/P EST LOW 20 MIN: CPT | Performed by: PODIATRIST

## 2023-03-08 NOTE — PATIENT INSTRUCTIONS
Thank you for choosing [facility]!    DR. BEDOLLA'S CLINIC LOCATIONS:     [facility] TRIAGE LINE: [phone]   [address] [place] [address] APPOINTMENTS: [phone]   [place] [address] RADIOLOGY: [phone]   (Every other Tues - Wed - Fri PM) SET UP SURGERY: [phone]    PHYSICAL THERAPY: [phone]   [facility] BILLING QUESTIONS: [phone] [address] FAX: [phone]   [place] [address]    (Thurs & Fri AM)       Here is a list of routine foot care resources, which includes toenail trimming and callus/corn management.     This is not a referral. It is your responsibility to contact the organization and your insurance to confirm cost and coverage.      ROUTINE FOOT CARE (NAIL TRIMMING / CALLUSES)      [facility]  [phone]   [facility]  [phone]  Multiple locations   [facility]  [phone] Dr. Ochoa and Dr. Charles  [address]  [place] [address]    Office: [phone]     Follow Up: As Needed

## 2023-03-08 NOTE — PROGRESS NOTES
ASSESSMENT:  Encounter Diagnoses   Name Primary?     Toenail deformity Yes     Toe pain, left      Toe deformity, left      MEDICAL DECISION MAKING:  I agree with Shyann, that the compression stockings might contribute to her left-sided hallux abduction, crowding of the lesser digits and toenail changes.    We discussed open toed compression stockings as an option.  She has consider this.  I do not know of any hard toe Protector that could be worn under a compression stocking.  We discussed possibly using toe spacers.    A list of alternatives for nail care was provided.  Using a nail nippers, I trimmed the distal medial aspect of the left hallux nail.  This is the site of reported, occasional pain.  We discussed the option of a partial nail avulsion in the future, if needed.    The superficial wound on the dorsal right second toe might be from footwear irritation.  She reports that it has healed in the past.  I recommend she consider returning to clinic if this does not heal or is a recurrent problem.  Biopsy is recommended.    Otherwise, follow-up on an as-needed basis.    Disclaimer: This note consists of symbols derived from keyboarding, dictation and/or voice recognition software. As a result, there may be errors in the script that have gone undetected. Please consider this when interpreting information found in this chart.    Hai Milan DPM, FACFAS, MS    [facility] Department of Podiatry/Foot & Ankle Surgery      ____________________________________________________________________    HPI:       I was asked by Aleksandra Starks MD to evaluate Shyann Samuel in consultation for a toenail deformity concern.     Shyann reports that she wears compression stockings due to left leg lymphedema.  Pressure from the stockings are believed to be causing deformity of toes and nails.  This has gradually progressed over the last year.  Pain is on occasion from pressure on the left great toenail.    She also reports a  "recurrent lesion on the right second toe.  She questions if this is from rubbing and footwear.  *  Past Medical History:   Diagnosis Date     Anterior corneal dystrophy      Back pain      Cataract      Chronic osteoarthritis      Esotropia      Hyperlipidemia      Hyperlipidemia      Hypertension      Insomnia      Osteoarthritis      Posterior vitreous detachment      Urinary tract infection    *  *  Past Surgical History:   Procedure Laterality Date     CATARACT IOL, RT/LT Bilateral ~2005     EYE SURGERY  after 2003,pt unsure    blepharoplasty     HC EXTERNAL LEVATOR RESECTION Bilateral 12/2014     LAMINECTOMY LUMBAR TWO LEVELS  01/24/2019    L1 to L5 laminectomy [facility]      REPAIR PTOSIS Bilateral 10/14/2020    Procedure: Bilateral upper eyelid ptosis repair, bilateral upper eyelid blepharoplasty, bilateral brow pexy;  Surgeon: Espinoza Burns MD;  Location: [facility] [place] OR     TONSILLECTOMY  1946     [email or web address] TOTAL HIP ARTHROPLASTY Right 10/2009    Replacement of right hip   *  *  Current Outpatient Medications   Medication Sig Dispense Refill     calcium carbonate 600 mg-vitamin D 400 units (CALCIUM CARB-CHOLECALCIFEROL) 600-400 MG-UNIT per tablet Take 1 tablet by mouth 2 times daily 180 tablet 3     cyanocolbalamin (VITAMIN  B-12) 1000 MCG tablet Take 1 tablet by mouth three times a week.       lisinopril (ZESTRIL) 5 MG tablet Take 1 tablet (5 mg) by mouth daily 90 tablet 1     naproxen sodium (ANAPROX) 220 MG tablet Take one tablet once or twice daily 180 tablet 3     NEW MED Mikaylaan Pelon Raya       Omega-3 Fatty Acids (OMEGA-3 FISH OIL PO) Take  by mouth.       simvastatin (ZOCOR) 20 MG tablet Take 1 tablet (20 mg) by mouth At Bedtime 90 tablet 3         EXAM:    Vitals: /78   Ht 1.6 m (5' 3\")   Wt 79.4 kg (175 lb)   BMI 31.00 kg/m    BMI: Body mass index is 31 kg/m .    Constitutional:  Shyann Samuel is in no apparent distress, appears well-nourished.  Cooperative with history and physical " exam.    Vascular:  Pedal pulses are palpable on the right.  I do not readily palpate pedal pulses in the left, given the lymphedema.    Neuro: Light touch sensation is intact to the L4, L5, S1 distributions  No evidence of weakness, spasticity, or contracture in the lower extremities.     Derm: Normal texture and turgor.  No erythema, ecchymosis, or cyanosis.   There is an excoriation appearing lesion on the dorsal aspect of the right second toe just proximal to the nail unit.  Toenails on the left have some irregularities or deformity.  The medial aspect of the left hallux nail plate is incurvated.    Musculoskeletal:    Bilateral hallux abduction.  Lesser toes on the left foot are crowded.

## 2023-03-08 NOTE — LETTER
3/8/2023         RE: Shyann Samuel  [address] [address]        Dear Colleague,    Thank you for referring your patient, Shyann Samuel, to the [facility]. Please see a copy of my visit note below.    ASSESSMENT:  Encounter Diagnoses   Name Primary?     Toenail deformity Yes     Toe pain, left      Toe deformity, left      MEDICAL DECISION MAKING:  I agree with Shyann, that the compression stockings might contribute to her left-sided hallux abduction, crowding of the lesser digits and toenail changes.    We discussed open toed compression stockings as an option.  She has consider this.  I do not know of any hard toe Protector that could be worn under a compression stocking.  We discussed possibly using toe spacers.    A list of alternatives for nail care was provided.  Using a nail nippers, I trimmed the distal medial aspect of the left hallux nail.  This is the site of reported, occasional pain.  We discussed the option of a partial nail avulsion in the future, if needed.    The superficial wound on the dorsal right second toe might be from footwear irritation.  She reports that it has healed in the past.  I recommend she consider returning to clinic if this does not heal or is a recurrent problem.  Biopsy is recommended.    Otherwise, follow-up on an as-needed basis.    Disclaimer: This note consists of symbols derived from keyboarding, dictation and/or voice recognition software. As a result, there may be errors in the script that have gone undetected. Please consider this when interpreting information found in this chart.    Hai Milan DPM, FACFAS, [facility] Department of Podiatry/Foot & Ankle Surgery      ____________________________________________________________________    HPI:       I was asked by Aleksandra Starks MD to evaluate Shyann Samuel in consultation for a toenail deformity concern.     Shyann reports that she wears compression  stockings due to left leg lymphedema.  Pressure from the stockings are believed to be causing deformity of toes and nails.  This has gradually progressed over the last year.  Pain is on occasion from pressure on the left great toenail.    She also reports a recurrent lesion on the right second toe.  She questions if this is from rubbing and footwear.  *  Past Medical History:   Diagnosis Date     Anterior corneal dystrophy      Back pain      Cataract      Chronic osteoarthritis      Esotropia      Hyperlipidemia      Hyperlipidemia      Hypertension      Insomnia      Osteoarthritis      Posterior vitreous detachment      Urinary tract infection    *  *  Past Surgical History:   Procedure Laterality Date     CATARACT IOL, RT/LT Bilateral ~2005     EYE SURGERY  after 2003,pt unsure    blepharoplasty     HC EXTERNAL LEVATOR RESECTION Bilateral 12/2014     LAMINECTOMY LUMBAR TWO LEVELS  01/24/2019    L1 to L5 laminectomy [facility]      REPAIR PTOSIS Bilateral 10/14/2020    Procedure: Bilateral upper eyelid ptosis repair, bilateral upper eyelid blepharoplasty, bilateral brow pexy;  Surgeon: Espinoza Burns MD;  Location: [facility] OR     TONSILLECTOMY  1946     [facility] TOTAL HIP ARTHROPLASTY Right 10/2009    Replacement of right hip   *  *  Current Outpatient Medications   Medication Sig Dispense Refill     calcium carbonate 600 mg-vitamin D 400 units (CALCIUM CARB-CHOLECALCIFEROL) 600-400 MG-UNIT per tablet Take 1 tablet by mouth 2 times daily 180 tablet 3     cyanocolbalamin (VITAMIN  B-12) 1000 MCG tablet Take 1 tablet by mouth three times a week.       lisinopril (ZESTRIL) 5 MG tablet Take 1 tablet (5 mg) by mouth daily 90 tablet 1     naproxen sodium (ANAPROX) 220 MG tablet Take one tablet once or twice daily 180 tablet 3     NEW MED Barry Raya       Omega-3 Fatty Acids (OMEGA-3 FISH OIL PO) Take  by mouth.       simvastatin (ZOCOR) 20 MG tablet Take 1 tablet (20 mg) by mouth At Bedtime 90 tablet 3  "        EXAM:    Vitals: /78   Ht 1.6 m (5' 3\")   Wt 79.4 kg (175 lb)   BMI 31.00 kg/m    BMI: Body mass index is 31 kg/m .    Constitutional:  Shyann Samuel is in no apparent distress, appears well-nourished.  Cooperative with history and physical exam.    Vascular:  Pedal pulses are palpable on the right.  I do not readily palpate pedal pulses in the left, given the lymphedema.    Neuro: Light touch sensation is intact to the L4, L5, S1 distributions  No evidence of weakness, spasticity, or contracture in the lower extremities.     Derm: Normal texture and turgor.  No erythema, ecchymosis, or cyanosis.   There is an excoriation appearing lesion on the dorsal aspect of the right second toe just proximal to the nail unit.  Toenails on the left have some irregularities or deformity.  The medial aspect of the left hallux nail plate is incurvated.    Musculoskeletal:    Bilateral hallux abduction.  Lesser toes on the left foot are crowded.            Again, thank you for allowing me to participate in the care of your patient.        Sincerely,        Hai Milan, SUSY    "

## 2023-03-09 DIAGNOSIS — E78.5 HYPERLIPIDEMIA, UNSPECIFIED HYPERLIPIDEMIA TYPE: ICD-10-CM

## 2023-03-09 DIAGNOSIS — I10 ESSENTIAL HYPERTENSION: ICD-10-CM

## 2023-03-09 RX ORDER — SIMVASTATIN 20 MG
20 TABLET ORAL AT BEDTIME
Qty: 90 TABLET | Refills: 1 | Status: SHIPPED | OUTPATIENT
Start: 2023-03-09 | End: 2023-03-20

## 2023-03-09 RX ORDER — LISINOPRIL 5 MG/1
5 TABLET ORAL DAILY
Qty: 90 TABLET | Refills: 0 | Status: SHIPPED | OUTPATIENT
Start: 2023-03-09 | End: 2023-03-20

## 2023-03-09 NOTE — TELEPHONE ENCOUNTER
Lisinopril (Zestril) 5 mg + Simvastatin (Zocor) 20 mg    Last Office Visit: 9/7/22  [facility] Appointments: None  Medication last refilled:     10/5/22 #90 with 3 refill(s) - Simvastatin  10/5/22 #90 with 1 refill(s) - Lisinopril    Vital Signs 8/31/2022 10/21/2022 3/8/2023   Systolic 120 148 138   Diastolic 77 92 78     Required labs per protocol:    LAB REF RANGE 10/1/21 8/31/22   Creatinine 0.8-1.25 mg/dL 0.68 0.62   Potassium 3.4-5.3 mmol/L 4.8 4.4   LDL < 100 mg/dL 59 78     Prescription approved per [facility] Refill Protocol.    Shaina Conroy, PATN, RN, CCM

## 2023-03-20 ENCOUNTER — OFFICE VISIT (OUTPATIENT)
Dept: FAMILY MEDICINE | Facility: CLINIC | Age: 83
End: 2023-03-20
Payer: MEDICARE

## 2023-03-20 VITALS
DIASTOLIC BLOOD PRESSURE: 86 MMHG | HEART RATE: 96 BPM | BODY MASS INDEX: 30.85 KG/M2 | WEIGHT: 174.12 LBS | HEIGHT: 63 IN | SYSTOLIC BLOOD PRESSURE: 134 MMHG | TEMPERATURE: 97.2 F | OXYGEN SATURATION: 98 %

## 2023-03-20 DIAGNOSIS — E78.5 HYPERLIPIDEMIA, UNSPECIFIED HYPERLIPIDEMIA TYPE: ICD-10-CM

## 2023-03-20 DIAGNOSIS — I10 ESSENTIAL HYPERTENSION: Primary | ICD-10-CM

## 2023-03-20 DIAGNOSIS — R15.9 INCONTINENCE OF FECES, UNSPECIFIED FECAL INCONTINENCE TYPE: ICD-10-CM

## 2023-03-20 LAB
ALBUMIN SERPL BCG-MCNC: 4.2 G/DL (ref 3.5–5.2)
ALP SERPL-CCNC: 56 U/L (ref 35–104)
ALT SERPL W P-5'-P-CCNC: 17 U/L (ref 10–35)
ANION GAP SERPL CALCULATED.3IONS-SCNC: 12 MMOL/L (ref 7–15)
AST SERPL W P-5'-P-CCNC: 23 U/L (ref 10–35)
BILIRUB SERPL-MCNC: 0.2 MG/DL
BUN SERPL-MCNC: 20.3 MG/DL (ref 8–23)
CALCIUM SERPL-MCNC: 9.7 MG/DL (ref 8.8–10.2)
CHLORIDE SERPL-SCNC: 102 MMOL/L (ref 98–107)
CHOLEST SERPL-MCNC: 188 MG/DL
CREAT SERPL-MCNC: 0.59 MG/DL (ref 0.51–0.95)
DEPRECATED HCO3 PLAS-SCNC: 23 MMOL/L (ref 22–29)
GFR SERPL CREATININE-BSD FRML MDRD: 90 ML/MIN/1.73M2
GLUCOSE SERPL-MCNC: 107 MG/DL (ref 70–99)
HDLC SERPL-MCNC: 48 MG/DL
LDLC SERPL CALC-MCNC: 81 MG/DL
NONHDLC SERPL-MCNC: 140 MG/DL
POTASSIUM SERPL-SCNC: 4.8 MMOL/L (ref 3.4–5.3)
PROT SERPL-MCNC: 7.2 G/DL (ref 6.4–8.3)
SODIUM SERPL-SCNC: 137 MMOL/L (ref 136–145)
TRIGL SERPL-MCNC: 297 MG/DL

## 2023-03-20 PROCEDURE — 80061 LIPID PANEL: CPT | Performed by: INTERNAL MEDICINE

## 2023-03-20 PROCEDURE — 80053 COMPREHEN METABOLIC PANEL: CPT | Performed by: INTERNAL MEDICINE

## 2023-03-20 RX ORDER — SIMVASTATIN 20 MG
20 TABLET ORAL AT BEDTIME
Qty: 90 TABLET | Refills: 3 | Status: SHIPPED | OUTPATIENT
Start: 2023-03-20 | End: 2024-01-01

## 2023-03-20 RX ORDER — LISINOPRIL 5 MG/1
5 TABLET ORAL DAILY
Qty: 90 TABLET | Refills: 3 | Status: SHIPPED | OUTPATIENT
Start: 2023-03-20 | End: 2024-01-01

## 2023-03-20 NOTE — NURSING NOTE
"82 year old  Chief Complaint   Patient presents with     Hypertension     Blood pressure check in       Blood pressure 133/84, pulse 96, temperature 97.2  F (36.2  C), height 1.6 m (5' 2.99\"), weight 79 kg (174 lb 1.9 oz), SpO2 98 %, not currently breastfeeding. Body mass index is 30.85 kg/m .  Patient Active Problem List   Diagnosis     Chronic bilateral low back pain with sciatica     Status post hip replacement     Hyperlipidemia     Anterior corneal dystrophy     Hypertension     Insomnia     Osteoarthritis     Cataract     Dermatitis medicamentosa     Esotropia     Macular puckering     Nontoxic multinodular goiter     Posterior vitreous detachment     Recurrent UTI     Other symptoms involving nervous and musculoskeletal systems(781.99)     Atrophic vaginitis     Overactive bladder     Back pain     Health Care Home     Lower extremity edema     Spinal stenosis of lumbar region with neurogenic claudication     Brow ptosis, bilateral     Dermatochalasis of both upper eyelids     Ptosis of eyelid, bilateral     Pain in joint, pelvic region and thigh, unspecified laterality     Pain of right thigh     Iliotibial band syndrome, right     Mood change     Pain of left thigh     Lymphedema       Wt Readings from Last 2 Encounters:   03/20/23 79 kg (174 lb 1.9 oz)   03/08/23 79.4 kg (175 lb)     BP Readings from Last 3 Encounters:   03/20/23 133/84   03/08/23 138/78   10/21/22 (!) 148/92         Current Outpatient Medications   Medication     calcium carbonate 600 mg-vitamin D 400 units (CALCIUM CARB-CHOLECALCIFEROL) 600-400 MG-UNIT per tablet     cyanocolbalamin (VITAMIN  B-12) 1000 MCG tablet     lisinopril (ZESTRIL) 5 MG tablet     naproxen sodium (ANAPROX) 220 MG tablet     NEW MED     Omega-3 Fatty Acids (OMEGA-3 FISH OIL PO)     simvastatin (ZOCOR) 20 MG tablet     Current Facility-Administered Medications   Medication     lidocaine (PF) (XYLOCAINE) 1 % injection 7 mL     triamcinolone (KENALOG-40) injection 40 " mg       Social History     Tobacco Use     Smoking status: Never     Smokeless tobacco: Never   Substance Use Topics     Alcohol use: No     Drug use: No       Health Maintenance Due   Topic Date Due     DEXA  Never done     URINE DRUG SCREEN  Never done     ZOSTER IMMUNIZATION (1 of 2) Never done     MEDICARE ANNUAL WELLNESS VISIT  Never done     FALL RISK ASSESSMENT  12/29/2021       No results found for: PAP      March 20, 2023 3:44 PM

## 2023-03-20 NOTE — PROGRESS NOTES
Shyann Samuel is a 82 year old female here for the following issues:    Essential hypertension  Shyann takes lisinopril 5 mg daily for treatment of hypertension. Her BP is 133/84 today. She has a BP machine at home and her systolic BP is usually around 125 in the morning after putting on her compression stockings and going downstairs. She recalls having her home BP machine checked for accuracy at one point. She takes her lisinopril at night. She states that she has 3 months left with no refills. She walks in the driveway when the weather is good and in the house when the weather is bad. She walks for about 15 minutes/day, but does not do steps due to knee pain. BP recheck 134/86 further into the visit. Denies chest pain.     BP Readings from Last 3 Encounters:   03/20/23 133/84   03/08/23 138/78   10/21/22 (!) 148/92     Chronic Left Knee Pain  Shyann has a history of chronic left knee pain. This limits her exercise as she has been avoiding stairs. She has been told that walking up and down stairs can help with lymphedema and would like to be able to do this more. She received a cortisone injection on 3/2/2023 and has some relief with this. She has been told that she will eventually need a knee replacement and is still deciding whether to get this done before or after renovations. One of the renovations would be to add a walk-in shower rather than one she has to climb over a bathtub wall to get into. She would want to do all of the needed renovations, including getting plumbing and electricity up to code, at once.    Left Thigh Pain  Shyann has a history of posterior left thigh pain, which she believes is due to her computer chair not being ergonomic. Of note, the cortisone injection she got to her left knee also helped with her left thigh pain. She plans to look into purchasing a new computer chair when the weather improves, as she would want to go to the store and try different chairs first.     Toenail  "Deformity  Shyann has a history of a deformity on the left great toenail. Her other toenails are also growing angled towards the great toes. She feels the compression stockings she wears for left leg lymphedema are likely contributing to this. She wears her compression stockings daily and has another \"compression thing\" to wear at night. She occasionally has pain from pressure on the left great toenail, and also has a recurrent lesion on the right second toe. She followed with podiatry with the hope to find a hard to protector that could be worn under a compression stocking, but was told this doesn't exist. Open-toed compression stockings were suggested. She has found some individual toe caps at [employer] web page. She would like to try, and has a fitting scheduled to adjust for the extra fabric that would be present. She is hopeful that her current plan will work.     Anal Leakage  Shyann has a history of anal leakage and has discovered that this is triggered by foods with lactose. She believes she is lactose intolerant. She now uses soy milk with her morning cereal and takes Lactaid with creamy foods. This is working well for her.     Hyperlipidemia  Shyann takes simvastatin 20 mg nightly for treatment of hyperlipidemia. She states that she has 3 months left with 1 refill. Not fasting today but we discussed getting labs today.     Recent Labs   Lab Test 08/31/22  1618 10/01/21  1218 09/18/20  0952 07/03/19  1049   CHOL 175 159 174.0 180.0   HDL 43* 57 60.0 62.0   LDL 78 59 75.0 82.0   TRIG 268* 216* 199.0* 178.0*   CHOLHDLRATIO  --   --  2.9 2.9       Patient Active Problem List   Diagnosis     Chronic bilateral low back pain with sciatica     Status post hip replacement     Hyperlipidemia     Anterior corneal dystrophy     Hypertension     Insomnia     Osteoarthritis     Cataract     Dermatitis medicamentosa     Esotropia     Macular puckering     Nontoxic multinodular goiter     Posterior vitreous detachment     " "Recurrent UTI     Other symptoms involving nervous and musculoskeletal systems(781.99)     Atrophic vaginitis     Overactive bladder     Back pain     Health Care Home     Lower extremity edema     Spinal stenosis of lumbar region with neurogenic claudication     Brow ptosis, bilateral     Dermatochalasis of both upper eyelids     Ptosis of eyelid, bilateral     Pain in joint, pelvic region and thigh, unspecified laterality     Pain of right thigh     Iliotibial band syndrome, right     Mood change     Pain of left thigh     Lymphedema       Current Outpatient Medications   Medication Sig Dispense Refill     calcium carbonate 600 mg-vitamin D 400 units (CALCIUM CARB-CHOLECALCIFEROL) 600-400 MG-UNIT per tablet Take 1 tablet by mouth 2 times daily 180 tablet 3     cyanocolbalamin (VITAMIN  B-12) 1000 MCG tablet Take 1 tablet by mouth three times a week.       lisinopril (ZESTRIL) 5 MG tablet Take 1 tablet (5 mg) by mouth daily 90 tablet 0     naproxen sodium (ANAPROX) 220 MG tablet Take one tablet once or twice daily 180 tablet 3     NEW MED Barry Raya       Omega-3 Fatty Acids (OMEGA-3 FISH OIL PO) Take  by mouth.       simvastatin (ZOCOR) 20 MG tablet Take 1 tablet (20 mg) by mouth At Bedtime 90 tablet 1       Allergies   Allergen Reactions     Benzalkonium Chloride      Eye irritation     Nitrofurantoin Other (See Comments)     Causes hast heart rate.  Causes fast heart rate.        EXAM  /84   Pulse 96   Temp 97.2  F (36.2  C)   Ht 1.6 m (5' 2.99\")   Wt 79 kg (174 lb 1.9 oz)   SpO2 98%   BMI 30.85 kg/m    Gen: Alert, pleasant, NAD  COR: S1,S2, no murmur      Assessment:  (I10) Essential hypertension  Comment: BP in target range.   Plan: lisinopril (ZESTRIL) 5 MG tablet, Comprehensive        metabolic panel        Medication refilled x1 year.    (E78.5) Hyperlipidemia, unspecified hyperlipidemia type  Comment: Not fasting. Total and LDL in target range. TG elevated and HDL below target of " >50  Recent Labs   Lab Test 03/20/23  1637 08/31/22  1618 10/01/21  1218 09/18/20  0952 07/03/19  1049   CHOL 188 175   < > 174.0 180.0   HDL 48* 43*   < > 60.0 62.0   LDL 81 78   < > 75.0 82.0   TRIG 297* 268*   < > 199.0* 178.0*   CHOLHDLRATIO  --   --   --  2.9 2.9    < > = values in this interval not displayed.   Plan: simvastatin (ZOCOR) 20 MG tablet, Comprehensive        metabolic panel, Lipid Profile.        Medication refilled x1 year.    (R15.9) Incontinence of feces, unspecified fecal incontinence type  Comment: improving with use of lactaid enzyme after consumption of dairy  Plan: continue with current treatment.       Aleksandra Starks MD  Internal Medicine/Pediatrics      I, Kim Arshad, am serving as a scribe to document services personally performed by Dr. Aleksandra Starks, based on data collection and the provider's statements to me. Dr. Starks has reviewed, edited, and approved the above note.

## 2023-03-21 DIAGNOSIS — E78.5 HYPERLIPIDEMIA, UNSPECIFIED HYPERLIPIDEMIA TYPE: ICD-10-CM

## 2023-06-24 ENCOUNTER — HEALTH MAINTENANCE LETTER (OUTPATIENT)
Age: 83
End: 2023-06-24

## 2023-08-09 PROBLEM — M25.562 LEFT KNEE PAIN: Status: ACTIVE | Noted: 2017-11-27

## 2023-08-09 NOTE — PROGRESS NOTES
PHYSICAL THERAPY EVALUATION  Type of Visit: Evaluation    See electronic medical record for Abuse and Falls Screening details.    Subjective       Presenting condition or subjective complaint: Pre-surgery exercises for knee replacement  Date of onset: 07/09/23 (date patient MD)    Relevant medical history: High blood pressure; Osteoarthritis; Overweight   Dates & types of surgery: 10/2009 right hip replacement; 01/2019 L1-L5 laminectomy; cataract surgery; blepheroplasty    Prior diagnostic imaging/testing results: X-ray     Prior therapy history for the same diagnosis, illness or injury: No      Prior Level of Function  Transfers: Independent  Ambulation: Assistive equipment  ADL: Assistive equipment  IADL:     Living Environment  Social support: Alone   Type of home: House; Multi-level   Stairs to enter the home: Yes 3 Is there a railing: Yes  uses a cane outside  Ramp: No   Stairs inside the home: Yes 2 Is there a railing: Yes   Help at home: Home and Yard maintenance tasks  Equipment owned: Straight Cane; Walker with wheels     Employment: Not Applicable    Hobbies/Interests: Current events, [employer] studies, home decor    Patient goals for therapy: Want to be in better shape before surgery    Pain assessment: See objective evaluation for additional pain details     Objective   KNEE EVALUATION  PAIN: Pain is Exacerbated By: stairs and walking  INTEGUMENTARY (edema, incisions):  general swelling  and has a garment for lymphedema  POSTURE: Standing Posture: flexed  GAIT:  Weightbearing Status: PWB  Assistive Device(s): Walker (front wheeled)  Gait Deviations: Antalgic  Knee varus on the left  BALANCE/PROPRIOCEPTION: Single Leg Stance Eyes Open (seconds): unable to do without support  WEIGHTBEARING ALIGNMENT:   NON-WEIGHTBEARING ALIGNMENT:   ROM:   (Degrees) Left AROM Left PROM  Right AROM Right PROM   Knee Flexion 125  135    Knee Extension 0  0    Pain:   End feel:     STRENGTH:  quad set bilaterally and able to do  SLR    FUNCTIONAL TESTS:  can move sit to stand without hands  PALPATION:     Assessment & Plan   CLINICAL IMPRESSIONS  Medical Diagnosis: left knee pain    Treatment Diagnosis: left knee pain   Impression/Assessment: Patient is a 83 year old female with left knee complaints.  The following significant findings have been identified: Pain, Decreased ROM/flexibility, Decreased joint mobility, Decreased strength, Impaired balance, Edema, and Decreased activity tolerance. These impairments interfere with their ability to perform self care tasks, household chores, household mobility, and community mobility as compared to previous level of function.     Clinical Decision Making (Complexity):  Clinical Presentation: Stable/Uncomplicated  Clinical Presentation Rationale: based on medical and personal factors listed in PT evaluation  Clinical Decision Making (Complexity): Low complexity    PLAN OF CARE  Treatment Interventions:  Interventions: Therapeutic Activity, Therapeutic Exercise    Long Term Goals     PT Goal 1  Goal Identifier: knee ROM  Goal Description: able to bend knee to 130 flexion  Rationale: to maximize safety and independence with performance of ADLs and functional tasks  Goal Progress: 125 currently  Target Date: 09/09/23      Frequency of Treatment: once visit  Duration of Treatment: one visit    Recommended Referrals to Other Professionals:   Education Assessment:   Learner/Method: Patient;Listening;Reading;Demonstration;Pictures/Video;No Barriers to Learning    Risks and benefits of evaluation/treatment have been explained.   Patient/Family/caregiver agrees with Plan of Care.     Evaluation Time:     PT Eval, Low Complexity Minutes (76931): 15       Signing Clinician: Kat Wayne PT      [facility]                                                                                   OUTPATIENT PHYSICAL THERAPY      PLAN OF TREATMENT FOR OUTPATIENT REHABILITATION   Patient's  Last Name, First Name, ROSE MARIEIbrahimaShyann Corona    YOB: 1940   Provider's Name   [facility]   Medical Record No.  [id number]     Onset Date: 07/09/23 (date patient MD)  Start of Care Date: 08/09/23     Medical Diagnosis:  left knee pain      PT Treatment Diagnosis:  left knee pain Plan of Treatment  Frequency/Duration: once visit/ one visit    Certification date from 08/09/23 to 11/06/23         See note for plan of treatment details and functional goals     Kat Wayne, PT                         I CERTIFY THE NEED FOR THESE SERVICES FURNISHED UNDER        THIS PLAN OF TREATMENT AND WHILE UNDER MY CARE     (Physician attestation of this document indicates review and certification of the therapy plan).                Referring Provider:  Aleksandra Starks      Initial Assessment  See Epic Evaluation- Start of Care Date: 08/09/23

## 2023-08-10 NOTE — LETTER
8/10/2023         RE: Shyann Samuel  [address]  [place] [address]        Dear Colleague,    Thank you for referring your patient, Shyann Samuel, to the [facility] [place]. Please see a copy of my visit note below.    Chief Complaint: RECHECK (Discuss surgery left knee OA)         HPI: Shyann Samuel returns today in follow-up for her left knee.  She reports that her symptoms are now severe and she would like to proceed with total knee.  She has been in contact with the [facility] to see if this is an option in the postoperative period.      Medications and allergies are documented in the EMR and have been reviewed.      Current Outpatient Medications:     calcium carbonate 600 mg-vitamin D 400 units (CALCIUM CARB-CHOLECALCIFEROL) 600-400 MG-UNIT per tablet, Take 1 tablet by mouth 2 times daily, Disp: 180 tablet, Rfl: 3    cyanocolbalamin (VITAMIN  B-12) 1000 MCG tablet, Take 1 tablet by mouth three times a week., Disp: , Rfl:     lisinopril (ZESTRIL) 5 MG tablet, Take 1 tablet (5 mg) by mouth daily, Disp: 90 tablet, Rfl: 3    naproxen sodium (ANAPROX) 220 MG tablet, Take one tablet once or twice daily, Disp: 180 tablet, Rfl: 3    [facility] Barry CAMPOS, Disp: , Rfl:     Omega-3 Fatty Acids (OMEGA-3 FISH OIL PO), Take  by mouth., Disp: , Rfl:     simvastatin (ZOCOR) 20 MG tablet, Take 1 tablet (20 mg) by mouth At Bedtime, Disp: 90 tablet, Rfl: 3    Current Facility-Administered Medications:     lidocaine (PF) (XYLOCAINE) 1 % injection 7 mL, 7 mL, , , Christopher Snell MD, 7 mL at 03/02/23 1448    triamcinolone (KENALOG-40) injection 40 mg, 40 mg, , , Christopher Snell MD, 40 mg at 03/02/23 1448    Allergies: Benzalkonium chloride and Nitrofurantoin    Current Status:  KOOS Jr:  [facility]:  Global Mental Health Score - (P) 18  Global Physical Health Score - (P) 13  PROMIS TOTAL - SUBSCORES - (P) 31    Physical Exam:  On physical examination the patient appears the stated  age, is in no acute distress, affect is appropriate, and breathing is non-labored.    There were no vitals taken for this visit.  There is no height or weight on file to calculate BMI.    Rises from chair: With effort  Gait: Arrives with a walker  Appearance: benign  Clinical alignment: Neutral  Effusion: Small  Tenderness to palpation: Medial and lateral joint line  Extension: 3 degrees  Flexion: 105 normal  Patellar tracking:  Collateral ligaments: intact    Cruciate ligaments: grossly intact     Stable in in the sagittal plane in mid-flexion.    Distally, the circulatory, motor, and sensation exam is intact with 5/5 EHL, gastroc-soleus, and tibialis anterior.  Sensation to light touch is intact.  Dorsalis pedis and posterior tibialis pulses are faintly palpable through her compression stocking.  She wears a compression stocking for known long-term unilateral lymphedema which has been worked up.  She has a prescription compression stocking.  She is consulted with the lymphedema nurse regarding postoperative management and was given the suggestion of using a Velcro compression garment for her surgical limb in the postoperative period until she can go back into her stocking.  The lymphedema is fairly mild on examination today and this thin female there is minimal swelling over the patella.    Assessment: Advanced left knee osteoarthritis associated with severe symptoms related to activities of daily living because of pain.  She has had comprehensive nonoperative management.  We discussed the diagnosis and the treatment options including living with it and total knee.  She like to go forward with total knee.We discussed the risks and benefits of total knee arthroplasty at length and reviewed the proposed surgical plan.  The discussion included but was not limited to the following:  blood clots, blood clots to the lungs, infection, injury to blood vessels and nerves, stiffness, and continued pain. We discussed the  post-operative recovery for the typical patient, return to driving, and return to normal activities.  All the patient's questions were answered to the best of my ability.    Plan: Left total knee when the patient chooses to go forward with it.  No ref. provider found      Answers submitted by the patient for this visit:  Symptoms you have experienced in the last 30 days (Submitted on 8/8/2023)  General Symptoms: No  Skin Symptoms: No  HENT Symptoms: No  EYE SYMPTOMS: No  HEART SYMPTOMS: No  LUNG SYMPTOMS: No  INTESTINAL SYMPTOMS: No  URINARY SYMPTOMS: No  GYNECOLOGIC SYMPTOMS: No  BREAST SYMPTOMS: No  SKELETAL SYMPTOMS: Yes  BLOOD SYMPTOMS: No  NERVOUS SYSTEM SYMPTOMS: No  MENTAL HEALTH SYMPTOMS: No  Please answer the questions below to tell us what condition you are experiencing: (Submitted on 8/8/2023)  Back pain: No  Muscle aches: No  Neck pain: No  Swollen joints: No  Joint pain: Yes  Bone pain: No  Muscle cramps: No  Muscle weakness: No  Joint stiffness: No  Bone fracture: No      Sincerely,        Christopher Snell MD

## 2023-08-10 NOTE — NURSING NOTE
Reason For Visit:   Chief Complaint   Patient presents with    RECHECK     Discuss surgery left knee OA       There were no vitals taken for this visit.         Raquel Lemos ATC

## 2023-08-10 NOTE — PROGRESS NOTES
Chief Complaint: RECHECK (Discuss surgery left knee OA)         HPI: Shyann Samuel returns today in follow-up for her left knee.  She reports that her symptoms are now severe and she would like to proceed with total knee.  She has been in contact with the [facility] to see if this is an option in the postoperative period.      Medications and allergies are documented in the EMR and have been reviewed.      Current Outpatient Medications:     calcium carbonate 600 mg-vitamin D 400 units (CALCIUM CARB-CHOLECALCIFEROL) 600-400 MG-UNIT per tablet, Take 1 tablet by mouth 2 times daily, Disp: 180 tablet, Rfl: 3    cyanocolbalamin (VITAMIN  B-12) 1000 MCG tablet, Take 1 tablet by mouth three times a week., Disp: , Rfl:     lisinopril (ZESTRIL) 5 MG tablet, Take 1 tablet (5 mg) by mouth daily, Disp: 90 tablet, Rfl: 3    naproxen sodium (ANAPROX) 220 MG tablet, Take one tablet once or twice daily, Disp: 180 tablet, Rfl: 3    [facility] Barry CAMPOS, Disp: , Rfl:     Omega-3 Fatty Acids (OMEGA-3 FISH OIL PO), Take  by mouth., Disp: , Rfl:     simvastatin (ZOCOR) 20 MG tablet, Take 1 tablet (20 mg) by mouth At Bedtime, Disp: 90 tablet, Rfl: 3    Current Facility-Administered Medications:     lidocaine (PF) (XYLOCAINE) 1 % injection 7 mL, 7 mL, , , Christopher Snell MD, 7 mL at 03/02/23 1448    triamcinolone (KENALOG-40) injection 40 mg, 40 mg, , , Christopher Snell MD, 40 mg at 03/02/23 1448    Allergies: Benzalkonium chloride and Nitrofurantoin    Current Status:  KOOS Jr:  [facility]:  Global Mental Health Score - (P) 18  Global Physical Health Score - (P) 13  PROMIS TOTAL - SUBSCORES - (P) 31    Physical Exam:  On physical examination the patient appears the stated age, is in no acute distress, affect is appropriate, and breathing is non-labored.    There were no vitals taken for this visit.  There is no height or weight on file to calculate BMI.    Rises from chair: With effort  Gait: Arrives with a walker  Appearance:  benign  Clinical alignment: Neutral  Effusion: Small  Tenderness to palpation: Medial and lateral joint line  Extension: 3 degrees  Flexion: 105 normal  Patellar tracking:  Collateral ligaments: intact    Cruciate ligaments: grossly intact     Stable in in the sagittal plane in mid-flexion.    Distally, the circulatory, motor, and sensation exam is intact with 5/5 EHL, gastroc-soleus, and tibialis anterior.  Sensation to light touch is intact.  Dorsalis pedis and posterior tibialis pulses are faintly palpable through her compression stocking.  She wears a compression stocking for known long-term unilateral lymphedema which has been worked up.  She has a prescription compression stocking.  She is consulted with the lymphedema nurse regarding postoperative management and was given the suggestion of using a Velcro compression garment for her surgical limb in the postoperative period until she can go back into her stocking.  The lymphedema is fairly mild on examination today and this thin female there is minimal swelling over the patella.    Assessment: Advanced left knee osteoarthritis associated with severe symptoms related to activities of daily living because of pain.  She has had comprehensive nonoperative management.  We discussed the diagnosis and the treatment options including living with it and total knee.  She like to go forward with total knee.We discussed the risks and benefits of total knee arthroplasty at length and reviewed the proposed surgical plan.  The discussion included but was not limited to the following:  blood clots, blood clots to the lungs, infection, injury to blood vessels and nerves, stiffness, and continued pain. We discussed the post-operative recovery for the typical patient, return to driving, and return to normal activities.  All the patient's questions were answered to the best of my ability.    Plan: Left total knee when the patient chooses to go forward with it.  No ref. provider  found      Answers submitted by the patient for this visit:  Symptoms you have experienced in the last 30 days (Submitted on 8/8/2023)  General Symptoms: No  Skin Symptoms: No  HENT Symptoms: No  EYE SYMPTOMS: No  HEART SYMPTOMS: No  LUNG SYMPTOMS: No  INTESTINAL SYMPTOMS: No  URINARY SYMPTOMS: No  GYNECOLOGIC SYMPTOMS: No  BREAST SYMPTOMS: No  SKELETAL SYMPTOMS: Yes  BLOOD SYMPTOMS: No  NERVOUS SYSTEM SYMPTOMS: No  MENTAL HEALTH SYMPTOMS: No  Please answer the questions below to tell us what condition you are experiencing: (Submitted on 8/8/2023)  Back pain: No  Muscle aches: No  Neck pain: No  Swollen joints: No  Joint pain: Yes  Bone pain: No  Muscle cramps: No  Muscle weakness: No  Joint stiffness: No  Bone fracture: No

## 2023-08-11 NOTE — TELEPHONE ENCOUNTER
Patient was given packet and soap in the clinic. She is not interested in scheduling at this time as she is going to seek out a second opinion. She will reach out if she would like to proceed with surgery with Dr. Snell.     Elaine Mckenzie  Surgery Scheduling Coordinator  Ph: [phone]

## 2023-11-21 NOTE — PROGRESS NOTES
[facility] [address], [address]  [facility] [address]  Phone: [phone]  Fax: [phone]  Primary Provider: Marcus Starks  Pre-op Performing Provider: MARCUS STARKS    PREOPERATIVE EVALUATION:  Today's date: 11/22/2023    Shyann is a 83 year old, presenting for the following:  Pre-Op Exam (LEFT TOTAL KNEE ARTHROPLASTY)    Surgical Information:  Surgery/Procedure: LEFT TOTAL KNEE ARTHROPLASTY   Surgery Location: [facility]  Surgeon: Hakeem De Jesus MD   Surgery Date: 12/13/2023  Time of Surgery: to be determined  Where patient plans to recover-- keeping overnight: At a TCU (Transitional Care Unit) [facility] transitional rehab  Fax number for surgical facility: [phone]    ASSESSMENT:  (Z01.818) Pre-operative general physical examination  (primary encounter diagnosis)  (M17.12) Primary osteoarthritis of left knee  Comment: elective total LEFT knee arthroplasty  Plan: Basic metabolic panel, CBC with platelets        No contraindication to procedure, proceed as planned. Pt is instructed to hold any vitamins, herbs, supplements and NSAIDs 14 days prior to surgery. She will be NPO after midnight, no medications on the morning of surgery.       (R73.03) Prediabetes  Comment: diet controlled. A1c is slightly higher than last time but still in excellent range  Lab Results   Component Value Date    A1C 6.2 11/22/2023    A1C 5.7 10/01/2021   Plan: Hemoglobin A1c, Hemoglobin A1c        No additional instructions, diet controlled and in good range    (I10) Essential hypertension  Comment:  BP in target range  BP Readings from Last 3 Encounters:   11/22/23 129/82   03/20/23 134/86   03/08/23 138/78   Plan: take Losartan the night prior to surgery, NPO on morning of surgery    (M21.962) Acquired left calf asymmetry  Comment: chronic lymphedema of left lower extremity, she usually wear support hose  Plan: continue wearing  support hose, understanding she may need to stop wearing them after surgery to allow surgical incision to heal. Orders per orthopedic surgeon post op.    Aleksandra Starks MD  Internal Medicine/Pediatrics      Subjective       HPI related to upcoming procedure:   Shyann Samuel is a 83 year old women with hx of hypertension, hyperlipidemia, chronic low back pain with sciatica, osteoarthritis, overactive bladder, lower extremity edema which is asymmetric, and prediabetes. She presents for preoperative assessment as she wishes to undergo elective left total knee arthroscopy. Cortisone injections do not provide relief. She uses a cane to ambulate, describes left knee pain with walking, going up/down stair, using naproxen 220mg bid for comfort. Fortunately she lives in a house with an elevator. She is s/p right hip replacement without complication.         11/17/2023    12:21 PM   Preop Questions   1. Have you ever had a heart attack or stroke? No   2. Have you ever had surgery on your heart or blood vessels, such as a stent placement, a coronary artery bypass, or surgery on an artery in your head, neck, heart, or legs? No   3. Do you have chest pain with activity? No   4. Do you have a history of  heart failure? No   5. Do you currently have a cold, bronchitis or symptoms of other infection? No   6. Do you have a cough, shortness of breath, or wheezing? No   7. Do you or anyone in your family have previous history of blood clots? No   8. Do you or does anyone in your family have a serious bleeding problem such as prolonged bleeding following surgeries or cuts? No   9. Have you ever had problems with anemia or been told to take iron pills? No   10. Have you had any abnormal blood loss such as black, tarry or bloody stools, or abnormal vaginal bleeding? No   11. Have you ever had a blood transfusion? YES - with previous surgery,cannot recall circumstance   11a. Have you ever had a transfusion reaction? No   12. Are you  willing to have a blood transfusion if it is medically needed before, during, or after your surgery? Yes   13. Have you or any of your relatives ever had problems with anesthesia? No   14. Do you have sleep apnea, excessive snoring or daytime drowsiness? No   15. Do you have any artifical heart valves or other implanted medical devices like a pacemaker, defibrillator, or continuous glucose monitor? No   16. Do you have artificial joints? YES - right hip   17. Are you allergic to latex? No       Health Care Directive:  Patient has a Health Care Directive on file    Preoperative Review of :   reviewed - no record of controlled substances prescribed.    Prediabetes  Diet controlled, historically has had very low A1c    Essential hypertension  Lisinopril 5mg daily  No hx of chest pain, palpitations, stroke, ASCVD  Acquired left calf asymmetry    Lymphedema of left leg   Support stocking on left side  Will wrap with ace after surgery    Alone, lives in house with elevator    Naproxen 220mg bid, not sure it is making a difference, stop 2 wks prior  Take tylenol  No ice, cold compress    Review of Systems  Constitutional, neuro, ENT, endocrine, pulmonary, cardiac, gastrointestinal, genitourinary, musculoskeletal, integument and psychiatric systems are negative, except as otherwise noted.    Patient Active Problem List    Diagnosis Date Noted    Lymphedema 09/07/2022     Priority: Medium    Pain of left thigh 03/16/2022     Priority: Medium    Mood change 10/04/2021     Priority: Medium    Pain of right thigh 05/11/2021     Priority: Medium    Iliotibial band syndrome, right 05/11/2021     Priority: Medium    Pain in joint, pelvic region and thigh, unspecified laterality 04/21/2021     Priority: Medium    Brow ptosis, bilateral 09/16/2020     Priority: Medium     Added automatically from request for surgery [id number]      Dermatochalasis of both upper eyelids 09/16/2020     Priority: Medium     Added automatically from  request for surgery [id number]      Ptosis of eyelid, bilateral 09/16/2020     Priority: Medium     Added automatically from request for surgery [id number]      Spinal stenosis of lumbar region with neurogenic claudication 12/30/2018     Priority: Medium    Lower extremity edema 12/21/2018     Priority: Medium    Left knee pain 11/27/2017     Priority: Medium    Health Care Home 03/17/2016     Priority: Medium     Date:  4-19-16  Status: Closed  Care Coordinator/:  Shawn Judy, LSW      Back pain 01/27/2016     Priority: Medium    Overactive bladder 09/03/2014     Priority: Medium    Atrophic vaginitis 12/19/2012     Priority: Medium    Other symptoms involving nervous and musculoskeletal systems(781.99) 11/27/2012     Priority: Medium    Recurrent UTI 11/21/2012     Priority: Medium    Cataract 09/27/2012     Priority: Medium     Utility update for deleted IMO code  [employer] Update utility      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 Provider to review and confirm.      Status post hip replacement 06/23/2011     Priority: Medium     (Problem list name updated by automated process. Provider to review and confirm.)        Past Medical History:   Diagnosis Date    Anterior corneal dystrophy     Back pain     Cataract     Chronic osteoarthritis     Esotropia     Hyperlipidemia     Hyperlipidemia     Hypertension     Insomnia     Osteoarthritis     Posterior  vitreous detachment     Urinary tract infection      Past Surgical History:   Procedure Laterality Date    CATARACT IOL, RT/LT Bilateral ~    EYE SURGERY  after ,pt unsure    blepharoplasty    HC EXTERNAL LEVATOR RESECTION Bilateral 2014    LAMINECTOMY LUMBAR TWO LEVELS  2019    L1 to L5 laminectomy [facility]     REPAIR PTOSIS Bilateral 10/14/2020    Procedure: Bilateral upper eyelid ptosis repair, bilateral upper eyelid blepharoplasty, bilateral brow pexy;  Surgeon: Espinoza Burns MD;  Location: [facility] OR    TONSILLECTOMY  194    [facility] TOTAL HIP ARTHROPLASTY Right 10/2009    Replacement of right hip     Current Outpatient Medications   Medication Sig Dispense Refill    calcium carbonate 600 mg-vitamin D 400 units (CALCIUM CARB-CHOLECALCIFEROL) 600-400 MG-UNIT per tablet Take 1 tablet by mouth 2 times daily 180 tablet 3    cyanocolbalamin (VITAMIN  B-12) 1000 MCG tablet Take 1 tablet by mouth three times a week.      lisinopril (ZESTRIL) 5 MG tablet Take 1 tablet (5 mg) by mouth daily 90 tablet 3    naproxen sodium (ANAPROX) 220 MG tablet Take one tablet once or twice daily 180 tablet 3    NEW MED Suan Pelon Raya      Omega-3 Fatty Acids (OMEGA-3 FISH OIL PO) Take  by mouth.      simvastatin (ZOCOR) 20 MG tablet Take 1 tablet (20 mg) by mouth At Bedtime 90 tablet 3       Allergies   Allergen Reactions    Benzalkonium Chloride      Eye irritation    Nitrofurantoin Other (See Comments)     Causes hast heart rate.  Causes fast heart rate.        Social History     Tobacco Use    Smoking status: Never    Smokeless tobacco: Never   Substance Use Topics    Alcohol use: No     Family History   Problem Relation Age of Onset    Cerebrovascular Disease Mother         probably consequent to Crohn's disease  87    Crohn's Disease Mother     Osteoporosis Mother         probably consequent to Crohn's disease    Other Cancer Father         kidney cancer, benign brain tumor  86    Other Cancer  "Brother         adult neuroblastoma (dispute about precise diagnos    Cancer Other         Father (kidney meningioma), Brother (neuroblastoma)    Glaucoma No family hx of     Macular Degeneration No family hx of     Melanoma No family hx of     Skin Cancer No family hx of      History   Drug Use No         Objective     /82 (BP Location: Left arm, Patient Position: Sitting, Cuff Size: Adult Large)   Pulse 80   Temp 97.6  F (36.4  C) (Skin)   Resp 15   Ht 1.6 m (5' 2.99\")   Wt 78.9 kg (174 lb)   SpO2 99%   BMI 30.83 kg/m      Physical Exam    GENERAL APPEARANCE: healthy, alert and no distress     EYES: EOMI, PERRL     HENT: ear canals and TM's normal and nose and mouth without ulcers or lesions     NECK: no adenopathy, thyroid normal to palpation     RESP: lungs clear to auscultation - no rales, rhonchi or wheezes     CV: regular rates and rhythm, normal S1 S2, no S3 or S4 and no murmur, click or rub     ABDOMEN:  soft, nontender, no HSM or masses and bowel sounds normal     MS: palpable tenderness at medial left knee, inferior to patella. Extremities otherwise normal- no gross deformities noted, no evidence of inflammation in joints, FROM in all extremities.     SKIN: no suspicious lesions or rashes     NEURO: Normal strength and tone, sensory exam grossly normal, mentation intact and speech normal     PSYCH: mentation appears normal. and affect normal/bright  Ext: Lower extremity asymmetry, L>R wearing support stocking on left leg    Recent Labs      Results for orders placed or performed in visit on 11/22/23   Basic metabolic panel     Status: Abnormal   Result Value Ref Range    Sodium 140 135 - 145 mmol/L    Potassium 4.4 3.4 - 5.3 mmol/L    Chloride 107 98 - 107 mmol/L    Carbon Dioxide (CO2) 23 22 - 29 mmol/L    Anion Gap 10 7 - 15 mmol/L    Urea Nitrogen 20.1 8.0 - 23.0 mg/dL    Creatinine 0.58 0.51 - 0.95 mg/dL    GFR Estimate 90 >60 mL/min/1.73m2    Calcium 9.1 8.8 - 10.2 mg/dL    Glucose 105 " (H) 70 - 99 mg/dL   CBC with platelets     Status: Normal   Result Value Ref Range    WBC Count 8.8 4.0 - 11.0 10e3/uL    RBC Count 4.99 3.80 - 5.20 10e6/uL    Hemoglobin 14.7 11.7 - 15.7 g/dL    Hematocrit 46.6 35.0 - 47.0 %    MCV 93 78 - 100 fL    MCH 29.5 26.5 - 33.0 pg    MCHC 31.5 31.5 - 36.5 g/dL    RDW 13.3 10.0 - 15.0 %    Platelet Count 228 150 - 450 10e3/uL   Hemoglobin A1c     Status: Abnormal   Result Value Ref Range    Hemoglobin A1C 6.2 (H) <5.7 %         Diagnostics:  EKG (11/22/23): appears normal, NSR, rate 90. normal axis, normal intervals, no acute ST/T changes c/w ischemia, no LVH by voltage criteria, unchanged from previous tracings. Read by Aleksandra Starks MD    Revised Cardiac Risk Index (RCRI):  The patient has the following serious cardiovascular risks for perioperative complications:   - No serious cardiac risks = 0 points     RCRI Interpretation: 0 points: Class I (very low risk - 0.4% complication rate)        Signed Electronically by: Aleksandra Starks MD  Copy of this evaluation report is provided to requesting physician.

## 2023-11-22 NOTE — PATIENT INSTRUCTIONS
Acetaminophen  325, 500 or 650mg tablet    Max is 3000 in 24 hr    325mg take 3 tablet 3x per day  500mg take 2 tablet 3x per day  650 mg , no more than 5 tablets in a day      Hold any vitamins, herbs, supplements and Naproxen, aspirin, Excederin 14 days prior to surgery.   Chinese herb, fish oil, naproxen    Lisinopril and simvastatin evening before the surgery, none on the morning of surgery

## 2023-11-22 NOTE — NURSING NOTE
"83 year old  Chief Complaint   Patient presents with    Pre-Op Exam     LEFT TOTAL KNEE ARTHROPLASTY       Blood pressure 129/82, pulse 80, temperature 97.6  F (36.4  C), temperature source Skin, resp. rate 15, height 1.6 m (5' 2.99\"), weight 78.9 kg (174 lb), SpO2 99%, not currently breastfeeding. Body mass index is 30.83 kg/m .  Patient Active Problem List   Diagnosis    Chronic bilateral low back pain with sciatica    Status post hip replacement    Hyperlipidemia    Anterior corneal dystrophy    Hypertension    Insomnia    Osteoarthritis    Cataract    Dermatitis medicamentosa    Esotropia    Macular puckering    Nontoxic multinodular goiter    Posterior vitreous detachment    Recurrent UTI    Other symptoms involving nervous and musculoskeletal systems(781.99)    Atrophic vaginitis    Overactive bladder    Back pain    Health Care Home    Left knee pain    Lower extremity edema    Spinal stenosis of lumbar region with neurogenic claudication    Brow ptosis, bilateral    Dermatochalasis of both upper eyelids    Ptosis of eyelid, bilateral    Pain in joint, pelvic region and thigh, unspecified laterality    Pain of right thigh    Iliotibial band syndrome, right    Mood change    Pain of left thigh    Lymphedema       Wt Readings from Last 2 Encounters:   11/22/23 78.9 kg (174 lb)   03/20/23 79 kg (174 lb 1.9 oz)     BP Readings from Last 3 Encounters:   11/22/23 129/82   03/20/23 134/86   03/08/23 138/78         Current Outpatient Medications   Medication    calcium carbonate 600 mg-vitamin D 400 units (CALCIUM CARB-CHOLECALCIFEROL) 600-400 MG-UNIT per tablet    cyanocolbalamin (VITAMIN  B-12) 1000 MCG tablet    lisinopril (ZESTRIL) 5 MG tablet    naproxen sodium (ANAPROX) 220 MG tablet    NEW MED    Omega-3 Fatty Acids (OMEGA-3 FISH OIL PO)    simvastatin (ZOCOR) 20 MG tablet     Current Facility-Administered Medications   Medication    lidocaine (PF) (XYLOCAINE) 1 % injection 7 mL    triamcinolone (KENALOG-40) " "injection 40 mg       Social History     Tobacco Use    Smoking status: Never    Smokeless tobacco: Never   Vaping Use    Vaping Use: Never used   Substance Use Topics    Alcohol use: No    Drug use: No       Health Maintenance Due   Topic Date Due    DEXA  Never done    URINE DRUG SCREEN  Never done    ZOSTER IMMUNIZATION (1 of 2) Never done    RSV VACCINE (Pregnancy & 60+) (1 - 1-dose 60+ series) Never done    MEDICARE ANNUAL WELLNESS VISIT  Never done    DTAP/TDAP/TD IMMUNIZATION (1 - Tdap) 09/19/2020    FALL RISK ASSESSMENT  12/29/2021       No results found for: \"PAP\"      November 22, 2023 11:18 AM    "

## 2023-11-24 PROBLEM — M21.962: Status: ACTIVE | Noted: 2023-01-01

## 2023-11-24 PROBLEM — R73.03 PREDIABETES: Status: ACTIVE | Noted: 2023-01-01

## 2024-01-01 ENCOUNTER — OFFICE VISIT (OUTPATIENT)
Dept: FAMILY MEDICINE | Facility: CLINIC | Age: 84
End: 2024-01-01
Payer: MEDICARE

## 2024-01-01 ENCOUNTER — TELEPHONE (OUTPATIENT)
Dept: FAMILY MEDICINE | Facility: CLINIC | Age: 84
End: 2024-01-01

## 2024-01-01 ENCOUNTER — MYC MEDICAL ADVICE (OUTPATIENT)
Dept: FAMILY MEDICINE | Facility: CLINIC | Age: 84
End: 2024-01-01

## 2024-01-01 ENCOUNTER — VIRTUAL VISIT (OUTPATIENT)
Dept: FAMILY MEDICINE | Facility: CLINIC | Age: 84
End: 2024-01-01
Payer: MEDICARE

## 2024-01-01 VITALS
WEIGHT: 176 LBS | SYSTOLIC BLOOD PRESSURE: 122 MMHG | OXYGEN SATURATION: 96 % | BODY MASS INDEX: 31.19 KG/M2 | HEART RATE: 96 BPM | DIASTOLIC BLOOD PRESSURE: 79 MMHG | TEMPERATURE: 98 F

## 2024-01-01 VITALS
SYSTOLIC BLOOD PRESSURE: 113 MMHG | RESPIRATION RATE: 18 BRPM | OXYGEN SATURATION: 95 % | DIASTOLIC BLOOD PRESSURE: 66 MMHG | TEMPERATURE: 98.4 F | HEART RATE: 89 BPM

## 2024-01-01 DIAGNOSIS — Z09 HOSPITAL DISCHARGE FOLLOW-UP: Primary | ICD-10-CM

## 2024-01-01 DIAGNOSIS — E78.5 HYPERLIPIDEMIA, UNSPECIFIED HYPERLIPIDEMIA TYPE: Primary | ICD-10-CM

## 2024-01-01 DIAGNOSIS — E78.5 HYPERLIPIDEMIA, UNSPECIFIED HYPERLIPIDEMIA TYPE: ICD-10-CM

## 2024-01-01 DIAGNOSIS — E28.39 ESTROGEN DEFICIENCY: ICD-10-CM

## 2024-01-01 DIAGNOSIS — I89.0 LYMPHEDEMA: Primary | ICD-10-CM

## 2024-01-01 DIAGNOSIS — Z86.16 HISTORY OF COVID-19: ICD-10-CM

## 2024-01-01 DIAGNOSIS — K59.00 CONSTIPATION, UNSPECIFIED CONSTIPATION TYPE: Primary | ICD-10-CM

## 2024-01-01 DIAGNOSIS — Z96.652 S/P TOTAL KNEE ARTHROPLASTY, LEFT: ICD-10-CM

## 2024-01-01 DIAGNOSIS — I10 ESSENTIAL HYPERTENSION: Primary | ICD-10-CM

## 2024-01-01 DIAGNOSIS — M15.0 PRIMARY OSTEOARTHRITIS INVOLVING MULTIPLE JOINTS: Primary | ICD-10-CM

## 2024-01-01 LAB
ALBUMIN SERPL BCG-MCNC: 4 G/DL (ref 3.5–5.2)
ALP SERPL-CCNC: 65 U/L (ref 40–150)
ALT SERPL W P-5'-P-CCNC: 15 U/L (ref 0–50)
ANION GAP SERPL CALCULATED.3IONS-SCNC: 8 MMOL/L (ref 7–15)
AST SERPL W P-5'-P-CCNC: 14 U/L (ref 0–45)
BILIRUB SERPL-MCNC: <0.2 MG/DL
BUN SERPL-MCNC: 19.9 MG/DL (ref 8–23)
CALCIUM SERPL-MCNC: 9.4 MG/DL (ref 8.8–10.2)
CHLORIDE SERPL-SCNC: 105 MMOL/L (ref 98–107)
CHOLEST SERPL-MCNC: 167 MG/DL
CREAT SERPL-MCNC: 0.63 MG/DL (ref 0.51–0.95)
DEPRECATED HCO3 PLAS-SCNC: 24 MMOL/L (ref 22–29)
EGFRCR SERPLBLD CKD-EPI 2021: 88 ML/MIN/1.73M2
FASTING STATUS PATIENT QL REPORTED: NO
GLUCOSE SERPL-MCNC: 124 MG/DL (ref 70–99)
HDLC SERPL-MCNC: 42 MG/DL
LDLC SERPL CALC-MCNC: 55 MG/DL
NONHDLC SERPL-MCNC: 125 MG/DL
POTASSIUM SERPL-SCNC: 4.8 MMOL/L (ref 3.4–5.3)
PROT SERPL-MCNC: 6.6 G/DL (ref 6.4–8.3)
SODIUM SERPL-SCNC: 137 MMOL/L (ref 135–145)
TRIGL SERPL-MCNC: 349 MG/DL

## 2024-01-01 PROCEDURE — 80061 LIPID PANEL: CPT | Mod: ORL | Performed by: INTERNAL MEDICINE

## 2024-01-01 PROCEDURE — 80053 COMPREHEN METABOLIC PANEL: CPT | Mod: ORL | Performed by: INTERNAL MEDICINE

## 2024-01-01 RX ORDER — POLYETHYLENE GLYCOL 3350 17 G/17G
1 POWDER, FOR SOLUTION ORAL DAILY PRN
Qty: 225 G | Refills: 0 | Status: SHIPPED | OUTPATIENT
Start: 2024-01-01

## 2024-01-01 RX ORDER — DOCUSATE SODIUM 100 MG/1
100 CAPSULE, LIQUID FILLED ORAL 2 TIMES DAILY PRN
Qty: 30 CAPSULE | Refills: 0 | Status: SHIPPED | OUTPATIENT
Start: 2024-01-01

## 2024-01-01 RX ORDER — NAPROXEN SODIUM 220 MG
220 TABLET ORAL 2 TIMES DAILY WITH MEALS
COMMUNITY
Start: 2024-01-01

## 2024-01-01 RX ORDER — LISINOPRIL 5 MG/1
5 TABLET ORAL DAILY
Qty: 90 TABLET | Refills: 3 | Status: SHIPPED | OUTPATIENT
Start: 2024-01-01

## 2024-01-01 RX ORDER — SIMVASTATIN 20 MG
20 TABLET ORAL AT BEDTIME
Qty: 90 TABLET | Refills: 3 | Status: SHIPPED | OUTPATIENT
Start: 2024-01-01

## 2024-01-01 RX ORDER — ACETAMINOPHEN 500 MG
1000 TABLET ORAL EVERY 6 HOURS
COMMUNITY
Start: 2023-01-01 | End: 2024-01-01

## 2024-01-11 NOTE — TELEPHONE ENCOUNTER
Home Care Verbal Orders    Caller Name:Yoselin  [facility]    Orders Requested:   Physical Therapy (PT) once a week for six weeks, Occupational Therapy (OT) Evaluation,    Home Health Aid (HHA) once a week for six weeks to help with showering     BP high today 138/96    Lita

## 2024-01-12 NOTE — TELEPHONE ENCOUNTER
Home Care Verbal Orders    Caller Name:Dulce  Agency: [facility]    Orders Requested:   Occupational Therapy (OT) twice a week for two weeks; once a week for two weeks    Lita

## 2024-01-15 NOTE — NURSING NOTE
83 year old  Chief Complaint   Patient presents with    Recheck Medication       not currently breastfeeding. There is no height or weight on file to calculate BMI.  Patient Active Problem List   Diagnosis    Chronic bilateral low back pain with sciatica    Status post hip replacement    Hyperlipidemia    Anterior corneal dystrophy    Hypertension    Insomnia    Osteoarthritis    Cataract    Dermatitis medicamentosa    Esotropia    Macular puckering    Nontoxic multinodular goiter    Posterior vitreous detachment    Recurrent UTI    Other symptoms involving nervous and musculoskeletal systems(781.99)    Atrophic vaginitis    Overactive bladder    Back pain    Health Care Home    Left knee pain    Lower extremity edema    Spinal stenosis of lumbar region with neurogenic claudication    Brow ptosis, bilateral    Dermatochalasis of both upper eyelids    Ptosis of eyelid, bilateral    Pain in joint, pelvic region and thigh, unspecified laterality    Pain of right thigh    Iliotibial band syndrome, right    Mood change    Pain of left thigh    Lymphedema    Acquired left calf asymmetry    Prediabetes       Wt Readings from Last 2 Encounters:   11/22/23 78.9 kg (174 lb)   03/20/23 79 kg (174 lb 1.9 oz)     BP Readings from Last 3 Encounters:   11/22/23 129/82   03/20/23 134/86   03/08/23 138/78         Current Outpatient Medications   Medication    acetaminophen (TYLENOL) 500 MG tablet    calcium carbonate 600 mg-vitamin D 400 units (CALCIUM CARB-CHOLECALCIFEROL) 600-400 MG-UNIT per tablet    cyanocolbalamin (VITAMIN  B-12) 1000 MCG tablet    lisinopril (ZESTRIL) 5 MG tablet    Omega-3 Fatty Acids (OMEGA-3 FISH OIL PO)    simvastatin (ZOCOR) 20 MG tablet    naproxen sodium (ANAPROX) 220 MG tablet    NEW MED     Current Facility-Administered Medications   Medication    lidocaine (PF) (XYLOCAINE) 1 % injection 7 mL    triamcinolone (KENALOG-40) injection 40 mg       Social History     Tobacco Use    Smoking status: Never     "Smokeless tobacco: Never   Vaping Use    Vaping Use: Never used   Substance Use Topics    Alcohol use: No    Drug use: No       Health Maintenance Due   Topic Date Due    DEXA  Never done    URINE DRUG SCREEN  Never done    ZOSTER IMMUNIZATION (1 of 2) Never done    RSV VACCINE (Pregnancy & 60+) (1 - 1-dose 60+ series) Never done    MEDICARE ANNUAL WELLNESS VISIT  Never done    DTAP/TDAP/TD IMMUNIZATION (1 - Tdap) 09/19/2020    FALL RISK ASSESSMENT  12/29/2021       No results found for: \"PAP\"      January 15, 2024 2:55 PM    "

## 2024-01-15 NOTE — PROGRESS NOTES
Shyann is a 83 year old who is being evaluated via a billable telephone visit.      What phone number would you like to be contacted at? [phone]  How would you like to obtain your AVS? Michelle    Distant Location (provider location):  On-site    Start time: 3:02 pm  End time: 3:20 PM  Total : 18 minutes    ASSESSMENT:  (Z09) Hospital discharge follow-up  (primary encounter diagnosis)  (Z96.652) S/P total knee arthroplasty, left  Comment: elective knee replacement 12/13/23, successful surgery, now doing home PT, no longer using narcotics or celebrex. Using acetaminophen TID with one extra dose if needed, 3000-4000mg daily. Was also placed on aspirin after surgery  Plan: doing very well. Ok to stop aspirin as she is up and ambulating. May also stop celebrex and continue using tylenol. Follow up with surgeon as planned.     (E78.5) Hyperlipidemia, unspecified hyperlipidemia type  Comment: recently took Paxlovid. First day of Paxlovid 15 days ago, asking when she can resume simvastatin  Plan: ok to restart simvastatin now    (Z86.16) History of COVID-19  Comment: onset about 2 wks ago while in a TCU for recovery from her knee surgery. She has fatigue and dampened appetite but no shortness of breath, improving  Plan: rest, fluids, analgesics as needed    Aleksandra Starks MD  Internal Medicine/Pediatrics      Subjective   Shyann is a 83 year old, presenting for the following health issues:  Recheck Medication    Hospital follow up  Surgery 12/13/23 Left total knee arthroscopy  Did well with surgery  Currently has home health visits with RN, OT/PT HHA to assist with ADLs,, bathing, washing hair.   Using acetaminophen 3000-4000mg daily with good relief.   Off narcotics and Celebrex  ASA started after surgery, asking if this can be discontinued  She is up, ambulating with a cane  Has follow up with surgery, incision healing no redness or drainage    COVID  Suffered COVID infection roughly 15 d ago while at the TCU  Got 5  d of Paxlovid,   Some fatigue but breathing well, appetite dampened but eating and drinking  Had COVID and Flu shots just prior to surgery.  Has groceries in house,  friends are helping      Hyperlipidemia  On simvastatin  Medication was held while on Paxlovid  Asking if she may resume med    Vitals:  No vitals were obtained today due to virtual visit.    Physical Exam   healthy, alert, and no distress  PSYCH: Alert and oriented times 3; coherent speech, normal   rate and volume, able to articulate logical thoughts, able   to abstract reason, no tangential thoughts, no hallucinations   or delusions  Her affect is normal  RESP: No cough, no audible wheezing, able to talk in full sentences  Remainder of exam unable to be completed due to telephone visits    Reviewed hospital records

## 2024-01-24 NOTE — TELEPHONE ENCOUNTER
Who is calling? Yoselin Bullock   Reason for Call: Medication check. Does not know which medication patient is concerned about. Requests call back

## 2024-01-24 NOTE — TELEPHONE ENCOUNTER
Called Yoselin RN, at [facility], [phone] to update her on Shyann's medications after her recent visit with Dr. Starks on 1/15/24.      1) Stop ASA  2) Stop Celebrex  3) Continue Tylenol 6430-3236 mg max daily  4) Restart Simvastatin 20 mg daily now that she is done with Paxlovid.    PAT CharlesN, RN, [facility]  RN Care Coordinator  [facility]  01/24/24  2:47 PM  Phone: [phone]

## 2024-01-26 NOTE — TELEPHONE ENCOUNTER
Home Care Verbal Orders    Caller Name:Dulce   Agency: [facility]    Orders Requested:   Occupational Therapy (OT) 2 times a week for 1 week starting   1/29/24

## 2024-02-08 NOTE — TELEPHONE ENCOUNTER
Returned call to [facility] to confirm verbal orders as requested:  Physical Therapy (PT) 3 add'l PT visits ; once a week for four weeks.     NICK Stapleton, RN  02/08/24, 11:55 AM

## 2024-02-08 NOTE — TELEPHONE ENCOUNTER
Home Care Verbal Orders    Caller Name:Yoselin  Agency: [facility]    Orders Requested:   Physical Therapy (PT) 3 add'l PT visits ; once a week for four weeks.    Lita

## 2024-03-04 NOTE — PROGRESS NOTES
Pt stopped tylenol as she reports it triggered diarrhea.  Diarrhea stopped when she stopped medication.    Aleksandra Starks MD  Internal Medicine/Pediatrics

## 2024-03-06 NOTE — TELEPHONE ENCOUNTER
Called Yoselin PT, with [facility] at [phone] and left a message with verbal authorization for:    Physical Therapy (PT) once every two weeks for six weeks,     I asked Yoselin to continue to monitor Shyann's HR and breathing status with her activity based on her report during today's visit, however, I did explain it isn't to be unexpected as she just had a knee replacement a month ago and she is obese and deconditioned.     FYI heart rate has been 105, and SOB Sx with walking    PAT CharlesN, RN, CCM  RN Care Coordinator  [facility]  03/06/24  5:09 PM  Phone: [phone]

## 2024-03-06 NOTE — TELEPHONE ENCOUNTER
Home Care Verbal Orders    Caller Name:Yoselin  Agency: [facility]    Orders Requested:   Physical Therapy (PT) once every two weeks for six weeks,      FYI heart rate has been 105, and SOB Sx with walking    Lita

## 2024-04-19 NOTE — TELEPHONE ENCOUNTER
General (use ONLY if request does not fit into other dot phrase categories)    Who is calling - Shyann  Reason for call - Made an appointment to see Raudel next week, hopefully go over medication and referral   Action desired - Would like labs put in for simvastatin   Return call needed? No  Advised patient that response may take up to 1-2 business days? Yes  Ok to leave a message on VM? Yes

## 2024-04-25 NOTE — PROGRESS NOTES
Shyann Samuel is a 84 year old female here for the following issues:    Total L knee arthroplasty 12/13  Transitional care, ready to discharge home 12/31 but came down with COVID  Took Paxlovid  Finally returned home with home health care services  Then had diarrhea, links it to tylenol 4000mg dose  Stopped taking tylenol and diarrhea has resolved  Home exercise program  Using naproxen 1 tablet twice daily  Trying to wean off med    Lymphedema  Has chronic leg asymmetry, L>>R.   was told would worsen after knee surgery. (3-12 mos)  First 6 wk after surgery, wrapped lower extremity which helped  Wearing compression daily, using donning aid  May return to lymphedema therapy    Poor sleep  Longstanding issue--20 +yr hx  Has worked with sleep clinic  Reports she could do better in adhering to good sleep hygiene  Avoiding napping during the day     Essential hypertension  Lisinopril 5mg daily  BP in target range  Home BP reading 125/80  No chest pain or fluttering   BP Readings from Last 3 Encounters:   04/25/24 122/79   11/22/23 129/82   03/20/23 134/86       Hyperlipidemia  Simvastatin 20mg daily  Hx of elevated TG  No hx of ASCVD or stroke  Diet is omnivore.   Eating whole grains  Cooking at home  Wt Readings from Last 4 Encounters:   04/25/24 79.8 kg (176 lb)   11/22/23 78.9 kg (174 lb)   03/20/23 79 kg (174 lb 1.9 oz)   03/08/23 79.4 kg (175 lb)       Recent Labs   Lab Test 03/20/23  1637 08/31/22  1618 10/01/21  1218 09/18/20  0952 07/03/19  1049   CHOL 188 175   < > 174.0 180.0   HDL 48* 43*   < > 60.0 62.0   LDL 81 78   < > 75.0 82.0   TRIG 297* 268*   < > 199.0* 178.0*   CHOLHDLRATIO  --   --   --  2.9 2.9    < > = values in this interval not displayed.     HCM  Previous colonoscopy negative, no polyps  Mammogram 2015, no family history  DEXA--lactose intolerant, supplement with calcium and D, requesting study  No formal program. Will start walking outside    VACCINE  COVID illness January 2024  RSV/ Shingles  due at pharmacy    Up to date on dentist  Due for eye doctor, for new eye Rx, no eye diseases    Patient Active Problem List   Diagnosis    Chronic bilateral low back pain with sciatica    Status post hip replacement    Hyperlipidemia    Anterior corneal dystrophy    Hypertension    Insomnia    Osteoarthritis    Cataract    Dermatitis medicamentosa    Esotropia    Macular puckering    Nontoxic multinodular goiter    Posterior vitreous detachment    Recurrent UTI    Other symptoms involving nervous and musculoskeletal systems(781.99)    Atrophic vaginitis    Overactive bladder    Back pain    Health Care Home    Left knee pain    Lower extremity edema    Spinal stenosis of lumbar region with neurogenic claudication    Brow ptosis, bilateral    Dermatochalasis of both upper eyelids    Ptosis of eyelid, bilateral    Pain in joint, pelvic region and thigh, unspecified laterality    Pain of right thigh    Iliotibial band syndrome, right    Mood change    Pain of left thigh    Lymphedema    Acquired left calf asymmetry    Prediabetes       Current Outpatient Medications   Medication Sig Dispense Refill    calcium carbonate 600 mg-vitamin D 400 units (CALCIUM CARB-CHOLECALCIFEROL) 600-400 MG-UNIT per tablet Take 1 tablet by mouth 2 times daily 180 tablet 3    cyanocolbalamin (VITAMIN  B-12) 1000 MCG tablet Take 1 tablet by mouth three times a week.      lisinopril (ZESTRIL) 5 MG tablet Take 1 tablet (5 mg) by mouth daily 90 tablet 3    naproxen sodium (ANAPROX) 220 MG tablet Take 1 tablet (220 mg) by mouth 2 times daily (with meals)      Omega-3 Fatty Acids (OMEGA-3 FISH OIL PO) Take  by mouth.      simvastatin (ZOCOR) 20 MG tablet Take 1 tablet (20 mg) by mouth At Bedtime 90 tablet 3       Allergies   Allergen Reactions    Acetaminophen Diarrhea     Stopped by patient    Benzalkonium Chloride      Eye irritation    Nitrofurantoin Other (See Comments)     Causes hast heart rate.  Causes fast heart rate.        EXAM  BP  122/79 (BP Location: Left arm, Patient Position: Sitting, Cuff Size: Adult Large)   Pulse 96   Temp 98  F (36.7  C) (Skin)   Wt 79.8 kg (176 lb)   SpO2 96%   BMI 31.19 kg/m    Gen: Alert, pleasant, NAD  COR: S1,S2, no murmur  Lungs: CTA bilaterally, no rhonchi, wheezes or rales  Ext: asymmetry, left leg >>R. Support hose on L leg, trace edema on right lower extremity      Assessment:  (I10) Essential hypertension  (primary encounter diagnosis)  Comment: BP in target range  Plan: lisinopril (ZESTRIL) 5 MG tablet, Comprehensive        metabolic panel        Refilled medication for one  year    (E78.5) Hyperlipidemia, unspecified hyperlipidemia type  Comment: on simvastatin , not fasting today  Plan: simvastatin (ZOCOR) 20 MG tablet, Comprehensive        metabolic panel        Continue simvastatin    (E28.39) Estrogen deficiency  Comment: never had bone density, requesting study  Plan: DX Bone Density        Discussed calcium/ D intake recommendations, she is lactose intolerant, may benefit from supplements    33 minutes spend on the date of this encounter doing chart review, history and exam, documentation and further activities as noted above.       Aleksandra Starks MD  Internal Medicine/Pediatrics

## 2024-04-25 NOTE — NURSING NOTE
Shyann  84 year old    Chief Complaint   Patient presents with    Medication Refill     Lisinopril and simvastatin    Follow Up     LEFT knee replacement in Dec 2023, got COVID before discharged from rehab, sent home with home health care - lymphedema still present in LEFT leg for last 4 months    Referral     DEXA            Blood pressure 122/79, pulse 96, temperature 98  F (36.7  C), temperature source Skin, weight 79.8 kg (176 lb), SpO2 96%, not currently breastfeeding. Body mass index is 31.19 kg/m .    Patient Active Problem List   Diagnosis    Chronic bilateral low back pain with sciatica    Status post hip replacement    Hyperlipidemia    Anterior corneal dystrophy    Hypertension    Insomnia    Osteoarthritis    Cataract    Dermatitis medicamentosa    Esotropia    Macular puckering    Nontoxic multinodular goiter    Posterior vitreous detachment    Recurrent UTI    Other symptoms involving nervous and musculoskeletal systems(781.99)    Atrophic vaginitis    Overactive bladder    Back pain    Health Care Home    Left knee pain    Lower extremity edema    Spinal stenosis of lumbar region with neurogenic claudication    Brow ptosis, bilateral    Dermatochalasis of both upper eyelids    Ptosis of eyelid, bilateral    Pain in joint, pelvic region and thigh, unspecified laterality    Pain of right thigh    Iliotibial band syndrome, right    Mood change    Pain of left thigh    Lymphedema    Acquired left calf asymmetry    Prediabetes              Wt Readings from Last 2 Encounters:   04/25/24 79.8 kg (176 lb)   11/22/23 78.9 kg (174 lb)       BP Readings from Last 3 Encounters:   04/25/24 122/79   11/22/23 129/82   03/20/23 134/86                Current Outpatient Medications   Medication Sig Dispense Refill    calcium carbonate 600 mg-vitamin D 400 units (CALCIUM CARB-CHOLECALCIFEROL) 600-400 MG-UNIT per tablet Take 1 tablet by mouth 2 times daily 180 tablet 3    cyanocolbalamin (VITAMIN  B-12) 1000 MCG tablet  "Take 1 tablet by mouth three times a week.      lisinopril (ZESTRIL) 5 MG tablet Take 1 tablet (5 mg) by mouth daily 90 tablet 3    naproxen sodium (ANAPROX) 220 MG tablet Take 1 tablet (220 mg) by mouth 2 times daily (with meals)      Omega-3 Fatty Acids (OMEGA-3 FISH OIL PO) Take  by mouth.      simvastatin (ZOCOR) 20 MG tablet Take 1 tablet (20 mg) by mouth At Bedtime 90 tablet 3     Current Facility-Administered Medications   Medication Dose Route Frequency Provider Last Rate Last Admin    lidocaine (PF) (XYLOCAINE) 1 % injection 7 mL  7 mL   Christopher Snell MD   7 mL at 03/02/23 1448    triamcinolone (KENALOG-40) injection 40 mg  40 mg   Christopher Snell MD   40 mg at 03/02/23 1448              Social History     Tobacco Use    Smoking status: Never    Smokeless tobacco: Never   Vaping Use    Vaping status: Never Used   Substance Use Topics    Alcohol use: No    Drug use: No              Health Maintenance Due   Topic Date Due    DEXA  Never done    URINE DRUG SCREEN  Never done    ZOSTER IMMUNIZATION (1 of 2) Never done    RSV VACCINE (Pregnancy & 60+) (1 - 1-dose 60+ series) Never done    MEDICARE ANNUAL WELLNESS VISIT  Never done    DTAP/TDAP/TD IMMUNIZATION (1 - Tdap) 09/19/2020    FALL RISK ASSESSMENT  12/29/2021    COVID-19 Vaccine (5 - 2023-24 season) 03/15/2024    LIPID  03/20/2024            No results found for: \"PAP\"           April 25, 2024 3:44 PM  "

## 2024-06-26 NOTE — PROGRESS NOTES
Assessment & Plan   Problem List Items Addressed This Visit    None  Visit Diagnoses       Constipation, unspecified constipation type    -  Primary    Relevant Medications    docusate sodium (COLACE) 100 MG capsule    polyethylene glycol (MIRALAX) 17 GM/Dose powder           Suspicion that current issues with constipation are due to recent narcotic use, possibly exacerbated by something in the herbal remedy Shyann ordered online to help with sleep. Will trial meds as noted above and follow up as indicated.      Aubrey Alves MD  4:58 PM, June 26, 2024        Naresh Boothe is a 84 year old, presenting for the following health issues:  Constipation (X2 weeks -- believes sx started when she was having sleep issues. Took a hydrocodone from a prior surgery, constipated the following day. Uses a chinese herbal med for sleep, ran out of it and unable to find more. Got a different med online, constipation got worse while using. Sleep is effected, ok when walking around in the day, but when laying down gets discomfort that will wake her. Stopped new herbal med but still having sx.) and Follow Up (Had looked at a FODMAP diet to help with gas -- gut micro biome issues? Eating yogurt now.)    HPI   Constipation  - recent history as noted above.  - no BM for the past 4 days, so not sure if she has blood in her stool  - hasn't been drinking as much water as she should because her abdomen feels so full that it can be very uncomfortable  - no nausea        Review of Systems  Constitutional, HEENT, cardiovascular, pulmonary, gi and gu systems are negative, except as otherwise noted.      Objective    /66 (BP Location: Left arm, Patient Position: Sitting, Cuff Size: Adult Large)   Pulse 89   Temp 98.4  F (36.9  C) (Temporal)   Resp 18   SpO2 95%   There is no height or weight on file to calculate BMI.  Physical Exam   GENERAL: alert and no distress  NECK: no adenopathy, no asymmetry, masses, or scars  RESP: lungs  clear to auscultation - no rales, rhonchi or wheezes  CV: regular rate and rhythm, normal S1 S2, no S3 or S4, no murmur, click or rub, no peripheral edema  ABDOMEN: soft but full, nontender, no hepatosplenomegaly, no masses and bowel sounds normal  MS: no gross musculoskeletal defects noted, no edema          Signed Electronically by: Aubrey Alves MD

## 2024-06-26 NOTE — NURSING NOTE
84 year old  Chief Complaint   Patient presents with    Constipation     X2 weeks -- believes sx started when she was having sleep issues. Took a hydrocodone from a prior surgery, constipated the following day. Uses a chinese herbal med for sleep, ran out of it and unable to find more. Got a different med online, constipation got worse while using. Sleep is effected, ok when walking around in the day, but when laying down gets discomfort that will wake her. Stopped new herbal med but still having sx.    Follow Up     Had looked at a FODMAP diet to help with gas -- gut micro biome issues? Eating yogurt now.       Blood pressure 113/66, pulse 89, temperature 98.4  F (36.9  C), temperature source Temporal, resp. rate 18, SpO2 95%, not currently breastfeeding. There is no height or weight on file to calculate BMI.  Patient Active Problem List   Diagnosis    Chronic bilateral low back pain with sciatica    Status post hip replacement    Hyperlipidemia    Anterior corneal dystrophy    Hypertension    Insomnia    Osteoarthritis    Cataract    Dermatitis medicamentosa    Esotropia    Macular puckering    Nontoxic multinodular goiter    Posterior vitreous detachment    Recurrent UTI    Other symptoms involving nervous and musculoskeletal systems(781.99)    Atrophic vaginitis    Overactive bladder    Back pain    Left knee pain    Lower extremity edema    Spinal stenosis of lumbar region with neurogenic claudication    Brow ptosis, bilateral    Dermatochalasis of both upper eyelids    Ptosis of eyelid, bilateral    Pain in joint, pelvic region and thigh, unspecified laterality    Pain of right thigh    Iliotibial band syndrome, right    Mood change    Pain of left thigh    Lymphedema    Acquired left calf asymmetry    Prediabetes       Wt Readings from Last 2 Encounters:   04/25/24 79.8 kg (176 lb)   11/22/23 78.9 kg (174 lb)     BP Readings from Last 3 Encounters:   06/26/24 113/66   04/25/24 122/79   11/22/23 129/82  "        Current Outpatient Medications   Medication Sig Dispense Refill    calcium carbonate 600 mg-vitamin D 400 units (CALCIUM CARB-CHOLECALCIFEROL) 600-400 MG-UNIT per tablet Take 1 tablet by mouth 2 times daily 180 tablet 3    cyanocolbalamin (VITAMIN  B-12) 1000 MCG tablet Take 1 tablet by mouth three times a week.      lisinopril (ZESTRIL) 5 MG tablet Take 1 tablet (5 mg) by mouth daily 90 tablet 3    naproxen sodium (ANAPROX) 220 MG tablet Take 1 tablet (220 mg) by mouth 2 times daily (with meals)      Omega-3 Fatty Acids (OMEGA-3 FISH OIL PO) Take  by mouth.      simvastatin (ZOCOR) 20 MG tablet Take 1 tablet (20 mg) by mouth at bedtime 90 tablet 3     Current Facility-Administered Medications   Medication Dose Route Frequency Provider Last Rate Last Admin    lidocaine (PF) (XYLOCAINE) 1 % injection 7 mL  7 mL   Christopher Snell MD   7 mL at 03/02/23 1448    triamcinolone (KENALOG-40) injection 40 mg  40 mg   Christopher Snell MD   40 mg at 03/02/23 1448       Social History     Tobacco Use    Smoking status: Never    Smokeless tobacco: Never   Vaping Use    Vaping status: Never Used   Substance Use Topics    Alcohol use: No    Drug use: No       Health Maintenance Due   Topic Date Due    DEXA  Never done    ZOSTER IMMUNIZATION (1 of 2) Never done    RSV VACCINE (Pregnancy & 60+) (1 - 1-dose 60+ series) Never done    MEDICARE ANNUAL WELLNESS VISIT  Never done    DTAP/TDAP/TD IMMUNIZATION (1 - Tdap) 09/19/2020    FALL RISK ASSESSMENT  12/29/2021    COVID-19 Vaccine (5 - 2023-24 season) 03/15/2024       No results found for: \"PAP\"      June 26, 2024 4:15 PM    "

## 2024-08-17 ENCOUNTER — HEALTH MAINTENANCE LETTER (OUTPATIENT)
Age: 84
End: 2024-08-17

## (undated) DEVICE — GLOVE PROTEXIS MICRO 7.5  2D73PM75

## (undated) DEVICE — SOL WATER IRRIG 500ML BOTTLE 2F7113

## (undated) DEVICE — ESU EYE HIGH TEMP 65410-183

## (undated) DEVICE — PACK MINOR EYE CUSTOM ASC

## (undated) DEVICE — SU MERSILENE 5-0 S-24DA 18" 1761G

## (undated) DEVICE — EYE PREP BETADINE 5% SOLUTION 30ML 0065-0411-30

## (undated) DEVICE — SU PDS II 4-0 P-3 18" Z494G

## (undated) DEVICE — LINEN TOWEL PACK X5 5464

## (undated) DEVICE — SU PLAIN 6-0 G-1DA 18" 770G

## (undated) RX ORDER — DEXAMETHASONE SODIUM PHOSPHATE 10 MG/ML
INJECTION, SOLUTION INTRAMUSCULAR; INTRAVENOUS
Status: DISPENSED
Start: 2018-07-06

## (undated) RX ORDER — TRIAMCINOLONE ACETONIDE 40 MG/ML
INJECTION, SUSPENSION INTRA-ARTICULAR; INTRAMUSCULAR
Status: DISPENSED
Start: 2023-03-02

## (undated) RX ORDER — ACETAMINOPHEN 325 MG/1
TABLET ORAL
Status: DISPENSED
Start: 2020-10-14

## (undated) RX ORDER — LIDOCAINE HYDROCHLORIDE 10 MG/ML
INJECTION, SOLUTION INFILTRATION; PERINEURAL
Status: DISPENSED
Start: 2023-03-02

## (undated) RX ORDER — BUPIVACAINE HYDROCHLORIDE 5 MG/ML
INJECTION, SOLUTION EPIDURAL; INTRACAUDAL
Status: DISPENSED
Start: 2018-07-06

## (undated) RX ORDER — LIDOCAINE HYDROCHLORIDE 20 MG/ML
INJECTION, SOLUTION EPIDURAL; INFILTRATION; INTRACAUDAL; PERINEURAL
Status: DISPENSED
Start: 2020-10-14

## (undated) RX ORDER — LIDOCAINE HYDROCHLORIDE 10 MG/ML
INJECTION, SOLUTION EPIDURAL; INFILTRATION; INTRACAUDAL; PERINEURAL
Status: DISPENSED
Start: 2018-07-06

## (undated) RX ORDER — PROPOFOL 10 MG/ML
INJECTION, EMULSION INTRAVENOUS
Status: DISPENSED
Start: 2020-10-14

## (undated) RX ORDER — ONDANSETRON 2 MG/ML
INJECTION INTRAMUSCULAR; INTRAVENOUS
Status: DISPENSED
Start: 2020-10-14

## (undated) RX ORDER — FENTANYL CITRATE 50 UG/ML
INJECTION, SOLUTION INTRAMUSCULAR; INTRAVENOUS
Status: DISPENSED
Start: 2020-10-14